# Patient Record
Sex: MALE | Race: WHITE | Employment: OTHER | URBAN - METROPOLITAN AREA
[De-identification: names, ages, dates, MRNs, and addresses within clinical notes are randomized per-mention and may not be internally consistent; named-entity substitution may affect disease eponyms.]

---

## 2018-05-12 ENCOUNTER — HOSPITAL ENCOUNTER (OUTPATIENT)
Age: 68
Discharge: HOME | End: 2018-05-14
Payer: MEDICARE

## 2018-05-12 ENCOUNTER — HOSPITAL ENCOUNTER (OUTPATIENT)
Dept: GENERAL RADIOLOGY | Age: 68
Discharge: HOME | End: 2018-05-14
Payer: MEDICARE

## 2018-05-12 DIAGNOSIS — M19.90 OSTEOARTHRITIS, UNSPECIFIED OSTEOARTHRITIS TYPE, UNSPECIFIED SITE: ICD-10-CM

## 2018-05-12 PROCEDURE — 73590 X-RAY EXAM OF LOWER LEG: CPT

## 2018-08-30 ENCOUNTER — OFFICE VISIT (OUTPATIENT)
Dept: FAMILY MEDICINE CLINIC | Age: 68
End: 2018-08-30
Payer: MEDICARE

## 2018-08-30 VITALS
BODY MASS INDEX: 42.66 KG/M2 | RESPIRATION RATE: 12 BRPM | HEIGHT: 72 IN | OXYGEN SATURATION: 97 % | DIASTOLIC BLOOD PRESSURE: 76 MMHG | HEART RATE: 66 BPM | WEIGHT: 315 LBS | SYSTOLIC BLOOD PRESSURE: 130 MMHG | TEMPERATURE: 96.4 F

## 2018-08-30 DIAGNOSIS — E11.59 TYPE 2 DIABETES MELLITUS WITH OTHER CIRCULATORY COMPLICATION, WITH LONG-TERM CURRENT USE OF INSULIN (HCC): Primary | ICD-10-CM

## 2018-08-30 DIAGNOSIS — R79.89 LOW TESTOSTERONE IN MALE: ICD-10-CM

## 2018-08-30 DIAGNOSIS — Z87.891 HISTORY OF TOBACCO USE: ICD-10-CM

## 2018-08-30 DIAGNOSIS — Z79.4 TYPE 2 DIABETES MELLITUS WITH OTHER CIRCULATORY COMPLICATION, WITH LONG-TERM CURRENT USE OF INSULIN (HCC): Primary | ICD-10-CM

## 2018-08-30 PROBLEM — J96.21 ACUTE AND CHRONIC RESPIRATORY FAILURE WITH HYPOXIA (HCC): Status: ACTIVE | Noted: 2017-01-16

## 2018-08-30 PROBLEM — G47.33 OSA ON CPAP: Status: ACTIVE | Noted: 2017-01-16

## 2018-08-30 PROBLEM — R06.2 WHEEZING: Status: ACTIVE | Noted: 2017-01-16

## 2018-08-30 PROBLEM — Z99.89 OSA ON CPAP: Status: ACTIVE | Noted: 2017-01-16

## 2018-08-30 PROCEDURE — 4040F PNEUMOC VAC/ADMIN/RCVD: CPT | Performed by: FAMILY MEDICINE

## 2018-08-30 PROCEDURE — G8417 CALC BMI ABV UP PARAM F/U: HCPCS | Performed by: FAMILY MEDICINE

## 2018-08-30 PROCEDURE — 1123F ACP DISCUSS/DSCN MKR DOCD: CPT | Performed by: FAMILY MEDICINE

## 2018-08-30 PROCEDURE — 4004F PT TOBACCO SCREEN RCVD TLK: CPT | Performed by: FAMILY MEDICINE

## 2018-08-30 PROCEDURE — 1101F PT FALLS ASSESS-DOCD LE1/YR: CPT | Performed by: FAMILY MEDICINE

## 2018-08-30 PROCEDURE — 3017F COLORECTAL CA SCREEN DOC REV: CPT | Performed by: FAMILY MEDICINE

## 2018-08-30 PROCEDURE — 99203 OFFICE O/P NEW LOW 30 MIN: CPT | Performed by: FAMILY MEDICINE

## 2018-08-30 PROCEDURE — G8427 DOCREV CUR MEDS BY ELIG CLIN: HCPCS | Performed by: FAMILY MEDICINE

## 2018-08-30 PROCEDURE — 2022F DILAT RTA XM EVC RTNOPTHY: CPT | Performed by: FAMILY MEDICINE

## 2018-08-30 PROCEDURE — 3046F HEMOGLOBIN A1C LEVEL >9.0%: CPT | Performed by: FAMILY MEDICINE

## 2018-08-30 RX ORDER — ALBUTEROL SULFATE 90 UG/1
2 AEROSOL, METERED RESPIRATORY (INHALATION) DAILY
COMMUNITY
Start: 2018-06-26 | End: 2020-02-13

## 2018-08-30 RX ORDER — IPRATROPIUM BROMIDE AND ALBUTEROL SULFATE 2.5; .5 MG/3ML; MG/3ML
3 SOLUTION RESPIRATORY (INHALATION) EVERY 4 HOURS PRN
Status: ON HOLD | COMMUNITY
Start: 2017-01-16 | End: 2019-10-25 | Stop reason: HOSPADM

## 2018-08-30 RX ORDER — GABAPENTIN 800 MG/1
800 TABLET ORAL 3 TIMES DAILY
COMMUNITY
End: 2018-08-30

## 2018-08-30 RX ORDER — GABAPENTIN 800 MG/1
800 TABLET ORAL 3 TIMES DAILY
Qty: 90 TABLET | Refills: 0 | Status: SHIPPED | OUTPATIENT
Start: 2018-08-30 | End: 2018-10-18 | Stop reason: SDUPTHER

## 2018-08-30 ASSESSMENT — PATIENT HEALTH QUESTIONNAIRE - PHQ9
SUM OF ALL RESPONSES TO PHQ9 QUESTIONS 1 & 2: 0
SUM OF ALL RESPONSES TO PHQ QUESTIONS 1-9: 0
SUM OF ALL RESPONSES TO PHQ QUESTIONS 1-9: 0
2. FEELING DOWN, DEPRESSED OR HOPELESS: 0
1. LITTLE INTEREST OR PLEASURE IN DOING THINGS: 0

## 2018-08-30 NOTE — PROGRESS NOTES
Subjective:      Patient ID: Launie Sandifer is a 79 y.o. male who presents today for:  Chief Complaint   Patient presents with   [address]     previous PCP Raudel Pierce. license was suspended    Diabetes     patient is here for a follow up on DM type 2. Patient is currently taking Insulin with complications. Diabetes   He presents for his follow-up diabetic visit. His disease course has been stable. There are no hypoglycemic associated symptoms. Pertinent negatives for hypoglycemia include no headaches or seizures. Pertinent negatives for diabetes include no blurred vision, no chest pain, no fatigue, no foot paresthesias, no foot ulcerations, no polydipsia, no polyphagia, no polyuria, no visual change, no weakness and no weight loss. There are no hypoglycemic complications. Symptoms are stable. He is compliant with treatment all of the time. His weight is stable. He is following a generally healthy diet. Meal planning includes avoidance of concentrated sweets. He participates in exercise intermittently. His overall blood glucose range is 130-140 mg/dl. An ACE inhibitor/angiotensin II receptor blocker is being taken. Patient also has a history of low testosterone that he was receiving injections at his primary care office. He states that he recently pain lab work last month and will forward it to our office for review  Past Medical History:   Diagnosis Date    Asthma     [facility]) [facility]     Neuropathy     Obesity     Sleep apnea     Type 2 diabetes mellitus without complication ([facility]      Past Surgical History:   Procedure Laterality Date    RECTAL SURGERY  2015    patient had a boil break open inside rectum. needed surgery for infection removal.     History reviewed. No pertinent family history.   Social History     Social History    Marital status:      Spouse name: N/A    Number of children: N/A    Years of education: N/A     Occupational

## 2018-09-06 ASSESSMENT — ENCOUNTER SYMPTOMS
NAUSEA: 0
DIARRHEA: 0
VOMITING: 0
SHORTNESS OF BREATH: 0
CONSTIPATION: 0
ABDOMINAL PAIN: 0
BLOOD IN STOOL: 0
BLURRED VISION: 0
APNEA: 0
VISUAL CHANGE: 0
COUGH: 0
CHEST TIGHTNESS: 0

## 2018-10-18 ENCOUNTER — OFFICE VISIT (OUTPATIENT)
Dept: FAMILY MEDICINE CLINIC | Age: 68
End: 2018-10-18
Payer: MEDICARE

## 2018-10-18 VITALS
SYSTOLIC BLOOD PRESSURE: 124 MMHG | RESPIRATION RATE: 14 BRPM | OXYGEN SATURATION: 98 % | BODY MASS INDEX: 42.66 KG/M2 | HEART RATE: 73 BPM | TEMPERATURE: 96.2 F | HEIGHT: 72 IN | WEIGHT: 315 LBS | DIASTOLIC BLOOD PRESSURE: 72 MMHG

## 2018-10-18 DIAGNOSIS — E11.8 TYPE 2 DIABETES MELLITUS WITH COMPLICATION, WITH LONG-TERM CURRENT USE OF INSULIN (HCC): ICD-10-CM

## 2018-10-18 DIAGNOSIS — Z91.81 AT HIGH RISK FOR FALLS: ICD-10-CM

## 2018-10-18 DIAGNOSIS — G62.9 NEUROPATHY: Primary | ICD-10-CM

## 2018-10-18 DIAGNOSIS — Z79.4 TYPE 2 DIABETES MELLITUS WITH COMPLICATION, WITH LONG-TERM CURRENT USE OF INSULIN (HCC): ICD-10-CM

## 2018-10-18 DIAGNOSIS — R79.89 LOW TESTOSTERONE IN MALE: ICD-10-CM

## 2018-10-18 PROCEDURE — 3017F COLORECTAL CA SCREEN DOC REV: CPT | Performed by: FAMILY MEDICINE

## 2018-10-18 PROCEDURE — G8417 CALC BMI ABV UP PARAM F/U: HCPCS | Performed by: FAMILY MEDICINE

## 2018-10-18 PROCEDURE — 99214 OFFICE O/P EST MOD 30 MIN: CPT | Performed by: FAMILY MEDICINE

## 2018-10-18 PROCEDURE — 2022F DILAT RTA XM EVC RTNOPTHY: CPT | Performed by: FAMILY MEDICINE

## 2018-10-18 PROCEDURE — 3046F HEMOGLOBIN A1C LEVEL >9.0%: CPT | Performed by: FAMILY MEDICINE

## 2018-10-18 PROCEDURE — 1123F ACP DISCUSS/DSCN MKR DOCD: CPT | Performed by: FAMILY MEDICINE

## 2018-10-18 PROCEDURE — 4004F PT TOBACCO SCREEN RCVD TLK: CPT | Performed by: FAMILY MEDICINE

## 2018-10-18 PROCEDURE — 4040F PNEUMOC VAC/ADMIN/RCVD: CPT | Performed by: FAMILY MEDICINE

## 2018-10-18 PROCEDURE — 1101F PT FALLS ASSESS-DOCD LE1/YR: CPT | Performed by: FAMILY MEDICINE

## 2018-10-18 PROCEDURE — G8484 FLU IMMUNIZE NO ADMIN: HCPCS | Performed by: FAMILY MEDICINE

## 2018-10-18 PROCEDURE — G8427 DOCREV CUR MEDS BY ELIG CLIN: HCPCS | Performed by: FAMILY MEDICINE

## 2018-10-18 RX ORDER — GABAPENTIN 800 MG/1
800 TABLET ORAL 3 TIMES DAILY
Qty: 90 TABLET | Refills: 0 | Status: SHIPPED | OUTPATIENT
Start: 2018-10-18 | End: 2020-02-13

## 2018-10-18 NOTE — PROGRESS NOTES
Subjective:      Patient ID: Amanda Stevens is a 79 y.o. male who presents today for:  No chief complaint on file. HPI  Patient is 57-year-old male with diabetic neuropathy who presents today to follow-up after initial visit last month. Unsure at this time whether he will continue to establish care or will go back to his previous doctor who is currently under suspension. He has not had an hemoglobin A1c in over 3 months and does not check his sugars regularly. However he is compliant/adherent to his medication. He continues to have a wound VAC in place due to a poor healing wound on his right leg. He also has a history of low testosterone which levels have not been checked in over one year. The patient does have mild fatigue and decreased libido    Past Medical History:   Diagnosis Date    Asthma     [facility] obstructive [facility] disease) [facility]     Neuropathy     Obesity     Sleep apnea     Type 2 diabetes mellitus without complication ([facility]      Past Surgical History:   Procedure Laterality Date    RECTAL SURGERY  2015    patient had a boil break open inside rectum. needed surgery for infection removal.     History reviewed. No pertinent family history. Social History     Social History    Marital status:      Spouse name: N/A    Number of children: N/A    Years of education: N/A     Occupational History    Not on file.      Social History Main Topics    Smoking status: Current Every Day Smoker     Packs/day: 0.50     Years: 58.00     Types: Cigarettes    Smokeless tobacco: Never Used    Alcohol use Not on file    Drug use: No    Sexual activity: Not on file     Other Topics Concern    Not on file     Social History Narrative    No narrative on file     Current Outpatient Prescriptions on File Prior to Visit   Medication Sig Dispense Refill    albuterol sulfate  (90 Base) MCG/ACT inhaler Inhale 2 puffs into the lungs      ipratropium-albuterol (DUONEB) 0.5-2.5 (3) MG/3ML reviewed. Assessment & Plan:     1. Neuropathy  We will continue chronic dose of Neurontin  - gabapentin (NEURONTIN) 800 MG tablet; Take 1 tablet by mouth 3 times daily for 30 days. .  Dispense: 90 tablet; Refill: 0    2. Type 2 diabetes mellitus with complication, with long-term current use of insulin ([facility]  Wall obtain labs to assess current status of diabetes if patient is willing to follow-up  - CBC Auto Differential; Future  - Comprehensive Metabolic Panel; Future  - Hemoglobin A1C; Future  - Lipid Panel; Future    3. At high risk for falls  Advised home safety tips    4. Low testosterone in male  We will check current testosterone levels per patient request  - Testosterone Free And Total Male; Future      Return in about 1 month (around 11/18/2018). Jack Cortes MD              On the basis of positive falls risk screening, assessment and plan is as follows: home safety tips provided.

## 2018-10-24 ASSESSMENT — ENCOUNTER SYMPTOMS
BLOOD IN STOOL: 0
SHORTNESS OF BREATH: 0
ABDOMINAL PAIN: 0
VOMITING: 0
CONSTIPATION: 0
APNEA: 0
CHEST TIGHTNESS: 0
DIARRHEA: 0
NAUSEA: 0
COUGH: 0

## 2018-11-13 ENCOUNTER — APPOINTMENT (OUTPATIENT)
Dept: GENERAL RADIOLOGY | Age: 68
End: 2018-11-13
Payer: MEDICARE

## 2018-11-13 ENCOUNTER — HOSPITAL ENCOUNTER (EMERGENCY)
Age: 68
Discharge: HOME | End: 2018-11-13
Attending: EMERGENCY MEDICINE
Payer: MEDICARE

## 2018-11-13 VITALS
BODY MASS INDEX: 48.15 KG/M2 | RESPIRATION RATE: 18 BRPM | WEIGHT: 315 LBS | OXYGEN SATURATION: 94 % | HEART RATE: 64 BPM | SYSTOLIC BLOOD PRESSURE: 117 MMHG | DIASTOLIC BLOOD PRESSURE: 93 MMHG | TEMPERATURE: 99.3 F

## 2018-11-13 DIAGNOSIS — M25.561 ACUTE PAIN OF RIGHT KNEE: Primary | ICD-10-CM
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
## 2018-11-13 DIAGNOSIS — M25.562 ACUTE PAIN OF LEFT KNEE: ICD-10-CM

## 2018-11-13 LAB
ALBUMIN SERPL-MCNC: 2.6 G/DL (ref 3.9–4.9)
ALP BLD-CCNC: 164 U/L (ref 35–104)
ALT SERPL-CCNC: <5 U/L (ref 0–41)
ANION GAP SERPL CALCULATED.3IONS-SCNC: 12 MEQ/L (ref 7–13)
AST SERPL-CCNC: 34 U/L (ref 0–40)
BASOPHILS ABSOLUTE: 0.1 K/UL (ref 0–0.2)
BASOPHILS RELATIVE PERCENT: 0.8 %
BILIRUB SERPL-MCNC: 1.6 MG/DL (ref 0–1.2)
BUN BLDV-MCNC: 23 MG/DL (ref 8–23)
CALCIUM SERPL-MCNC: 8.8 MG/DL (ref 8.6–10.2)
CHLORIDE BLD-SCNC: 103 MEQ/L (ref 98–107)
CO2: 24 MEQ/L (ref 22–29)
CREAT SERPL-MCNC: 0.87 MG/DL (ref 0.7–1.2)
EOSINOPHILS ABSOLUTE: 0 K/UL (ref 0–0.7)
EOSINOPHILS RELATIVE PERCENT: 0.1 %
GFR AFRICAN AMERICAN: >60
GFR NON-AFRICAN AMERICAN: >60
GLOBULIN: 3.7 G/DL (ref 2.3–3.5)
GLUCOSE BLD-MCNC: 97 MG/DL (ref 74–109)
HCT VFR BLD CALC: 47.9 % (ref 42–52)
HEMOGLOBIN: 15.9 G/DL (ref 14–18)
LYMPHOCYTES ABSOLUTE: 0.3 K/UL (ref 1–4.8)
LYMPHOCYTES RELATIVE PERCENT: 3.1 %
MCH RBC QN AUTO: 32.8 PG (ref 27–31.3)
MCHC RBC AUTO-ENTMCNC: 33.1 % (ref 33–37)
MCV RBC AUTO: 99 FL (ref 80–100)
MONOCYTES ABSOLUTE: 0.8 K/UL (ref 0.2–0.8)
MONOCYTES RELATIVE PERCENT: 9.1 %
NEUTROPHILS ABSOLUTE: 7.9 K/UL (ref 1.4–6.5)
NEUTROPHILS RELATIVE PERCENT: 86.9 %
PDW BLD-RTO: 16.5 % (ref 11.5–14.5)
PLATELET # BLD: 137 K/UL (ref 130–400)
POTASSIUM SERPL-SCNC: 4.7 MEQ/L (ref 3.5–5.1)
RBC # BLD: 4.84 M/UL (ref 4.7–6.1)
SODIUM BLD-SCNC: 139 MEQ/L (ref 132–144)
TOTAL PROTEIN: 6.3 G/DL (ref 6.4–8.1)
WBC # BLD: 9.1 K/UL (ref 4.8–10.8)

## 2018-11-13 PROCEDURE — 99284 EMERGENCY DEPT VISIT MOD MDM: CPT

## 2018-11-13 PROCEDURE — 36415 COLL VENOUS BLD VENIPUNCTURE: CPT

## 2018-11-13 PROCEDURE — 73562 X-RAY EXAM OF KNEE 3: CPT

## 2018-11-13 PROCEDURE — 96374 THER/PROPH/DIAG INJ IV PUSH: CPT

## 2018-11-13 PROCEDURE — 2580000003 HC RX 258: Performed by: EMERGENCY MEDICINE

## 2018-11-13 PROCEDURE — 85025 COMPLETE CBC W/AUTO DIFF WBC: CPT

## 2018-11-13 PROCEDURE — 80053 COMPREHEN METABOLIC PANEL: CPT

## 2018-11-13 PROCEDURE — 96375 TX/PRO/DX INJ NEW DRUG ADDON: CPT

## 2018-11-13 PROCEDURE — 6360000002 HC RX W HCPCS: Performed by: EMERGENCY MEDICINE

## 2018-11-13 RX ORDER — 0.9 % SODIUM CHLORIDE 0.9 %
1000 INTRAVENOUS SOLUTION INTRAVENOUS ONCE
Status: COMPLETED | OUTPATIENT
Start: 2018-11-13 | End: 2018-11-13

## 2018-11-13 RX ORDER — ONDANSETRON 2 MG/ML
4 INJECTION INTRAMUSCULAR; INTRAVENOUS ONCE
Status: COMPLETED | OUTPATIENT
Start: 2018-11-13 | End: 2018-11-13

## 2018-11-13 RX ORDER — KETOROLAC TROMETHAMINE 15 MG/ML
15 INJECTION, SOLUTION INTRAMUSCULAR; INTRAVENOUS ONCE
Status: COMPLETED | OUTPATIENT
Start: 2018-11-13 | End: 2018-11-13

## 2018-11-13 RX ORDER — OXYCODONE HYDROCHLORIDE AND ACETAMINOPHEN 5; 325 MG/1; MG/1
1 TABLET ORAL PRN
Status: ON HOLD | COMMUNITY
End: 2019-05-14 | Stop reason: SDUPTHER

## 2018-11-13 RX ORDER — HYDROCODONE BITARTRATE AND ACETAMINOPHEN 5; 325 MG/1; MG/1
1 TABLET ORAL EVERY 4 HOURS PRN
Qty: 18 TABLET | Refills: 0 | Status: SHIPPED | OUTPATIENT
Start: 2018-11-13 | End: 2018-11-16

## 2018-11-13 RX ADMIN — SODIUM CHLORIDE 1000 ML: 9 INJECTION, SOLUTION INTRAVENOUS at 11:21

## 2018-11-13 RX ADMIN — HYDROMORPHONE HYDROCHLORIDE 1 MG: 1 INJECTION, SOLUTION INTRAMUSCULAR; INTRAVENOUS; SUBCUTANEOUS at 11:26

## 2018-11-13 RX ADMIN — ONDANSETRON 4 MG: 2 INJECTION INTRAMUSCULAR; INTRAVENOUS at 11:23

## 2018-11-13 RX ADMIN — KETOROLAC TROMETHAMINE 15 MG: 15 INJECTION, SOLUTION INTRAMUSCULAR; INTRAVENOUS at 11:23

## 2018-11-13 ASSESSMENT — PAIN SCALES - GENERAL
PAINLEVEL_OUTOF10: 7
PAINLEVEL_OUTOF10: 5
PAINLEVEL_OUTOF10: 7
PAINLEVEL_OUTOF10: 8

## 2018-11-13 ASSESSMENT — PAIN DESCRIPTION - ONSET
ONSET: ON-GOING
ONSET: ON-GOING

## 2018-11-13 ASSESSMENT — ENCOUNTER SYMPTOMS
DIARRHEA: 0
VOMITING: 0
ABDOMINAL PAIN: 0
BACK PAIN: 0
SORE THROAT: 0
NAUSEA: 0
COUGH: 0
SHORTNESS OF BREATH: 0

## 2018-11-13 ASSESSMENT — PAIN DESCRIPTION - FREQUENCY
FREQUENCY: CONTINUOUS
FREQUENCY: CONTINUOUS

## 2018-11-13 ASSESSMENT — PAIN DESCRIPTION - DESCRIPTORS
DESCRIPTORS: THROBBING
DESCRIPTORS: THROBBING

## 2018-11-13 ASSESSMENT — PAIN DESCRIPTION - PROGRESSION
CLINICAL_PROGRESSION: NOT CHANGED
CLINICAL_PROGRESSION: NOT CHANGED

## 2018-11-13 ASSESSMENT — PAIN DESCRIPTION - LOCATION
LOCATION: KNEE
LOCATION: KNEE

## 2018-11-13 ASSESSMENT — PAIN DESCRIPTION - PAIN TYPE
TYPE: ACUTE PAIN
TYPE: CHRONIC PAIN;ACUTE PAIN

## 2018-11-13 ASSESSMENT — PAIN DESCRIPTION - ORIENTATION
ORIENTATION: RIGHT;LEFT
ORIENTATION: RIGHT;LEFT

## 2018-11-19 ENCOUNTER — HOSPITAL ENCOUNTER (INPATIENT)
Age: 68
LOS: 1 days | Discharge: ANOTHER HOSPITAL | DRG: 299 | End: 2018-11-20
Attending: INTERNAL MEDICINE | Admitting: INTERNAL MEDICINE
Payer: MEDICARE

## 2018-11-19 ENCOUNTER — APPOINTMENT (OUTPATIENT)
Dept: GENERAL RADIOLOGY | Age: 68
DRG: 299 | End: 2018-11-19
Payer: MEDICARE

## 2018-11-19 DIAGNOSIS — R26.2 UNABLE TO AMBULATE: ICD-10-CM

## 2018-11-19 DIAGNOSIS — M79.604 RIGHT LEG PAIN: ICD-10-CM

## 2018-11-19 DIAGNOSIS — L03.115 CELLULITIS OF RIGHT LOWER EXTREMITY: Primary | ICD-10-CM

## 2018-11-19 LAB
ALBUMIN SERPL-MCNC: 2.3 G/DL (ref 3.9–4.9)
ALP BLD-CCNC: 316 U/L (ref 35–104)
ALT SERPL-CCNC: <5 U/L (ref 0–41)
ANION GAP SERPL CALCULATED.3IONS-SCNC: 14 MEQ/L (ref 7–13)
APTT: 25.4 SEC (ref 21.6–35.4)
AST SERPL-CCNC: 48 U/L (ref 0–40)
BASOPHILS ABSOLUTE: 0 K/UL (ref 0–0.2)
BASOPHILS RELATIVE PERCENT: 0.4 %
BILIRUB SERPL-MCNC: 5.1 MG/DL (ref 0–1.2)
BUN BLDV-MCNC: 51 MG/DL (ref 8–23)
C-REACTIVE PROTEIN: 406.4 MG/L (ref 0–5)
CALCIUM SERPL-MCNC: 9.6 MG/DL (ref 8.6–10.2)
CHLORIDE BLD-SCNC: 94 MEQ/L (ref 98–107)
CO2: 28 MEQ/L (ref 22–29)
CREAT SERPL-MCNC: 0.99 MG/DL (ref 0.7–1.2)
EOSINOPHILS ABSOLUTE: 0 K/UL (ref 0–0.7)
EOSINOPHILS RELATIVE PERCENT: 0.1 %
GFR AFRICAN AMERICAN: >60
GFR NON-AFRICAN AMERICAN: >60
GLOBULIN: 4.6 G/DL (ref 2.3–3.5)
GLUCOSE BLD-MCNC: 204 MG/DL (ref 74–109)
HCT VFR BLD CALC: 48.8 % (ref 42–52)
HEMOGLOBIN: 16.2 G/DL (ref 14–18)
INR BLD: 1.2
LACTIC ACID: 2 MMOL/L (ref 0.5–2.2)
LYMPHOCYTES ABSOLUTE: 0.8 K/UL (ref 1–4.8)
LYMPHOCYTES RELATIVE PERCENT: 6.5 %
MCH RBC QN AUTO: 32.8 PG (ref 27–31.3)
MCHC RBC AUTO-ENTMCNC: 33.1 % (ref 33–37)
MCV RBC AUTO: 98.9 FL (ref 80–100)
MONOCYTES ABSOLUTE: 0.7 K/UL (ref 0.2–0.8)
MONOCYTES RELATIVE PERCENT: 5.5 %
NEUTROPHILS ABSOLUTE: 10.9 K/UL (ref 1.4–6.5)
NEUTROPHILS RELATIVE PERCENT: 87.5 %
PDW BLD-RTO: 17.1 % (ref 11.5–14.5)
PLATELET # BLD: 217 K/UL (ref 130–400)
POTASSIUM SERPL-SCNC: 4.5 MEQ/L (ref 3.5–5.1)
PROTHROMBIN TIME: 12.9 SEC (ref 9.6–12.3)
RBC # BLD: 4.93 M/UL (ref 4.7–6.1)
SODIUM BLD-SCNC: 136 MEQ/L (ref 132–144)
TOTAL CK: 50 U/L (ref 0–190)
TOTAL PROTEIN: 6.9 G/DL (ref 6.4–8.1)
WBC # BLD: 12.4 K/UL (ref 4.8–10.8)

## 2018-11-19 PROCEDURE — 83605 ASSAY OF LACTIC ACID: CPT

## 2018-11-19 PROCEDURE — 85025 COMPLETE CBC W/AUTO DIFF WBC: CPT

## 2018-11-19 PROCEDURE — 96375 TX/PRO/DX INJ NEW DRUG ADDON: CPT

## 2018-11-19 PROCEDURE — 80053 COMPREHEN METABOLIC PANEL: CPT

## 2018-11-19 PROCEDURE — 87040 BLOOD CULTURE FOR BACTERIA: CPT

## 2018-11-19 PROCEDURE — 2580000003 HC RX 258: Performed by: NURSE PRACTITIONER

## 2018-11-19 PROCEDURE — 86140 C-REACTIVE PROTEIN: CPT

## 2018-11-19 PROCEDURE — 85730 THROMBOPLASTIN TIME PARTIAL: CPT

## 2018-11-19 PROCEDURE — 73630 X-RAY EXAM OF FOOT: CPT

## 2018-11-19 PROCEDURE — 82550 ASSAY OF CK (CPK): CPT

## 2018-11-19 PROCEDURE — 6360000002 HC RX W HCPCS: Performed by: NURSE PRACTITIONER

## 2018-11-19 PROCEDURE — 99285 EMERGENCY DEPT VISIT HI MDM: CPT

## 2018-11-19 PROCEDURE — 93005 ELECTROCARDIOGRAM TRACING: CPT

## 2018-11-19 PROCEDURE — 85610 PROTHROMBIN TIME: CPT

## 2018-11-19 RX ORDER — KETOROLAC TROMETHAMINE 15 MG/ML
15 INJECTION, SOLUTION INTRAMUSCULAR; INTRAVENOUS ONCE
Status: COMPLETED | OUTPATIENT
Start: 2018-11-19 | End: 2018-11-19

## 2018-11-19 RX ORDER — 0.9 % SODIUM CHLORIDE 0.9 %
1000 INTRAVENOUS SOLUTION INTRAVENOUS ONCE
Status: COMPLETED | OUTPATIENT
Start: 2018-11-19 | End: 2018-11-20

## 2018-11-19 RX ORDER — NICOTINE 21 MG/24HR
1 PATCH, TRANSDERMAL 24 HOURS TRANSDERMAL ONCE
Status: DISCONTINUED | OUTPATIENT
Start: 2018-11-19 | End: 2018-11-21 | Stop reason: HOSPADM

## 2018-11-19 RX ADMIN — SODIUM CHLORIDE 1000 ML: 9 INJECTION, SOLUTION INTRAVENOUS at 22:39

## 2018-11-19 RX ADMIN — HYDROMORPHONE HYDROCHLORIDE 0.5 MG: 1 INJECTION, SOLUTION INTRAMUSCULAR; INTRAVENOUS; SUBCUTANEOUS at 22:39

## 2018-11-19 RX ADMIN — KETOROLAC TROMETHAMINE 15 MG: 15 INJECTION, SOLUTION INTRAMUSCULAR; INTRAVENOUS at 22:39

## 2018-11-19 ASSESSMENT — PAIN DESCRIPTION - LOCATION: LOCATION: FOOT;LEG

## 2018-11-19 ASSESSMENT — PAIN DESCRIPTION - PAIN TYPE
TYPE: ACUTE PAIN
TYPE: CHRONIC PAIN;ACUTE PAIN

## 2018-11-19 ASSESSMENT — PAIN SCALES - GENERAL
PAINLEVEL_OUTOF10: 5
PAINLEVEL_OUTOF10: 7
PAINLEVEL_OUTOF10: 7

## 2018-11-19 ASSESSMENT — PAIN DESCRIPTION - ORIENTATION: ORIENTATION: RIGHT;LEFT

## 2018-11-20 ENCOUNTER — APPOINTMENT (OUTPATIENT)
Dept: GENERAL RADIOLOGY | Age: 68
DRG: 299 | End: 2018-11-20
Payer: MEDICARE

## 2018-11-20 ENCOUNTER — APPOINTMENT (OUTPATIENT)
Dept: ULTRASOUND IMAGING | Age: 68
DRG: 299 | End: 2018-11-20
Payer: MEDICARE

## 2018-11-20 VITALS
OXYGEN SATURATION: 94 % | BODY MASS INDEX: 42.66 KG/M2 | RESPIRATION RATE: 16 BRPM | WEIGHT: 315 LBS | DIASTOLIC BLOOD PRESSURE: 47 MMHG | HEART RATE: 71 BPM | TEMPERATURE: 97.5 F | HEIGHT: 72 IN | SYSTOLIC BLOOD PRESSURE: 93 MMHG

## 2018-11-20 PROBLEM — D72.829 LEUKOCYTOSIS: Status: ACTIVE | Noted: 2018-11-20

## 2018-11-20 PROBLEM — E11.65 HYPERGLYCEMIA DUE TO TYPE 2 DIABETES MELLITUS (HCC): Chronic | Status: ACTIVE | Noted: 2018-11-20

## 2018-11-20 PROBLEM — Z87.891 HISTORY OF TOBACCO USE: Chronic | Status: ACTIVE | Noted: 2017-01-16

## 2018-11-20 PROBLEM — A48.0 GAS GANGRENE OF FOOT (HCC): Chronic | Status: ACTIVE | Noted: 2018-11-20

## 2018-11-20 PROBLEM — L03.90 CELLULITIS: Status: ACTIVE | Noted: 2018-11-20

## 2018-11-20 PROBLEM — R26.2 UNABLE TO AMBULATE: Status: ACTIVE | Noted: 2018-11-20

## 2018-11-20 PROBLEM — I73.9 PVD (PERIPHERAL VASCULAR DISEASE) (HCC): Chronic | Status: ACTIVE | Noted: 2018-11-20

## 2018-11-20 PROBLEM — G47.33 OSA ON CPAP: Chronic | Status: ACTIVE | Noted: 2017-01-16

## 2018-11-20 PROBLEM — R17 ELEVATED BILIRUBIN: Status: ACTIVE | Noted: 2018-11-20

## 2018-11-20 PROBLEM — S81.802A LEG WOUND, LEFT: Status: ACTIVE | Noted: 2018-11-20

## 2018-11-20 PROBLEM — Z99.89 OSA ON CPAP: Chronic | Status: ACTIVE | Noted: 2017-01-16

## 2018-11-20 PROBLEM — A48.0 GAS GANGRENE OF FOOT (HCC): Status: ACTIVE | Noted: 2018-11-20

## 2018-11-20 PROBLEM — R26.2 UNABLE TO AMBULATE: Chronic | Status: ACTIVE | Noted: 2018-11-20

## 2018-11-20 LAB
ABO/RH: NORMAL
ALBUMIN SERPL-MCNC: 1.8 G/DL (ref 3.9–4.9)
ALP BLD-CCNC: 285 U/L (ref 35–104)
ALT SERPL-CCNC: <5 U/L (ref 0–41)
ANION GAP SERPL CALCULATED.3IONS-SCNC: 15 MEQ/L (ref 7–13)
ANISOCYTOSIS: ABNORMAL
ANTIBODY SCREEN: NORMAL
APTT: 24.9 SEC (ref 21.6–35.4)
AST SERPL-CCNC: 46 U/L (ref 0–40)
BACTERIA: ABNORMAL /HPF
BANDED NEUTROPHILS RELATIVE PERCENT: 4 %
BASOPHILS ABSOLUTE: 0 K/UL (ref 0–0.2)
BASOPHILS RELATIVE PERCENT: 0.2 %
BILIRUB SERPL-MCNC: 4.8 MG/DL (ref 0–1.2)
BILIRUBIN DIRECT: 4 MG/DL (ref 0–0.3)
BILIRUBIN URINE: ABNORMAL
BILIRUBIN, INDIRECT: 0.8 MG/DL (ref 0–0.6)
BLOOD, URINE: NEGATIVE
BUN BLDV-MCNC: 52 MG/DL (ref 8–23)
CALCIUM SERPL-MCNC: 9.1 MG/DL (ref 8.6–10.2)
CHLORIDE BLD-SCNC: 101 MEQ/L (ref 98–107)
CLARITY: CLEAR
CO2: 22 MEQ/L (ref 22–29)
COLOR: ABNORMAL
CREAT SERPL-MCNC: 0.92 MG/DL (ref 0.7–1.2)
EKG ATRIAL RATE: 71 BPM
EKG ATRIAL RATE: 73 BPM
EKG P AXIS: 62 DEGREES
EKG P AXIS: 73 DEGREES
EKG P-R INTERVAL: 190 MS
EKG P-R INTERVAL: 194 MS
EKG Q-T INTERVAL: 416 MS
EKG Q-T INTERVAL: 416 MS
EKG QRS DURATION: 160 MS
EKG QRS DURATION: 162 MS
EKG QTC CALCULATION (BAZETT): 452 MS
EKG QTC CALCULATION (BAZETT): 458 MS
EKG R AXIS: 65 DEGREES
EKG R AXIS: 85 DEGREES
EKG T AXIS: -28 DEGREES
EKG T AXIS: -40 DEGREES
EKG VENTRICULAR RATE: 71 BPM
EKG VENTRICULAR RATE: 73 BPM
EOSINOPHILS ABSOLUTE: 0 K/UL (ref 0–0.7)
EOSINOPHILS RELATIVE PERCENT: 0.1 %
EPITHELIAL CELLS, UA: ABNORMAL /HPF
GFR AFRICAN AMERICAN: >60
GFR NON-AFRICAN AMERICAN: >60
GLOBULIN: 4.1 G/DL (ref 2.3–3.5)
GLUCOSE BLD-MCNC: 181 MG/DL (ref 60–115)
GLUCOSE BLD-MCNC: 195 MG/DL (ref 74–109)
GLUCOSE BLD-MCNC: 214 MG/DL (ref 60–115)
GLUCOSE BLD-MCNC: 219 MG/DL (ref 60–115)
GLUCOSE BLD-MCNC: 241 MG/DL (ref 60–115)
GLUCOSE URINE: NEGATIVE MG/DL
HBA1C MFR BLD: 7.2 % (ref 4.8–5.9)
HCT VFR BLD CALC: 44.8 % (ref 42–52)
HEMOGLOBIN: 14.7 G/DL (ref 14–18)
KETONES, URINE: ABNORMAL MG/DL
LACTIC ACID: 1.8 MMOL/L (ref 0.5–2.2)
LACTIC ACID: 2.1 MMOL/L (ref 0.5–2.2)
LEUKOCYTE ESTERASE, URINE: ABNORMAL
LV EF: 60 %
LVEF MODALITY: NORMAL
LYMPHOCYTES ABSOLUTE: 0.3 K/UL (ref 1–4.8)
LYMPHOCYTES RELATIVE PERCENT: 2 %
MACROCYTES: ABNORMAL
MCH RBC QN AUTO: 32.5 PG (ref 27–31.3)
MCHC RBC AUTO-ENTMCNC: 32.9 % (ref 33–37)
MCV RBC AUTO: 98.9 FL (ref 80–100)
MONOCYTES ABSOLUTE: 0.6 K/UL (ref 0.2–0.8)
MONOCYTES RELATIVE PERCENT: 3.8 %
NEUTROPHILS ABSOLUTE: 14.5 K/UL (ref 1.4–6.5)
NEUTROPHILS RELATIVE PERCENT: 91 %
NITRITE, URINE: POSITIVE
PDW BLD-RTO: 17 % (ref 11.5–14.5)
PERFORMED ON: ABNORMAL
PH UA: 6 (ref 5–9)
PLATELET # BLD: 211 K/UL (ref 130–400)
PLATELET SLIDE REVIEW: NORMAL
POTASSIUM REFLEX MAGNESIUM: 4.5 MEQ/L (ref 3.5–5.1)
PROTEIN UA: >=300 MG/DL
RBC # BLD: 4.52 M/UL (ref 4.7–6.1)
RBC UA: ABNORMAL /HPF (ref 0–2)
RENAL EPITHELIAL, UA: ABNORMAL /HPF
SEDIMENTATION RATE, ERYTHROCYTE: 59 MM (ref 0–20)
SODIUM BLD-SCNC: 138 MEQ/L (ref 132–144)
SPECIFIC GRAVITY UA: 1.03 (ref 1–1.03)
TOTAL PROTEIN: 5.9 G/DL (ref 6.4–8.1)
URINE REFLEX TO CULTURE: YES
UROBILINOGEN, URINE: 4 E.U./DL
WBC # BLD: 15.3 K/UL (ref 4.8–10.8)
WBC UA: ABNORMAL /HPF (ref 0–5)

## 2018-11-20 PROCEDURE — 80053 COMPREHEN METABOLIC PANEL: CPT

## 2018-11-20 PROCEDURE — 87086 URINE CULTURE/COLONY COUNT: CPT

## 2018-11-20 PROCEDURE — 6360000002 HC RX W HCPCS: Performed by: NURSE PRACTITIONER

## 2018-11-20 PROCEDURE — 87075 CULTR BACTERIA EXCEPT BLOOD: CPT

## 2018-11-20 PROCEDURE — 73630 X-RAY EXAM OF FOOT: CPT

## 2018-11-20 PROCEDURE — 2580000003 HC RX 258: Performed by: NURSE PRACTITIONER

## 2018-11-20 PROCEDURE — 93005 ELECTROCARDIOGRAM TRACING: CPT

## 2018-11-20 PROCEDURE — 71046 X-RAY EXAM CHEST 2 VIEWS: CPT

## 2018-11-20 PROCEDURE — 99222 1ST HOSP IP/OBS MODERATE 55: CPT | Performed by: INTERNAL MEDICINE

## 2018-11-20 PROCEDURE — 93010 ELECTROCARDIOGRAM REPORT: CPT | Performed by: INTERNAL MEDICINE

## 2018-11-20 PROCEDURE — 6360000002 HC RX W HCPCS: Performed by: HOSPITALIST

## 2018-11-20 PROCEDURE — 83036 HEMOGLOBIN GLYCOSYLATED A1C: CPT

## 2018-11-20 PROCEDURE — 2580000003 HC RX 258

## 2018-11-20 PROCEDURE — 86900 BLOOD TYPING SEROLOGIC ABO: CPT

## 2018-11-20 PROCEDURE — 87070 CULTURE OTHR SPECIMN AEROBIC: CPT

## 2018-11-20 PROCEDURE — 2500000003 HC RX 250 WO HCPCS: Performed by: INTERNAL MEDICINE

## 2018-11-20 PROCEDURE — 2060000000 HC ICU INTERMEDIATE R&B

## 2018-11-20 PROCEDURE — 93925 LOWER EXTREMITY STUDY: CPT

## 2018-11-20 PROCEDURE — 87186 SC STD MICRODIL/AGAR DIL: CPT

## 2018-11-20 PROCEDURE — 82248 BILIRUBIN DIRECT: CPT

## 2018-11-20 PROCEDURE — 85652 RBC SED RATE AUTOMATED: CPT

## 2018-11-20 PROCEDURE — 6370000000 HC RX 637 (ALT 250 FOR IP): Performed by: STUDENT IN AN ORGANIZED HEALTH CARE EDUCATION/TRAINING PROGRAM

## 2018-11-20 PROCEDURE — 87077 CULTURE AEROBIC IDENTIFY: CPT

## 2018-11-20 PROCEDURE — 87205 SMEAR GRAM STAIN: CPT

## 2018-11-20 PROCEDURE — 96368 THER/DIAG CONCURRENT INF: CPT

## 2018-11-20 PROCEDURE — 93306 TTE W/DOPPLER COMPLETE: CPT

## 2018-11-20 PROCEDURE — 85730 THROMBOPLASTIN TIME PARTIAL: CPT

## 2018-11-20 PROCEDURE — 86901 BLOOD TYPING SEROLOGIC RH(D): CPT

## 2018-11-20 PROCEDURE — 87040 BLOOD CULTURE FOR BACTERIA: CPT

## 2018-11-20 PROCEDURE — 2580000003 HC RX 258: Performed by: HOSPITALIST

## 2018-11-20 PROCEDURE — 6370000000 HC RX 637 (ALT 250 FOR IP): Performed by: HOSPITALIST

## 2018-11-20 PROCEDURE — 86850 RBC ANTIBODY SCREEN: CPT

## 2018-11-20 PROCEDURE — 36415 COLL VENOUS BLD VENIPUNCTURE: CPT

## 2018-11-20 PROCEDURE — 73590 X-RAY EXAM OF LOWER LEG: CPT

## 2018-11-20 PROCEDURE — 83605 ASSAY OF LACTIC ACID: CPT

## 2018-11-20 PROCEDURE — 82247 BILIRUBIN TOTAL: CPT

## 2018-11-20 PROCEDURE — 85025 COMPLETE CBC W/AUTO DIFF WBC: CPT

## 2018-11-20 PROCEDURE — 96365 THER/PROPH/DIAG IV INF INIT: CPT

## 2018-11-20 PROCEDURE — 81001 URINALYSIS AUTO W/SCOPE: CPT

## 2018-11-20 RX ORDER — ONDANSETRON 2 MG/ML
4 INJECTION INTRAMUSCULAR; INTRAVENOUS EVERY 6 HOURS PRN
Status: DISCONTINUED | OUTPATIENT
Start: 2018-11-20 | End: 2018-11-21 | Stop reason: HOSPADM

## 2018-11-20 RX ORDER — SODIUM CHLORIDE 0.9 % (FLUSH) 0.9 %
10 SYRINGE (ML) INJECTION EVERY 12 HOURS SCHEDULED
Status: DISCONTINUED | OUTPATIENT
Start: 2018-11-20 | End: 2018-11-21 | Stop reason: HOSPADM

## 2018-11-20 RX ORDER — DEXTROSE MONOHYDRATE 25 G/50ML
12.5 INJECTION, SOLUTION INTRAVENOUS PRN
Status: DISCONTINUED | OUTPATIENT
Start: 2018-11-20 | End: 2018-11-21 | Stop reason: HOSPADM

## 2018-11-20 RX ORDER — NICOTINE POLACRILEX 4 MG
15 LOZENGE BUCCAL PRN
Status: DISCONTINUED | OUTPATIENT
Start: 2018-11-20 | End: 2018-11-21 | Stop reason: HOSPADM

## 2018-11-20 RX ORDER — SODIUM CHLORIDE 9 MG/ML
INJECTION, SOLUTION INTRAVENOUS
Status: COMPLETED
Start: 2018-11-20 | End: 2018-11-20

## 2018-11-20 RX ORDER — DEXTROSE MONOHYDRATE 50 MG/ML
100 INJECTION, SOLUTION INTRAVENOUS PRN
Status: DISCONTINUED | OUTPATIENT
Start: 2018-11-20 | End: 2018-11-21 | Stop reason: HOSPADM

## 2018-11-20 RX ORDER — SODIUM CHLORIDE 0.9 % (FLUSH) 0.9 %
10 SYRINGE (ML) INJECTION PRN
Status: DISCONTINUED | OUTPATIENT
Start: 2018-11-20 | End: 2018-11-21 | Stop reason: HOSPADM

## 2018-11-20 RX ORDER — PANTOPRAZOLE SODIUM 40 MG/1
40 TABLET, DELAYED RELEASE ORAL
Status: DISCONTINUED | OUTPATIENT
Start: 2018-11-20 | End: 2018-11-21 | Stop reason: HOSPADM

## 2018-11-20 RX ORDER — KETOROLAC TROMETHAMINE 15 MG/ML
15 INJECTION, SOLUTION INTRAMUSCULAR; INTRAVENOUS EVERY 6 HOURS PRN
Status: DISCONTINUED | OUTPATIENT
Start: 2018-11-20 | End: 2018-11-21 | Stop reason: HOSPADM

## 2018-11-20 RX ORDER — ACETAMINOPHEN 325 MG/1
650 TABLET ORAL EVERY 4 HOURS PRN
Status: DISCONTINUED | OUTPATIENT
Start: 2018-11-20 | End: 2018-11-21 | Stop reason: HOSPADM

## 2018-11-20 RX ORDER — DEXTROSE MONOHYDRATE 50 MG/ML
INJECTION, SOLUTION INTRAVENOUS
Status: COMPLETED
Start: 2018-11-20 | End: 2018-11-20

## 2018-11-20 RX ADMIN — Medication 10 ML: at 22:10

## 2018-11-20 RX ADMIN — VANCOMYCIN HYDROCHLORIDE 1750 MG: 5 INJECTION, POWDER, LYOPHILIZED, FOR SOLUTION INTRAVENOUS at 14:25

## 2018-11-20 RX ADMIN — ENOXAPARIN SODIUM 40 MG: 40 INJECTION SUBCUTANEOUS at 08:40

## 2018-11-20 RX ADMIN — PIPERACILLIN SODIUM,TAZOBACTAM SODIUM 3.38 G: 3; .375 INJECTION, POWDER, FOR SOLUTION INTRAVENOUS at 17:05

## 2018-11-20 RX ADMIN — PIPERACILLIN SODIUM,TAZOBACTAM SODIUM 3.38 G: 3; .375 INJECTION, POWDER, FOR SOLUTION INTRAVENOUS at 08:39

## 2018-11-20 RX ADMIN — HYOSCYAMINE SULFATE: 16 SOLUTION at 04:09

## 2018-11-20 RX ADMIN — PIPERACILLIN SODIUM,TAZOBACTAM SODIUM 3.38 G: 3; .375 INJECTION, POWDER, FOR SOLUTION INTRAVENOUS at 00:31

## 2018-11-20 RX ADMIN — VANCOMYCIN HYDROCHLORIDE 1000 MG: 1 INJECTION, POWDER, LYOPHILIZED, FOR SOLUTION INTRAVENOUS at 01:00

## 2018-11-20 RX ADMIN — KETOROLAC TROMETHAMINE 15 MG: 15 INJECTION, SOLUTION INTRAMUSCULAR; INTRAVENOUS at 04:00

## 2018-11-20 RX ADMIN — KETOROLAC TROMETHAMINE 15 MG: 15 INJECTION, SOLUTION INTRAMUSCULAR; INTRAVENOUS at 12:26

## 2018-11-20 RX ADMIN — DEXTROSE MONOHYDRATE 250 ML: 50 INJECTION, SOLUTION INTRAVENOUS at 01:14

## 2018-11-20 RX ADMIN — Medication 10 ML: at 08:40

## 2018-11-20 RX ADMIN — DAKIN'S SOLUTION 0.125% (QUARTER STRENGTH): 0.12 SOLUTION at 17:03

## 2018-11-20 RX ADMIN — SODIUM CHLORIDE: 9 INJECTION, SOLUTION INTRAVENOUS at 08:39

## 2018-11-20 RX ADMIN — MICONAZOLE NITRATE: 20 POWDER TOPICAL at 14:39

## 2018-11-20 ASSESSMENT — ENCOUNTER SYMPTOMS
STRIDOR: 0
CHEST TIGHTNESS: 0
ABDOMINAL PAIN: 0
COLOR CHANGE: 0
EYE PAIN: 0
EYE DISCHARGE: 0
SORE THROAT: 0
NAUSEA: 0
APNEA: 0
COUGH: 0
TROUBLE SWALLOWING: 0
VOMITING: 0
RHINORRHEA: 0
CONSTIPATION: 0
WHEEZING: 0
ABDOMINAL DISTENTION: 0
DIARRHEA: 0
CHOKING: 0
BACK PAIN: 0
SHORTNESS OF BREATH: 0

## 2018-11-20 ASSESSMENT — PAIN SCALES - GENERAL
PAINLEVEL_OUTOF10: 10
PAINLEVEL_OUTOF10: 7

## 2018-11-20 NOTE — ED NOTES
Bed: 24  Expected date: 11/19/18  Expected time: 8:36 PM  Means of arrival: [facility]  Comments:  67yo M, weakness     Phillip Vance, RN  11/19/18 2045

## 2018-11-20 NOTE — DISCHARGE SUMMARY
of the lateral ankle extending along the anterior soft tissues. Irregularity in the anterior soft tissues of the distal lower extremity are consistent with history of wound. Three  views of the LEFT FOOT are submitted. There is a fracture of the proximal fifth metatarsal. There is air in the adjacent soft tissues, cannot exclude superimposed osteomyelitis. No dislocations. There is soft tissue swelling and air predominantly at the dorsum of the foot and extending into the lateral ankle. Right tibia and fibula-2 views: Plate and screw hardware at the proximal medial tibia. No acute fracture of the tibia or fibula. Soft tissue irregularity at the anterior soft tissues of the distal right lower extremity, consistent with wound. 1. Enlarged left hilum, when the patient is able recommend repeat chest radiograph in true PA positioning. 2. Fracture of the proximal fifth metatarsal. Correlate with site of ulcerations, cannot exclude superimposed osteomyelitis. 3. Air within the soft tissues at the level of the left ankle and foot, suggestive of extensive soft tissue infection. 4. Bilateral anterior distal lower extremity wounds. Xr Foot Right (min 3 Views)    Result Date: 11/20/2018  EXAMINATION: THREE VIEWS OF THE RIGHT FOOT: DATE AND TIME: 11/19/2018 at 11:00 PM. CLINICAL HISTORY: ACUTE RIGHT FOOT PAIN. INCREASING PAIN - SKIN BREAKDOWN HEEL INFECTION. COMPARISON: None. FINDINGS: There is no evidence of acute fracture dislocation or other significant bone abnormality. No abnormal gas shadows. NO ACUTE RADIOGRAPHIC FINDINGS IN THE RIGHT FOOT. NO RADIOGRAPHIC FINDINGS OF OSTEOMYELITIS.        [facility] Medications:       [facility]   Home Medication Instructions UFF:[id number]    Printed on:11/20/18 7735   Medication Information                      albuterol sulfate  (90 Base) MCG/ACT inhaler  Inhale 2 puffs into the lungs             Clopidogrel Bisulfate (PLAVIX PO)  Take 75 mg by

## 2018-11-20 NOTE — ED PROVIDER NOTES
extremity pain, inability to ambulate. CRITICAL CARE TIME       CONSULTS:  None    PROCEDURES:  Unless otherwise noted below, none     Procedures    FINAL IMPRESSION      1. Cellulitis of right lower extremity    2. Right leg pain    3. Unable to ambulate          DISPOSITION/PLAN   DISPOSITION Decision To Admit 11/20/2018 12:57:21 AM      PATIENT REFERRED TO:  No follow-up provider specified.     DISCHARGE MEDICATIONS:  New Prescriptions    No medications on file          (Please notethat portions of this note were completed with a voice recognition program.  Efforts were made to edit the dictations but occasionally words are mis-transcribed.)    ASIM Mathew - CRIS (electronically signed)  Attending Emergency Physician         Melissa De Paz MD  11/20/18 2078

## 2018-11-20 NOTE — DISCHARGE INSTR - COC
Continuity of Care Form    Patient Name: Riana Alvarez   :  1950  MRN:  [id number]    [address] [place] [address] date:  2018  Discharge date:  ***    Code Status Order: Full Code   Advance Directives:   Advance Care Flowsheet Documentation     Date/Time Healthcare Directive Type of Healthcare Directive Copy in [address] Agent's Name Healthcare Agent's Phone Number    [phone]  No, patient does not have an advance directive for healthcare treatment -- -- -- -- --          Admitting Physician: Clotilde Limon MD  PCP: No primary care provider on file. Discharging Nurse: [facility] Unit/Room#: Z903/E796-76  Discharging Unit Phone Number: ***    Emergency Contact:   Extended Emergency Contact Information  Primary Emergency Contact: [address] Phone: [phone]  Relation: Other    Past Surgical History:  Past Surgical History:   Procedure Laterality Date    RECTAL SURGERY      patient had a boil break open inside rectum. needed surgery for infection removal.       Immunization History: There is no immunization history on file for this patient. Active Problems:  Patient Active Problem List   Diagnosis Code    Acute and chronic respiratory failure with hypoxia ([facility]) J96.21    BMI 45.0-49.9, adult ([facility]) Z68.42    History of tobacco use Z87.891    HARSHA on CPAP G47.33, Z99.89    Wheezing R06.2    Cellulitis L03.90    Leg wound, left S81.802A    Leukocytosis D72.829    Unable to ambulate R26.2    Elevated bilirubin R17    Cellulitis of left foot L03. 116    Gangrene associated with type 2 diabetes mellitus ([facility]) E11.52    Venous stasis ulcer limited to breakdown of skin without varicose veins ([facility]) I87.2, L97.901    Gas gangrene of foot ([facility]) A48.0    Hyperglycemia due to type 2 diabetes mellitus ([facility]) E11.65    PVD (peripheral vascular disease) ([facility]) I73.9       Isolation/Infection:   Isolation          Contact Dressing Changed Changed/New 11/20/2018  4:29 AM   Dressing/Treatment Moist to dry 11/20/2018  4:29 AM   Wound Cleansed Rinsed/Irrigated with saline 11/20/2018  4:29 AM   Wound Length (cm) 4 cm 11/20/2018  4:29 AM   Wound Width (cm) 3 cm 11/20/2018  4:29 AM   Calculated Wound Size (cm^2) (l*w) 12 cm^2 11/20/2018  4:29 AM   Wound Assessment Yellow;Edema;Drainage;Light purple 11/20/2018  4:29 AM   Drainage Amount Small 11/20/2018  4:29 AM   Drainage Description Foul purulent; Tan 11/20/2018  4:29 AM   Odor Strong 11/20/2018  4:29 AM   Number of days: 0       Wound 11/20/18 Leg Left; Anterior; Lower 5cm wide 4 cm long (Active)   Wound Type Wound 11/20/2018  4:29 AM   Wound Unstageable 11/20/2018  4:29 AM   Dressing Status Clean;Dry; Intact; Changed 11/20/2018  4:29 AM   Dressing Changed Changed/New 11/20/2018  4:29 AM   Dressing/Treatment Moist to dry;ABD 11/20/2018  4:29 AM   Wound Cleansed Rinsed/Irrigated with saline; Wound cleanser 11/20/2018  4:29 AM   Wound Length (cm) 5 cm 11/20/2018  4:29 AM   Wound Width (cm) 4 cm 11/20/2018  4:29 AM   Calculated Wound Size (cm^2) (l*w) 20 cm^2 11/20/2018  4:29 AM   Drainage Amount Scant 11/20/2018  4:29 AM   Drainage Description Foul purulent 11/20/2018  4:29 AM   Number of days: 0       Wound 11/20/18 Leg Left; Lower; Inner;Medial butterfly shape 2x1 (Active)   Wound Type Wound 11/20/2018  4:29 AM   Wound Pressure Stage  2 11/20/2018  4:29 AM   Dressing Status Clean;Dry; Intact; Changed 11/20/2018  4:29 AM   Dressing Changed Changed/New 11/20/2018  4:29 AM   Dressing/Treatment Moist to dry 11/20/2018  4:29 AM   Wound Cleansed Rinsed/Irrigated with saline 11/20/2018  4:29 AM   Wound Length (cm) 2 cm 11/20/2018  4:29 AM   Wound Width (cm) 1 cm 11/20/2018  4:29 AM   Calculated Wound Size (cm^2) (l*w) 2 cm^2 11/20/2018  4:29 AM   Drainage Amount None 11/20/2018  4:29 AM   Number of days: 0       Wound 11/20/18 Leg Left; Lower;Distal 1 cm round back of leg, tunneling apparent (Active) Wound Type Wound 11/20/2018  4:29 AM   Wound Unstageable 11/20/2018  4:29 AM   Dressing Status Clean;Dry; Intact 11/20/2018  4:29 AM   Dressing Changed Changed/New 11/20/2018  4:29 AM   Dressing/Treatment Moist to dry;ABD 11/20/2018  4:29 AM   Wound Cleansed Wound cleanser 11/20/2018  4:29 AM   Wound Length (cm) 1 cm 11/20/2018  4:29 AM   Wound Width (cm) 1 cm 11/20/2018  4:29 AM   Calculated Wound Size (cm^2) (l*w) 1 cm^2 11/20/2018  4:29 AM   Number of days: 0       Wound 11/20/18 Leg Right; Lower; Anterior 7cm x 2.5cm black, tomts  (Active)   Wound Type Wound 11/20/2018  4:29 AM   Dressing Status Clean;Dry; Intact 11/20/2018  4:29 AM   Dressing Changed Changed/New 11/20/2018  4:29 AM   Dressing/Treatment Moist to dry;ABD 11/20/2018  4:29 AM   Wound Cleansed Wound cleanser 11/20/2018  4:29 AM   Wound Length (cm) 7 cm 11/20/2018  4:29 AM   Wound Width (cm) 2.5 cm 11/20/2018  4:29 AM   Wound Depth (cm)  1 11/20/2018  4:29 AM   Calculated Wound Size (cm^2) (l*w) 17.5 cm^2 11/20/2018  4:29 AM   Wound Assessment Black;Drainage;Painful 11/20/2018  4:29 AM   Drainage Amount Small 11/20/2018  4:29 AM   Drainage Description Foul purulent;Black 11/20/2018  4:29 AM   Odor Strong 11/20/2018  4:29 AM   Number of days: 0       Wound 11/20/18 Blister Leg Right; Lower 4x3 cm round red blister area (Active)   Wound Type Wound 11/20/2018  4:29 AM   Dressing Status Clean;Dry; Intact 11/20/2018  4:29 AM   Dressing Changed Changed/New 11/20/2018  4:29 AM   Dressing/Treatment Moist to dry;ABD 11/20/2018  4:29 AM   Wound Cleansed Rinsed/Irrigated with saline 11/20/2018  4:29 AM   Wound Length (cm) 4 cm 11/20/2018  4:29 AM   Wound Width (cm) 3 cm 11/20/2018  4:29 AM   Calculated Wound Size (cm^2) (l*w) 12 cm^2 11/20/2018  4:29 AM   Drainage Amount None 11/20/2018  4:29 AM   Number of days: 0       Wound 11/20/18 Blister Leg Right; Lower 2 small blisters above large one (Active)   Wound Type Wound 11/20/2018  4:29 AM   Dressing Status

## 2018-11-20 NOTE — ED TRIAGE NOTES
Patient to ED with c/o increased bilateral leg and foot pain and weakness. Patient has wound vac present on right leg. Patient admitted to hospital after at home last Tuesday. Prior to that patient was able to ambulate with cane or walker.

## 2018-11-20 NOTE — CONSULTS
Infectious Diseases Inpatient Consult Note      Reason for Consult:   BLE cellulitis and ulcers  Requesting Physician:   Sweetie Stein CNP  Primary Care Physician:  No primary care provider on file. History Obtained From:   Pt, EPIC    Admit Date: 11/19/2018  Hospital Day: 2      HISTORY OF PRESENT ILLNESS:  This [facility] 79 [facility],  through ER with severe weakness and frequent falls for 1 week. Has extensive BLE ulcers with maggots in some with black discoloration, foul smell and redness, swelling and blisters. Had chills. On CPAP at home. Chronic smoker. Lives alone. girlfriend visits him daily. Follows up at [facility] and wants transferred there. Had a wound VAC on R leg chronic deep ulcer. Has been off antibiotics for past 6 weeks. No fevers, + leukocytosis. High direct Bilirubin and AST, DHJ=698    CHIEF COMPLAINT:       Past Medical History:   Diagnosis Date    Asthma     [facility]     Morbid obesity ([facility]     Neuropathy     PVD (peripheral vascular disease) ([facility])     1 stent in left leg placed 09/2018 and 2 stents in right leg    Sleep apnea     Type 2 diabetes mellitus without complication ([facility]        Past Surgical History:   Procedure Laterality Date    RECTAL SURGERY  2015    patient had a boil break open inside rectum.  needed surgery for infection removal.       Current Medications:     sodium chloride flush  10 mL Intravenous 2 times per day    enoxaparin  40 mg Subcutaneous Daily    pantoprazole  40 mg Oral QAM AC    sodium hypochlorite   Irrigation Daily    insulin lispro  0-6 Units Subcutaneous TID WC    insulin lispro  0-3 Units Subcutaneous Nightly    piperacillin-tazobactam  3.375 g Intravenous Q8H    vancomycin  15 mg/kg (Adjusted) Intravenous Q12H    vancomycin (VANCOCIN) intermittent dosing (placeholder)   Other RX Placeholder    miconazole   Topical BID    sodium hypochlorite   Irrigation Daily   

## 2018-11-21 LAB — URINE CULTURE, ROUTINE: NORMAL

## 2018-11-21 NOTE — CONSULTS
Inpatient Consultation      Olena Suresh (1950)  11/20/2018    Reason for Consult:  [id number]    IMPRESSION/RECOMMENDATIONS:    Assessment: B leg cellulitis    Plan:  1) IV abx  2) high risk for wound complications / amp  3) transfer to [facility] per pt request and treating wound physician    Herbie Lloyd    CHIEF COMPLAINT:   B leg cellulitis    HISTORY OF PRESENT ILLNESS:                The patient is a 79 y.o. male who presents with above chief complaint. B leg cellulitis    Past Medical History:        Diagnosis Date    Asthma     COPD (chronic obstructive pulmonary disease) ([facility]     Morbid obesity (HCC)     Neuropathy     PVD (peripheral vascular disease) ([facility]     1 stent in left leg placed 09/2018 and 2 stents in right leg    Sleep apnea     Type 2 diabetes mellitus without complication ([facility]      Past Surgical History:        Procedure Laterality Date    RECTAL SURGERY  2015    patient had a boil break open inside rectum.  needed surgery for infection removal.     Current Medications:   Current Facility-Administered Medications: sodium chloride flush 0.9 % injection 10 mL, 10 mL, Intravenous, 2 times per day  sodium chloride flush 0.9 % injection 10 mL, 10 mL, Intravenous, PRN  magnesium hydroxide (MILK OF MAGNESIA) 400 MG/5ML suspension 30 mL, 30 mL, Oral, Daily PRN  ondansetron (ZOFRAN) injection 4 mg, 4 mg, Intravenous, Q6H PRN  enoxaparin (LOVENOX) injection 40 mg, 40 mg, Subcutaneous, Daily  pantoprazole (PROTONIX) tablet 40 mg, 40 mg, Oral, QAM AC  acetaminophen (TYLENOL) tablet 650 mg, 650 mg, Oral, Q4H PRN  ketorolac (TORADOL) injection 15 mg, 15 mg, Intravenous, Q6H PRN  sodium hypochlorite (DAKINS) 0.25 % external solution, , Irrigation, Daily  glucose (GLUTOSE) 40 % oral gel 15 g, 15 g, Oral, PRN  dextrose 50 % solution 12.5 g, 12.5 g, Intravenous, PRN  glucagon (rDNA) injection 1 mg, 1 mg, Intramuscular, PRN  dextrose 5 % solution, 100 mL/hr, Intravenous, PRN  insulin lispro

## 2018-11-21 NOTE — CONSULTS
[address]                      [address], [address] [facility]                                  CONSULTATION    PATIENT NAME: Destiny Alarcon                      :        1950  MED REC NO:   [id number]                            ROOM:       [id number]  ACCOUNT NO:   [de-identified]                           ADMIT DATE: 2018  PROVIDER:     Derek Kelly MD    CONSULT DATE:  2018    TIME:  2 p.m. HISTORY:  This is a 19-year-old male who was admitted from home with  severe weakness, falls, and extensive lower extremity blister and  concern for a cellulitis and deep infection. The patient is complaining  of little bit of aching pain. He says the legs have been stable in  terms of his overall wound management. He says he sees a wound  management physician at [facility], where he has been having  ongoing treatments for his leg ulcers in the past including wound VACs,  surgical debridements, and other treatments. He states that his legs  have been fairly stable in terms of their overall wound management. The  patient does not believe this to be any too much worsened and it is at  normal baseline. He does report that the left leg is more swollen and  red today. PHYSICAL EXAMINATION:  GENERAL:  He is morbidly obese male, in no acute distress, alert and  oriented. Responds appropriately to questioning. Good mood. Normal  affect. HEENT:  Head is atraumatic. Trachea midline. Extraocular eye muscles  intact. LUNGS:  Respirations regular, no audible wheeze. ABDOMEN:  Soft and nontender. CARDIOVASCULAR:  Pulse regular. EXTREMITIES:  Left upper extremity is neurovascularly intact, has  palpable pedal pulse, warm and well perfused. Skin is intact sensation  and light touch.   The patient does have couple of significant areas of  superficial skin necrosis and a little bit eschar and breakdown.  _____  anterior tibia and his medial tibia distally he has

## 2018-11-21 NOTE — FLOWSHEET NOTE
Consent to transfer to [facility] signed.  Electronically signed by Travis Last RN on 11/20/2018 at 9:01 PM

## 2018-11-23 LAB
ANAEROBIC CULTURE: ABNORMAL
GRAM STAIN RESULT: ABNORMAL
ORGANISM: ABNORMAL
WOUND/ABSCESS: ABNORMAL
WOUND/ABSCESS: ABNORMAL

## 2018-11-24 LAB
ANAEROBIC CULTURE: ABNORMAL
GRAM STAIN RESULT: ABNORMAL
ORGANISM: ABNORMAL
WOUND/ABSCESS: ABNORMAL

## 2018-11-25 LAB
BLOOD CULTURE, ROUTINE: NORMAL
CULTURE, BLOOD 2: NORMAL

## 2018-12-05 ENCOUNTER — TELEPHONE (OUTPATIENT)
Dept: FAMILY MEDICINE CLINIC | Age: 68
End: 2018-12-05

## 2018-12-05 NOTE — LETTER
[address], [address]  Phone: [phone]  Fax: [phone]        Ricardo Thorpe      December 13, 2018      Janet Yee [address] [address]      Dear [employer] Peter recently had lab work ordered by Dr. Inez Campos on 10/18/2018. Our system shows that we have yet to receive these results. If you have already had these tests done, please contact our office so we can resolve this issue. If you have not had this test done, please do so at this time. If you need any assistance please feel free to contact our office at the number listed above. Thank you.     Sincerely,        [facility]

## 2019-03-07 ENCOUNTER — TELEPHONE (OUTPATIENT)
Dept: FAMILY MEDICINE CLINIC | Age: 69
End: 2019-03-07

## 2019-03-26 ENCOUNTER — TELEPHONE (OUTPATIENT)
Dept: FAMILY MEDICINE CLINIC | Age: 69
End: 2019-03-26

## 2019-04-30 ENCOUNTER — TELEPHONE (OUTPATIENT)
Dept: FAMILY MEDICINE CLINIC | Age: 69
End: 2019-04-30

## 2019-05-07 ENCOUNTER — HOSPITAL ENCOUNTER (INPATIENT)
Age: 69
LOS: 7 days | Discharge: NURSING, REHAB OR LONG-TERM CARE | DRG: 622 | End: 2019-05-14
Attending: EMERGENCY MEDICINE | Admitting: INTERNAL MEDICINE
Payer: MEDICARE

## 2019-05-07 ENCOUNTER — APPOINTMENT (OUTPATIENT)
Dept: MRI IMAGING | Age: 69
DRG: 622 | End: 2019-05-07
Payer: MEDICARE

## 2019-05-07 ENCOUNTER — APPOINTMENT (OUTPATIENT)
Dept: GENERAL RADIOLOGY | Age: 69
DRG: 622 | End: 2019-05-07
Payer: MEDICARE

## 2019-05-07 ENCOUNTER — APPOINTMENT (OUTPATIENT)
Dept: ULTRASOUND IMAGING | Age: 69
DRG: 622 | End: 2019-05-07
Payer: MEDICARE

## 2019-05-07 DIAGNOSIS — R26.9 GAIT DISTURBANCE: ICD-10-CM

## 2019-05-07 DIAGNOSIS — Z86.39 HISTORY OF TYPE 2 DIABETES MELLITUS: ICD-10-CM

## 2019-05-07 DIAGNOSIS — L03.119 CELLULITIS OF LOWER LEG: Primary | ICD-10-CM

## 2019-05-07 PROBLEM — L97.519 DIABETIC ULCER OF RIGHT FOOT DUE TO TYPE 2 DIABETES MELLITUS (HCC): Status: ACTIVE | Noted: 2019-05-07

## 2019-05-07 PROBLEM — E11.621 DIABETIC ULCER OF RIGHT FOOT DUE TO TYPE 2 DIABETES MELLITUS (HCC): Status: ACTIVE | Noted: 2019-05-07

## 2019-05-07 LAB
ALBUMIN SERPL-MCNC: 2.5 G/DL (ref 3.5–4.6)
ALP BLD-CCNC: 105 U/L (ref 35–104)
ALT SERPL-CCNC: <5 U/L (ref 0–41)
ANION GAP SERPL CALCULATED.3IONS-SCNC: 18 MEQ/L (ref 9–15)
AST SERPL-CCNC: 51 U/L (ref 0–40)
BACTERIA: NEGATIVE /HPF
BASOPHILS ABSOLUTE: 0 K/UL (ref 0–0.2)
BASOPHILS RELATIVE PERCENT: 0.3 %
BILIRUB SERPL-MCNC: 0.8 MG/DL (ref 0.2–0.7)
BILIRUBIN URINE: NEGATIVE
BLOOD, URINE: ABNORMAL
BUN BLDV-MCNC: 51 MG/DL (ref 8–23)
CALCIUM SERPL-MCNC: 8.4 MG/DL (ref 8.5–9.9)
CHLORIDE BLD-SCNC: 94 MEQ/L (ref 95–107)
CHP ED QC CHECK: YES
CLARITY: CLEAR
CO2: 25 MEQ/L (ref 20–31)
COLOR: ABNORMAL
CREAT SERPL-MCNC: 1.34 MG/DL (ref 0.7–1.2)
EOSINOPHILS ABSOLUTE: 0 K/UL (ref 0–0.7)
EOSINOPHILS RELATIVE PERCENT: 0.3 %
EPITHELIAL CELLS, UA: ABNORMAL /HPF (ref 0–5)
GFR AFRICAN AMERICAN: >60
GFR NON-AFRICAN AMERICAN: 53.2
GLOBULIN: 3.8 G/DL (ref 2.3–3.5)
GLUCOSE BLD-MCNC: 318 MG/DL (ref 60–115)
GLUCOSE BLD-MCNC: 327 MG/DL (ref 70–99)
GLUCOSE BLD-MCNC: 331 MG/DL
GLUCOSE BLD-MCNC: 331 MG/DL (ref 60–115)
GLUCOSE URINE: 500 MG/DL
HBA1C MFR BLD: 7.8 % (ref 4.8–5.9)
HCT VFR BLD CALC: 48.2 % (ref 42–52)
HEMOGLOBIN: 15.8 G/DL (ref 14–18)
HYALINE CASTS: ABNORMAL /HPF (ref 0–5)
KETONES, URINE: ABNORMAL MG/DL
LACTIC ACID: 2.4 MMOL/L (ref 0.5–2.2)
LEUKOCYTE ESTERASE, URINE: NEGATIVE
LYMPHOCYTES ABSOLUTE: 0.8 K/UL (ref 1–4.8)
LYMPHOCYTES RELATIVE PERCENT: 7.9 %
MCH RBC QN AUTO: 31.8 PG (ref 27–31.3)
MCHC RBC AUTO-ENTMCNC: 32.8 % (ref 33–37)
MCV RBC AUTO: 97.2 FL (ref 80–100)
MONOCYTES ABSOLUTE: 1.2 K/UL (ref 0.2–0.8)
MONOCYTES RELATIVE PERCENT: 12.1 %
NEUTROPHILS ABSOLUTE: 8.1 K/UL (ref 1.4–6.5)
NEUTROPHILS RELATIVE PERCENT: 79.4 %
NITRITE, URINE: NEGATIVE
PDW BLD-RTO: 16.9 % (ref 11.5–14.5)
PERFORMED ON: ABNORMAL
PERFORMED ON: ABNORMAL
PH UA: 5 (ref 5–9)
PLATELET # BLD: 216 K/UL (ref 130–400)
POTASSIUM SERPL-SCNC: 4.8 MEQ/L (ref 3.4–4.9)
PROTEIN UA: >=300 MG/DL
RBC # BLD: 4.96 M/UL (ref 4.7–6.1)
RBC UA: ABNORMAL /HPF (ref 0–2)
SODIUM BLD-SCNC: 137 MEQ/L (ref 135–144)
SPECIFIC GRAVITY UA: 1.02 (ref 1–1.03)
TOTAL PROTEIN: 6.3 G/DL (ref 6.3–8)
URINE REFLEX TO CULTURE: YES
UROBILINOGEN, URINE: 1 E.U./DL
WBC # BLD: 10.1 K/UL (ref 4.8–10.8)
WBC UA: ABNORMAL /HPF (ref 0–5)
YEAST: PRESENT

## 2019-05-07 PROCEDURE — 6360000002 HC RX W HCPCS: Performed by: NURSE PRACTITIONER

## 2019-05-07 PROCEDURE — 81001 URINALYSIS AUTO W/SCOPE: CPT

## 2019-05-07 PROCEDURE — 80053 COMPREHEN METABOLIC PANEL: CPT

## 2019-05-07 PROCEDURE — 96365 THER/PROPH/DIAG IV INF INIT: CPT

## 2019-05-07 PROCEDURE — 1210000000 HC MED SURG R&B

## 2019-05-07 PROCEDURE — 93926 LOWER EXTREMITY STUDY: CPT

## 2019-05-07 PROCEDURE — 2700000000 HC OXYGEN THERAPY PER DAY

## 2019-05-07 PROCEDURE — 73630 X-RAY EXAM OF FOOT: CPT

## 2019-05-07 PROCEDURE — 2580000003 HC RX 258: Performed by: EMERGENCY MEDICINE

## 2019-05-07 PROCEDURE — 36415 COLL VENOUS BLD VENIPUNCTURE: CPT

## 2019-05-07 PROCEDURE — 73502 X-RAY EXAM HIP UNI 2-3 VIEWS: CPT

## 2019-05-07 PROCEDURE — 83036 HEMOGLOBIN GLYCOSYLATED A1C: CPT

## 2019-05-07 PROCEDURE — 85025 COMPLETE CBC W/AUTO DIFF WBC: CPT

## 2019-05-07 PROCEDURE — 87086 URINE CULTURE/COLONY COUNT: CPT

## 2019-05-07 PROCEDURE — 6360000002 HC RX W HCPCS: Performed by: EMERGENCY MEDICINE

## 2019-05-07 PROCEDURE — 2580000003 HC RX 258: Performed by: NURSE PRACTITIONER

## 2019-05-07 PROCEDURE — 99285 EMERGENCY DEPT VISIT HI MDM: CPT

## 2019-05-07 PROCEDURE — 6360000002 HC RX W HCPCS: Performed by: INTERNAL MEDICINE

## 2019-05-07 PROCEDURE — 73718 MRI LOWER EXTREMITY W/O DYE: CPT

## 2019-05-07 PROCEDURE — 87040 BLOOD CULTURE FOR BACTERIA: CPT

## 2019-05-07 PROCEDURE — 83605 ASSAY OF LACTIC ACID: CPT

## 2019-05-07 PROCEDURE — 73564 X-RAY EXAM KNEE 4 OR MORE: CPT

## 2019-05-07 RX ORDER — DEXTROSE MONOHYDRATE 25 G/50ML
12.5 INJECTION, SOLUTION INTRAVENOUS PRN
Status: DISCONTINUED | OUTPATIENT
Start: 2019-05-07 | End: 2019-05-14 | Stop reason: HOSPADM

## 2019-05-07 RX ORDER — NICOTINE POLACRILEX 4 MG
15 LOZENGE BUCCAL PRN
Status: DISCONTINUED | OUTPATIENT
Start: 2019-05-07 | End: 2019-05-14 | Stop reason: HOSPADM

## 2019-05-07 RX ORDER — SODIUM CHLORIDE 0.9 % (FLUSH) 0.9 %
10 SYRINGE (ML) INJECTION EVERY 12 HOURS SCHEDULED
Status: DISCONTINUED | OUTPATIENT
Start: 2019-05-07 | End: 2019-05-14 | Stop reason: HOSPADM

## 2019-05-07 RX ORDER — ONDANSETRON 2 MG/ML
4 INJECTION INTRAMUSCULAR; INTRAVENOUS EVERY 6 HOURS PRN
Status: DISCONTINUED | OUTPATIENT
Start: 2019-05-07 | End: 2019-05-14 | Stop reason: HOSPADM

## 2019-05-07 RX ORDER — SODIUM CHLORIDE 0.9 % (FLUSH) 0.9 %
10 SYRINGE (ML) INJECTION PRN
Status: DISCONTINUED | OUTPATIENT
Start: 2019-05-07 | End: 2019-05-14 | Stop reason: HOSPADM

## 2019-05-07 RX ORDER — LORAZEPAM 2 MG/ML
2 INJECTION INTRAMUSCULAR ONCE
Status: COMPLETED | OUTPATIENT
Start: 2019-05-07 | End: 2019-05-07

## 2019-05-07 RX ORDER — 0.9 % SODIUM CHLORIDE 0.9 %
1000 INTRAVENOUS SOLUTION INTRAVENOUS ONCE
Status: COMPLETED | OUTPATIENT
Start: 2019-05-07 | End: 2019-05-07

## 2019-05-07 RX ORDER — DEXTROSE MONOHYDRATE 50 MG/ML
100 INJECTION, SOLUTION INTRAVENOUS PRN
Status: DISCONTINUED | OUTPATIENT
Start: 2019-05-07 | End: 2019-05-14 | Stop reason: HOSPADM

## 2019-05-07 RX ORDER — MAGNESIUM SULFATE 1 G/100ML
1 INJECTION INTRAVENOUS PRN
Status: DISCONTINUED | OUTPATIENT
Start: 2019-05-07 | End: 2019-05-14 | Stop reason: HOSPADM

## 2019-05-07 RX ORDER — CLOPIDOGREL BISULFATE 75 MG/1
75 TABLET ORAL DAILY
Status: DISCONTINUED | OUTPATIENT
Start: 2019-05-07 | End: 2019-05-14 | Stop reason: HOSPADM

## 2019-05-07 RX ORDER — ALBUTEROL SULFATE 90 UG/1
2 AEROSOL, METERED RESPIRATORY (INHALATION) EVERY 4 HOURS PRN
Status: DISCONTINUED | OUTPATIENT
Start: 2019-05-07 | End: 2019-05-14 | Stop reason: HOSPADM

## 2019-05-07 RX ORDER — 0.9 % SODIUM CHLORIDE 0.9 %
30 INTRAVENOUS SOLUTION INTRAVENOUS ONCE
Status: COMPLETED | OUTPATIENT
Start: 2019-05-07 | End: 2019-05-08

## 2019-05-07 RX ORDER — POTASSIUM CHLORIDE 1.5 G/1.77G
40 POWDER, FOR SOLUTION ORAL PRN
Status: DISCONTINUED | OUTPATIENT
Start: 2019-05-07 | End: 2019-05-14 | Stop reason: HOSPADM

## 2019-05-07 RX ORDER — IPRATROPIUM BROMIDE AND ALBUTEROL SULFATE 2.5; .5 MG/3ML; MG/3ML
3 SOLUTION RESPIRATORY (INHALATION) EVERY 4 HOURS PRN
Status: DISCONTINUED | OUTPATIENT
Start: 2019-05-07 | End: 2019-05-08

## 2019-05-07 RX ORDER — SODIUM CHLORIDE 9 MG/ML
INJECTION, SOLUTION INTRAVENOUS CONTINUOUS
Status: DISCONTINUED | OUTPATIENT
Start: 2019-05-07 | End: 2019-05-14 | Stop reason: HOSPADM

## 2019-05-07 RX ORDER — POTASSIUM CHLORIDE 7.45 MG/ML
10 INJECTION INTRAVENOUS PRN
Status: DISCONTINUED | OUTPATIENT
Start: 2019-05-07 | End: 2019-05-14 | Stop reason: HOSPADM

## 2019-05-07 RX ORDER — POTASSIUM CHLORIDE 20 MEQ/1
40 TABLET, EXTENDED RELEASE ORAL PRN
Status: DISCONTINUED | OUTPATIENT
Start: 2019-05-07 | End: 2019-05-14 | Stop reason: HOSPADM

## 2019-05-07 RX ADMIN — PIPERACILLIN SODIUM,TAZOBACTAM SODIUM 3.38 G: 3; .375 INJECTION, POWDER, FOR SOLUTION INTRAVENOUS at 23:03

## 2019-05-07 RX ADMIN — LORAZEPAM 2 MG: 2 INJECTION INTRAMUSCULAR; INTRAVENOUS at 16:56

## 2019-05-07 RX ADMIN — PIPERACILLIN SODIUM,TAZOBACTAM SODIUM 3.38 G: 3; .375 INJECTION, POWDER, FOR SOLUTION INTRAVENOUS at 14:01

## 2019-05-07 RX ADMIN — SODIUM CHLORIDE 4437 ML: 9 INJECTION, SOLUTION INTRAVENOUS at 20:33

## 2019-05-07 RX ADMIN — SODIUM CHLORIDE 1000 ML: 9 INJECTION, SOLUTION INTRAVENOUS at 14:01

## 2019-05-07 RX ADMIN — VANCOMYCIN HYDROCHLORIDE 2000 MG: 1 INJECTION, POWDER, LYOPHILIZED, FOR SOLUTION INTRAVENOUS at 15:16

## 2019-05-07 RX ADMIN — ENOXAPARIN SODIUM 40 MG: 40 INJECTION SUBCUTANEOUS at 21:36

## 2019-05-07 ASSESSMENT — ENCOUNTER SYMPTOMS
SORE THROAT: 0
VOMITING: 0
EYE PAIN: 0
NAUSEA: 0
SHORTNESS OF BREATH: 0
COLOR CHANGE: 1
ABDOMINAL PAIN: 0
CHEST TIGHTNESS: 0

## 2019-05-07 ASSESSMENT — PAIN DESCRIPTION - LOCATION: LOCATION: LEG

## 2019-05-07 ASSESSMENT — PAIN DESCRIPTION - ORIENTATION: ORIENTATION: LEFT

## 2019-05-07 ASSESSMENT — PAIN DESCRIPTION - FREQUENCY: FREQUENCY: CONTINUOUS

## 2019-05-07 ASSESSMENT — PAIN SCALES - GENERAL: PAINLEVEL_OUTOF10: 7

## 2019-05-07 ASSESSMENT — PAIN DESCRIPTION - PAIN TYPE: TYPE: ACUTE PAIN

## 2019-05-07 ASSESSMENT — PAIN DESCRIPTION - DESCRIPTORS: DESCRIPTORS: ACHING

## 2019-05-07 NOTE — ED NOTES
This ED Tech @ bedside to do OT and transport patient to X-ray via cart.       Robert Bosch  05/07/19 6143

## 2019-05-07 NOTE — PROGRESS NOTES
Pharmacy Note  Vancomycin Consult    Edith Chi is a 79 y.o. male started on Vancomycin for cellulitis, r/o osteomyelitis;   consult received from Jenny Alejandro to manage therapy. Also receiving the following antibiotics: Zosyn. Patient Active Problem List   Diagnosis    Acute and chronic respiratory failure with hypoxia (HCC)    BMI 45.0-49.9, adult ([facility])    History of tobacco use    HARSHA on CPAP    Wheezing    Cellulitis    Leg wound, left    Leukocytosis    Unable to ambulate    Elevated bilirubin    Cellulitis of left foot    Gangrene associated with type 2 diabetes mellitus ([facility])    Venous stasis ulcer limited to breakdown of skin without varicose veins ([facility])    Gas gangrene of foot ([facility])    Hyperglycemia due to type 2 diabetes mellitus ([facility]    PVD (peripheral vascular disease) ([facility]    Diabetic ulcer of right foot due to type 2 diabetes mellitus ([facility])    Gait abnormality       Allergies:  Coconut oil     Temp max: 98.7 F    Recent Labs     05/07/19  1248   BUN 51*       Recent Labs     05/07/19  1248   CREATININE 1.34*       Recent Labs     05/07/19  1248   WBC 10.1         Intake/Output Summary (Last 24 hours) at 5/7/2019 1602  Last data filed at 5/7/2019 1516  Gross per 24 hour   Intake 50 ml   Output --   Net 50 ml           Ht Readings from Last 1 Encounters:   05/07/19 6' (1.829 m)        Wt Readings from Last 1 Encounters:   05/07/19 (!) 326 lb (147.9 kg)         Body mass index is 44.21 kg/m². Estimated Creatinine Clearance: 81 mL/min (A) (based on SCr of 1.34 mg/dL (H)). Goal Trough Level: 10-20 mcg/mL for cellulitis. Assessment/Plan:  Will initiate vancomycin 1500mg (= 15mg/kg using adjusted BW) IV every 12 hours. The patient rec'd one dose of 2000mg in ER. Timing of trough level will be determined based on culture results, renal function, and clinical response. Trough prior to 4th total dose. \" R/O  osteomyelitis \" is noted in chart, may need higher goal trough (15-20).      Thank you for the consult. Will continue to follow.      Tony Parry [facility]  05/07/19  4:11 PM

## 2019-05-07 NOTE — H&P
Hospital Medicine History & Physical      PCP: No primary care provider on file. Date of Admission: 5/7/2019    Date of Service: Pt seen/examined on 5/7/19 and Admitted to Inpatient with expected LOS greater than two midnights due to medical therapy. Chief Complaint:  Right lower extremity weakness      History Of Present Illness:      79 y.o. male who presented to [facility] ED with complaint of right lower extremity weakness and pain. He has a history of asthma,COPD, obesity, neuropathy, PVD with stents in lower extremities, sleep apnea, uncontrolled DM2 with Left AKA. He states yesterday evening he was feeling weak in RLE and felt self sliding off couch. He caught self with left leg stump and tangled right lower extremity underneath him and developed RLE and knee pain. He has a known right heel ulcer that is currently being treated by [facility] wound center. Per patient he does have a prothesis but was never taught how to use or ambulate with it and has not done so since being fitted in January. He was unable to get self off the floor and required the help of significant other. He has 7/10 pain to RLE with palpation. He denies cp/sob/n/v/d/fever/chills/rash. Past Medical History:          Diagnosis Date    Asthma     COPD (chronic obstructive pulmonary disease) ([address]     Morbid obesity (HCC)     Neuropathy     [facility]     1 stent in left leg placed 09/2018 and 2 stents in right leg    Sleep apnea     Type 2 diabetes mellitus without complication ([facility]        Past Surgical History:          Procedure Laterality Date    RECTAL SURGERY  2015    patient had a boil break open inside rectum. needed surgery for infection removal.       Medications Prior to Admission:      Prior to Admission medications    Medication Sig Start Date End Date Taking?  Authorizing Provider   Clopidogrel Bisulfate (PLAVIX PO) Take 75 mg by mouth daily    Yes Historical Provider, MD Musculoskeletal:  No clubbing, cyanosis or edema bilaterally. Left AKA  Skin: RLE erythema and swelling with non-healing wound to RLE shin and non-healing ulcer to right heel with thick purulent drainage. Neurologic:  Neurovascularly intact without any focal sensory/motor deficits. Cranial nerves: II-XII intact, grossly non-focal.  Psychiatric:  Alert and oriented, thought content appropriate, normal insight  Capillary Refill: Brisk,< 3 seconds   Peripheral Pulses: +2 palpable, equal bilaterally       Labs:     Recent Labs     05/07/19  1248   WBC 10.1   HGB 15.8   HCT 48.2        Recent Labs     05/07/19  1248      K 4.8   CL 94*   CO2 25   BUN 51*   CREATININE 1.34*   CALCIUM 8.4*     Recent Labs     05/07/19  1248   AST 51*   ALT <5   BILITOT 0.8*   ALKPHOS 105*     No results for input(s): INR in the last 72 hours. No results for input(s): Cookie Dark in the last 72 hours. Urinalysis:      Lab Results   Component Value Date    NITRU Negative 05/07/2019    WBCUA 3-5 05/07/2019    BACTERIA Negative 05/07/2019    RBCUA 0-2 05/07/2019    BLOODU LARGE 05/07/2019    SPECGRAV 1.022 05/07/2019    GLUCOSEU 500 05/07/2019       Radiology:     CXR: I have reviewed the CXR with the following interpretation: pending  EKG:  I have reviewed the EKG with the following interpretation: pending     XR FOOT RIGHT (MIN 3 VIEWS)   Final Result      XR HIP 2-3 VW W PELVIS RIGHT   Final Result      XR KNEE RIGHT (MIN 4 VIEWS)   Final Result          ASSESSMENT:    Active Hospital Problems    Diagnosis Date Noted    Diabetic ulcer of right foot due to type 2 diabetes mellitus ([facility] [U09.919, L97.519] 05/07/2019       PLAN:    1. Right diabetic foot ulcer with cellulitis of RLE- podiatry consulted, continue on IV zosyn and vancomycin, cultures pending, IVF, MRI right foot to r/o osteomyelitis. 2. Uncontrolled DM2- ac/hs and as needed poct glucose with ssi coverage as needed, a1c pending  3.  Lactic acidosis- will hydrate and repeat lab  4. Renal insufficency- hydrate and repeat labs, monitor output       DVT Prophylaxis: lovenox  Diet: carb control  Code Status: full    Dispo - inpatient   I saw and evaluated the pt.  I personally obtained the key and critical portions of the history and physical exam. I reviewed the mild level documentation and agree with assessment and plan that we come up together  Electronically signed by Piyush Oliveira MD on 5/7/19 at 3:10 PM         ASIM Kay - CNP

## 2019-05-07 NOTE — ED NOTES
Bed: 08  Expected date: 5/7/19  Expected time: 11:50 AM  Means of arrival: [facility]  Comments:  79year old male weakness in right leg. Patient fell 2 days ago.  ot [id number] [facility], [place],[facility], RN  05/07/19 5000

## 2019-05-07 NOTE — ED PROVIDER NOTES
Genitourinary: Negative for flank pain and urgency. Musculoskeletal: Positive for arthralgias, gait problem and myalgias. Negative for neck stiffness. Skin: Positive for color change and wound. Negative for rash. Neurological: Negative for weakness, light-headedness and headaches. Psychiatric/Behavioral: Negative for confusion and sleep disturbance. Except as noted above the remainder of the review of systems was reviewed and negative. PAST MEDICAL HISTORY     Past Medical History:   Diagnosis Date    Asthma     [facility]ch[facility] obstructive [facility] disease) [facility]     Morbid obesity ([facility]     Neuropathy     PVD (peripheral vascular disease) ([facility]     1 stent in left leg placed 09/2018 and 2 stents in right leg    Sleep apnea     Type 2 diabetes mellitus [facility] complication ([facility]          SURGICALHISTORY       Past Surgical History:   Procedure Laterality Date    RECTAL SURGERY  2015    patient had a boil break open inside rectum. needed surgery for infection removal.         CURRENT MEDICATIONS       Previous Medications    ALBUTEROL SULFATE  (90 BASE) MCG/ACT INHALER    Inhale 2 puffs into the lungs    CLOPIDOGREL BISULFATE (PLAVIX PO)    Take 75 mg by mouth daily     GABAPENTIN (NEURONTIN) 800 MG TABLET    Take 1 tablet by mouth 3 times daily for 30 days. .    INSULIN LISPRO PROTAMINE & LISPRO (HUMALOG MIX 75/25) (75-25) 100 UNIT PER ML SUSP INJECTION VIAL    Inject into the skin 2 times daily (with meals)    IPRATROPIUM-ALBUTEROL (DUONEB) 0.5-2.5 (3) MG/3ML SOLN NEBULIZER SOLUTION    Inhale 3 mLs into the lungs    OXYCODONE-ACETAMINOPHEN (PERCOCET) 5-325 MG PER TABLET    Take 1 tablet by mouth as needed for Pain. Suezanne Thiago ALLERGIES     Coconut oil    FAMILY HISTORY     History reviewed. No pertinent family history.        SOCIAL HISTORY       Social History     Socioeconomic History    Marital status:      Spouse name: None    Number of children: None    Years of education: None    Highest education level: None   Occupational History    None   Social Needs    Financial resource strain: None    Food insecurity:     Worry: None     Inability: None    Transportation needs:     Medical: None     Non-medical: None   Tobacco Use    Smoking status: Current Every Day Smoker     Packs/day: 1.00     Years: 58.00     Pack years: 58.00     Types: Cigarettes    Smokeless tobacco: Never Used   Substance and Sexual Activity    Alcohol use: No    Drug use: Yes     Types: Marijuana    Sexual activity: None   Lifestyle    Physical activity:     Days per week: None     Minutes per session: None    Stress: None   Relationships    Social connections:     Talks on phone: None     Gets together: None     Attends Oriental orthodox service: None     Active member of club or organization: None     Attends meetings of clubs or organizations: None     Relationship status: None    Intimate partner violence:     Fear of current or ex partner: None     Emotionally abused: None     Physically abused: None     Forced sexual activity: None   Other Topics Concern    None   Social History Narrative    None       SCREENINGS              PHYSICAL EXAM    (up to 7 for level 4, 8 or more for level 5)     ED Triage Vitals [05/07/19 1212]   BP Temp Temp Source Pulse Resp SpO2 Height Weight   (!) 157/71 98.7 °F (37.1 °C) Oral 86 24 94 % 6' (1.829 m) (!) 326 lb (147.9 kg)       Physical Exam   Constitutional: He is oriented to person, place, and time. He appears well-developed and well-nourished. No distress. HENT:   Head: Normocephalic and atraumatic. Right Ear: External ear normal.   Left Ear: External ear normal.   Mouth/Throat: Oropharynx is clear and moist. No oropharyngeal exudate. Eyes: Pupils are equal, round, and reactive to light. Conjunctivae are normal.   Neck: Normal range of motion. Neck supple. No JVD present. No tracheal deviation present. No thyromegaly present.    Cardiovascular: Normal rate and normal heart sounds. No murmur heard. Pulmonary/Chest: Effort normal and breath sounds normal. No respiratory distress. He has no wheezes. Abdominal: Soft. Bowel sounds are normal. There is no tenderness. There is no guarding. Musculoskeletal: He exhibits edema. Right lower extremity shows severe edema to the right thigh hip all the way down to his toes. He has pain with movement of the hip. Knee is mildly tender. Lower extremity is extremely hot and red with a very large necrotic ulcer on his heel. He has some black discoloration to a couple of his toenails. Pulses are palpable dorsalis pedis and posterior tibial.   Neurological: He is alert and oriented to person, place, and time. No cranial nerve deficit. Skin: Skin is warm and dry. No rash noted. He is not diaphoretic. Psychiatric: He has a normal mood and affect. His behavior is normal.   Nursing note and vitals reviewed.       DIAGNOSTIC RESULTS     EKG: All EKG's are interpreted by the Emergency Department Physician who either signs or Co-signsthis chart in the absence of a cardiologist.        RADIOLOGY:   Subhash Lacy such as CT, Ultrasound and MRI are read by the radiologist. [facility] radiographic images are visualized and preliminarily interpreted by the emergency physician with the below findings:    X-ray right hip and pelvis no fracture  X-ray knee no fracture  X-ray foot no osteomyelitis    Interpretation per the Radiologist below, if available at the time ofthis note:    XR FOOT RIGHT (MIN 3 VIEWS)    (Results Pending)   XR HIP 2-3 VW W PELVIS RIGHT    (Results Pending)   XR KNEE RIGHT (MIN 4 VIEWS)    (Results Pending)         ED BEDSIDE ULTRASOUND:   Performed by ED Physician - none    LABS:  Labs Reviewed   CBC WITH AUTO DIFFERENTIAL - Abnormal; Notable for the following components:       Result Value    MCH 31.8 (*)     MCHC 32.8 (*)     RDW 16.9 (*)     Neutrophils # 8.1 (*)     Lymphocytes # 0.8 (*)     Monocytes Care time was 0 minutes, excluding separatelyreportable procedures. There was a high probability ofclinically significant/life threatening deterioration in the patient's condition which required my urgent intervention. CONSULTS:  None    PROCEDURES:  Unless otherwise noted below, none     Procedures    FINAL IMPRESSION      1. Cellulitis of lower leg    2. Gait disturbance    3. History of type 2 diabetes mellitus          DISPOSITION/PLAN   DISPOSITION        PATIENT REFERREDTO:  No follow-up provider specified. DISCHARGEMEDICATIONS:  New Prescriptions    No medications on file     Controlled Substances Monitoring:     RX Monitoring 8/30/2018   Attestation The Prescription Monitoring Report for this patient was reviewed today. Chronic Pain Routine Monitoring Possible medication side effects, risk of tolerance/dependence & alternative treatments discussed. ;No signs of potential drug abuse or diversion identified.        (Please note that portions of this note were completed with a voice recognition program.  Efforts were made to edit the dictations but occasionally words are mis-transcribed.)    Enma Wilde DO (electronically signed)  Attending Emergency Physician         Enma Wilde DO  05/07/19 1600

## 2019-05-08 ENCOUNTER — APPOINTMENT (OUTPATIENT)
Dept: GENERAL RADIOLOGY | Age: 69
DRG: 622 | End: 2019-05-08
Payer: MEDICARE

## 2019-05-08 LAB
ANION GAP SERPL CALCULATED.3IONS-SCNC: 13 MEQ/L (ref 9–15)
BUN BLDV-MCNC: 39 MG/DL (ref 8–23)
C-REACTIVE PROTEIN, HIGH SENSITIVITY: 233.1 MG/L (ref 0–5)
CALCIUM SERPL-MCNC: 7.5 MG/DL (ref 8.5–9.9)
CHLORIDE BLD-SCNC: 101 MEQ/L (ref 95–107)
CO2: 24 MEQ/L (ref 20–31)
CREAT SERPL-MCNC: 1.06 MG/DL (ref 0.7–1.2)
GFR AFRICAN AMERICAN: >60
GFR NON-AFRICAN AMERICAN: >60
GLUCOSE BLD-MCNC: 246 MG/DL (ref 60–115)
GLUCOSE BLD-MCNC: 257 MG/DL (ref 60–115)
GLUCOSE BLD-MCNC: 284 MG/DL (ref 60–115)
GLUCOSE BLD-MCNC: 330 MG/DL (ref 70–99)
HCT VFR BLD CALC: 43.8 % (ref 42–52)
HEMOGLOBIN: 14.1 G/DL (ref 14–18)
MCH RBC QN AUTO: 31.6 PG (ref 27–31.3)
MCHC RBC AUTO-ENTMCNC: 32.2 % (ref 33–37)
MCV RBC AUTO: 98 FL (ref 80–100)
PDW BLD-RTO: 17.1 % (ref 11.5–14.5)
PERFORMED ON: ABNORMAL
PLATELET # BLD: 188 K/UL (ref 130–400)
POTASSIUM REFLEX MAGNESIUM: 4.6 MEQ/L (ref 3.4–4.9)
RBC # BLD: 4.47 M/UL (ref 4.7–6.1)
SODIUM BLD-SCNC: 138 MEQ/L (ref 135–144)
URINE CULTURE, ROUTINE: NORMAL
WBC # BLD: 7.8 K/UL (ref 4.8–10.8)

## 2019-05-08 PROCEDURE — 2580000003 HC RX 258: Performed by: NURSE PRACTITIONER

## 2019-05-08 PROCEDURE — 1210000000 HC MED SURG R&B

## 2019-05-08 PROCEDURE — 97163 PT EVAL HIGH COMPLEX 45 MIN: CPT

## 2019-05-08 PROCEDURE — 94640 AIRWAY INHALATION TREATMENT: CPT

## 2019-05-08 PROCEDURE — 36415 COLL VENOUS BLD VENIPUNCTURE: CPT

## 2019-05-08 PROCEDURE — 99222 1ST HOSP IP/OBS MODERATE 55: CPT | Performed by: INTERNAL MEDICINE

## 2019-05-08 PROCEDURE — 6370000000 HC RX 637 (ALT 250 FOR IP): Performed by: INTERNAL MEDICINE

## 2019-05-08 PROCEDURE — 6370000000 HC RX 637 (ALT 250 FOR IP): Performed by: STUDENT IN AN ORGANIZED HEALTH CARE EDUCATION/TRAINING PROGRAM

## 2019-05-08 PROCEDURE — 80048 BASIC METABOLIC PNL TOTAL CA: CPT

## 2019-05-08 PROCEDURE — 86141 C-REACTIVE PROTEIN HS: CPT

## 2019-05-08 PROCEDURE — 99213 OFFICE O/P EST LOW 20 MIN: CPT

## 2019-05-08 PROCEDURE — 71045 X-RAY EXAM CHEST 1 VIEW: CPT

## 2019-05-08 PROCEDURE — 99223 1ST HOSP IP/OBS HIGH 75: CPT | Performed by: INTERNAL MEDICINE

## 2019-05-08 PROCEDURE — 6370000000 HC RX 637 (ALT 250 FOR IP): Performed by: NURSE PRACTITIONER

## 2019-05-08 PROCEDURE — 6360000002 HC RX W HCPCS: Performed by: NURSE PRACTITIONER

## 2019-05-08 PROCEDURE — 2700000000 HC OXYGEN THERAPY PER DAY

## 2019-05-08 PROCEDURE — 97535 SELF CARE MNGMENT TRAINING: CPT

## 2019-05-08 PROCEDURE — 94664 DEMO&/EVAL PT USE INHALER: CPT

## 2019-05-08 PROCEDURE — 97166 OT EVAL MOD COMPLEX 45 MIN: CPT

## 2019-05-08 PROCEDURE — 85027 COMPLETE CBC AUTOMATED: CPT

## 2019-05-08 RX ORDER — IPRATROPIUM BROMIDE AND ALBUTEROL SULFATE 2.5; .5 MG/3ML; MG/3ML
3 SOLUTION RESPIRATORY (INHALATION) 3 TIMES DAILY
Status: DISCONTINUED | OUTPATIENT
Start: 2019-05-08 | End: 2019-05-14 | Stop reason: HOSPADM

## 2019-05-08 RX ORDER — ALBUTEROL SULFATE 2.5 MG/3ML
2.5 SOLUTION RESPIRATORY (INHALATION)
Status: DISCONTINUED | OUTPATIENT
Start: 2019-05-08 | End: 2019-05-14 | Stop reason: HOSPADM

## 2019-05-08 RX ORDER — TRAMADOL HYDROCHLORIDE 50 MG/1
50 TABLET ORAL ONCE
Status: COMPLETED | OUTPATIENT
Start: 2019-05-08 | End: 2019-05-08

## 2019-05-08 RX ADMIN — Medication 10 ML: at 21:02

## 2019-05-08 RX ADMIN — INSULIN LISPRO 10 UNITS: 100 INJECTION, SUSPENSION SUBCUTANEOUS at 18:27

## 2019-05-08 RX ADMIN — CLOPIDOGREL BISULFATE 75 MG: 75 TABLET, FILM COATED ORAL at 08:46

## 2019-05-08 RX ADMIN — INSULIN LISPRO 6 UNITS: 100 INJECTION, SOLUTION INTRAVENOUS; SUBCUTANEOUS at 08:46

## 2019-05-08 RX ADMIN — IPRATROPIUM BROMIDE AND ALBUTEROL SULFATE 3 ML: 2.5; .5 SOLUTION RESPIRATORY (INHALATION) at 07:13

## 2019-05-08 RX ADMIN — PIPERACILLIN SODIUM,TAZOBACTAM SODIUM 3.38 G: 3; .375 INJECTION, POWDER, FOR SOLUTION INTRAVENOUS at 13:50

## 2019-05-08 RX ADMIN — SODIUM CHLORIDE: 9 INJECTION, SOLUTION INTRAVENOUS at 11:01

## 2019-05-08 RX ADMIN — SODIUM CHLORIDE: 9 INJECTION, SOLUTION INTRAVENOUS at 00:53

## 2019-05-08 RX ADMIN — ENOXAPARIN SODIUM 40 MG: 40 INJECTION SUBCUTANEOUS at 08:46

## 2019-05-08 RX ADMIN — PIPERACILLIN SODIUM,TAZOBACTAM SODIUM 3.38 G: 3; .375 INJECTION, POWDER, FOR SOLUTION INTRAVENOUS at 05:45

## 2019-05-08 RX ADMIN — INSULIN LISPRO 6 UNITS: 100 INJECTION, SOLUTION INTRAVENOUS; SUBCUTANEOUS at 13:50

## 2019-05-08 RX ADMIN — IPRATROPIUM BROMIDE AND ALBUTEROL SULFATE 3 ML: 2.5; .5 SOLUTION RESPIRATORY (INHALATION) at 13:10

## 2019-05-08 RX ADMIN — VANCOMYCIN HYDROCHLORIDE 1500 MG: 5 INJECTION, POWDER, LYOPHILIZED, FOR SOLUTION INTRAVENOUS at 02:36

## 2019-05-08 RX ADMIN — IPRATROPIUM BROMIDE AND ALBUTEROL SULFATE 3 ML: 2.5; .5 SOLUTION RESPIRATORY (INHALATION) at 19:55

## 2019-05-08 RX ADMIN — SODIUM CHLORIDE: 9 INJECTION, SOLUTION INTRAVENOUS at 20:52

## 2019-05-08 RX ADMIN — COLLAGENASE SANTYL: 250 OINTMENT TOPICAL at 05:26

## 2019-05-08 RX ADMIN — INSULIN LISPRO 10 UNITS: 100 INJECTION, SUSPENSION SUBCUTANEOUS at 08:46

## 2019-05-08 RX ADMIN — IPRATROPIUM BROMIDE AND ALBUTEROL SULFATE 3 ML: .5; 3 SOLUTION RESPIRATORY (INHALATION) at 01:11

## 2019-05-08 RX ADMIN — PIPERACILLIN SODIUM,TAZOBACTAM SODIUM 3.38 G: 3; .375 INJECTION, POWDER, FOR SOLUTION INTRAVENOUS at 21:02

## 2019-05-08 RX ADMIN — COLLAGENASE SANTYL: 250 OINTMENT TOPICAL at 11:02

## 2019-05-08 RX ADMIN — TRAMADOL HYDROCHLORIDE 50 MG: 50 TABLET, FILM COATED ORAL at 21:02

## 2019-05-08 ASSESSMENT — ENCOUNTER SYMPTOMS
SHORTNESS OF BREATH: 0
COUGH: 1
GASTROINTESTINAL NEGATIVE: 1
EYES NEGATIVE: 1
ABDOMINAL PAIN: 0
NAUSEA: 0
WHEEZING: 0
ALLERGIC/IMMUNOLOGIC NEGATIVE: 1
VOMITING: 0

## 2019-05-08 ASSESSMENT — PAIN SCALES - GENERAL
PAINLEVEL_OUTOF10: 0
PAINLEVEL_OUTOF10: 7
PAINLEVEL_OUTOF10: 7
PAINLEVEL_OUTOF10: 0

## 2019-05-08 ASSESSMENT — PAIN DESCRIPTION - DESCRIPTORS: DESCRIPTORS: ACHING

## 2019-05-08 ASSESSMENT — PAIN DESCRIPTION - FREQUENCY: FREQUENCY: CONTINUOUS

## 2019-05-08 ASSESSMENT — PAIN DESCRIPTION - ORIENTATION: ORIENTATION: LEFT

## 2019-05-08 ASSESSMENT — PAIN DESCRIPTION - PAIN TYPE: TYPE: ACUTE PAIN

## 2019-05-08 ASSESSMENT — PAIN DESCRIPTION - LOCATION: LOCATION: LEG

## 2019-05-08 NOTE — PROGRESS NOTES
Nutrition Assessment    Type and Reason for Visit: Initial, Consult, Positive Nutrition Screen(Wounds, Ole )    Nutrition Recommendations: Modify current diet, Start ONS(Carb Control 4 diet with wound healing supplement bid and protein supplement x1/day)    Nutrition Assessment: Pt at nutritional risk due to increased nutrient needs for wound healing. To provide wound healing supplement and Protein supplement. Malnutrition Assessment:  · Malnutrition Status: No malnutrition  · Context: Chronic illness  · Findings of the 6 clinical characteristics of malnutrition (Minimum of 2 out of 6 clinical characteristics is required to make the diagnosis of moderate or severe Protein Calorie Malnutrition based on AND/ASPEN Guidelines):  1. Energy Intake-Greater than 75% of estimated energy requirement, Greater than or equal to 3 months    2. Weight Loss-7.5% loss or greater, in 6 months  3. Fat Loss-No significant subcutaneous fat loss,    4. Muscle Loss-No significant muscle mass loss,    5. Fluid Accumulation-Moderate to severe fluid accumulation, Extremities  6.  Strength-Not measured    Nutrition Risk Level: Moderate    Nutrient Needs:  · Estimated Daily Total Kcal: 8290-8948 (kg x 12-14)  · Estimated Daily Protein (g): 112-128(kg x 1.4-1.6)  · Estimated Daily Total Fluid (ml/day): ~2000 (1ml/kcal)    Nutrition Diagnosis:   · Problem: Increased nutrient needs  · Etiology: related to Increased demand for energy/nutrients     Signs and symptoms:  as evidenced by Presence of wounds    Objective Information:  · Nutrition-Focused Physical Findings: +3 pitting RLE edema noted. Pt states appetite fairly good with no nutritional complaints/stated no recent unintended weight loss. Pt agreeable to wound supplement.   · Wound Type: Pressure Ulcer(ischium & coccyx, right foot, and left stump (see wound nurse note) )  · Current Nutrition Therapies:  · Oral Diet Orders: ('Carb Control')   · Oral Diet intake: 51-75%  · Oral Nutrition Supplement (ONS) Orders: None  · Anthropometric Measures:  · Ht: 6' (182.9 cm)   · Current Body Wt:  please obtain actual weight when able to zero bed  · Admission Body Wt: 326 lb (147.9 kg)(stated)  · Usual Body Wt: 357 lb (161.9 kg)(11/2018; 347-357lb in 2018)  · % Weight Change:  ,  8.8% in 6 months per EMR weights  · Ideal Body Wt: 168lb, % Ideal Body >100%  · body weight adjusted for  BKA  · BMI Classification: BMI > or equal to 40.0 Obese Class III    Nutrition Interventions:   Modify current diet, Start ONS(Carb Control 4 diet with wound healing supplement bid and protein supplement x1/day)  Continued Inpatient Monitoring    Nutrition Evaluation:   · Evaluation: Goals set   · Goals: Intake > 75% of meals/suppleemnt. Improved wound healing.      · Monitoring: Meal Intake, Supplement Intake, Weight, Pertinent Labs, Wound Healing      Electronically signed by Jeni Reed RD, LD on 5/8/19 at 4:32 PM

## 2019-05-08 NOTE — PLAN OF CARE
See OT evaluation for all goals and OT POC.  Electronically signed by STEPHEN Benoit/L on 5/8/2019 at 12:47 PM

## 2019-05-08 NOTE — PROGRESS NOTES
Physical Therapy   Facility/Department: [facility] MED SURG [id number]    NAME: Ediht Chi    : 1950 (79 y.o.)  MRN: [id number]    Account: [de-identified]  Gender: male    PT evaluation and treatment orders received. Chart reviewed. PT eval attempted. Patient eating breakfast and requesting he finishes before eval. Pt was assisted with repositioning in bed. Will attempt PT evaluation again at earliest convenience.       Electronically signed by Lauren Razo PT on 19 at 9:28 AM

## 2019-05-08 NOTE — CONSULTS
PODIATRIC MEDICINE AND SURGERY  CONSULT HISTORY AND PHYSICAL      Consulting Service:  Internal Medicine  Opinion/advice regarding: Management of Right LE DFU  Staff Doctor:  Dr. Jeremy Schreiber DPM    ASSESSMENT:  This 79 y.o. male with significant PMH of DM2, PVD, Left AKA 11/2018 at [facility]. Stable right heel and leg ulcerations. Dependent Rubor.     PLAN AND RECOMMENDATIONS[de-identified]  Prior imaging reviewed. Radiographs and XR negative for acute OM / abscess/Gas  Weight bearing as tolerated to RLE for transfers  Offloading boots in bed  Dependent Rubor likely secondary to PVD. Consult to Cardiology for work up. Art Duplex US ordered for right. Santyl to the right posterior heel and anterior leg with DSD. Betadine paint to any areas of necrosis to toes. Leave open to air. Change daily. Post op shoe to right foot  Patient to be discussed with staff, Dr. Chevy Walsh, who will provide final recommendations going forward. Podiatry to follow while in house. HPI: 79 y.o. male who presented to [facility] ED with complaint of right lower extremity weakness and pain. Patient is a known diabetic vasculopath with hx of AKA at [facility] in November of 2018. States he developed weakness yesterday evening and sustained a fall. He states that he has a chronic history right heel and leg ulcerations for which he follows at [facility]. He has been applying santyl daily to RLE ulcerations. Ptient denies nausea, vomiting, diarrhea, fevers, chills, chest pain, shortness of breath, head ache, or calf pain. No other pedal complaints.      Past Medical History:   Diagnosis Date    Asthma     COPD (chronic obstructive pulmonary disease) ([address]     Morbid obesity (HCC)     Neuropathy     PVD [facility]peripheral vascular [facility]) [facility]     1 stent in left leg placed 09/2018 and 2 stents in right leg    Sleep apnea     Type 2 diabetes mellitus without complication ([address]        Past Surgical History: file    Stress: Not on file   Relationships    Social connections:     Talks on phone: Not on file     Gets together: Not on file     Attends Nondenominational service: Not on file     Active member of club or organization: Not on file     Attends meetings of clubs or organizations: Not on file     Relationship status: Not on file    Intimate partner violence:     Fear of current or ex partner: Not on file     Emotionally abused: Not on file     Physically abused: Not on file     Forced sexual activity: Not on file   Other Topics Concern    Not on file   Social History Narrative    Social/Functional History            Review of Systems  CONSTITUTIONAL: No fevers, chills, diaphoresis  HEENT: Denies epistaxis or tinnitus  EYES: No diplopia or blurry vision. CARDIOVASCULAR: No chest pain, dyspnea, palpitations, orthopnea, PND, ankle edema. PULM: No dyspnea, tachypnea, wheezing  GI: No dysphagia/odynophagia, constipation, diarrhea, changes in stool habits, hematochezia, melena. : No new urinary complaints, including dysuria, gross hematuria or pyuria. NEURO: No new balance problems, peripheral weakness/paresthesias or numbness of concern. MUSC-SKEL: admits to pain in right. See below  PSY: No concerns regarding depression, anxiety or panic. INTEGUMENTARY: admits to open skin lesion. See below    OBJECTIVE:  BP (!) 148/51   Pulse 92   Temp 97.9 °F (36.6 °C)   Resp 22   Ht 6' (1.829 m)   Wt (!) 326 lb (147.9 kg)   SpO2 94%   BMI 44.21 kg/m²   Patient is alert and oriented x 3 in NAD.        Vascular:   Non-Palpable Dorsalis Pedis and non-Palpable Posterior Tibial Pulses on right.   Capillary Fill time < 5 seconds right  Skin temperature warm to cool tibial tuberosity to the digits B/L  Hair growth absent to digits   ++ Pitting edema RLE  Dependent rubor distal right forefoot     Neurological:   Epicritic sensation intact B/L  Protective sensation via monofilament testing impaired B/L  Sharp/dull sensation

## 2019-05-08 NOTE — PROGRESS NOTES
[facility] Respiratory Therapy Evaluation   Current Order duoneb q4prn albuterol MDI 2 puffs q4prn    Home Regimen: prn     Ordering Physician: Staci Soria  Re-evaluation Date: 5/11     Diagnosis: cellulitis of lower leg     Patient Status: Stable / Unstable + Physician notified    The following MDI Criteria must be met in order to convert aerosol to MDI with spacer.  If unable to meet, MDI will be converted to aerosol:  []  Patient able to demonstrate the ability to use MDI effectively  []  Patient alert and cooperative  []  Patient able to take deep breath with 5-10 second hold  []  Medication(s) available in this delivery method   []  Peak flow greater than or equal to 200 ml/min            Current Order Substituted To  (same drug, same frequency)   Aerosol to MDI [] Albuterol Sulfate 0.083% unit dose by aerosol Albuterol Sulfate MDI 2 puffs by inhalation with spacer    [] Levalbuterol 1.25 mg unit dose by aerosol Levalbuterol MDI 2 puffs by inhalation with spacer    [] Levalbuterol 0.63 mg unit dose by aerosol Levalbuterol MDI 2 puffs by inhalation with spacer    [] Ipratropium Bromide 0.02% unit dose by aerosol Ipratropium Bromide MDI 2 puffs by inhalation with spacer    [] Duoneb (Ipratropium + Albuterol) unit dose by aerosol Ipratropium MDI + Albuterol MDI 2 puffs by inhalation w/spacer   MDI to Aerosol [] Albuterol Sulfate MDI Albuterol Sulfate 0.083% unit dose by aerosol    [] Levalbuterol MDI 2 puffs by inhalation Levalbuterol 1.25 mg unit dose by aerosol    [] Ipratropium Bromide MDI by inhalation Ipratropium Bromide 0.02% unit dose by aerosol    [] Combivent (Ipratropium + Albuterol) MDI by inhalation Duoneb (Ipratropium + Albuterol) unit dose by aerosol   Treatment Assessment [Frequency/Schedule]:  Change frequency to:duoneb tid albuterol q2prn __________________________________________________per Protocol, P&T, [facility]      Points 0 1 2 3 4   Pulmonary Status  Non-Smoker  []   Smoking history   < 20 pack years  []   Smoking history  ?  20 pack years  []   Pulmonary Disorder  (acute or chronic)  [x]   Severe or Chronic w/ Exacerbation  []     Surgical Status No [x]   Surgeries     General []   Surgery Lower []   Abdominal Thoracic or []   Upper Abdominal Thoracic with  PulmonaryDisorder  []     Chest X-ray Clear/Not  Ordered     [x]  Chronic Changes  Results Pending  []  Infiltrates, atelectasis, pleural effusion, or edema  []  Infiltrates in more than one lobe []  Infiltrate + Atelectasis, &/or pleural effusion  []    Respiratory Pattern Regular,  RR = 12-20 [x]  Increased,  RR = 21-25 []  CANCINO, irregular,  or RR = 26-30 []  Decreased FEV1  or RR = 31-35 []  Severe SOB, use  of accessory muscles, or RR ? 35  []    Mental Status Alert, oriented,  Cooperative [x]  Confused but Follows commands []  Lethargic or unable to follow commands []  Obtunded  []  Comatose  []    Breath Sounds Clear to  auscultation  []  Decreased unilaterally or  in bases only []  Decreased  bilaterally  []  Crackles or intermittent wheezes [x]  Wheezes []    Cough Strong, Spontan., & nonproductive [x]  Strong,  spontaneous, &  productive []  Weak,  Nonproductive []  Weak, productive or  with wheezes []  No spontaneous  cough or may require suctioning []    Level of Activity Ambulatory []  Ambulatory w/ Assist  [x]  Non-ambulatory []  Paraplegic []  Quadriplegic []    Total    Score:__7_____     Triage Score:___4_____      Tri       Triage:     1. (>20) Freq: Q3    2. (16-20) Freq: Q4   3. (11-15) Freq: QID & Albuterol Q2 PRN    4. (6-10) Freq: TID & Albuterol Q2 PRN    5. (0-5) Freq Q4prn

## 2019-05-08 NOTE — CONSULTS
Infectious Diseases Inpatient Consult Note      Reason for Consult:   Diabetic foot ulcer  Requesting Physician:   Dr Madie Castro  Primary Care Physician:  No primary care provider on file. History Obtained From:   Pt, EPIC    Admit Date: 5/7/2019  Hospital Day: 2      HISTORY OF PRESENT ILLNESS:  This is a 79 y.o. male was admitted to [facility]  from home  through ER after he slid out of the couch and landed on his R leg on Sunday and since then he has not been able to lift himself to transfer. Has been very weak. . Had L BKA last year and since then he has not been ambulatory. Has HARSHA on CPAP at night. Has R heel chronic non healing ulcer for 1 year. Smokes on and off. Had DM 2. CHIEF COMPLAINT:       Past Medical History:   Diagnosis Date    Asthma     COPD (chronic obstructive pulmonary disease) ([address]     Morbid obesity (HCC)     Neuropathy     PVD [facility]peripheral vascular disease) [facility]     1 stent in left leg placed 09/2018 and 2 stents in right leg    Sleep apnea     Type 2 diabetes mellitus without complication ([facility]        Past Surgical History:   Procedure Laterality Date    RECTAL SURGERY  2015    patient had a boil break open inside rectum.  needed surgery for infection removal.       Current Medications:     ipratropium-albuterol  3 mL Inhalation TID    sodium chloride flush  10 mL Intravenous 2 times per day    enoxaparin  40 mg Subcutaneous Daily    vancomycin  15 mg/kg (Adjusted) Intravenous Q12H    piperacillin-tazobactam  3.375 g Intravenous Q8H    insulin lispro  0-12 Units Subcutaneous TID WC    insulin lispro  0-6 Units Subcutaneous Nightly    vancomycin (VANCOCIN) intermittent dosing (placeholder)   Other RX Placeholder    clopidogrel  75 mg Oral Daily    insulin lispro protamine & lispro  10 Units Subcutaneous BID WC    collagenase   Topical Daily       Allergies:  Coconut oil    Social History     Socioeconomic History    Marital status:      Spouse name: Not on file    Number of children: Not on file    Years of education: Not on file    Highest education level: Not on file   Occupational History    Not on file   Social Needs    Financial resource strain: Not on file    Food insecurity:     Worry: Not on file     Inability: Not on file    Transportation needs:     Medical: Not on file     Non-medical: Not on file   Tobacco Use    Smoking status: Current Every Day Smoker     Packs/day: 1.00     Years: 58.00     Pack years: 58.00     Types: Cigarettes    Smokeless tobacco: Never Used   Substance and Sexual Activity    Alcohol use: No    Drug use: Yes     Types: Marijuana    Sexual activity: Not on file   Lifestyle    Physical activity:     Days per week: Not on file     Minutes per session: Not on file    Stress: Not on file   Relationships    Social connections:     Talks on phone: Not on file     Gets together: Not on file     Attends [employer] service: Not on file     Active member of club or organization: Not on file     Attends meetings of clubs or organizations: Not on file     Relationship status: Not on file    Intimate partner violence:     Fear of current or ex partner: Not on file     Emotionally abused: Not on file     Physically abused: Not on file     Forced sexual activity: Not on file   Other Topics Concern    Not on file   Social History Narrative         Lives With: Alone     Pt is from home but home was not going well prior to this admission. Pt lives alone. Pt was recently at a SNF in [facility] but he does not want to return there. Pt is agreeable to [facility] if they will take him. Referral called to Gonzalo Reid at [place].       Type of Home: Apartment One level    Home Access: Stairs to enter without rails - Number of Steps: 1 (working with landlord to get a ramp)    Bathroom Shower/Tub: Tub/Shower unit(w/c does not fit into the bathroom)    Bathroom Equipment: 3-in-1 commode    Home Equipment: [address]    ADL Assistance: Independent(\"With baby wipes, not very good\")    Homemaking Assistance: Needs assistance    Ambulation Assistance: (w/c)    Transfer Assistance: Independent(was using slideboard at home)    Active : No    Additional Comments: Has been sleeping on the sofa, cannot get into the bedroom or bathroom at home as [facility] does not fit in the doorways. Girlfriend or guys from the apartment do his shopping    Social/Functional History              Family History:   History reviewed. No pertinent family history. Review of Systems   Respiratory: Positive for cough (for 3 days). Skin: Positive for wound. Neurological: Positive for weakness. All other systems reviewed and are negative. 14 system review is negative other than HPI    Physical Exam  Vitals:    05/07/19 1621 05/07/19 1924 05/08/19 0111 05/08/19 0734   BP: (!) 148/51 (!) 145/69     Pulse: 92 88     Resp: 22 22     Temp: 97.9 °F (36.6 °C) 97.7 °F (36.5 °C)     TempSrc:  Oral     SpO2:  93% (!) 89% 90%   Weight:       Height:         General Appearance: alert  and oriented to person, place and time, well-developed and well-nourished, in no acute distress  Skin: warm and dry, no rash. Head: normocephalic and atraumatic  Eyes: extraocular eye movements intact, conjunctivae normal, anicteric sclerae  ENT: oropharynx clear and moist with normal mucous membranes. No thrush  Lungs: normal respiratory effort, diminished with mild wheezes and rales  Heart:RRR, nl S1/S2, no murmur  Abdomen: soft, obese, no tenderness, no H-S-megaly, + BS  NEUROLOGICAL: alert and oriented x 3, no focal deficits  RLE 3+ edema  Mild RLE erythema, warmth, no tenderness.  decreased touch sensation  R heel ulcer necrotic with slough, minimal granulation, measures > 5cm, no exposed bone, minimal drainage, no malodor  R 1st 2 toes with small areas of blisters and black discolored nails  Palpable peripheral pulses  L BKA healed        DATA:    Lab Results   Component Value Date BMI 45.0-49.9, adult (HCC)    History of tobacco use    HARSHA on CPAP    Wheezing    Cellulitis    Leg wound, left    Leukocytosis    Unable to ambulate    Elevated bilirubin    Cellulitis of left foot    Gangrene associated with type 2 diabetes mellitus (HCC)    Venous stasis ulcer limited to breakdown of skin without varicose veins (HCC)    Gas gangrene of foot (HCC)    Hyperglycemia due to type 2 diabetes mellitus [facility]    PVD (peripheral vascular disease) ([facility]    Diabetic ulcer of right foot due to type 2 diabetes mellitus ([facility]    Gait abnormality       PLAN:  · D/C IV Vanco  · Continue IV Zosyn  · R heel Wound Cx  · Local wound care and offloading  · Podiatry and cardiology following  · CRP CXR    Discussed with patient    Karol Fowler MD

## 2019-05-08 NOTE — PROGRESS NOTES
Pt,complaining of shortness of breath. However pt refusing to do tx because\" I need to drink my coffee first.\"  Tx eventually given. Pt speaking in complete sentences. No distress noted @ this time.

## 2019-05-08 NOTE — DISCHARGE INSTR - COC
2 diabetes mellitus ([facility]) E11.621, L97.519    Gait abnormality R26.9       Isolation/Infection:   Isolation          No Isolation            Nurse Assessment:  Last Vital Signs: BP (!) 145/69   Pulse 88   Temp 97.7 °F (36.5 °C) (Oral)   Resp 22   Ht 6' (1.829 m)   Wt (!) 326 lb (147.9 kg)   SpO2 90%   BMI 44.21 kg/m²     Last documented pain score (0-10 scale): Pain Level: 0  Last Weight:   Wt Readings from Last 1 Encounters:   05/07/19 (!) 326 lb (147.9 kg)     Mental Status:  oriented, alert, coherent, logical, thought processes intact and able to concentrate and follow conversation    IV Access:  - None    Nursing Mobility/ADLs:  Walking   Assisted  Transfer  Assisted  Bathing  Assisted  Dressing  Assisted  Toileting  Assisted  Feeding  Independent  Med [address]  Med Delivery   whole    Wound Care Documentation and Therapy:  Wound 11/19/18 Skin tear Leg Anterior; Lateral;Right pt with wound vac on lower leg and open ulcer above wound vac area right heel reddened (Active)   Number of days: 169       Wound 11/19/18  Leg Left (Active)   Number of days: 170       Wound 11/20/18 Foot Left;Lateral tunneling apparent, 1.2 cm (Active)   Number of days: 169       Wound 11/20/18 Skin tear Foot Left; Anterior yellow and bruising, seeping (Active)   Number of days: 169       Wound 11/20/18 Leg Left; Anterior; Lower 5cm wide 4 cm long (Active)   Number of days: 169       Wound 11/20/18 Leg Left; Lower; Inner;Medial butterfly shape 2x1 (Active)   Number of days: 169       Wound 11/20/18 Leg Left; Lower;Distal 1 cm round back of leg, tunneling apparent (Active)   Number of days: 169       Wound 11/20/18 Leg Right; Lower; Anterior 7cm x 2.5cm black, maggots  (Active)   Number of days: 169       Wound 11/20/18 Blister Leg Right; Lower 4x3 cm round red blister area (Active)   Number of days: 169       Wound 11/20/18 Blister Leg Right; Lower 2 small blisters above large one (Active)   Number of days: 169       Wound 05/08/19 Ischium Right (Active)   Wound Pressure Unstageable 5/8/2019 10:17 AM   Dressing/Treatment Moist to dry 5/8/2019 10:17 AM   Wound Length (cm) 5.5 cm 5/8/2019 10:17 AM   Wound Width (cm) 3.5 cm 5/8/2019 10:17 AM   Wound Surface Area (cm^2) 19.25 cm^2 5/8/2019 10:17 AM   Wound Assessment Brown;Slough;Drainage 5/8/2019 10:17 AM   Drainage Amount Moderate 5/8/2019 10:17 AM   Drainage Description Yellow 5/8/2019 10:17 AM   Odor Mild 5/8/2019 10:17 AM   Silvia-wound Assessment Red;Maceration;Denuded 5/8/2019 10:17 AM   Other%Wound Bed 100 5/8/2019 10:17 AM   Number of days: 0       Wound 05/07/19 Ischium Left (Active)   Wound Deep tissue/Injury 5/8/2019 10:17 AM   Dressing/Treatment Protective barrier 5/8/2019 10:17 AM   Dressing Change Due 05/08/19 5/8/2019 10:17 AM   Wound Length (cm) 2 cm 5/8/2019 10:17 AM   Wound Width (cm) 2.5 cm 5/8/2019 10:17 AM   Wound Surface Area (cm^2) 5 cm^2 5/8/2019 10:17 AM   Wound Assessment Intact; Purple;Maroon 5/8/2019 10:17 AM   Drainage Amount None 5/8/2019 10:17 AM   Odor None 5/8/2019 10:17 AM   Purple%Wound Bed 100 5/8/2019 10:17 AM   Number of days: 1       Wound 05/07/19 Coccyx (Active)   Wound Deep tissue/Injury 5/8/2019 10:17 AM   Dressing/Treatment Protective barrier 5/8/2019 10:17 AM   Dressing Change Due 05/08/19 5/8/2019 10:17 AM   Wound Length (cm) 2 cm 5/8/2019 10:17 AM   Wound Width (cm) 1.5 cm 5/8/2019 10:17 AM   Wound Surface Area (cm^2) 3 cm^2 5/8/2019 10:17 AM   Wound Assessment Intact; Purple 5/8/2019 10:17 AM   Drainage Amount None 5/8/2019 10:17 AM   Odor None 5/8/2019 10:17 AM   Purple%Wound Bed 100 5/8/2019 10:17 AM   Number of days: 1       Wound 05/07/19 Leg Left; Anterior (Active)   Wound Pressure Stage  2 5/8/2019 10:17 AM   Dressing/Treatment Barrier Film 5/8/2019 10:17 AM   Dressing Change Due 05/08/19 5/8/2019 10:17 AM   Wound Length (cm) 2 cm 5/8/2019 10:17 AM   Wound Width (cm) 5 cm 5/8/2019 10:17 AM   Wound Depth (cm) 0 cm 5/8/2019 10:17 AM   Wound Surface Area (cm^2) 10 cm^2 5/8/2019 10:17 AM   Wound Volume (cm^3) 0 cm^3 5/8/2019 10:17 AM   Wound Assessment Blood filled blister 5/8/2019 10:17 AM   Drainage Amount None 5/8/2019 10:17 AM   Odor None 5/8/2019 10:17 AM   Silvia-wound Assessment Non-blanchable erythema 5/8/2019 10:17 AM   Number of days: 1        Elimination:  Continence:   · Bowel: Yes  · Bladder: [place]  Urinary Catheter: Insertion Date: 5/9/19   Colostomy/Ileostomy/Ileal Conduit: No       Date of Last BM: 5/15/19    Intake/Output Summary (Last 24 hours) at 5/8/2019 1136  Last data filed at 5/7/2019 1516  Gross per 24 hour   Intake 50 ml   Output --   Net 50 ml     I/O last 3 completed shifts: In: 48 [IV Piggyback:50]  Out: -     Safety Concerns:     None    Impairments/Disabilities:      Amputation - Left AKA    Nutrition Therapy:  Current Nutrition Therapy:   - Oral Diet:  General    Routes of Feeding: Oral  Liquids: No Restrictions  Daily Fluid Restriction: no  Last Modified Barium Swallow with Video (Video Swallowing Test): not done    Treatments at the Time of Hospital Discharge:   Respiratory Treatments: None  Oxygen Therapy:  is on oxygen at 3 L/min per nasal cannula. Ventilator:    No ventilator support. Rehab Therapies: Physical Therapy and Occupational Therapy. Right heel daily wound care. Weight Bearing Status/Restrictions: Left BKA  Other Medical Equipment (for information only, NOT a DME order):  wheelchair, bedside commode and hospital bed  Other Treatments: Wound treatments. Dressing changes. Daily dressing changes to right heel wound. Daily wound care to the right heel wound as follows: 1. Cleanse wound(s) with normal saline. 2. Apply a nickel thickness layer of SANTYL OINTMENT to the wound bed for enzymatic debridement purposes. 3. Apply a moistened saline 4x4 (gauze pad) over the Santyl Ointment. 4. Cover with additional dry 4x4's and wrap with gauze (theresa or kerlix).   5. Change Daily    Dressing changes to Rt ischial area (per Dr. Johnathon Thakkar): change dressing with silvercel three times a week. F/U in wound center 2 weeks after discharge. Patient's personal belongings (please select all that are sent with patient):  {[facility] DME Belongings:[id number]}    RN SIGNATURE:  Electronically signed by Santo Patino RN on 5/14/19 at 7:08 PM    CASE MANAGEMENT/SOCIAL WORK SECTION    Inpatient Status Date: 05/07/2019    Readmission Risk Assessment Score:  Readmission Risk              Risk of Unplanned Readmission:        17           Discharging to Facility/ Agency   · Name:   · Address:  · Phone:  · Fax:    Dialysis Facility (if applicable)   · Name:  · Address:  · Dialysis Schedule:  · Phone:  · Fax:    / signature: Electronically signed by JING Soto on 5/8/19 at 11:36 AM    PHYSICIAN SECTION    Prognosis: Fair    Condition at Discharge: Stable    Rehab Potential (if transferring to Rehab): Good    Recommended Labs or Other Treatments After Discharge: none     Physician Certification: I certify the above information and transfer of Zuleyma Nolasco  is necessary for the continuing treatment of the diagnosis listed and that he requires [facility] for less 30 days.      Update Admission H&P: No change in H&P    PHYSICIAN SIGNATURE:  Electronically signed by Sol Willett DO on 5/14/19 at 12:35 PM

## 2019-05-08 NOTE — CONSULTS
([facility]    Gas gangrene of foot ([facility]    Hyperglycemia due to type 2 diabetes mellitus ([facility]    [facility]    Diabetic ulcer of right foot due to type 2 diabetes mellitus ([facility]    Gait abnormality       Past Surgical History:   Procedure Laterality Date    RECTAL SURGERY  2015    patient had a boil break open inside rectum. needed surgery for infection removal.       Social History     Socioeconomic History    Marital status:      Spouse name: None    Number of children: None    Years of education: None    Highest education level: None   Occupational History    None   Social Needs    Financial resource strain: None    Food insecurity:     Worry: None     Inability: None    Transportation needs:     Medical: None     Non-medical: None   Tobacco Use    Smoking status: Current Every Day Smoker     Packs/day: 1.00     Years: 58.00     Pack years: 58.00     Types: Cigarettes    Smokeless tobacco: Never Used   Substance and Sexual Activity    Alcohol use: No    Drug use: Yes     Types: Marijuana    Sexual activity: None   Lifestyle    Physical activity:     Days per week: None     Minutes per session: None    Stress: None   Relationships    Social connections:     Talks on phone: None     Gets together: None     Attends [employer] service: None     Active member of club or organization: None     Attends meetings of clubs or organizations: None     Relationship status: None    Intimate partner violence:     Fear of current or ex partner: None     Emotionally abused: None     Physically abused: None     Forced sexual activity: None   Other Topics Concern    None   Social History Narrative    Social/Functional History            History reviewed. No pertinent family history.     Current Facility-Administered Medications   Medication Dose Route Frequency Provider Last Rate Last Dose    ipratropium-albuterol (DUONEB) nebulizer solution 3 mL  3 mL Inhalation TID Steve Montero Josephine Sears ASIM - CNP        insulin lispro (HUMALOG) injection vial 0-12 Units  0-12 Units Subcutaneous TID WC Josephine Sears APRNABILA Claire CNP   6 Units at 05/08/19 0846    insulin lispro (HUMALOG) injection vial 0-6 Units  0-6 Units Subcutaneous Nightly Josephine Sears APRN - CNP   4 Units at 05/07/19 2254    vancomycin (VANCOCIN) intermittent dosing (placeholder)   Other RX Placeholder Josephine Sears APRNABILA - CNP        albuterol sulfate  (90 Base) MCG/ACT inhaler 2 puff  2 puff Inhalation Q4H PRN Thomas Stringer MD        clopidogrel (PLAVIX) tablet 75 mg  75 mg Oral Daily Thomas Stringer MD   75 mg at 05/08/19 0846    insulin lispro protamine & lispro (HUMALOG MIX) (75-25) 100 UNIT per ML injection vial SUSP 10 Units  10 Units Subcutaneous BID  Thomas Stringer MD   10 Units at 05/08/19 0846    collagenase ointment   Topical Daily Sherry Gibbons DPM           ALLERGIES: Coconut oil    Review of Systems   Constitutional: Negative. Negative for chills and fever. HENT: Negative. Eyes: Negative. Respiratory: Negative for shortness of breath and wheezing. Cardiovascular: Negative for chest pain, palpitations and leg swelling. Gastrointestinal: Negative. Negative for abdominal pain, nausea and vomiting. Endocrine: Negative. Genitourinary: Negative. Musculoskeletal:        Left AKA   Skin: Positive for wound (right LE bandaged but has gangrenous toes and superficail ulcers on anterior tibial area. ). Negative for rash. Allergic/Immunologic: Negative. Neurological: Negative for dizziness, weakness and headaches. Hematological: Negative. Psychiatric/Behavioral: Negative. VITALS:  Blood pressure (!) 145/69, pulse 88, temperature 97.7 °F (36.5 °C), temperature source Oral, resp. rate 22, height 6' (1.829 m), weight (!) 326 lb (147.9 kg), SpO2 90 %. Body mass index is 44.21 kg/m². Physical Exam   Constitutional: He is oriented to person, place, and time.  He appears well-developed and well-nourished. HENT:   Head: Normocephalic and atraumatic. Eyes: Pupils are equal, round, and reactive to light. Neck: Normal range of motion. Neck supple. No JVD present. No tracheal deviation present. No thyromegaly present. Cardiovascular: Normal rate, regular rhythm, normal heart sounds and intact distal pulses. PMI is not displaced. Exam reveals no gallop, no S3, no distant heart sounds and no friction rub. No murmur heard. Pulmonary/Chest: No respiratory distress. He has no wheezes. He has no rales. He exhibits no tenderness. Abdominal: Soft. Bowel sounds are normal. He exhibits no distension and no mass. There is no tenderness. There is no rebound and no guarding. Musculoskeletal: He exhibits edema (right LE with multiple wounds, bandaged. + left AKA. Judithe Owego ). Neurological: He is alert and oriented to person, place, and time. No cranial nerve deficit. Skin: Skin is warm and dry. No rash noted. He is not diaphoretic. No erythema. No pallor. Psychiatric: He has a normal mood and affect.  His behavior is normal. Judgment and thought content normal.       LABS:  Recent Results (from the past 24 hour(s))   Urine Reflex to Culture    Collection Time: 05/07/19 12:30 PM   Result Value Ref Range    Color, UA DARK YELLOW (A) Straw/Yellow    Clarity, UA Clear Clear    Glucose, Ur 500 (A) Negative mg/dL    Bilirubin Urine Negative Negative    Ketones, Urine TRACE (A) Negative mg/dL    Specific Gravity, UA 1.022 1.005 - 1.030    Blood, Urine LARGE (A) Negative    pH, UA 5.0 5.0 - 9.0    Protein, UA >=300 (A) Negative mg/dL    Urobilinogen, Urine 1.0 <2.0 E.U./dL    Nitrite, Urine Negative Negative    Leukocyte Esterase, Urine Negative Negative    Urine Reflex to Culture YES    Microscopic Urinalysis    Collection Time: 05/07/19 12:30 PM   Result Value Ref Range    RBC, UA 0-2 0 - 2 /HPF    Bacteria, UA Negative /HPF    Yeast, UA Present (A)     Hyaline Casts, UA 5-10 0 - 5 /HPF 05/07/19  8:42 PM   Result Value Ref Range    POC Glucose 318 (H) 60 - 115 mg/dl    Performed on ACCU-CHEK    Basic Metabolic Panel w/ Reflex to MG    Collection Time: 05/08/19  5:17 AM   Result Value Ref Range    Sodium 138 135 - 144 mEq/L    Potassium reflex Magnesium 4.6 3.4 - 4.9 mEq/L    Chloride 101 95 - 107 mEq/L    CO2 24 20 - 31 mEq/L    Anion Gap 13 9 - 15 mEq/L    Glucose 330 (H) 70 - 99 mg/dL    BUN 39 (H) 8 - 23 mg/dL    CREATININE 1.06 0.70 - 1.20 mg/dL    GFR Non-African American >60.0 >60    GFR  >60.0 >60    Calcium 7.5 (L) 8.5 - 9.9 mg/dL   CBC    Collection Time: 05/08/19  5:17 AM   Result Value Ref Range    WBC 7.8 4.8 - 10.8 K/uL    RBC 4.47 (L) 4.70 - 6.10 M/uL    Hemoglobin 14.1 14.0 - 18.0 g/dL    Hematocrit 43.8 42.0 - 52.0 %    MCV 98.0 80.0 - 100.0 fL    MCH 31.6 (H) 27.0 - 31.3 pg    MCHC 32.2 (L) 33.0 - 37.0 %    RDW 17.1 (H) 11.5 - 14.5 %    Platelets 634 379 - 135 K/uL   POCT Glucose    Collection Time: 05/08/19  8:09 AM   Result Value Ref Range    POC Glucose 284 (H) 60 - 115 mg/dl    Performed on ACCU-CHEK      Troponin: No results found for: TROPONINI    EKG: normal sinus rhythm, RBBB      ASSESSMENT:      Right LE non healing diabetic foot ulcers/wounds  Hx of severe PAD s/p remote PVI's  Hx of left AKA  Hx of DM  Morbid obesity  HARSHA        PLAN:   1. As always, aggressive risk factor modification is strongly recommended. We should adhere to the JNC VIII guidelines for HTN management and the NCEPATP III guidelines for LDL-C management. 2. Will need LE angio to assess right BTK vasculature as LE art duplex will be limited due to body habitus, ulcerations and edema. Will plan for tomorrow pm  3. Max cardiac meds  4. Coronary evaluation when feasible. Will likely need [facility] given poor stress test candidate and multiple risk factors for cad  5. Gi/dvt proph  6. Further recs to follow. Will discuss with podiatry.      Thank you for allowing me to participate in the care of your patient, please don't hesitate to contact me if you have any further questions.     Electronically signed by Yobani Logan DO on 5/8/2019 at 9:21 AM

## 2019-05-08 NOTE — PROGRESS NOTES
Hospitalist Daily Progress Note  Name: Rc Eastman  Age: 79 y.o. Gender: male  CodeStatus: Full Code  Allergies: Coconut Oil    Chief Complaint:Extremity Weakness (pt c/o RLE extremity weakness for the past 2 days after falling off the couch)      Primary Care Provider: No primary care provider on file. InpatientTreatment Team: Treatment Team: Attending Provider: Jeanine Cullen MD; Consulting Physician: Deion Hernadez DO; Consulting Physician: Haley Orellana DPM; Consulting Physician: Osmar Dumont MD; Consulting Physician: Nikita Burns DO; Care Coordinator: Marylu Razo, RN; Patient Care Tech: Davis Cedeno; Registered Nurse: Jessica Lim, RN; Registered Nurse: Petr Warner, RN; Consulting Physician: Lacie Osler, MD    Admission Date: 5/7/2019      Subjective: Patient resting in bed, states rle wound has been worsening over past several weeks. No cp, sob. Physical Exam   Constitutional: He is oriented to person, place, and time. He appears well-developed and well-nourished. Cardiovascular: Normal rate and regular rhythm. Pulmonary/Chest: Effort normal and breath sounds normal.   Abdominal: Soft. Bowel sounds are normal.   Musculoskeletal: Normal range of motion. LLE AKA   Neurological: He is alert and oriented to person, place, and time. Skin:   Right foot dressed, noted significant drainage from right heel wound stage 3   Nursing note and vitals reviewed.       Medications:  Reviewed    Infusion Medications:    sodium chloride 100 mL/hr at 05/08/19 1101    dextrose       Scheduled Medications:    ipratropium-albuterol  3 mL Inhalation TID    sodium chloride flush  10 mL Intravenous 2 times per day    enoxaparin  40 mg Subcutaneous Daily    vancomycin  15 mg/kg (Adjusted) Intravenous Q12H    piperacillin-tazobactam  3.375 g Intravenous Q8H    insulin lispro  0-12 Units Subcutaneous TID WC    insulin lispro  0-6 Units Subcutaneous Nightly    left hilum appears bulky and enlarged measuring up to 3.0 cm. The pulmonary vascularity is normal size. The osseous   structures are unremarkable. Left tibia and fibula-2 views:  No fracture or dislocation. There is spurring of the medial lateral tibial plateau and narrowing of the medial compartment of the left knee. There is air in the subcutaneous tissues of the level of the lateral ankle extending along the anterior soft   tissues. Irregularity in the anterior soft tissues of the distal lower extremity are consistent with history of wound. Three  views of the LEFT FOOT are submitted. There is a fracture of the proximal fifth metatarsal. There is air in the adjacent soft tissues, cannot exclude superimposed osteomyelitis. No dislocations. There is soft tissue swelling and air predominantly at the dorsum of the foot and extending into the lateral ankle. Right tibia and fibula-2 views:  Plate and screw hardware at the proximal medial tibia. No acute fracture of the tibia or fibula. Soft tissue irregularity at the anterior soft tissues of the distal right lower extremity, consistent with wound. Impression 1. Enlarged left hilum, when the patient is able recommend repeat chest radiograph in true PA positioning. 2. Fracture of the proximal fifth metatarsal. Correlate with site of ulcerations, cannot exclude superimposed osteomyelitis. 3. Air within the soft tissues at the level of the left ankle and foot, suggestive of extensive soft tissue infection. 4. Bilateral anterior distal lower extremity wounds. Portable: No results found for this or any previous visit. Echo No results found for this or any previous visit.           Assessment/Plan:    Active Hospital Problems    Diagnosis Date Noted    Diabetic ulcer of right foot due to type 2 diabetes mellitus ([facility] [S27.748, L97.519] 05/07/2019    Gait abnormality [R26.9] 05/07/2019    PVD (peripheral vascular disease) ([facility] [I73.9] 11/20/2018    Leg wound, left [S81.802A] 11/20/2018    Venous stasis ulcer limited to breakdown of skin without varicose veins (HCC) [I87.2, L97.901]     HARSHA on CPAP [G47.33, Z99.89] 01/16/2017    Acute and chronic respiratory failure with hypoxia ([facility] [J96.21] 01/16/2017     1. Right diabetic foot ulcer with cellulitis of RLE- podiatry consulted, continue on IV zosyn and vancomycin, cultures pending, IVF, MRI right foot to r/o osteomyelitis. 5/8 - neg MRI for osteomyelitis, cont iv abx, podiatry and cardiology evaluating for healing plan    2. Uncontrolled DM2- ac/hs and as needed poct glucose with ssi coverage as needed, a1c pending   5/8 - hgba1c 7.8, cont current coverage    3. Lactic acidosis- will hydrate and repeat lab    4. Renal insufficency- hydrate and repeat labs, monitor output    5/8 - improved    5.  DVT proph - lovenox        DVT Prophylaxis: lovenox  Diet: carb control  Code Status: full           Electronically signed by Ian Peterson MD on 5/8/2019 at 12:25 PM

## 2019-05-08 NOTE — FLOWSHEET NOTE
9253  Physical and occupational therapy to bedside. [employer]ility rep also here with bariatric air mattress. Therapy was willing to help transfer patient when bed was ready. 1000  Patient was transferred to the bed via [facility] Lift that is in the room. Patient now resting in bed. Susy Lynchijamrit 95 wound care to bedside to see the patient. Patient has multiple sites of concern. 2 spots on the bottom look necrotic and need to be debrided in the OR. Dr. Sana Maria was consulted at this time. There is a blister filled with fluid on the left thigh. Patient has an AKA on the left side. Patient's right leg is warm and very swollen. +3 pitting edema noted on the right side. Podiatry has the foot on that side wrapped with santyl at this time. Patient needs help with turning. Patient denies any chest pain, dizziness, nausea, vomiting. He has shortness of breath upon exertion at this time. Patient is awake, and oriented at this time. [address] [facility] to bedside to speak with patient about plan of care. ABEL PENA&MARIA DOLORES Patel updated her on patient's condition before she entered the room.

## 2019-05-08 NOTE — PROGRESS NOTES
[facility] OCCUPATIONAL THERAPY EVALUATION - ACUTE     Date: 2019  Patient Name: Ginny Griffiths        MRN: [id number]  Account: [de-identified]   : 1950  (79 y.o.)  [facility]    Chart Review:  Diagnosis:  The primary encounter diagnosis was Cellulitis of lower leg. Diagnoses of Gait disturbance and History of type 2 diabetes mellitus were also pertinent to this visit. Past Medical History:   Diagnosis Date    Asthma     COPD (chronic obstructive pulmonary disease) ([facility]     Morbid obesity (HCC)     Neuropathy     PVD (peripheral vascular disease) ([facility]     1 stent in left leg placed 2018 and 2 stents in right leg    Sleep apnea     Type 2 diabetes mellitus without complication ([facility]      Past Surgical History:   Procedure Laterality Date    RECTAL SURGERY      patient had a boil break open inside rectum. needed surgery for infection removal.       Restrictions  Restrictions/Precautions: Fall Risk  Position Activity Restriction  Other position/activity restrictions: \"Weight bearing as tolerated to RLE for transfers\" and \"surgical shoe to RLE\" per podiatry note     Safety Devices: Safety Devices  Safety Devices in place: Yes  Type of devices:  All fall risk precautions in place    Subjective  Pre Treatment Pain Screening  Pain at present: 0  Scale Used: Numeric Score  Intervention List: Patient able to continue with treatment, Patient declined any intervention    Pain Reassessment:   Pain Assessment  Patient Currently in Pain: Denies  Pain Assessment: 0-10  Pain Level: 0       Orientation  Orientation  Overall Orientation Status: Within Functional Limits    Prior Level of Function:  Social/Functional History  Lives With: Alone  Type of Home: Apartment  Home Layout: One level  Home Access: Stairs to enter without rails  Entrance Stairs - Number of Steps: 1 (working with landlord to get a ramp)  Bathroom Shower/Tub: Tub/Shower unit(w/c does not fit into the bathroom)  Bathroom Equipment: 3-in-1 commode  Home Equipment: Wheelchair-manual  ADL Assistance: Independent(\"With baby wipes, not very good\")  Homemaking Assistance: Needs assistance  Ambulation Assistance: (w/c)  Transfer Assistance: Independent(was using slideboard at home)  Active : No  Additional Comments: Has been sleeping on the sofa, cannot get into the bedroom or bathroom at home as [place] [facility] does not fit in the doorways. Girlfriend or guys from the apartment do his shopping    OBJECTIVE:     Orientation Status:  Orientation  Overall Orientation Status: Within Functional Limits    Observation:  Observation/Palpation  Posture: Fair(L lean, difficulty coming to full upright position)  Observation: blister on L stump, R foot bandaged and redness throughout lower leg    Cognition Status:  Cognition  Overall Cognitive Status: WFL  Cognition Comment: Increased processing time    Perception Status:  Perception  Overall Perceptual Status: WFL    Sensation Status:  Sensation  Overall Sensation Status: WFL    Vision and Hearing Status:  Vision  Vision: Within Functional Limits  Hearing  Hearing: Within functional limits     ROM:   LUE AROM (degrees)  LUE AROM : WFL  Left Hand AROM (degrees)  Left Hand AROM: WFL  RUE AROM (degrees)  RUE AROM : WFL  Right Hand AROM (degrees)  Right Hand AROM: WFL    Strength:  LUE Strength  Gross LUE Strength: Exceptions to [facility]  L Hand Grasp: 3+/5  L Hand Release: 3+/5  LUE Strength Comment: 3+/5 all planes  RUE Strength  Gross RUE Strength: Exceptions to [facility]  R Hand Grasp: 3+/5  R Hand Release: 3+/5  RUE Strength Comment: 3+/5 all planes    Coordination, Tone, Quality of Movement: Tone RUE  RUE Tone: Normotonic  Tone LUE  LUE Tone: Normotonic  Coordination  Movements Are Fluid And Coordinated: Yes    Hand Dominance:  Hand Dominance  Hand Dominance: Right    ADL Status:  ADL  Feeding: Independent  Grooming: Setup  UE Bathing: Moderate assistance  LE Bathing: Maximum assistance  UE Dressing:  Moderate assistance  LE functional mobility , Decreased ADL status, Decreased strength, Decreased endurance, Decreased balance, Decreased high-level IADLs  Prognosis: Good  Discharge Recommendations: Continue to assess pending progress  History: Pt's medical history is moderately complex  Exam: Pt has 6 performance deficits  Assistance / Modification: Pt. requires max A    Six Click Score   How much help for putting on and taking off regular lower body clothing?: Total  How much help for Bathing?: A Lot  How much help for Toileting?: Total  How much help for putting on and taking off regular upper body clothing?: A Lot  How much help for taking care of personal grooming?: A Little  How much help for eating meals?: None  AM-PAC Inpatient Daily Activity Raw Score: 13  AM-PAC Inpatient ADL T-Scale Score : 32.03  ADL Inpatient CMS 0-100% Score: 63.03    Plan:  Plan  Times per week: 1-3x  Plan weeks: Length of acute stay  Current Treatment Recommendations: Strengthening, Balance Training, Functional Mobility Training, Endurance Training, Equipment Evaluation, Education, & procurement, Self-Care / ADL, Patient/Caregiver Education & Training, Home Management Training, Safety Education & Training    Goals:   Patient will:    - Improve functional endurance to tolerate/complete 60 mins of ADL's  - Be Setup in UB ADLs   - Be Mod A in LB ADLs  - Be Mod A slide board in ADL transfers without LOB  - Be Max A in toileting tasks  - Improve B UE strength and endurance to 4/5 in order to participate in self-care activities as projected. - Access appropriate D/C site with as few architectural barriers as possible. - Sequence self-care tasks with no verbal cues for safety    Patient Goal: Patient goals : \"I want to get stronger so I can learn to walk again\"     Discussed and agreed upon: Yes Comments:     Therapy Time:   OT Individual Minutes  Time In: 0941  Time Out: 1010  Minutes: 29  10 minutes ADL education    Electronically signed by:     Zakia Mora CARIE Beard  5/8/2019, 12:42 PM

## 2019-05-08 NOTE — PROGRESS NOTES
Physical Therapy Med Surg Initial Assessment  [facility]       NAME: Marquez Lee  : 1950 (79 y.o.)  MRN: [id number]  CODE STATUS: Full Code    Date of Service: 2019    Patient Diagnosis(es): Cellulitis of lower leg [D27.619]   Chief Complaint   Patient presents with    Extremity Weakness     pt c/o RLE extremity weakness for the past 2 days after falling off the couch     Patient Active Problem List    Diagnosis Date Noted    Diabetic ulcer of right foot due to type 2 diabetes mellitus ([address] 2019    Gait abnormality 2019    Cellulitis 2018    Leg wound, left 2018    Leukocytosis 2018    Unable to ambulate 2018    Elevated bilirubin 2018    Gas gangrene of foot ([address] 2018    Hyperglycemia due to type 2 diabetes mellitus ([address] 2018    PVD (peripheral vascular disease) ([address] 2018    Cellulitis of left foot     Gangrene associated with type 2 diabetes mellitus ([address]     Venous stasis ulcer limited to breakdown of skin without varicose veins (HCC)     Acute and chronic respiratory failure with hypoxia ([address] 2017    BMI 45.0-49.9, adult ([address] 2017    History of tobacco use 2017    HARSHA on CPAP 2017    Wheezing 2017        Past Medical History:   Diagnosis Date    Asthma     COPD (chronic obstructive pulmonary disease) ([address]     Morbid obesity ([address]     Neuropathy     PVD (peripheral vascular disease) ([address]     1 stent in left leg placed 2018 and 2 stents in right leg    Sleep apnea     Type 2 diabetes mellitus without complication ([address]      Past Surgical History:   Procedure Laterality Date    RECTAL SURGERY      patient had a boil break open inside rectum.  needed surgery for infection removal.       Chart Reviewed: Yes  Patient assessed for rehabilitation services?: Yes  Family / Caregiver Present: No  General Comment  Comments: Pt awake in bed, agreeable to PT eval.     Restrictions:  Restrictions/Precautions: Fall Risk  Position Activity Restriction  Other position/activity restrictions: \"Weight bearing as tolerated to RLE for transfers\" and \"surgical shoe to RLE\" per podiatry note     SUBJECTIVE: Subjective: Pt reports he will be unable to attempt a pivot transfer this date. Pre Treatment Pain Screening  Pain at present: 0  Comments / Details: no pain at rest, though tenderness throughout RLE when assisting it with mobility    Post Treatment Pain Screening:   Pain Screening  Patient Currently in Pain: Denies  Pain Assessment  Pain Level: 0    Prior Level of Function:  Social/Functional History  Lives With: Alone  Type of Home: Apartment  Home Layout: One level  Home Access: Stairs to enter without rails  Entrance Stairs - Number of Steps: 1 (working with landlord to get a ramp)  Bathroom Shower/Tub: Tub/Shower unit(w/c does not fit into the bathroom)  Bathroom Equipment: 3-in-1 commode  Home Equipment: [email or web address]  ADL Assistance: Independent(\"With baby wipes, not very good\")  Homemaking Assistance: Needs assistance  Ambulation Assistance: (w/c)  Transfer Assistance: Independent(was using slideboard at home)  Active : No  Additional Comments: Has been sleeping on the sofa, cannot get into the bedroom or bathroom at home as [facility] does not fit in the doorways.  Girlfriend or guys from the apartment do his shopping    OBJECTIVE:   Vision/Hearing:  Vision: Within Functional Limits  Hearing: Within functional limits    Cognition:  Overall Orientation Status: Within Functional Limits  Follows Commands: Within Functional Limits    Observation/Palpation  Observation: blister on L stump, R foot bandaged and redness throughout lower leg    ROM:  RLE PROM: WFL  LLE General PROM: tightness into 0* hip extension and abduction    Strength:  Strength RLE  Comment: no noted mm contractions in ankle DF or PF, pt reports \"it feels like I'm wiggling my toes but I know they're not\"; limited in hip and knee strength to 2-/5 grossly  Strength LLE  Comment: grossly 2-/5 hip mm    Neuro:  Balance  Sitting - Static: (unable; decreased core strength)             Bed mobility  Rolling to Left: Moderate assistance(increased pain rolling to L)  Rolling to Right: Moderate assistance  Supine to Sit: Dependent/Total;2 Person assistance  Comment: trained with rolling and positioning in bed; scooting and use of bed rails/bed controls for compensatory techniques    Transfers  Sit to Stand: Dependent/Total(unsafe to attempt pivot or slideboard transfer due to severe LE/core weakness)  Comment: recommend mechanical lift         Activity Tolerance  Activity Tolerance: Patient limited by endurance          ASSESSMENT:   Body structures, Functions, Activity limitations: Decreased functional mobility ; Decreased strength;Decreased endurance;Decreased coordination;Decreased balance; Increased Pain;Decreased ROM  Decision Making: High Complexity  History: high  Exam: high  Clinical Presentation: high    Prognosis: Good  Patient Education: PT POC, safety techniques    DISCHARGE RECOMMENDATIONS:  Discharge Recommendations: Continue to assess pending progress, Patient would benefit from continued therapy after discharge    Assessment: Pt with severe deficits in strength, mobility, endurance and also with painful mobility. Pt was DC'd from SNF [facility] level with slideboard transfers but was having difficulty at home and now unable to safely attempt a transfer this date. Pt will benefit from further PT to address impairments in order to return to highest practical level of mobility.          PLAN OF CARE:  Plan  Times per week: 3-6  Current Treatment Recommendations: Strengthening, Functional Mobility Training, Wheelchair Mobility Training, Neuromuscular Re-education, Home Exercise Program, Equipment Evaluation, Education, & procurement, ROM, Transfer Training, Safety Education & Training, Modalities, Manual Therapy - Soft Tissue Mobilization, Balance Training, Endurance Training, Pain Management, Patient/Caregiver Education & Training, Positioning  Safety Devices  Type of devices: Left in bed(handoff of care to RN)    Goals:  Patient goals : \"to get more independent\"  Short term goals  Short term goal 1: pt to roll side to side with SBA  Short term goal 2: pt to bride/scoot in bed with min A  Short term goal 3: pt to be SBA in RLE HEP in order to increase strength to 3/5 all planes  Short term goal 4: pt to be SBA with LLE HEP to improve ROM to [facility] required for prosthesis and mobility  Long term goals  Long term goal 1: pt to be mod A +2 for supine <> sit  Long term goal 2: pt to be assessed for transfers when able    [facility] (6 CLICK) [phone] Mobility Raw Score : 7     Therapy Time:   Individual   Time In 0941   Time Out 1010   Minutes Jaime Alvares PT, 05/08/19 at 11:04 AM

## 2019-05-08 NOTE — CARE COORDINATION
LSW spoke with pt regarding his DC plan. Pt is from home but home was not going well prior to this admission. Pt lives alone. Pt was recently at a SNF in [facility] but he does not want to return there. Pt is agreeable to [facility] if they will take him. Referral called to Ana Stauffer at [facility]. [facility] is out of network with pt's insurance so pt has decided on [address]. Referral faxed to [phone]  and precert was started today.

## 2019-05-08 NOTE — PROGRESS NOTES
0030 incont of urine and stool, pericare provided linens changed, patient able to help minimally yulisa self, 5cm in diameter dark purple wound on right buttock noted with surrounding skin denuded, mepilex applied, patient states he is unaware of any wounds on his skin, also noted fluid filled blisters to left lower ext stump, patient notably SOB and wheezing after turning in bed and exerting self, called for resp treatment patient denies need.  Will monitor

## 2019-05-08 NOTE — PROGRESS NOTES
Wound Ostomy Continence Nurse  Consult Note       NAME:  Trinh Muñoz  MEDICAL RECORD NUMBER:  [id number]  AGE: 79 y.o. GENDER: male  : 1950  TODAY'S DATE:  2019    Subjective   Reason for [id number] Nurse Evaluation and Assessment: Multiple pressure injuries present on admission      Trinh Muñoz is a 79 y.o. male referred by:   [] Physician  [x] Nursing  [] Other:     Wound Identification:  Wound Type: pressure  Contributing Factors: chronic pressure, decreased mobility, shear force, obesity, malnutrition, incontinence of stool and incontinence of urine    Wound History: Patient admitted to [facility] with Multiple pressure injuries - present on admission. Right ischium with unstageable pressure injury; left ischium and coccyx with Deep tissue pressure injury; Stage 2 intact serum filled blistering from prosthetic; fungal rash in skin folds and IAD with denudation of skin to scrotum, buttocks, and lj areas   Current Wound Care Treatment:  Right foot wound care per Podiatry.  Recommendin) Pressure injury prevention interventions 2) Consult Dr. Cheryl Randle for evaluation of Right ischium - Enzymatic vs. Surgical debridement; will order saline moist to dry for now 3) Antifungal powder to skin folds 4) Protective barrier cream to areas of DTPI 5) incontinence care  - zinc paste 6) Skin protectant wipe to left stump stage 2    Patient Goal of Care:  [x] Wound Healing  [] Odor Control  [] Palliative Care  [] Pain Control   [] Other:         PAST MEDICAL HISTORY        Diagnosis Date    Asthma     COPD (chronic obstructive pulmonary disease) ([address]     Morbid obesity ([address]     Neuropathy     PVD (peripheral vascular disease) [facility]     1 stent in left leg placed 2018 and 2 stents in right leg    Sleep apnea     Type 2 diabetes mellitus without complication ([address]        PAST SURGICAL HISTORY    Past Surgical History:   Procedure Laterality Date    RECTAL SURGERY      patient had a boil break open inside rectum. needed surgery for infection removal.       FAMILY HISTORY    History reviewed. No pertinent family history. SOCIAL HISTORY    Social History     Tobacco Use    Smoking status: Current Every Day Smoker     Packs/day: 1.00     Years: 58.00     Pack years: 58.00     Types: Cigarettes    Smokeless tobacco: Never Used   Substance Use Topics    Alcohol use: No    Drug use: Yes     Types: Marijuana       ALLERGIES    Allergies   Allergen Reactions    Coconut Oil Nausea And Vomiting     Coconut meat only. MEDICATIONS    No current facility-administered medications on file prior to encounter. Current Outpatient Medications on File Prior to Encounter   Medication Sig Dispense Refill    Clopidogrel Bisulfate (PLAVIX PO) Take 75 mg by mouth daily       insulin lispro protamine & lispro (HUMALOG MIX 75/25) (75-25) 100 UNIT per ML SUSP injection vial Inject into the skin 2 times daily (with meals)      albuterol sulfate  (90 Base) MCG/ACT inhaler Inhale 2 puffs into the lungs      ipratropium-albuterol (DUONEB) 0.5-2.5 (3) MG/3ML SOLN nebulizer solution Inhale 3 mLs into the lungs      oxyCODONE-acetaminophen (PERCOCET) 5-325 MG per tablet Take 1 tablet by mouth as needed for Pain. Nile Dark gabapentin (NEURONTIN) 800 MG tablet Take 1 tablet by mouth 3 times daily for 30 days. . 90 tablet 0       Objective    BP (!) 145/69   Pulse 88   Temp 97.7 °F (36.5 °C) (Oral)   Resp 22   Ht 6' (1.829 m)   Wt (!) 326 lb (147.9 kg)   SpO2 90%   BMI 44.21 kg/m²     LABS:  WBC:    Lab Results   Component Value Date    WBC 7.8 05/08/2019     H/H:    Lab Results   Component Value Date    HGB 14.1 05/08/2019    HCT 43.8 05/08/2019     PTT:    Lab Results   Component Value Date    APTT 24.9 11/20/2018   [APTT}  PT/INR:    Lab Results   Component Value Date    PROTIME 12.9 11/19/2018    INR 1.2 11/19/2018     HgBA1c:    Lab Results   Component Value Date    LABA1C 7.8 05/07/2019       Assessment Ole Risk Score: Ole Scale Score: 10    Patient Active Problem List   Diagnosis    Acute and chronic respiratory failure with hypoxia (HCC)    BMI 45.0-49.9, adult ([facility])    History of tobacco use    HARSHA on CPAP    Wheezing    Cellulitis    Leg wound, left    Leukocytosis    Unable to ambulate    Elevated bilirubin    Cellulitis of left foot    Gangrene associated with type 2 diabetes mellitus (HCC)    Venous stasis ulcer limited to breakdown of skin without varicose veins (HCC)    Gas gangrene of foot ([facility])    Hyperglycemia due to type 2 diabetes mellitus ([facility]    PVD (peripheral vascular disease) [facility]    Diabetic ulcer of right foot due to type 2 diabetes mellitus [facility]    Gait abnormality       Measurements:  Wound 11/19/18 Skin tear Leg Anterior; Lateral;Right pt with wound vac on lower leg and open ulcer above wound vac area right heel reddened (Active)   Number of days: 169       Wound 11/19/18  Leg Left (Active)   Number of days: 170       Wound 11/20/18 Foot Left;Lateral tunneling apparent, 1.2 cm (Active)   Number of days: 169       Wound 11/20/18 Skin tear Foot Left; Anterior yellow and bruising, seeping (Active)   Number of days: 169       Wound 11/20/18 Leg Left; Anterior; Lower 5cm wide 4 cm long (Active)   Number of days: 169       Wound 11/20/18 Leg Left; Lower; Inner;Medial butterfly shape 2x1 (Active)   Number of days: 169       Wound 11/20/18 Leg Left; Lower;Distal 1 cm round back of leg, tunneling apparent (Active)   Number of days: 169       Wound 11/20/18 Leg Right; Lower; Anterior 7cm x 2.5cm black, maggots  (Active)   Number of days: 169       Wound 11/20/18 Blister Leg Right; Lower 4x3 cm round red blister area (Active)   Number of days: 169       Wound 11/20/18 Blister Leg Right; Lower 2 small blisters above large one (Active)   Number of days: 169       Wound 05/08/19 Ischium Right (Active)   Wound Pressure Unstageable 5/8/2019 10:17 AM   Dressing/Treatment Moist to dry Blood filled blister 5/8/2019 10:17 AM   Drainage Amount None 5/8/2019 10:17 AM   Odor None 5/8/2019 10:17 AM   Silvia-wound Assessment Non-blanchable erythema 5/8/2019 10:17 AM   Number of days: 1       Plan   Plan of Care: Wound 05/08/19 Ischium Right-Dressing/Treatment: Moist to dry  Wound 05/07/19 Ischium Left-Dressing/Treatment: Protective barrier  Wound 05/07/19 Coccyx-Dressing/Treatment: Protective barrier  Wound 05/07/19 Leg Left; Anterior-Dressing/Treatment: Sweta Coad Film    Specialty Bed Required : Yes   [x] Low Air Loss   [] Pressure Redistribution  [] Fluid Immersion  [x] Bariatric  [] Other:     Current Diet: DIET CARB CONTROL;   Dietician consult:  Yes    Discharge Plan:  Placement for patient upon discharge: skilled nursing    Patient appropriate for Outpatient [address]: Yes    Referrals:  []   [] [facility]  [] Supplies  [] Other    Patient/Caregiver Teaching:  Level of patient/caregiver understanding able to:   [] Indicates understanding       [] Needs reinforcement  [] Unsuccessful      [] Verbal Understanding  [] Demonstrated understanding       [] No evidence of learning  [] Refused teaching         [x] N/A       Electronically signed by CARLTON PageN, RN, CWOCN on 5/8/2019 at 10:39 AM

## 2019-05-09 ENCOUNTER — APPOINTMENT (OUTPATIENT)
Dept: CARDIAC CATH/INVASIVE PROCEDURES | Age: 69
DRG: 622 | End: 2019-05-09
Payer: MEDICARE

## 2019-05-09 ENCOUNTER — APPOINTMENT (OUTPATIENT)
Dept: ULTRASOUND IMAGING | Age: 69
DRG: 622 | End: 2019-05-09
Payer: MEDICARE

## 2019-05-09 LAB
ANION GAP SERPL CALCULATED.3IONS-SCNC: 12 MEQ/L (ref 9–15)
BUN BLDV-MCNC: 31 MG/DL (ref 8–23)
CALCIUM SERPL-MCNC: 7.7 MG/DL (ref 8.5–9.9)
CHLORIDE BLD-SCNC: 100 MEQ/L (ref 95–107)
CO2: 25 MEQ/L (ref 20–31)
CREAT SERPL-MCNC: 0.94 MG/DL (ref 0.7–1.2)
GFR AFRICAN AMERICAN: >60
GFR NON-AFRICAN AMERICAN: >60
GLUCOSE BLD-MCNC: 237 MG/DL (ref 70–99)
GLUCOSE BLD-MCNC: 269 MG/DL (ref 60–115)
GLUCOSE BLD-MCNC: 296 MG/DL (ref 60–115)
HCT VFR BLD CALC: 42.7 % (ref 42–52)
HEMOGLOBIN: 13.5 G/DL (ref 14–18)
MCH RBC QN AUTO: 31.4 PG (ref 27–31.3)
MCHC RBC AUTO-ENTMCNC: 31.7 % (ref 33–37)
MCV RBC AUTO: 99.3 FL (ref 80–100)
PDW BLD-RTO: 17.5 % (ref 11.5–14.5)
PERFORMED ON: ABNORMAL
PERFORMED ON: ABNORMAL
PLATELET # BLD: 178 K/UL (ref 130–400)
POTASSIUM REFLEX MAGNESIUM: 4.3 MEQ/L (ref 3.4–4.9)
RBC # BLD: 4.3 M/UL (ref 4.7–6.1)
SODIUM BLD-SCNC: 137 MEQ/L (ref 135–144)
WBC # BLD: 7.3 K/UL (ref 4.8–10.8)

## 2019-05-09 PROCEDURE — 6370000000 HC RX 637 (ALT 250 FOR IP): Performed by: INTERNAL MEDICINE

## 2019-05-09 PROCEDURE — 75710 ARTERY X-RAYS ARM/LEG: CPT | Performed by: INTERNAL MEDICINE

## 2019-05-09 PROCEDURE — 2500000003 HC RX 250 WO HCPCS: Performed by: INTERNAL MEDICINE

## 2019-05-09 PROCEDURE — 2700000000 HC OXYGEN THERAPY PER DAY

## 2019-05-09 PROCEDURE — 94640 AIRWAY INHALATION TREATMENT: CPT

## 2019-05-09 PROCEDURE — 94760 N-INVAS EAR/PLS OXIMETRY 1: CPT

## 2019-05-09 PROCEDURE — 2580000003 HC RX 258: Performed by: NURSE PRACTITIONER

## 2019-05-09 PROCEDURE — 2580000003 HC RX 258

## 2019-05-09 PROCEDURE — C1769 GUIDE WIRE: HCPCS

## 2019-05-09 PROCEDURE — 2709999900 HC NON-CHARGEABLE SUPPLY

## 2019-05-09 PROCEDURE — C1894 INTRO/SHEATH, NON-LASER: HCPCS

## 2019-05-09 PROCEDURE — 80048 BASIC METABOLIC PNL TOTAL CA: CPT

## 2019-05-09 PROCEDURE — 2580000003 HC RX 258: Performed by: INTERNAL MEDICINE

## 2019-05-09 PROCEDURE — 85027 COMPLETE CBC AUTOMATED: CPT

## 2019-05-09 PROCEDURE — 36247 INS CATH ABD/L-EXT ART 3RD: CPT | Performed by: INTERNAL MEDICINE

## 2019-05-09 PROCEDURE — 2500000003 HC RX 250 WO HCPCS

## 2019-05-09 PROCEDURE — 1210000000 HC MED SURG R&B

## 2019-05-09 PROCEDURE — 6360000002 HC RX W HCPCS

## 2019-05-09 PROCEDURE — 36415 COLL VENOUS BLD VENIPUNCTURE: CPT

## 2019-05-09 PROCEDURE — 6360000002 HC RX W HCPCS: Performed by: NURSE PRACTITIONER

## 2019-05-09 RX ORDER — SODIUM CHLORIDE 0.9 % (FLUSH) 0.9 %
10 SYRINGE (ML) INJECTION PRN
Status: DISCONTINUED | OUTPATIENT
Start: 2019-05-09 | End: 2019-05-14 | Stop reason: HOSPADM

## 2019-05-09 RX ORDER — SODIUM CHLORIDE 0.9 % (FLUSH) 0.9 %
10 SYRINGE (ML) INJECTION EVERY 12 HOURS SCHEDULED
Status: DISCONTINUED | OUTPATIENT
Start: 2019-05-09 | End: 2019-05-14 | Stop reason: HOSPADM

## 2019-05-09 RX ORDER — ACETAMINOPHEN 325 MG/1
650 TABLET ORAL EVERY 4 HOURS PRN
Status: DISCONTINUED | OUTPATIENT
Start: 2019-05-09 | End: 2019-05-14 | Stop reason: HOSPADM

## 2019-05-09 RX ORDER — SODIUM CHLORIDE 9 MG/ML
INJECTION, SOLUTION INTRAVENOUS CONTINUOUS
Status: ACTIVE | OUTPATIENT
Start: 2019-05-09 | End: 2019-05-10

## 2019-05-09 RX ADMIN — Medication 10 ML: at 22:00

## 2019-05-09 RX ADMIN — PIPERACILLIN SODIUM,TAZOBACTAM SODIUM 3.38 G: 3; .375 INJECTION, POWDER, FOR SOLUTION INTRAVENOUS at 21:58

## 2019-05-09 RX ADMIN — IPRATROPIUM BROMIDE AND ALBUTEROL SULFATE 3 ML: 2.5; .5 SOLUTION RESPIRATORY (INHALATION) at 07:20

## 2019-05-09 RX ADMIN — INSULIN LISPRO 10 UNITS: 100 INJECTION, SUSPENSION SUBCUTANEOUS at 09:16

## 2019-05-09 RX ADMIN — GUAIFENESIN 200 MG: 200 SOLUTION ORAL at 20:30

## 2019-05-09 RX ADMIN — SODIUM CHLORIDE: 9 INJECTION, SOLUTION INTRAVENOUS at 20:27

## 2019-05-09 RX ADMIN — PIPERACILLIN SODIUM,TAZOBACTAM SODIUM 3.38 G: 3; .375 INJECTION, POWDER, FOR SOLUTION INTRAVENOUS at 05:30

## 2019-05-09 RX ADMIN — GUAIFENESIN 200 MG: 200 SOLUTION ORAL at 02:27

## 2019-05-09 RX ADMIN — INSULIN LISPRO 6 UNITS: 100 INJECTION, SOLUTION INTRAVENOUS; SUBCUTANEOUS at 09:16

## 2019-05-09 RX ADMIN — ACETAMINOPHEN 650 MG: 325 TABLET ORAL at 05:30

## 2019-05-09 RX ADMIN — IPRATROPIUM BROMIDE AND ALBUTEROL SULFATE 3 ML: 2.5; .5 SOLUTION RESPIRATORY (INHALATION) at 19:31

## 2019-05-09 RX ADMIN — COLLAGENASE SANTYL: 250 OINTMENT TOPICAL at 11:01

## 2019-05-09 RX ADMIN — Medication 10 ML: at 20:37

## 2019-05-09 ASSESSMENT — PAIN DESCRIPTION - LOCATION
LOCATION: LEG
LOCATION: LEG

## 2019-05-09 ASSESSMENT — PAIN SCALES - GENERAL
PAINLEVEL_OUTOF10: 5
PAINLEVEL_OUTOF10: 2
PAINLEVEL_OUTOF10: 2

## 2019-05-09 ASSESSMENT — PAIN DESCRIPTION - DESCRIPTORS: DESCRIPTORS: ACHING

## 2019-05-09 ASSESSMENT — PAIN DESCRIPTION - PAIN TYPE
TYPE: ACUTE PAIN
TYPE: ACUTE PAIN

## 2019-05-09 ASSESSMENT — PAIN DESCRIPTION - FREQUENCY: FREQUENCY: CONTINUOUS

## 2019-05-09 ASSESSMENT — PAIN DESCRIPTION - ORIENTATION: ORIENTATION: LEFT

## 2019-05-09 NOTE — PROGRESS NOTES
0815: Introduced self to pt. Vitals obtained temp 97.8f oral, pulse 70, RR 22,  54, Pulse ox 94% on 4L. Blood sugar obtained for morning insulin. Head to toe assessment completed. Lungs ANDRY RU clear RL LL clear diminished, +3 pitting edema on LR extremity. Multiple stage 1 to 2 wounds on coccyx. inter dry put in place to prevent skin breakdown and irritation. 0915: insulin given in right thigh. Pt tolerated, bed down, call light within reach    1100: Size 14 [place] inserted to prevent further skin irritation and to have better view of output for patient. Urine clear yellow. Bed bath completed.

## 2019-05-09 NOTE — CONSULTS
Consults                             Consultation dictated. Impression: Necrotic, unstageble pressure wound on Rt Ischial area. Recommend: Need surgical debridement. Discussed with the pt. Who agree and requested general anesthesia.   Will schedule it in the next 24-48h

## 2019-05-09 NOTE — BRIEF OP NOTE
Section of Cardiology  Adult Brief LE angio Procedure Note        Procedure(s):  Right LE angio    Pre-operative Diagnosis:  Critical limb ischemia, non healing right foot wounds    H&P Status: Completed and reviewed. Post-operative Diagnosis:  99% right distal Popliteal artery stenosis. Patent right SFA stent  3 vessel run off, patent Iliacs and right profunda    Findings:  See full report    Complications:  none    Primary Proceduralist:   Dr. Danley Nageotte    Right Popliteal artery PVI on Monday post sacral wound debridement by dr. Daljit iverson to resume plavix.        Full procedure note to follow

## 2019-05-09 NOTE — CARE COORDINATION
Chema Amezquita is still working on the precert for pt to transfer to them when medically stable. 01370 completed.

## 2019-05-09 NOTE — CONSULTS
presented with a small superficial  ulceration around the perianal area as well as in the anterior perianal  area and also presented with a large unstageable pressure wound with  necrotic skin involving the right distal gluteal area just in the  ischial region. Other examination noncontributory. LABORATORY TESTS:  Electrolytes normal, BUN 31, glucose 237. Liver  function tests with albumin 2.5, alkaline phosphatase 105, bilirubin  0.8, and AST 51. The CBC with WBC 7, hemoglobin 13.5, hematocrit 42.7,  and platelet count 499,752. IMPRESSION:  The patient has a necrotic pressure wound in the right  ischial area. RECOMMENDATION:  The patient needs a surgical debridement which was  discussed with the patient who agreed. The patient requests to be done  under general anesthesia. The surgery will be scheduled in the next 24  to 48 hours.       Candi Irwin MD    D: 05/09/2019 10:21:58       T: 05/09/2019 14:46:48     [email or web address]  Job#: [id number]     Doc#: [id number]    CC:  Nena Tatum MD

## 2019-05-09 NOTE — PROGRESS NOTES
2030- Assessment complete. Pt is aert and oriented x 4. Skin has multiple areas of concern. Left AKA has fluid filled blister, RLE is warm to touch with diabetic foot wound. Dressing done by podiatry and is CDI. Pt c/o pain to foot and coocyx 7-10. Pt needs assistance moving from side to side in bed. 2100- Perfect serve to hospitalist requesting pain meds as none are ordered and pt rates pain 7-10 to right foot and coccyx. 2110- Received order for  Tramadol every 6 hrs PRN. 2200- Pt states pain is now 2-10 with relief from Tramadol. 0100- Attempts made throughout shift to roll pt side to side,. Pt refusing and wants to remain on back. Pt has specialty mattress. Made aware of importance to turn. Pt continues to refuse. 3192- Pt c/o persistent cough. Message sent to hospitalist requesting cough suppressant. Received order for Robitussin and administered to pt.      0515- Pt c/o HA 5/10. Received order for Tylenol 650 mg every 4 hrs prn and administered to pt.

## 2019-05-09 NOTE — PROGRESS NOTES
Hospitalist Daily Progress Note  Name: Mario Orellana  Age: 79 y.o. Gender: male  CodeStatus: Full Code  Allergies: Coconut Oil    Chief Complaint:Extremity Weakness (pt c/o RLE extremity weakness for the past 2 days after falling off the couch)      Primary Care Provider: No primary care provider on file. InpatientTreatment Team: Treatment Team: Attending Provider: Luís Melgar MD; Consulting Physician: Adarsh Hidalgo DO; Consulting Physician: Brittanie Ca DPM; Consulting Physician: Adriana Yip MD; Registered Nurse: Loni Ludwig, ABEL; Consulting Physician: Lenka Somers MD; : Melina Villagran, RN; Patient Care Tech: Fracisco Aguillonjose; Surgeon: Lenka Somers MD; Registered Nurse: Domonique Harrison RN    Admission Date: 5/7/2019      Subjective: Patient resting in bed, states rle wound has been worsening over past several weeks. No cp, sob. Physical Exam   Constitutional: He is oriented to person, place, and time. He appears well-developed and well-nourished. Cardiovascular: Normal rate and regular rhythm. Pulmonary/Chest: Effort normal and breath sounds normal.   Abdominal: Soft. Bowel sounds are normal.   Musculoskeletal: Normal range of motion. LLE AKA   Neurological: He is alert and oriented to person, place, and time. Skin:   Right foot dressed, noted significant drainage from right heel wound unstagable   Nursing note and vitals reviewed.       Medications:  Reviewed    Infusion Medications:    sodium chloride 100 mL/hr at 05/08/19 2052    dextrose       Scheduled Medications:    sodium chloride flush  10 mL Intravenous 2 times per day    ipratropium-albuterol  3 mL Inhalation TID    sodium chloride flush  10 mL Intravenous 2 times per day    enoxaparin  40 mg Subcutaneous Daily    piperacillin-tazobactam  3.375 g Intravenous Q8H    insulin lispro  0-12 Units Subcutaneous TID WC    insulin lispro  0-6 Units Subcutaneous Nightly    clopidogrel  75 mg Oral Daily    insulin lispro protamine & lispro  10 Units Subcutaneous BID WC    collagenase   Topical Daily     PRN Meds: sodium chloride flush, guaiFENesin, acetaminophen, albuterol, sodium chloride flush, magnesium hydroxide, ondansetron, potassium chloride **OR** potassium alternative oral replacement **OR** potassium chloride, magnesium sulfate, glucose, dextrose, glucagon (rDNA), dextrose, albuterol sulfate HFA    Labs:   Recent Labs     05/07/19  1248 05/08/19  0517 05/09/19  0508   WBC 10.1 7.8 7.3   HGB 15.8 14.1 13.5*   HCT 48.2 43.8 42.7    188 178     Recent Labs     05/07/19  1248 05/08/19  0517 05/09/19  0507    138 137   K 4.8 4.6 4.3   CL 94* 101 100   CO2 25 24 25   BUN 51* 39* 31*   CREATININE 1.34* 1.06 0.94   CALCIUM 8.4* 7.5* 7.7*     Recent Labs     05/07/19  1248   AST 51*   ALT <5   BILITOT 0.8*   ALKPHOS 105*     No results for input(s): INR in the last 72 hours. No results for input(s): Clarance Motley in the last 72 hours. Urinalysis:   Lab Results   Component Value Date    NITRU Negative 05/07/2019    WBCUA 3-5 05/07/2019    BACTERIA Negative 05/07/2019    RBCUA 0-2 05/07/2019    BLOODU LARGE 05/07/2019    SPECGRAV 1.022 05/07/2019    GLUCOSEU 500 05/07/2019       Radiology:   Most recent    Chest CT      WITH CONTRAST:No results found for this or any previous visit. WITHOUT CONTRAST: No results found for this or any previous visit. CXR      2-view:   Results for orders placed during the hospital encounter of 11/19/18   XR CHEST STANDARD (2 VW)    Narrative EXAMINATION: XR CHEST (2 VW), XR FOOT LEFT (MIN 3 VIEWS), XR TIBIA FIBULA LEFT (2 VIEWS), XR TIBIA FIBULA RIGHT (2 VIEWS), 11/20/2018 10:30 AM     History: 53-year-old male, infection, also    COMPARISON:  NONE. FINDINGS:  Chest-2 views: The patient is rotated to the left limiting the evaluation. There are chronic interstitial changes. The costophrenic angles are clear. Is cardiomegaly.  There are aortic arch calcifications. The left hilum appears bulky and enlarged measuring up to 3.0 cm. The pulmonary vascularity is normal size. The osseous   structures are unremarkable. Left tibia and fibula-2 views:  No fracture or dislocation. There is spurring of the medial lateral tibial plateau and narrowing of the medial compartment of the left knee. There is air in the subcutaneous tissues of the level of the lateral ankle extending along the anterior soft   tissues. Irregularity in the anterior soft tissues of the distal lower extremity are consistent with history of wound. Three  views of the LEFT FOOT are submitted. There is a fracture of the proximal fifth metatarsal. There is air in the adjacent soft tissues, cannot exclude superimposed osteomyelitis. No dislocations. There is soft tissue swelling and air predominantly at the dorsum of the foot and extending into the lateral ankle. Right tibia and fibula-2 views:  Plate and screw hardware at the proximal medial tibia. No acute fracture of the tibia or fibula. Soft tissue irregularity at the anterior soft tissues of the distal right lower extremity, consistent with wound. Impression 1. Enlarged left hilum, when the patient is able recommend repeat chest radiograph in true PA positioning. 2. Fracture of the proximal fifth metatarsal. Correlate with site of ulcerations, cannot exclude superimposed osteomyelitis. 3. Air within the soft tissues at the level of the left ankle and foot, suggestive of extensive soft tissue infection. 4. Bilateral anterior distal lower extremity wounds. Portable: No results found for this or any previous visit. Echo No results found for this or any previous visit.           Assessment/Plan:    Active Hospital Problems    Diagnosis Date Noted    Cellulitis of lower leg [L25.141]     Cellulitis of right leg [Z38.821]     Decubitus ulcer of right heel, stage 3 (HCC) [L89.613]     Cough [R05]     Diabetes mellitus due to underlying condition, controlled, with stage 2 chronic kidney disease [facility] [E08.22, N18.2]     Diabetic peripheral neuropathy associated with type 2 diabetes mellitus ([facility] [E11.42]     Diabetic ulcer of right foot due to type 2 diabetes mellitus ([facility] [L31.786, L97.519] 05/07/2019    Gait abnormality [R26.9] 05/07/2019    PAD (peripheral artery disease) ([facility] [I73.9] 11/20/2018    Leg wound, left [S81.802A] 11/20/2018    Venous stasis ulcer limited to breakdown of skin without varicose veins (HCC) [I87.2, L97.901]     HARSHA on CPAP [G47.33, Z99.89] 01/16/2017    Acute and chronic respiratory failure with hypoxia ([facility] [J96.21] 01/16/2017     1. Right diabetic foot ulcer with cellulitis of RLE- podiatry consulted, continue on IV zosyn and vancomycin, cultures pending, IVF, MRI right foot to r/o osteomyelitis. 5/8 - neg MRI for osteomyelitis, cont iv abx, podiatry and cardiology evaluating for healing plan   5/9 - for debridement in OR tomorrow, angiography today. 2. Uncontrolled DM2- ac/hs and as needed poct glucose with ssi coverage as needed, a1c pending   5/8 - hgba1c 7.8, cont current coverage    3. Lactic acidosis- will hydrate and repeat lab    4. Renal insufficency- hydrate and repeat labs, monitor output    5/8 - improved    5.  DVT proph - lovenox        DVT Prophylaxis: lovenox  Diet: carb control  Code Status: full           Electronically signed by Albert Simental MD on 5/9/2019 at 1:42 PM

## 2019-05-10 ENCOUNTER — ANESTHESIA EVENT (OUTPATIENT)
Dept: OPERATING ROOM | Age: 69
DRG: 622 | End: 2019-05-10
Payer: MEDICARE

## 2019-05-10 ENCOUNTER — ANESTHESIA (OUTPATIENT)
Dept: OPERATING ROOM | Age: 69
DRG: 622 | End: 2019-05-10
Payer: MEDICARE

## 2019-05-10 VITALS — SYSTOLIC BLOOD PRESSURE: 168 MMHG | DIASTOLIC BLOOD PRESSURE: 104 MMHG | TEMPERATURE: 97.7 F | OXYGEN SATURATION: 90 %

## 2019-05-10 LAB
ANION GAP SERPL CALCULATED.3IONS-SCNC: 12 MEQ/L (ref 9–15)
BUN BLDV-MCNC: 28 MG/DL (ref 8–23)
CALCIUM SERPL-MCNC: 7.9 MG/DL (ref 8.5–9.9)
CHLORIDE BLD-SCNC: 103 MEQ/L (ref 95–107)
CO2: 24 MEQ/L (ref 20–31)
CREAT SERPL-MCNC: 0.75 MG/DL (ref 0.7–1.2)
EKG ATRIAL RATE: 78 BPM
EKG P AXIS: 46 DEGREES
EKG P-R INTERVAL: 176 MS
EKG Q-T INTERVAL: 418 MS
EKG QRS DURATION: 156 MS
EKG QTC CALCULATION (BAZETT): 476 MS
EKG R AXIS: 83 DEGREES
EKG T AXIS: -4 DEGREES
EKG VENTRICULAR RATE: 78 BPM
GFR AFRICAN AMERICAN: >60
GFR NON-AFRICAN AMERICAN: >60
GLUCOSE BLD-MCNC: 242 MG/DL (ref 60–115)
GLUCOSE BLD-MCNC: 295 MG/DL (ref 60–115)
GLUCOSE BLD-MCNC: 295 MG/DL (ref 60–115)
GLUCOSE BLD-MCNC: 304 MG/DL (ref 60–115)
GLUCOSE BLD-MCNC: 323 MG/DL (ref 60–115)
GLUCOSE BLD-MCNC: 341 MG/DL (ref 70–99)
HCT VFR BLD CALC: 44.5 % (ref 42–52)
HEMOGLOBIN: 14.2 G/DL (ref 14–18)
MCH RBC QN AUTO: 31.6 PG (ref 27–31.3)
MCHC RBC AUTO-ENTMCNC: 32 % (ref 33–37)
MCV RBC AUTO: 98.7 FL (ref 80–100)
PDW BLD-RTO: 16.7 % (ref 11.5–14.5)
PERFORMED ON: ABNORMAL
PLATELET # BLD: 157 K/UL (ref 130–400)
POTASSIUM REFLEX MAGNESIUM: 4.6 MEQ/L (ref 3.4–4.9)
RBC # BLD: 4.51 M/UL (ref 4.7–6.1)
REASON FOR REJECTION: NORMAL
REJECTED TEST: NORMAL
SODIUM BLD-SCNC: 139 MEQ/L (ref 135–144)
WBC # BLD: 6.6 K/UL (ref 4.8–10.8)

## 2019-05-10 PROCEDURE — 3700000000 HC ANESTHESIA ATTENDED CARE: Performed by: SURGERY

## 2019-05-10 PROCEDURE — 7100000000 HC PACU RECOVERY - FIRST 15 MIN: Performed by: SURGERY

## 2019-05-10 PROCEDURE — 1210000000 HC MED SURG R&B

## 2019-05-10 PROCEDURE — 87070 CULTURE OTHR SPECIMN AEROBIC: CPT

## 2019-05-10 PROCEDURE — 2580000003 HC RX 258: Performed by: ANESTHESIOLOGY

## 2019-05-10 PROCEDURE — 6370000000 HC RX 637 (ALT 250 FOR IP): Performed by: INTERNAL MEDICINE

## 2019-05-10 PROCEDURE — 87205 SMEAR GRAM STAIN: CPT

## 2019-05-10 PROCEDURE — 2580000003 HC RX 258: Performed by: SURGERY

## 2019-05-10 PROCEDURE — 6360000002 HC RX W HCPCS: Performed by: NURSE ANESTHETIST, CERTIFIED REGISTERED

## 2019-05-10 PROCEDURE — 7100000001 HC PACU RECOVERY - ADDTL 15 MIN: Performed by: SURGERY

## 2019-05-10 PROCEDURE — 3600000002 HC SURGERY LEVEL 2 BASE: Performed by: SURGERY

## 2019-05-10 PROCEDURE — 2580000003 HC RX 258

## 2019-05-10 PROCEDURE — 94640 AIRWAY INHALATION TREATMENT: CPT

## 2019-05-10 PROCEDURE — 3700000001 HC ADD 15 MINUTES (ANESTHESIA): Performed by: SURGERY

## 2019-05-10 PROCEDURE — 87077 CULTURE AEROBIC IDENTIFY: CPT

## 2019-05-10 PROCEDURE — 2709999900 HC NON-CHARGEABLE SUPPLY: Performed by: SURGERY

## 2019-05-10 PROCEDURE — 2500000003 HC RX 250 WO HCPCS: Performed by: NURSE ANESTHETIST, CERTIFIED REGISTERED

## 2019-05-10 PROCEDURE — 80048 BASIC METABOLIC PNL TOTAL CA: CPT

## 2019-05-10 PROCEDURE — 2580000003 HC RX 258: Performed by: NURSE PRACTITIONER

## 2019-05-10 PROCEDURE — 6360000002 HC RX W HCPCS: Performed by: NURSE PRACTITIONER

## 2019-05-10 PROCEDURE — 3600000012 HC SURGERY LEVEL 2 ADDTL 15MIN: Performed by: SURGERY

## 2019-05-10 PROCEDURE — 99232 SBSQ HOSP IP/OBS MODERATE 35: CPT | Performed by: INTERNAL MEDICINE

## 2019-05-10 PROCEDURE — 87186 SC STD MICRODIL/AGAR DIL: CPT

## 2019-05-10 PROCEDURE — 94150 VITAL CAPACITY TEST: CPT

## 2019-05-10 PROCEDURE — 36415 COLL VENOUS BLD VENIPUNCTURE: CPT

## 2019-05-10 PROCEDURE — 93005 ELECTROCARDIOGRAM TRACING: CPT

## 2019-05-10 PROCEDURE — 85027 COMPLETE CBC AUTOMATED: CPT

## 2019-05-10 PROCEDURE — 87075 CULTR BACTERIA EXCEPT BLOOD: CPT

## 2019-05-10 PROCEDURE — 0JB70ZZ EXCISION OF BACK SUBCUTANEOUS TISSUE AND FASCIA, OPEN APPROACH: ICD-10-PCS | Performed by: SURGERY

## 2019-05-10 PROCEDURE — 2700000000 HC OXYGEN THERAPY PER DAY

## 2019-05-10 RX ORDER — ONDANSETRON 2 MG/ML
4 INJECTION INTRAMUSCULAR; INTRAVENOUS
Status: DISCONTINUED | OUTPATIENT
Start: 2019-05-10 | End: 2019-05-10 | Stop reason: HOSPADM

## 2019-05-10 RX ORDER — METOCLOPRAMIDE HYDROCHLORIDE 5 MG/ML
10 INJECTION INTRAMUSCULAR; INTRAVENOUS
Status: DISCONTINUED | OUTPATIENT
Start: 2019-05-10 | End: 2019-05-10 | Stop reason: HOSPADM

## 2019-05-10 RX ORDER — HYDROCODONE BITARTRATE AND ACETAMINOPHEN 5; 325 MG/1; MG/1
2 TABLET ORAL PRN
Status: DISCONTINUED | OUTPATIENT
Start: 2019-05-10 | End: 2019-05-10 | Stop reason: HOSPADM

## 2019-05-10 RX ORDER — HYDROCODONE BITARTRATE AND ACETAMINOPHEN 5; 325 MG/1; MG/1
1 TABLET ORAL PRN
Status: DISCONTINUED | OUTPATIENT
Start: 2019-05-10 | End: 2019-05-10 | Stop reason: HOSPADM

## 2019-05-10 RX ORDER — MAGNESIUM HYDROXIDE 1200 MG/15ML
LIQUID ORAL CONTINUOUS PRN
Status: COMPLETED | OUTPATIENT
Start: 2019-05-10 | End: 2019-05-10

## 2019-05-10 RX ORDER — SODIUM CHLORIDE, SODIUM LACTATE, POTASSIUM CHLORIDE, CALCIUM CHLORIDE 600; 310; 30; 20 MG/100ML; MG/100ML; MG/100ML; MG/100ML
INJECTION, SOLUTION INTRAVENOUS CONTINUOUS
Status: DISCONTINUED | OUTPATIENT
Start: 2019-05-10 | End: 2019-05-12

## 2019-05-10 RX ORDER — PROPOFOL 10 MG/ML
INJECTION, EMULSION INTRAVENOUS PRN
Status: DISCONTINUED | OUTPATIENT
Start: 2019-05-10 | End: 2019-05-10 | Stop reason: SDUPTHER

## 2019-05-10 RX ORDER — OXYCODONE HYDROCHLORIDE AND ACETAMINOPHEN 5; 325 MG/1; MG/1
1 TABLET ORAL EVERY 4 HOURS PRN
Status: DISCONTINUED | OUTPATIENT
Start: 2019-05-10 | End: 2019-05-14 | Stop reason: HOSPADM

## 2019-05-10 RX ORDER — LIDOCAINE HYDROCHLORIDE 20 MG/ML
INJECTION, SOLUTION EPIDURAL; INFILTRATION; INTRACAUDAL; PERINEURAL PRN
Status: DISCONTINUED | OUTPATIENT
Start: 2019-05-10 | End: 2019-05-10 | Stop reason: SDUPTHER

## 2019-05-10 RX ORDER — MIDAZOLAM HYDROCHLORIDE 1 MG/ML
INJECTION INTRAMUSCULAR; INTRAVENOUS PRN
Status: DISCONTINUED | OUTPATIENT
Start: 2019-05-10 | End: 2019-05-10 | Stop reason: SDUPTHER

## 2019-05-10 RX ORDER — MEPERIDINE HYDROCHLORIDE 25 MG/ML
12.5 INJECTION INTRAMUSCULAR; INTRAVENOUS; SUBCUTANEOUS EVERY 5 MIN PRN
Status: DISCONTINUED | OUTPATIENT
Start: 2019-05-10 | End: 2019-05-10 | Stop reason: HOSPADM

## 2019-05-10 RX ORDER — FENTANYL CITRATE 50 UG/ML
50 INJECTION, SOLUTION INTRAMUSCULAR; INTRAVENOUS EVERY 10 MIN PRN
Status: DISCONTINUED | OUTPATIENT
Start: 2019-05-10 | End: 2019-05-10 | Stop reason: HOSPADM

## 2019-05-10 RX ORDER — SODIUM CHLORIDE, SODIUM LACTATE, POTASSIUM CHLORIDE, CALCIUM CHLORIDE 600; 310; 30; 20 MG/100ML; MG/100ML; MG/100ML; MG/100ML
INJECTION, SOLUTION INTRAVENOUS
Status: COMPLETED
Start: 2019-05-10 | End: 2019-05-10

## 2019-05-10 RX ORDER — DIPHENHYDRAMINE HYDROCHLORIDE 50 MG/ML
12.5 INJECTION INTRAMUSCULAR; INTRAVENOUS
Status: DISCONTINUED | OUTPATIENT
Start: 2019-05-10 | End: 2019-05-10 | Stop reason: HOSPADM

## 2019-05-10 RX ADMIN — SODIUM CHLORIDE: 9 INJECTION, SOLUTION INTRAVENOUS at 16:29

## 2019-05-10 RX ADMIN — PHENYLEPHRINE HYDROCHLORIDE 200 MCG: 10 INJECTION INTRAVENOUS at 11:47

## 2019-05-10 RX ADMIN — LIDOCAINE HYDROCHLORIDE 60 MG: 20 INJECTION, SOLUTION EPIDURAL; INFILTRATION; INTRACAUDAL; PERINEURAL at 11:25

## 2019-05-10 RX ADMIN — PHENYLEPHRINE HYDROCHLORIDE 200 MCG: 10 INJECTION INTRAVENOUS at 11:50

## 2019-05-10 RX ADMIN — PHENYLEPHRINE HYDROCHLORIDE 200 MCG: 10 INJECTION INTRAVENOUS at 11:49

## 2019-05-10 RX ADMIN — INSULIN LISPRO 8 UNITS: 100 INJECTION, SOLUTION INTRAVENOUS; SUBCUTANEOUS at 18:06

## 2019-05-10 RX ADMIN — PIPERACILLIN SODIUM,TAZOBACTAM SODIUM 3.38 G: 3; .375 INJECTION, POWDER, FOR SOLUTION INTRAVENOUS at 16:30

## 2019-05-10 RX ADMIN — SODIUM CHLORIDE, POTASSIUM CHLORIDE, SODIUM LACTATE AND CALCIUM CHLORIDE: 600; 310; 30; 20 INJECTION, SOLUTION INTRAVENOUS at 13:47

## 2019-05-10 RX ADMIN — PROPOFOL 150 MG: 10 INJECTION, EMULSION INTRAVENOUS at 11:25

## 2019-05-10 RX ADMIN — IPRATROPIUM BROMIDE AND ALBUTEROL SULFATE 3 ML: 2.5; .5 SOLUTION RESPIRATORY (INHALATION) at 19:01

## 2019-05-10 RX ADMIN — ACETAMINOPHEN 650 MG: 325 TABLET ORAL at 16:30

## 2019-05-10 RX ADMIN — SODIUM CHLORIDE, POTASSIUM CHLORIDE, SODIUM LACTATE AND CALCIUM CHLORIDE: 600; 310; 30; 20 INJECTION, SOLUTION INTRAVENOUS at 10:49

## 2019-05-10 RX ADMIN — OXYCODONE HYDROCHLORIDE AND ACETAMINOPHEN 1 TABLET: 5; 325 TABLET ORAL at 19:04

## 2019-05-10 RX ADMIN — INSULIN LISPRO 8 UNITS: 100 INJECTION, SOLUTION INTRAVENOUS; SUBCUTANEOUS at 14:28

## 2019-05-10 RX ADMIN — PIPERACILLIN SODIUM,TAZOBACTAM SODIUM 3.38 G: 3; .375 INJECTION, POWDER, FOR SOLUTION INTRAVENOUS at 05:38

## 2019-05-10 RX ADMIN — PHENYLEPHRINE HYDROCHLORIDE 100 MCG: 10 INJECTION INTRAVENOUS at 11:44

## 2019-05-10 RX ADMIN — MIDAZOLAM HYDROCHLORIDE 2 MG: 1 INJECTION, SOLUTION INTRAMUSCULAR; INTRAVENOUS at 11:21

## 2019-05-10 RX ADMIN — IPRATROPIUM BROMIDE AND ALBUTEROL SULFATE 3 ML: 2.5; .5 SOLUTION RESPIRATORY (INHALATION) at 06:57

## 2019-05-10 ASSESSMENT — PULMONARY FUNCTION TESTS
PIF_VALUE: 1
PIF_VALUE: 13
PIF_VALUE: 12
PIF_VALUE: 14
PIF_VALUE: 13
PIF_VALUE: 14
PIF_VALUE: 13
PIF_VALUE: 13
PIF_VALUE: 6
PIF_VALUE: 6
PIF_VALUE: 12
PIF_VALUE: 16
PIF_VALUE: 2
PIF_VALUE: 1
PIF_VALUE: 13
PIF_VALUE: 12
PIF_VALUE: 13
PIF_VALUE: 13
PIF_VALUE: 14
PIF_VALUE: 12
PIF_VALUE: 1
PIF_VALUE: 2
PIF_VALUE: 12
PIF_VALUE: 16
PIF_VALUE: 17
PIF_VALUE: 13
PIF_VALUE: 0
PIF_VALUE: 12
PIF_VALUE: 12
PIF_VALUE: 13
PIF_VALUE: 12
PIF_VALUE: 6
PIF_VALUE: 16
PIF_VALUE: 14
PIF_VALUE: 13
PIF_VALUE: 13
PIF_VALUE: 12
PIF_VALUE: 1
PIF_VALUE: 16
PIF_VALUE: 1
PIF_VALUE: 13
PIF_VALUE: 7
PIF_VALUE: 2
PIF_VALUE: 13
PIF_VALUE: 3
PIF_VALUE: 7
PIF_VALUE: 1
PIF_VALUE: 0
PIF_VALUE: 0
PIF_VALUE: 2

## 2019-05-10 ASSESSMENT — PAIN SCALES - GENERAL
PAINLEVEL_OUTOF10: 9
PAINLEVEL_OUTOF10: 9

## 2019-05-10 NOTE — PROGRESS NOTES
Chief Complaint   Patient presents with    Extremity Weakness     pt c/o RLE extremity weakness for the past 2 days after falling off the couch     Patient is a 79 y.o. male who presents with a chief complaint of right LE pain and weakness. Patient is followed on a regular basis by Dr. Ioana Thompson primary care provider on file. . Patient has multiple right LE non healing wounds/gangrene. Hx PAD s/p remote b/l LE PVI at [facility] per patient. + hx of lef AKA. + hx of DM. Denies tob abuse, HTN or HLP. Denies any hx of MI, CHF or arrhythmia. No hx of LHC. Hx of negative remote stress tests. He is limited in function due to left AKA. Denies any CP or SOB at rest.   He is HD stable. Renal function and Hgb are WNL. 5-10-19: + cough. Going for surgery today with Dr. Bere Melton. No CP or SOB. Requesting nicotine patch. S/p right LE angio with 99% right popliteal artery stenosis.          Past Medical History        Past Medical History:   Diagnosis Date    Asthma     COPD (chronic obstructive pulmonary disease) ([facility] [address]     Morbid obesity (HCC)     Neuropathy     PVD (peripheral vascular disease) ([facility]     1 stent in left leg placed 09/2018 and 2 stents in right leg    Sleep apnea     Type 2 diabetes mellitus without complication ([facility])          Patient Active Problem List   Diagnosis    Acute and chronic respiratory failure with hypoxia ([facility])    BMI 45.0-49.9, adult ([address]    History of tobacco use    HARSHA on CPAP    Wheezing    Cellulitis    Leg wound, left    Leukocytosis    Unable to ambulate    Elevated bilirubin    Cellulitis of left foot    Gangrene associated with type 2 diabetes mellitus (HCC)    Venous stasis ulcer limited to breakdown of skin without varicose veins (HCC)    Gas gangrene of foot (HCC)    Hyperglycemia due to type 2 diabetes mellitus ([address]    PVD (peripheral vascular disease) ([address]    Diabetic ulcer of right foot due to type 2 diabetes mellitus (HCC)    Gait abnormality     Past Surgical History         Past Surgical History:   Procedure Laterality Date    RECTAL SURGERY  2015    patient had a boil break open inside rectum. needed surgery for infection removal.     Social History   Social History         Socioeconomic History    Marital status:      Spouse name: None    Number of children: None    Years of education: None    Highest education level: None   Occupational History    None   Social Needs    Financial resource strain: None    Food insecurity:     Worry: None     Inability: None    Transportation needs:     Medical: None     Non-medical: None   Tobacco Use    Smoking status: Current Every Day Smoker     Packs/day: 1.00     Years: 58.00     Pack years: 58.00     Types: Cigarettes    Smokeless tobacco: Never Used   Substance and Sexual Activity    Alcohol use: No    Drug use: Yes     Types: Marijuana    Sexual activity: None   Lifestyle    Physical activity:     Days per week: None     Minutes per session: None    Stress: None   Relationships    Social connections:     Talks on phone: None     Gets together: None     Attends Sabianist service: None     Active member of club or organization: None     Attends meetings of clubs or organizations: None     Relationship status: None    Intimate partner violence:     Fear of current or ex partner: None     Emotionally abused: None     Physically abused: None     Forced sexual activity: None   Other Topics Concern    None   Social History Narrative    Social/Functional History         Family History   History reviewed. No pertinent family history.      Current Facility-Administered Medications             Current Facility-Administered Medications   Medication Dose Route Frequency Provider Last Rate Last Dose    ipratropium-albuterol (DUONEB) nebulizer solution 3 mL 3 mL Inhalation TID Adrianna Glasgow MD  3 mL at 05/08/19 0713    albuterol (PROVENTIL) nebulizer solution 2.5 mg 2.5 mg Nebulization Q2H PRN Adrianna Glasgow MD  sodium chloride flush 0.9 % injection 10 mL 10 mL Intravenous 2 times per day Lutricia Stuart, APRN - CNP      sodium chloride flush 0.9 % injection 10 mL 10 mL Intravenous PRN Lutricia Stuart, APRN - CNP      magnesium hydroxide (MILK OF MAGNESIA) 400 MG/5ML suspension 30 mL 30 mL Oral Daily PRN Lutricia Stuart, APRN - CNP      ondansetron (ZOFRAN) injection 4 mg 4 mg Intravenous Q6H PRN Lutricia Stuart, APRN - CNP      enoxaparin (LOVENOX) injection 40 mg 40 mg Subcutaneous Daily Lutricia Stuart, APRN - CNP  40 mg at 05/08/19 0846    0.9 % sodium chloride infusion  Intravenous Continuous Lutricia Stuart, APRN -  mL/hr at 05/08/19 0053     potassium chloride (KLOR-CON M) extended release tablet 40 mEq 40 mEq Oral PRN Lutricia Stuart, APRN - CNP      Or    potassium chloride (KLOR-CON) packet 40 mEq 40 mEq Oral PRN Lutricia Stuart, APRN - CNP      Or    potassium chloride 10 mEq/100 mL IVPB (Peripheral Line) 10 mEq Intravenous PRN Lutricia Stuart, APRN - CNP      magnesium sulfate 1 g in dextrose 5% 100 mL IVPB 1 g Intravenous PRN Lutricia Stuart, APRN - CNP      vancomycin (VANCOCIN) 1,500 mg in dextrose 5 % 500 mL IVPB 15 mg/kg (Adjusted) Intravenous Q12H Lutricia Stuart, APRN - CNP  Stopped at 05/08/19 0504    piperacillin-tazobactam (ZOSYN) 3.375 g in dextrose 5 % 50 mL IVPB extended infusion (mini-bag) 3.375 g Intravenous Q8H Lutricia Stuart, APRN - CNP 12.5 mL/hr at 05/08/19 0545 3.375 g at 05/08/19 0545    glucose (GLUTOSE) 40 % oral gel 15 g 15 g Oral PRN Lutricia Stuart, APRN - CNP      dextrose 50 % solution 12.5 g 12.5 g Intravenous PRN Lutricia Stuart, APRN - CNP      glucagon (rDNA) injection 1 mg 1 mg Intramuscular PRN Lutricia Stuart, APRN - CNP      dextrose 5 % solution 100 mL/hr Intravenous PRN Lutricia Stuart, APRN - CNP      insulin lispro (HUMALOG) injection vial 0-12 Units 0-12 Units Subcutaneous TID  ASIM Brian CNP  6 Units at 05/08/19 0846    insulin

## 2019-05-10 NOTE — PROGRESS NOTES
2038- Assessment complete. Pt alert and oriented x 4. RLE dressing intact. Breathing easy and unlabored. L puncture site intact to Left groin. No swelling, redness, or drainage. Consent obtained by dayshift nurse for Debridement of right buttocks tomorrow, to be performed by Dr. Rianna Buck. 2100- Robitussin given for cough per pt request.      [address]- Pt made aware at 1900 that he would be NPO at midnight. Snacks x 3 given to pt this evening per his request. At 0000 this nurse went in room to remove food/ beverages from room. Pt became agitated and annoyed that he could no longer continue eating. Pt educated on the need to ne NPO at  [facility] for surgery.

## 2019-05-10 NOTE — OP NOTE
[address]                      [address], [address] [facility]                                OPERATIVE REPORT    PATIENT NAME: Yolette Bueno                      :        1950  MED REC NO:   [id number]                            ROOM:       W270  ACCOUNT NO:   [de-identified]                           ADMIT DATE: 2019  PROVIDER:     Trav Mcelroy MD    DATE OF PROCEDURE:  05/10/2019    PREOPERATIVE DIAGNOSIS:  Necrotic pressure wound in the right  gluteal/ischial area. POSTOPERATIVE DIAGNOSIS:  Necrotic pressure wound in the right  gluteal/ischial area. OPERATION PERFORMED:  Excisional debridement of 10 x 5 x 2.5 cm necrotic  pressure wound in the right buttock/ischial area. SURGEON:  Trav Mcelroy MD.    INDICATIONS:  This 61-year-old male patient who was recently admitted to  the hospital because of the weakness in the lower extremity for the past  few days after falling off the couch. The patient has a history of  peripheral vascular disease, history of diabetes mellitus, and also  present with a pressure necrotic wound unstageable involving the right  gluteal/ischial area. Otherwise, the patient is alert and stable. OPERATIVE PROCEDURE:  With the patient under general anesthesia and  lying on the left side, the right border of the ischial area was exposed  and was prepped and draped in the usual manner. Using a knife, all of  the necrotic tissue including the rim of the normal skin were excised  down to the subcutaneous tissue and including part of subcutaneous  tissue. Encountered some bleeders which immediately were controlled  with electrocautery. Also, the patient presented with some abrasions  distal to the wound which was cleared using curette removing epithelial  layer of the area. The wounds were irrigated with copious amount of  saline and tissue was obtained for culture and sensitivity.   After the  procedure complete, the wound was dressed using wet-to-dry dressing with  reevaluation in 24 to 48 hours. Otherwise, the patient tolerated the  procedure very well.       Anne Alfaro MD    D: 05/10/2019 15:07:11       T: 05/10/2019 16:38:30     [id number]  Job#: [id number]     Doc#: [id number]    CC:  Claudette Slay MD

## 2019-05-10 NOTE — ANESTHESIA PRE PROCEDURE
Department of Anesthesiology  Preprocedure Note       Name:  Peña Lantigua   Age:  79 y.o.  :  1950                                          MRN:  [id number]         Date:  5/10/2019      Surgeon: Lara Minaya):  Alexandria Pederson MD    Procedure: INCISION AND DEBRIDEMENT WOUND RIGHT BUTTOCK, ROOM 270 (Right )    Medications prior to admission:   Prior to Admission medications    Medication Sig Start Date End Date Taking? Authorizing Provider   Clopidogrel Bisulfate (PLAVIX PO) Take 75 mg by mouth daily    Yes Historical Provider, MD   insulin lispro protamine & lispro (HUMALOG MIX 75/25) (75-25) 100 UNIT per ML SUSP injection vial Inject into the skin 2 times daily (with meals)   Yes Historical Provider, MD   albuterol sulfate  (90 Base) MCG/ACT inhaler Inhale 2 puffs into the lungs 18 Yes Historical Provider, MD   ipratropium-albuterol (DUONEB) 0.5-2.5 (3) MG/3ML SOLN nebulizer solution Inhale 3 mLs into the lungs 17  Yes Historical Provider, MD   oxyCODONE-acetaminophen (PERCOCET) 5-325 MG per tablet Take 1 tablet by mouth as needed for Pain. Nimco Murray Historical Provider, MD   gabapentin (NEURONTIN) 800 MG tablet Take 1 tablet by mouth 3 times daily for 30 days. . 10/18/18 11/17/18  Alexandra May MD       Current medications:    Current Facility-Administered Medications   Medication Dose Route Frequency Provider Last Rate Last Dose    insulin NPH (HUMULIN N;NOVOLIN N) injection vial 15 Units  15 Units Subcutaneous BID AC Jack Hopper MD        lactated ringers infusion   Intravenous Continuous Miladis Humphrey  mL/hr at 05/10/19 1049      sodium chloride flush 0.9 % injection 10 mL  10 mL Intravenous 2 times per day Brita Bence, APRN - CNP   10 mL at 19    sodium chloride flush 0.9 % injection 10 mL  10 mL Intravenous PRN Brita Bence, APRN - CNP        guaiFENesin (ROBITUSSIN) 100 MG/5ML liquid 200 mg  200 mg Oral Q4H PRN Yoselin Cain MD   200 mg at [facility]) E11.42       Past Medical History:        Diagnosis Date    Asthma     [facility] obstructive [facility] disease) ([facility]     Diabetes mellitus due to underlying condition, controlled, with stage 2 chronic kidney disease [facility]     Morbid obesity ([facility]     Neuropathy     PVD (peripheral vascular disease) ([facility]     1 stent in left leg placed 09/2018 and 2 stents in right leg    Sleep apnea     Type 2 diabetes mellitus without complication ([facility]        Past Surgical History:        Procedure Laterality Date    RECTAL SURGERY  2015    patient had a boil break open inside rectum. needed surgery for infection removal.       Social History:    Social History     Tobacco Use    Smoking status: Current Every Day Smoker     Packs/day: 1.00     Years: 58.00     Pack years: 58.00     Types: Cigarettes    Smokeless tobacco: Never Used   Substance Use Topics    Alcohol use: No                                Ready to quit: Not Answered  Counseling given: Not Answered      Vital Signs (Current):   Vitals:    05/09/19 1708 05/09/19 1919 05/09/19 1934 05/10/19 1044   BP: (!) 148/60 (!) 176/79  (!) 144/70   Pulse: 79 79  81   Resp: (!) 35  18 16   Temp:  97.9 °F (36.6 °C)  97.3 °F (36.3 °C)   TempSrc:  Oral  Temporal   SpO2: 92% 98% 99% 92%   Weight:       Height:                                                  BP Readings from Last 3 Encounters:   05/10/19 (!) 144/70   11/20/18 (!) 93/47   11/13/18 (!) 117/93       NPO Status: Time of last liquid consumption: 1930                        Time of last solid consumption: 2200                        Date of last liquid consumption: 05/09/19                        Date of last solid food consumption: 05/09/19    BMI:   Wt Readings from Last 3 Encounters:   05/07/19 (!) 326 lb (147.9 kg)   11/20/18 (!) 357 lb 12.9 oz (162.3 kg)   11/13/18 (!) 355 lb (161 kg)     Body mass index is 44.21 kg/m².     CBC:   Lab Results   Component Value Date    WBC 6.6 05/10/2019    RBC 4.51 05/10/2019    HGB 14.2 05/10/2019    HCT 44.5 05/10/2019    MCV 98.7 05/10/2019    RDW 16.7 05/10/2019     05/10/2019       CMP:   Lab Results   Component Value Date     05/10/2019    K 4.6 05/10/2019     05/10/2019    CO2 24 05/10/2019    BUN 28 05/10/2019    CREATININE 0.75 05/10/2019    GFRAA >60.0 05/10/2019    LABGLOM >60.0 05/10/2019    GLUCOSE 341 05/10/2019    PROT 6.3 05/07/2019    CALCIUM 7.9 05/10/2019    BILITOT 0.8 05/07/2019    ALKPHOS 105 05/07/2019    AST 51 05/07/2019    ALT <5 05/07/2019       POC Tests:   Recent Labs     05/10/19  1059   POCGLU 295*       Coags:   Lab Results   Component Value Date    PROTIME 12.9 11/19/2018    INR 1.2 11/19/2018    APTT 24.9 11/20/2018       HCG (If Applicable): No results found for: PREGTESTUR, PREGSERUM, HCG, HCGQUANT     ABGs: No results found for: PHART, PO2ART, QUM5RRJ, YMU0VOD, BEART, A6ETGRLL     Type & Screen (If Applicable):  No results found for: LABABO, [address]    Anesthesia Evaluation  Patient summary reviewed and Nursing notes reviewed no history of anesthetic complications:   Airway: Mallampati: II  TM distance: >3 FB   Neck ROM: full  Mouth opening: > = 3 FB Dental:    (+) upper dentures  Comment: No mandibular teeth    Pulmonary:   (+) COPD:  sleep apnea:  asthma:                            Cardiovascular:Negative CV ROS                      Neuro/Psych:   (+) neuromuscular disease:,             GI/Hepatic/Renal:   (+) morbid obesity          Endo/Other:    (+) DiabetesType II DM, using insulin, . Abdominal:           Vascular:   + PVD, aortic or cerebral, . Anesthesia Plan      general     ASA 4       Induction: intravenous. MIPS: Postoperative opioids intended and Prophylactic antiemetics administered. Anesthetic plan and risks discussed with patient. Plan discussed with CRNA.     Attending anesthesiologist reviewed and agrees with Pre Eval content              Rashmi ARMENTA

## 2019-05-10 NOTE — CARE COORDINATION
WENT TO SURGERY TODAY AND DR Prasanna Aparicio DOING A SACRAL DEBRIDEMENT. DR WALDROP PLANS ON Monday  A RIGHT POPLITEAL ARTERY PVI. DISCHARGE THEN POSSIBLE [facility]. PLAN IS [employer] WHEN PRECERT IS RETURNED WHICH WAS STARTED ON 5/9/119 PT DID NOT GO TO 1WT YESTERDAY AS PREVIOUSLY PLANNED. AFTER ANGIOGRAM, KEPT ON 2WT.

## 2019-05-10 NOTE — BRIEF OP NOTE
Brief Postoperative Note  ______________________________________________________________    Patient: Ben Urbina  YOB: 1950  MRN: [id number]  Date of Procedure: 5/10/2019    Pre-Op Diagnosis: INPATIENT    Post-Op Diagnosis: Same       Procedure(s):  INCISION AND DEBRIDEMENT WOUND RIGHT BUTTOCK, ROOM 270    Anesthesia: General    Surgeon(s):  Dara Quinn MD    Assistant: Garth Reyes    Estimated Blood Loss (mL): less than 50     Complications: None    Specimens:   ID Type Source Tests Collected by Time Destination   1 : RIGHT BUTTOCK WOUND DEBRIDEMENT TISSUE Tissue Buttock SURGICAL CULTURE Dara Quinn MD 5/10/2019 1148        Implants:  * No implants in log *      Drains: * No LDAs found *    Findings: necrotic skin and subcut    Dara Quinn MD  Date: 5/10/2019  Time: 12:02 PM

## 2019-05-10 NOTE — PROGRESS NOTES
NOTED TO HAVE FLUID FILLED BLISTERED AREA ON ANTERIOR LEFT STUMP. XEROFORM AND GAUZE DRESSING APPLIED.

## 2019-05-10 NOTE — PROGRESS NOTES
Hospitalist Daily Progress Note  Name: Nestor De La Paz  Age: 79 y.o. Gender: male  CodeStatus: Full Code  Allergies: Coconut Oil    Chief Complaint:Extremity Weakness (pt c/o RLE extremity weakness for the past 2 days after falling off the couch)      Primary Care Provider: No primary care provider on file. InpatientTreatment Team: Treatment Team: Attending Provider: Antonietta Wilkins MD; Consulting Physician: Azalea Iglesias DO; Consulting Physician: Mauricio Hernandez DPM; Consulting Physician: Madan Moon MD; Registered Nurse: Yanet Robert RN; Consulting Physician: Trav Mcelroy MD; Surgeon: Trav Mcelroy MD; Consulting Physician: Yuval Hopkins MD; : Xochitl Adan, RN; Patient Care Tech: Yumi Velasco; Registered Nurse: Stacy Owen RN    Admission Date: 5/7/2019      Subjective: Patient returned from OR, no cp, sob. Resting in bed. Physical Exam   Constitutional: He is oriented to person, place, and time. He appears well-developed and well-nourished. Cardiovascular: Normal rate and regular rhythm. Pulmonary/Chest: Effort normal and breath sounds normal.   Abdominal: Soft. Bowel sounds are normal.   Musculoskeletal: Normal range of motion. LLE AKA   Neurological: He is alert and oriented to person, place, and time. Skin:   Right foot dressed, noted significant drainage from right heel wound unstagable   Nursing note and vitals reviewed.       Medications:  Reviewed    Infusion Medications:    lactated ringers 100 mL/hr at 05/10/19 1049    sodium chloride 100 mL/hr at 05/08/19 2052    dextrose       Scheduled Medications:    insulin NPH  15 Units Subcutaneous BID AC    sodium chloride flush  10 mL Intravenous 2 times per day    ipratropium-albuterol  3 mL Inhalation TID    sodium chloride flush  10 mL Intravenous 2 times per day    enoxaparin  40 mg Subcutaneous Daily    piperacillin-tazobactam  3.375 g Intravenous Q8H    insulin lispro  0-12 Units Subcutaneous TID     insulin lispro  0-6 Units Subcutaneous Nightly    clopidogrel  75 mg Oral Daily    collagenase   Topical Daily     PRN Meds: fentanNYL, HYDROmorphone, HYDROcodone 5 mg - acetaminophen **OR** HYDROcodone 5 mg - acetaminophen, diphenhydrAMINE, ondansetron, metoclopramide, meperidine, sodium chloride flush, guaiFENesin, acetaminophen, albuterol, sodium chloride flush, magnesium hydroxide, ondansetron, potassium chloride **OR** potassium alternative oral replacement **OR** potassium chloride, magnesium sulfate, glucose, dextrose, glucagon (rDNA), dextrose, albuterol sulfate HFA    Labs:   Recent Labs     05/08/19  0517 05/09/19  0508 05/10/19  0514   WBC 7.8 7.3 6.6   HGB 14.1 13.5* 14.2   HCT 43.8 42.7 44.5    178 157     Recent Labs     05/08/19  0517 05/09/19  0507 05/10/19  0514    137 139   K 4.6 4.3 4.6    100 103   CO2 24 25 24   BUN 39* 31* 28*   CREATININE 1.06 0.94 0.75   CALCIUM 7.5* 7.7* 7.9*     No results for input(s): AST, ALT, BILIDIR, BILITOT, ALKPHOS in the last 72 hours. No results for input(s): INR in the last 72 hours. No results for input(s): Georga Rout in the last 72 hours. Urinalysis:   Lab Results   Component Value Date    NITRU Negative 05/07/2019    WBCUA 3-5 05/07/2019    BACTERIA Negative 05/07/2019    RBCUA 0-2 05/07/2019    BLOODU LARGE 05/07/2019    SPECGRAV 1.022 05/07/2019    GLUCOSEU 500 05/07/2019       Radiology:   Most recent    Chest CT      WITH CONTRAST:No results found for this or any previous visit. WITHOUT CONTRAST: No results found for this or any previous visit.     CXR      2-view:   Results for orders placed during the hospital encounter of 11/19/18   XR CHEST STANDARD (2 VW)    Narrative EXAMINATION: XR CHEST (2 VW), XR FOOT LEFT (MIN 3 VIEWS), XR TIBIA FIBULA LEFT (2 VIEWS), XR TIBIA FIBULA RIGHT (2 VIEWS), 11/20/2018 10:30 AM     History: 19-year-old male, infection, also    COMPARISON: NONE.    FINDINGS:  Chest-2 views: The patient is rotated to the left limiting the evaluation. There are chronic interstitial changes. The costophrenic angles are clear. Is cardiomegaly. There are aortic arch calcifications. The left hilum appears bulky and enlarged measuring up to 3.0 cm. The pulmonary vascularity is normal size. The osseous   structures are unremarkable. Left tibia and fibula-2 views:  No fracture or dislocation. There is spurring of the medial lateral tibial plateau and narrowing of the medial compartment of the left knee. There is air in the subcutaneous tissues of the level of the lateral ankle extending along the anterior soft   tissues. Irregularity in the anterior soft tissues of the distal lower extremity are consistent with history of wound. Three  views of the LEFT FOOT are submitted. There is a fracture of the proximal fifth metatarsal. There is air in the adjacent soft tissues, cannot exclude superimposed osteomyelitis. No dislocations. There is soft tissue swelling and air predominantly at the dorsum of the foot and extending into the lateral ankle. Right tibia and fibula-2 views:  Plate and screw hardware at the proximal medial tibia. No acute fracture of the tibia or fibula. Soft tissue irregularity at the anterior soft tissues of the distal right lower extremity, consistent with wound. Impression 1. Enlarged left hilum, when the patient is able recommend repeat chest radiograph in true PA positioning. 2. Fracture of the proximal fifth metatarsal. Correlate with site of ulcerations, cannot exclude superimposed osteomyelitis. 3. Air within the soft tissues at the level of the left ankle and foot, suggestive of extensive soft tissue infection. 4. Bilateral anterior distal lower extremity wounds. Portable: No results found for this or any previous visit.     Echo No results found for this or any previous

## 2019-05-11 LAB
ANION GAP SERPL CALCULATED.3IONS-SCNC: 9 MEQ/L (ref 9–15)
BUN BLDV-MCNC: 24 MG/DL (ref 8–23)
CALCIUM SERPL-MCNC: 7.8 MG/DL (ref 8.5–9.9)
CHLORIDE BLD-SCNC: 106 MEQ/L (ref 95–107)
CO2: 24 MEQ/L (ref 20–31)
CREAT SERPL-MCNC: 0.78 MG/DL (ref 0.7–1.2)
GFR AFRICAN AMERICAN: >60
GFR NON-AFRICAN AMERICAN: >60
GLUCOSE BLD-MCNC: 250 MG/DL (ref 60–115)
GLUCOSE BLD-MCNC: 297 MG/DL (ref 60–115)
GLUCOSE BLD-MCNC: 310 MG/DL (ref 60–115)
GLUCOSE BLD-MCNC: 313 MG/DL (ref 60–115)
GLUCOSE BLD-MCNC: 339 MG/DL (ref 70–99)
HCT VFR BLD CALC: 43.2 % (ref 42–52)
HEMOGLOBIN: 13.9 G/DL (ref 14–18)
MCH RBC QN AUTO: 32.2 PG (ref 27–31.3)
MCHC RBC AUTO-ENTMCNC: 32.2 % (ref 33–37)
MCV RBC AUTO: 99.9 FL (ref 80–100)
PDW BLD-RTO: 16.8 % (ref 11.5–14.5)
PERFORMED ON: ABNORMAL
PLATELET # BLD: 143 K/UL (ref 130–400)
POTASSIUM REFLEX MAGNESIUM: 4.6 MEQ/L (ref 3.4–4.9)
RBC # BLD: 4.32 M/UL (ref 4.7–6.1)
SODIUM BLD-SCNC: 139 MEQ/L (ref 135–144)
WBC # BLD: 6.5 K/UL (ref 4.8–10.8)

## 2019-05-11 PROCEDURE — 80048 BASIC METABOLIC PNL TOTAL CA: CPT

## 2019-05-11 PROCEDURE — 99231 SBSQ HOSP IP/OBS SF/LOW 25: CPT | Performed by: NURSE PRACTITIONER

## 2019-05-11 PROCEDURE — 93010 ELECTROCARDIOGRAM REPORT: CPT | Performed by: INTERNAL MEDICINE

## 2019-05-11 PROCEDURE — 94660 CPAP INITIATION&MGMT: CPT

## 2019-05-11 PROCEDURE — 6370000000 HC RX 637 (ALT 250 FOR IP): Performed by: INTERNAL MEDICINE

## 2019-05-11 PROCEDURE — 85027 COMPLETE CBC AUTOMATED: CPT

## 2019-05-11 PROCEDURE — 2580000003 HC RX 258: Performed by: NURSE PRACTITIONER

## 2019-05-11 PROCEDURE — 2700000000 HC OXYGEN THERAPY PER DAY

## 2019-05-11 PROCEDURE — 99232 SBSQ HOSP IP/OBS MODERATE 35: CPT | Performed by: INTERNAL MEDICINE

## 2019-05-11 PROCEDURE — 1210000000 HC MED SURG R&B

## 2019-05-11 PROCEDURE — 6360000002 HC RX W HCPCS: Performed by: NURSE PRACTITIONER

## 2019-05-11 PROCEDURE — 36415 COLL VENOUS BLD VENIPUNCTURE: CPT

## 2019-05-11 PROCEDURE — 6370000000 HC RX 637 (ALT 250 FOR IP): Performed by: FAMILY MEDICINE

## 2019-05-11 PROCEDURE — 94640 AIRWAY INHALATION TREATMENT: CPT

## 2019-05-11 PROCEDURE — 94664 DEMO&/EVAL PT USE INHALER: CPT

## 2019-05-11 RX ORDER — NICOTINE 21 MG/24HR
1 PATCH, TRANSDERMAL 24 HOURS TRANSDERMAL DAILY
Status: DISCONTINUED | OUTPATIENT
Start: 2019-05-11 | End: 2019-05-14 | Stop reason: HOSPADM

## 2019-05-11 RX ADMIN — IPRATROPIUM BROMIDE AND ALBUTEROL SULFATE 3 ML: 2.5; .5 SOLUTION RESPIRATORY (INHALATION) at 20:20

## 2019-05-11 RX ADMIN — PIPERACILLIN SODIUM,TAZOBACTAM SODIUM 3.38 G: 3; .375 INJECTION, POWDER, FOR SOLUTION INTRAVENOUS at 13:17

## 2019-05-11 RX ADMIN — COLLAGENASE SANTYL: 250 OINTMENT TOPICAL at 13:18

## 2019-05-11 RX ADMIN — Medication 10 ML: at 08:56

## 2019-05-11 RX ADMIN — IPRATROPIUM BROMIDE AND ALBUTEROL SULFATE 3 ML: 2.5; .5 SOLUTION RESPIRATORY (INHALATION) at 08:12

## 2019-05-11 RX ADMIN — OXYCODONE HYDROCHLORIDE AND ACETAMINOPHEN 1 TABLET: 5; 325 TABLET ORAL at 01:31

## 2019-05-11 RX ADMIN — Medication 10 ML: at 08:57

## 2019-05-11 RX ADMIN — OXYCODONE HYDROCHLORIDE AND ACETAMINOPHEN 1 TABLET: 5; 325 TABLET ORAL at 19:37

## 2019-05-11 RX ADMIN — INSULIN LISPRO 6 UNITS: 100 INJECTION, SOLUTION INTRAVENOUS; SUBCUTANEOUS at 17:45

## 2019-05-11 RX ADMIN — CLOPIDOGREL BISULFATE 75 MG: 75 TABLET, FILM COATED ORAL at 08:55

## 2019-05-11 RX ADMIN — INSULIN LISPRO 8 UNITS: 100 INJECTION, SOLUTION INTRAVENOUS; SUBCUTANEOUS at 09:03

## 2019-05-11 RX ADMIN — PIPERACILLIN SODIUM,TAZOBACTAM SODIUM 3.38 G: 3; .375 INJECTION, POWDER, FOR SOLUTION INTRAVENOUS at 21:54

## 2019-05-11 RX ADMIN — INSULIN LISPRO 6 UNITS: 100 INJECTION, SOLUTION INTRAVENOUS; SUBCUTANEOUS at 12:36

## 2019-05-11 RX ADMIN — SODIUM CHLORIDE: 9 INJECTION, SOLUTION INTRAVENOUS at 01:42

## 2019-05-11 RX ADMIN — SODIUM CHLORIDE: 9 INJECTION, SOLUTION INTRAVENOUS at 21:54

## 2019-05-11 RX ADMIN — INSULIN HUMAN 15 UNITS: 100 INJECTION, SUSPENSION SUBCUTANEOUS at 09:02

## 2019-05-11 RX ADMIN — PIPERACILLIN SODIUM,TAZOBACTAM SODIUM 3.38 G: 3; .375 INJECTION, POWDER, FOR SOLUTION INTRAVENOUS at 04:01

## 2019-05-11 RX ADMIN — OXYCODONE HYDROCHLORIDE AND ACETAMINOPHEN 1 TABLET: 5; 325 TABLET ORAL at 12:39

## 2019-05-11 RX ADMIN — ENOXAPARIN SODIUM 40 MG: 40 INJECTION SUBCUTANEOUS at 08:55

## 2019-05-11 ASSESSMENT — PAIN SCALES - GENERAL
PAINLEVEL_OUTOF10: 7
PAINLEVEL_OUTOF10: 0
PAINLEVEL_OUTOF10: 8
PAINLEVEL_OUTOF10: 0
PAINLEVEL_OUTOF10: 2
PAINLEVEL_OUTOF10: 10

## 2019-05-11 ASSESSMENT — PAIN DESCRIPTION - FREQUENCY: FREQUENCY: CONTINUOUS

## 2019-05-11 ASSESSMENT — PAIN DESCRIPTION - LOCATION: LOCATION: LEG

## 2019-05-11 ASSESSMENT — ENCOUNTER SYMPTOMS
GASTROINTESTINAL NEGATIVE: 1
RESPIRATORY NEGATIVE: 1

## 2019-05-11 ASSESSMENT — PAIN DESCRIPTION - DESCRIPTORS: DESCRIPTORS: ACHING

## 2019-05-11 ASSESSMENT — PAIN DESCRIPTION - PAIN TYPE: TYPE: ACUTE PAIN

## 2019-05-11 NOTE — PROGRESS NOTES
(peripheral vascular disease) ([facility]    Diabetic ulcer of right foot due to type 2 diabetes mellitus ([facility]    Gait abnormality     Past Surgical History         Past Surgical History:   Procedure Laterality Date    RECTAL SURGERY  2015    patient had a boil break open inside rectum. needed surgery for infection removal.     Social History   Social History         Socioeconomic History    Marital status:      Spouse name: None    Number of children: None    Years of education: None    Highest education level: None   Occupational History    None   Social Needs    Financial resource strain: None    Food insecurity:     Worry: None     Inability: None    Transportation needs:     Medical: None     Non-medical: None   Tobacco Use    Smoking status: Current Every Day Smoker     Packs/day: 1.00     Years: 58.00     Pack years: 58.00     Types: Cigarettes    Smokeless tobacco: Never Used   Substance and Sexual Activity    Alcohol use: No    Drug use: Yes     Types: Marijuana    Sexual activity: None   Lifestyle    Physical activity:     Days per week: None     Minutes per session: None    Stress: None   Relationships    Social connections:     Talks on phone: None     Gets together: None     Attends [employer] service: None     Active member of club or organization: None     Attends meetings of clubs or organizations: None     Relationship status: None    Intimate partner violence:     Fear of current or ex partner: None     Emotionally abused: None     Physically abused: None     Forced sexual activity: None   Other Topics Concern    None   Social History Narrative    Social/Functional History         Family History   History reviewed. No pertinent family history.      Current Facility-Administered Medications             Current Facility-Administered Medications   Medication Dose Route Frequency Provider Last Rate Last Dose    ipratropium-albuterol (DUONEB) nebulizer solution 3 mL 3 mL Inhalation TID Ronald Zapata MD  3 mL at 05/08/19 0713    albuterol (PROVENTIL) nebulizer solution 2.5 mg 2.5 mg Nebulization Q2H PRN Ronald Zapata MD      sodium chloride flush 0.9 % injection 10 mL 10 mL Intravenous 2 times per day ASIM Best - CNP      sodium chloride flush 0.9 % injection 10 mL 10 mL Intravenous PRN ASIM Best CNP      magnesium hydroxide (MILK OF MAGNESIA) 400 MG/5ML suspension 30 mL 30 mL Oral Daily PRN ASIM Best - CNP      ondansetron (ZOFRAN) injection 4 mg 4 mg Intravenous Q6H PRN ASIM Best CNP      enoxaparin (LOVENOX) injection 40 mg 40 mg Subcutaneous Daily ASIM Best CNP  40 mg at 05/08/19 0846    0.9 % sodium chloride infusion  Intravenous Continuous ASIM Best  mL/hr at 05/08/19 0053     potassium chloride (KLOR-CON M) extended release tablet 40 mEq 40 mEq Oral PRN ASIM Best - CNP      Or    potassium chloride (KLOR-CON) packet 40 mEq 40 mEq Oral PRN ASIM Best - CNP      Or    potassium chloride 10 mEq/100 mL IVPB (Peripheral Line) 10 mEq Intravenous PRN ASIM Best CNP      magnesium sulfate 1 g in dextrose 5% 100 mL IVPB 1 g Intravenous PRN ASIM Best - CNP      vancomycin (VANCOCIN) 1,500 mg in dextrose 5 % 500 mL IVPB 15 mg/kg (Adjusted) Intravenous Q12H ASIM Best CNP  Stopped at 05/08/19 0504    piperacillin-tazobactam (ZOSYN) 3.375 g in dextrose 5 % 50 mL IVPB extended infusion (mini-bag) 3.375 g Intravenous Q8H ASIM Best CNP 12.5 mL/hr at 05/08/19 0545 3.375 g at 05/08/19 0545    glucose (GLUTOSE) 40 % oral gel 15 g 15 g Oral PRN ASIM Best CNP      dextrose 50 % solution 12.5 g 12.5 g Intravenous PRN ASIM Best - CNP      glucagon (rDNA) injection 1 mg 1 mg Intramuscular PRN Tonya Erlin, APRN - CNP      dextrose 5 % solution 100 mL/hr Intravenous PRN Tonya Kern, APRN - CNP  insulin lispro (HUMALOG) injection vial 0-12 Units 0-12 Units Subcutaneous TID  ASIM Francois CNP  6 Units at 05/08/19 0846    insulin lispro (HUMALOG) injection vial 0-6 Units 0-6 Units Subcutaneous Nightly Tyrone ASIM Rashid CNP  4 Units at 05/07/19 2254    vancomycin (VANCOCIN) intermittent dosing (placeholder)  Other RX Placeholder Tyrone Rashid, APRN - CNP      albuterol sulfate  (90 Base) MCG/ACT inhaler 2 puff 2 puff Inhalation Q4H PRN Josue Isaacs MD      clopidogrel (PLAVIX) tablet 75 mg 75 mg Oral Daily Josue Isaacs MD  75 mg at 05/08/19 0846    insulin lispro protamine & lispro (HUMALOG MIX) (75-25) 100 UNIT per ML injection vial SUSP 10 Units 10 Units Subcutaneous BID  Josue Isaacs MD  10 Units at 05/08/19 0846    collagenase ointment  Topical Daily Floydene Riddles, DPM       ALLERGIES: Coconut oil   Review of Systems   Constitutional: Negative. Negative for chills and fever. HENT: Negative. Eyes: Negative. Respiratory: Negative for shortness of breath and wheezing. Cardiovascular: Negative for chest pain, palpitations and leg swelling. Gastrointestinal: Negative. Negative for abdominal pain, nausea and vomiting. Endocrine: Negative. Genitourinary: Negative. Musculoskeletal:   Left AKA   Skin: Positive for wound (right LE bandaged but has gangrenous toes and superficail ulcers on anterior tibial area. ). Negative for rash. Allergic/Immunologic: Negative. Neurological: Negative for dizziness, weakness and headaches. Hematological: Negative. Psychiatric/Behavioral: Negative. VITALS:   Blood pressure (!) 145/69, pulse 88, temperature 97.7 °F (36.5 °C), temperature source Oral, resp. rate 22, height 6' (1.829 m), weight (!) 326 lb (147.9 kg), SpO2 90 %. Body mass index is 44.21 kg/m². Physical Exam   Constitutional: He is oriented to person, place, and time. He appears well-developed and well-nourished.    HENT:   Head: Normocephalic

## 2019-05-11 NOTE — PROGRESS NOTES
Hospitalist Daily Progress Note  Name: Marquez Lee  Age: 79 y.o. Gender: male  CodeStatus: Full Code  Allergies: Coconut Oil    Chief Complaint:Extremity Weakness (pt c/o RLE extremity weakness for the past 2 days after falling off the couch)      Primary Care Provider: No primary care provider on file. InpatientTreatment Team: Treatment Team: Attending Provider: Teresita Castellanos MD; Consulting Physician: Lee Oglesby DO; Consulting Physician: Tabitha Soriano DPM; Consulting Physician: Renee Castellanos MD; Registered Nurse: Bart Luna RN; Consulting Physician: Rajni Hoyt MD; Surgeon: Rajni Hoyt MD; Consulting Physician: Codey Carroll MD; Care Coordinator: Maco Alejandro RN; Registered Nurse: Mariano Paredes RN; LPN: Drusilla Leyden, LPN; Patient Care Tech: Stacey Sheffield    Admission Date: 5/7/2019      Subjective: Patient resting in bed, complains of nicotine urge, denies cp, sob. Physical Exam   Constitutional: He is oriented to person, place, and time. He appears well-developed and well-nourished. Cardiovascular: Normal rate and regular rhythm. Pulmonary/Chest: Effort normal and breath sounds normal.   Abdominal: Soft. Bowel sounds are normal.   Musculoskeletal: Normal range of motion. LLE AKA   Neurological: He is alert and oriented to person, place, and time. Skin:   Right foot wound wrapped, clean dressing   Nursing note and vitals reviewed.       Medications:  Reviewed    Infusion Medications:    lactated ringers 100 mL/hr at 05/10/19 1347    sodium chloride 100 mL/hr at 05/11/19 0142    dextrose       Scheduled Medications:    nicotine  1 patch Transdermal Daily    insulin NPH  15 Units Subcutaneous BID AC    sodium chloride flush  10 mL Intravenous 2 times per day    ipratropium-albuterol  3 mL Inhalation TID    sodium chloride flush  10 mL Intravenous 2 times per day    enoxaparin  40 mg Subcutaneous Daily    piperacillin-tazobactam 3.375 g Intravenous Q8H    insulin lispro  0-12 Units Subcutaneous TID WC    insulin lispro  0-6 Units Subcutaneous Nightly    clopidogrel  75 mg Oral Daily    collagenase   Topical Daily     PRN Meds: oxyCODONE-acetaminophen, sodium chloride flush, guaiFENesin, acetaminophen, albuterol, sodium chloride flush, magnesium hydroxide, ondansetron, potassium chloride **OR** potassium alternative oral replacement **OR** potassium chloride, magnesium sulfate, glucose, dextrose, glucagon (rDNA), dextrose, albuterol sulfate HFA    Labs:   Recent Labs     05/09/19  0508 05/10/19  0514 05/11/19  0516   WBC 7.3 6.6 6.5   HGB 13.5* 14.2 13.9*   HCT 42.7 44.5 43.2    157 143     Recent Labs     05/09/19  0507 05/10/19  0514 05/11/19  0516    139 139   K 4.3 4.6 4.6    103 106   CO2 25 24 24   BUN 31* 28* 24*   CREATININE 0.94 0.75 0.78   CALCIUM 7.7* 7.9* 7.8*     No results for input(s): AST, ALT, BILIDIR, BILITOT, ALKPHOS in the last 72 hours. No results for input(s): INR in the last 72 hours. No results for input(s): Presley Mylar in the last 72 hours. Urinalysis:   Lab Results   Component Value Date    NITRU Negative 05/07/2019    WBCUA 3-5 05/07/2019    BACTERIA Negative 05/07/2019    RBCUA 0-2 05/07/2019    BLOODU LARGE 05/07/2019    SPECGRAV 1.022 05/07/2019    GLUCOSEU 500 05/07/2019       Radiology:   Most recent    Chest CT      WITH CONTRAST:No results found for this or any previous visit. WITHOUT CONTRAST: No results found for this or any previous visit. CXR      2-view:   Results for orders placed during the hospital encounter of 11/19/18   XR CHEST STANDARD (2 VW)    Narrative EXAMINATION: XR CHEST (2 VW), XR FOOT LEFT (MIN 3 VIEWS), XR TIBIA FIBULA LEFT (2 VIEWS), XR TIBIA FIBULA RIGHT (2 VIEWS), 11/20/2018 10:30 AM     History: 61-year-old male, infection, also    COMPARISON:  NONE. FINDINGS:  Chest-2 views: The patient is rotated to the left limiting the evaluation. There are chronic interstitial changes. The costophrenic angles are clear. Is cardiomegaly. There are aortic arch calcifications. The left hilum appears bulky and enlarged measuring up to 3.0 cm. The pulmonary vascularity is normal size. The osseous   structures are unremarkable. Left tibia and fibula-2 views:  No fracture or dislocation. There is spurring of the medial lateral tibial plateau and narrowing of the medial compartment of the left knee. There is air in the subcutaneous tissues of the level of the lateral ankle extending along the anterior soft   tissues. Irregularity in the anterior soft tissues of the distal lower extremity are consistent with history of wound. Three  views of the LEFT FOOT are submitted. There is a fracture of the proximal fifth metatarsal. There is air in the adjacent soft tissues, cannot exclude superimposed osteomyelitis. No dislocations. There is soft tissue swelling and air predominantly at the dorsum of the foot and extending into the lateral ankle. Right tibia and fibula-2 views:  Plate and screw hardware at the proximal medial tibia. No acute fracture of the tibia or fibula. Soft tissue irregularity at the anterior soft tissues of the distal right lower extremity, consistent with wound. Impression 1. Enlarged left hilum, when the patient is able recommend repeat chest radiograph in true PA positioning. 2. Fracture of the proximal fifth metatarsal. Correlate with site of ulcerations, cannot exclude superimposed osteomyelitis. 3. Air within the soft tissues at the level of the left ankle and foot, suggestive of extensive soft tissue infection. 4. Bilateral anterior distal lower extremity wounds. Portable: No results found for this or any previous visit. Echo No results found for this or any previous visit.           Assessment/Plan:    Active Hospital Problems    Diagnosis Date Noted    Cellulitis of lower leg [K35.101]     Cellulitis of right leg [W21.896]     Decubitus ulcer of right heel, stage 3 (HCC) [L89.613]     Cough [R05]     Diabetes mellitus due to underlying condition, controlled, with stage 2 chronic kidney disease ([facility] [E08.22, N18.2]     Diabetic peripheral neuropathy associated with type 2 diabetes mellitus ([facility] [E11.42]     Diabetic ulcer of right foot due to type 2 diabetes mellitus ([facility] [M10.311, L97.519] 05/07/2019    Gait abnormality [R26.9] 05/07/2019    PVD (peripheral vascular disease) ([facility] [I73.9] 11/20/2018    Leg wound, left [S81.802A] 11/20/2018    Venous stasis ulcer limited to breakdown of skin without varicose veins ([facility] [I87.2, U22.618]     HARSHA on CPAP [G47.33, Z99.89] 01/16/2017    Acute and chronic respiratory failure with hypoxia ([facility] [J96.21] 01/16/2017     1. Right diabetic foot ulcer with cellulitis of RLE- podiatry consulted, continue on IV zosyn and vancomycin, cultures pending, IVF, MRI right foot to r/o osteomyelitis. 5/8 - neg MRI for osteomyelitis, cont iv abx, podiatry and cardiology evaluating for healing plan   5/9 - for debridement in OR tomorrow, angiography today. 5/10 - wound care per Dr. Sammy Michelle, cont current ID treatment. 5/11 - s/p sacral debridement, wound care; 5/13 for angiography LE    2. Uncontrolled DM2- ac/hs and as needed poct glucose with ssi coverage as needed, a1c pending   5/8 - hgba1c 7.8, cont current coverage   5/11 - increase nph    3. Lactic acidosis- will hydrate and repeat lab    4. Renal insufficency- hydrate and repeat labs, monitor output    5/8 - improved    5.  DVT proph - lovenox        DVT Prophylaxis: lovenox  Diet: carb control  Code Status: full           Electronically signed by Pretty Haddad MD on 5/11/2019 at 11:47 AM

## 2019-05-12 LAB
ANION GAP SERPL CALCULATED.3IONS-SCNC: 7 MEQ/L (ref 9–15)
BLOOD CULTURE, ROUTINE: NORMAL
BUN BLDV-MCNC: 20 MG/DL (ref 8–23)
CALCIUM SERPL-MCNC: 7.9 MG/DL (ref 8.5–9.9)
CHLORIDE BLD-SCNC: 105 MEQ/L (ref 95–107)
CO2: 27 MEQ/L (ref 20–31)
CREAT SERPL-MCNC: 0.8 MG/DL (ref 0.7–1.2)
CULTURE, BLOOD 2: NORMAL
GFR AFRICAN AMERICAN: >60
GFR NON-AFRICAN AMERICAN: >60
GLUCOSE BLD-MCNC: 188 MG/DL (ref 60–115)
GLUCOSE BLD-MCNC: 216 MG/DL (ref 60–115)
GLUCOSE BLD-MCNC: 230 MG/DL (ref 60–115)
GLUCOSE BLD-MCNC: 241 MG/DL (ref 60–115)
GLUCOSE BLD-MCNC: 263 MG/DL (ref 70–99)
HCT VFR BLD CALC: 41.9 % (ref 42–52)
HEMOGLOBIN: 13.7 G/DL (ref 14–18)
MCH RBC QN AUTO: 32.4 PG (ref 27–31.3)
MCHC RBC AUTO-ENTMCNC: 32.7 % (ref 33–37)
MCV RBC AUTO: 99.1 FL (ref 80–100)
PDW BLD-RTO: 17.2 % (ref 11.5–14.5)
PERFORMED ON: ABNORMAL
PLATELET # BLD: 142 K/UL (ref 130–400)
POTASSIUM REFLEX MAGNESIUM: 4.8 MEQ/L (ref 3.4–4.9)
RBC # BLD: 4.23 M/UL (ref 4.7–6.1)
SODIUM BLD-SCNC: 139 MEQ/L (ref 135–144)
WBC # BLD: 6.7 K/UL (ref 4.8–10.8)

## 2019-05-12 PROCEDURE — 6370000000 HC RX 637 (ALT 250 FOR IP): Performed by: INTERNAL MEDICINE

## 2019-05-12 PROCEDURE — 80048 BASIC METABOLIC PNL TOTAL CA: CPT

## 2019-05-12 PROCEDURE — 2700000000 HC OXYGEN THERAPY PER DAY

## 2019-05-12 PROCEDURE — 1210000000 HC MED SURG R&B

## 2019-05-12 PROCEDURE — 2580000003 HC RX 258: Performed by: SURGERY

## 2019-05-12 PROCEDURE — 6370000000 HC RX 637 (ALT 250 FOR IP): Performed by: SURGERY

## 2019-05-12 PROCEDURE — 6360000002 HC RX W HCPCS: Performed by: SURGERY

## 2019-05-12 PROCEDURE — 2580000003 HC RX 258: Performed by: NURSE PRACTITIONER

## 2019-05-12 PROCEDURE — 6370000000 HC RX 637 (ALT 250 FOR IP): Performed by: FAMILY MEDICINE

## 2019-05-12 PROCEDURE — 99231 SBSQ HOSP IP/OBS SF/LOW 25: CPT | Performed by: INTERNAL MEDICINE

## 2019-05-12 PROCEDURE — 36415 COLL VENOUS BLD VENIPUNCTURE: CPT

## 2019-05-12 PROCEDURE — 6360000002 HC RX W HCPCS: Performed by: NURSE PRACTITIONER

## 2019-05-12 PROCEDURE — 94640 AIRWAY INHALATION TREATMENT: CPT

## 2019-05-12 PROCEDURE — 99232 SBSQ HOSP IP/OBS MODERATE 35: CPT | Performed by: INTERNAL MEDICINE

## 2019-05-12 PROCEDURE — 85027 COMPLETE CBC AUTOMATED: CPT

## 2019-05-12 RX ADMIN — ACETAMINOPHEN 650 MG: 325 TABLET ORAL at 04:08

## 2019-05-12 RX ADMIN — SODIUM CHLORIDE: 9 INJECTION, SOLUTION INTRAVENOUS at 17:38

## 2019-05-12 RX ADMIN — PIPERACILLIN SODIUM,TAZOBACTAM SODIUM 3.38 G: 3; .375 INJECTION, POWDER, FOR SOLUTION INTRAVENOUS at 14:17

## 2019-05-12 RX ADMIN — OXYCODONE HYDROCHLORIDE AND ACETAMINOPHEN 1 TABLET: 5; 325 TABLET ORAL at 09:15

## 2019-05-12 RX ADMIN — Medication 10 ML: at 20:56

## 2019-05-12 RX ADMIN — OXYCODONE HYDROCHLORIDE AND ACETAMINOPHEN 1 TABLET: 5; 325 TABLET ORAL at 18:52

## 2019-05-12 RX ADMIN — IPRATROPIUM BROMIDE AND ALBUTEROL SULFATE 3 ML: 2.5; .5 SOLUTION RESPIRATORY (INHALATION) at 07:03

## 2019-05-12 RX ADMIN — PIPERACILLIN SODIUM,TAZOBACTAM SODIUM 3.38 G: 3; .375 INJECTION, POWDER, FOR SOLUTION INTRAVENOUS at 05:35

## 2019-05-12 RX ADMIN — IPRATROPIUM BROMIDE AND ALBUTEROL SULFATE 3 ML: 2.5; .5 SOLUTION RESPIRATORY (INHALATION) at 14:18

## 2019-05-12 RX ADMIN — INSULIN LISPRO 4 UNITS: 100 INJECTION, SOLUTION INTRAVENOUS; SUBCUTANEOUS at 17:32

## 2019-05-12 RX ADMIN — CLOPIDOGREL BISULFATE 75 MG: 75 TABLET, FILM COATED ORAL at 09:15

## 2019-05-12 RX ADMIN — ENOXAPARIN SODIUM 40 MG: 40 INJECTION SUBCUTANEOUS at 09:15

## 2019-05-12 RX ADMIN — OXYCODONE HYDROCHLORIDE AND ACETAMINOPHEN 1 TABLET: 5; 325 TABLET ORAL at 14:23

## 2019-05-12 RX ADMIN — OXYCODONE HYDROCHLORIDE AND ACETAMINOPHEN 1 TABLET: 5; 325 TABLET ORAL at 02:13

## 2019-05-12 RX ADMIN — COLLAGENASE SANTYL: 250 OINTMENT TOPICAL at 09:15

## 2019-05-12 RX ADMIN — INSULIN LISPRO 4 UNITS: 100 INJECTION, SOLUTION INTRAVENOUS; SUBCUTANEOUS at 09:17

## 2019-05-12 RX ADMIN — PIPERACILLIN SODIUM,TAZOBACTAM SODIUM 3.38 G: 3; .375 INJECTION, POWDER, FOR SOLUTION INTRAVENOUS at 22:04

## 2019-05-12 RX ADMIN — IPRATROPIUM BROMIDE AND ALBUTEROL SULFATE 3 ML: 2.5; .5 SOLUTION RESPIRATORY (INHALATION) at 19:48

## 2019-05-12 ASSESSMENT — PAIN SCALES - GENERAL
PAINLEVEL_OUTOF10: 8
PAINLEVEL_OUTOF10: 0
PAINLEVEL_OUTOF10: 9
PAINLEVEL_OUTOF10: 7
PAINLEVEL_OUTOF10: 8
PAINLEVEL_OUTOF10: 10
PAINLEVEL_OUTOF10: 0
PAINLEVEL_OUTOF10: 8

## 2019-05-12 ASSESSMENT — PAIN DESCRIPTION - LOCATION: LOCATION: LEG

## 2019-05-12 ASSESSMENT — ENCOUNTER SYMPTOMS
GASTROINTESTINAL NEGATIVE: 1
RESPIRATORY NEGATIVE: 1

## 2019-05-12 ASSESSMENT — PAIN DESCRIPTION - PAIN TYPE: TYPE: ACUTE PAIN

## 2019-05-12 NOTE — PROGRESS NOTES
(DUONEB) nebulizer solution 3 mL 3 mL Inhalation TID Ana Mcdonough MD  3 mL at 05/08/19 0713    albuterol (PROVENTIL) nebulizer solution 2.5 mg 2.5 mg Nebulization Q2H PRN Ana Mcdonough MD      sodium chloride flush 0.9 % injection 10 mL 10 mL Intravenous 2 times per day Laqueta Dies, APRN - CNP      sodium chloride flush 0.9 % injection 10 mL 10 mL Intravenous PRN Laqueta Dies, APRN - CNP      magnesium hydroxide (MILK OF MAGNESIA) 400 MG/5ML suspension 30 mL 30 mL Oral Daily PRN Laqueta Dies, APRN - CNP      ondansetron (ZOFRAN) injection 4 mg 4 mg Intravenous Q6H PRN Laqueta Dies, APRN - CNP      enoxaparin (LOVENOX) injection 40 mg 40 mg Subcutaneous Daily Laqueta Dies, APRN - CNP  40 mg at 05/08/19 0846    0.9 % sodium chloride infusion  Intravenous Continuous Laqueta Dies, APRN -  mL/hr at 05/08/19 0053     potassium chloride (KLOR-CON M) extended release tablet 40 mEq 40 mEq Oral PRN Laqueta Dies, APRN - CNP      Or    potassium chloride (KLOR-CON) packet 40 mEq 40 mEq Oral PRN Laqueta Dies, APRN - CNP      Or    potassium chloride 10 mEq/100 mL IVPB (Peripheral Line) 10 mEq Intravenous PRN Laqueta Dies, APRN - CNP      magnesium sulfate 1 g in dextrose 5% 100 mL IVPB 1 g Intravenous PRN Laqueta Dies, APRN - CNP      vancomycin (VANCOCIN) 1,500 mg in dextrose 5 % 500 mL IVPB 15 mg/kg (Adjusted) Intravenous Q12H Laqueta Dies, APRN - CNP  Stopped at 05/08/19 0504    piperacillin-tazobactam (ZOSYN) 3.375 g in dextrose 5 % 50 mL IVPB extended infusion (mini-bag) 3.375 g Intravenous Q8H Laqueta Dies, APRN - CNP 12.5 mL/hr at 05/08/19 0545 3.375 g at 05/08/19 0545    glucose (GLUTOSE) 40 % oral gel 15 g 15 g Oral PRN Laqueta Dies, APRN - CNP      dextrose 50 % solution 12.5 g 12.5 g Intravenous PRN Laqueta Dies, APRN - CNP      glucagon (rDNA) injection 1 mg 1 mg Intramuscular PRN Laqueta Dies, APRN - CNP      dextrose 5 % solution 100 mL/hr well-nourished. HENT:   Head: Normocephalic and atraumatic. Eyes: Pupils are equal, round, and reactive to light. Neck: Normal range of motion. Neck supple. No JVD present. No tracheal deviation present. No thyromegaly present. Cardiovascular: Normal rate, regular rhythm, normal heart sounds and intact distal pulses. PMI is not displaced. Exam reveals no gallop, no S3, no distant heart sounds and no friction rub. No murmur heard. Pulmonary/Chest: No respiratory distress. He has no wheezes. He has no rales. He exhibits no tenderness. Abdominal: Soft. Bowel sounds are normal. He exhibits no distension and no mass. There is no tenderness. There is no rebound and no guarding. Musculoskeletal: He exhibits edema (right LE with multiple wounds, bandaged. + left AKA. [place] ). Neurological: He is alert and oriented to person, place, and time. No cranial nerve deficit. Skin: Skin is warm and dry. No rash noted. He is not diaphoretic. No erythema. No pallor. Psychiatric: He has a normal mood and affect. His behavior is normal. Judgment and thought content normal.       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 Hx of severe PAD s/p remote PVI's   Hx of left AKA   Hx of DM   Morbid obesity   HARSHA  Tob abuse          PLAN:   1. As always, aggressive risk factor modification is strongly recommended. We should adhere to the JNC VIII guidelines for HTN management and the NCEPATP III guidelines for LDL-C management. 2. Right Popliteal artery PVI on Monday post sacral wound debridement by dr. Reagan iverson to resume plavix.  monday at 730 AM      Electronically signed by Sophia Chicas MD on 5/12/2019 at 1:47 PM

## 2019-05-12 NOTE — PROGRESS NOTES
Natalie García is a 79 y.o.male patient.       R heel chronic non healing ulcer  · RLE cellulitis        Current Facility-Administered Medications   Medication Dose Route Frequency Provider Last Rate Last Dose    insulin NPH (HUMULIN N;NOVOLIN N) injection vial 28 Units  28 Units Subcutaneous BID AC Dee Hall MD        nicotine (NICODERM CQ) 21 MG/24HR 1 patch  1 patch Transdermal Daily Dee Hall MD   1 patch at 05/12/19 0916    oxyCODONE-acetaminophen (PERCOCET) 5-325 MG per tablet 1 tablet  1 tablet Oral Q4H PRN Abigail Moya MD   1 tablet at 05/12/19 1423    sodium chloride flush 0.9 % injection 10 mL  10 mL Intravenous 2 times per day Jes Ruvalcaba MD   10 mL at 05/11/19 0856    sodium chloride flush 0.9 % injection 10 mL  10 mL Intravenous PRN Jes Ruvalcaba MD        guaiFENesin (ROBITUSSIN) 100 MG/5ML liquid 200 mg  200 mg Oral Q4H PRN Jes Ruvalcaba MD   200 mg at 05/09/19 2030    acetaminophen (TYLENOL) tablet 650 mg  650 mg Oral Q4H PRN Jes Ruvalcaba MD   650 mg at 05/12/19 0408    ipratropium-albuterol (DUONEB) nebulizer solution 3 mL  3 mL Inhalation TID Jes Ruvalcaba MD   3 mL at 05/12/19 1418    albuterol (PROVENTIL) nebulizer solution 2.5 mg  2.5 mg Nebulization Q2H PRN Jes Ruvalcaba MD        sodium chloride flush 0.9 % injection 10 mL  10 mL Intravenous 2 times per day Jes Ruvalcaba MD   10 mL at 05/11/19 0857    sodium chloride flush 0.9 % injection 10 mL  10 mL Intravenous PRN Jes Ruvalcaba MD        magnesium hydroxide (MILK OF MAGNESIA) 400 MG/5ML suspension 30 mL  30 mL Oral Daily PRN Jes Ruvalcaba MD        ondansetron [facility]) injection 4 mg  4 mg Intravenous Q6H PRN Jes Ruvalcaba MD        [Held by provider] enoxaparin (LOVENOX) injection 40 mg  40 mg Subcutaneous Daily Jes Ruvalcaba MD   40 mg at 05/12/19 0915    0.9 % sodium chloride infusion   Intravenous Continuous Jes Ruvalcaba  mL/hr at 05/11/19 5974      potassium chloride (KLOR-CON M) extended release tablet 40 mEq  40 mEq Oral PRN Bonita Patton MD        Or    potassium chloride (KLOR-CON) packet 40 mEq  40 mEq Oral PRN Bonita Patton MD        Or    potassium chloride 10 mEq/100 mL IVPB (Peripheral Line)  10 mEq Intravenous PRN Bonita Patton MD        magnesium sulfate 1 g in dextrose 5% 100 mL IVPB  1 g Intravenous PRN Bonita Patton MD        piperacillin-tazobactam (ZOSYN) 3.375 g in dextrose 5 % 50 mL IVPB extended infusion (mini-bag)  3.375 g Intravenous Q8H Bonita Patton MD 12.5 mL/hr at 05/12/19 1417 3.375 g at 05/12/19 1417    glucose (GLUTOSE) 40 % oral gel 15 g  15 g Oral PRN Bonita Patton MD        dextrose 50 % solution 12.5 g  12.5 g Intravenous PRN Bonita Patton MD        glucagon (rDNA) injection 1 mg  1 mg Intramuscular PRN Bonita Patton MD        dextrose 5 % solution  100 mL/hr Intravenous PRN Bonita Patton MD        insulin lispro (HUMALOG) injection vial 0-12 Units  0-12 Units Subcutaneous TID WC Bonita Patton MD   4 Units at 05/12/19 0917    insulin lispro (HUMALOG) injection vial 0-6 Units  0-6 Units Subcutaneous Nightly Bonita Patton MD   4 Units at 05/11/19 2155    albuterol sulfate  (90 Base) MCG/ACT inhaler 2 puff  2 puff Inhalation Q4H PRN Bonita Patton MD        clopidogrel (PLAVIX) tablet 75 mg  75 mg Oral Daily Bonita Patton MD   75 mg at 05/12/19 0915    collagenase ointment   Topical Daily Bonita Patton MD           Allergies   Allergen Reactions    Coconut Oil Nausea And Vomiting     Coconut meat only.       Patient Active Problem List    Diagnosis Date Noted    Cellulitis of lower leg     Cellulitis of right leg     Decubitus ulcer of right heel, stage 3 (HCC)     Cough     Diabetes mellitus due to underlying condition, controlled, with stage 2 chronic kidney disease ([facility]     Diabetic peripheral neuropathy associated with type 2 diabetes mellitus ([facility]     Diabetic ulcer of right foot due to type 2 diabetes mellitus ([facility] 05/07/2019    Gait abnormality 05/07/2019    Cellulitis 11/20/2018    Leg wound, left 11/20/2018    Leukocytosis 11/20/2018    Unable to ambulate 11/20/2018    Elevated bilirubin 11/20/2018    Gas gangrene of foot ([facility] 11/20/2018    Hyperglycemia due to type 2 diabetes mellitus ([facility] 11/20/2018    PVD (peripheral vascular disease) ([facility] 11/20/2018    Cellulitis of left foot     Gangrene associated with type 2 diabetes mellitus (HCC)     Venous stasis ulcer limited to breakdown of skin without varicose veins (HCC)     Acute and chronic respiratory failure with hypoxia ([facility] 01/16/2017    BMI 45.0-49.9, adult ([facility] 01/16/2017    History of tobacco use 01/16/2017    HARSHA on CPAP 01/16/2017    Wheezing 01/16/2017       BP (!) 157/53   Pulse 64   Temp 97.9 °F (36.6 °C) (Oral)   Resp 18   Ht 6' (1.829 m)   Wt (!) 326 lb (147.9 kg)   SpO2 99%   BMI 44.21 kg/m²     Review of Systems   HENT: Negative. Respiratory: Negative. Cardiovascular: Negative. Gastrointestinal: Negative. Genitourinary: Negative. Skin: Positive for wound. Physical Exam   Cardiovascular: Normal heart sounds. No murmur heard. Pulmonary/Chest: No respiratory distress. He has no wheezes. He has no rales. He exhibits no tenderness. Abdominal: He exhibits no distension and no mass. There is no tenderness. There is no rebound and no guarding. Musculoskeletal: He exhibits edema. Deep right heel ulcer surrounding redness   Skin: Skin is warm. There is erythema.      Surgical Culture [[id number]] Collected: 05/10/19 1148   Order Status: Completed Specimen: Tissue from Buttock Updated: 05/11/19 1031    Gram Stain Result No WBC's, No organisms seen    Anaerobic Culture Culture in progress    Culture Surgical No growth 24 hours       Assessment/Plan:    R heel chronic non healing ulcer  · RLE cellulitis    zosyn

## 2019-05-12 NOTE — PROGRESS NOTES
Hospitalist Daily Progress Note  Name: Maliha Blanchard  Age: 79 y.o. Gender: male  CodeStatus: Full Code  Allergies: Coconut Oil    Chief Complaint:Extremity Weakness (pt c/o RLE extremity weakness for the past 2 days after falling off the couch)      Primary Care Provider: No primary care provider on file. InpatientTreatment Team: Treatment Team: Attending Provider: Miguel Butler MD; Consulting Physician: Myke Mazariegos DO; Consulting Physician: Renetta Burt DPM; Consulting Physician: Jason Simpson MD; Registered Nurse: Robel Velez, ABEL; Consulting Physician: Yolanda Williamson MD; Surgeon: Yolanda Williamson MD; Consulting Physician: Butch Joya MD; Care Coordinator: Alison Hernandez RN; Registered Nurse: Anisha Kelly RN    Admission Date: 5/7/2019      Subjective: Patient resting in bed, no cp, sob. Tolerating wound care. Physical Exam   Constitutional: He is oriented to person, place, and time. He appears well-developed and well-nourished. Cardiovascular: Normal rate and regular rhythm. Pulmonary/Chest: Effort normal and breath sounds normal.   Abdominal: Soft. Bowel sounds are normal.   Musculoskeletal: Normal range of motion. LLE AKA   Neurological: He is alert and oriented to person, place, and time. Skin:   Right foot wound wrapped, clean dressing; sacrum dressed   Nursing note and vitals reviewed.       Medications:  Reviewed    Infusion Medications:    lactated ringers 100 mL/hr at 05/10/19 1347    sodium chloride 100 mL/hr at 05/11/19 2154    dextrose       Scheduled Medications:    nicotine  1 patch Transdermal Daily    insulin NPH  22 Units Subcutaneous BID AC    sodium chloride flush  10 mL Intravenous 2 times per day    ipratropium-albuterol  3 mL Inhalation TID    sodium chloride flush  10 mL Intravenous 2 times per day    [Held by provider] enoxaparin  40 mg Subcutaneous Daily    piperacillin-tazobactam  3.375 g Intravenous Q8H    insulin lispro  0-12 Units Subcutaneous TID     insulin lispro  0-6 Units Subcutaneous Nightly    clopidogrel  75 mg Oral Daily    collagenase   Topical Daily     PRN Meds: oxyCODONE-acetaminophen, sodium chloride flush, guaiFENesin, acetaminophen, albuterol, sodium chloride flush, magnesium hydroxide, ondansetron, potassium chloride **OR** potassium alternative oral replacement **OR** potassium chloride, magnesium sulfate, glucose, dextrose, glucagon (rDNA), dextrose, albuterol sulfate HFA    Labs:   Recent Labs     05/10/19  0514 05/11/19  0516 05/12/19  0521   WBC 6.6 6.5 6.7   HGB 14.2 13.9* 13.7*   HCT 44.5 43.2 41.9*    143 142     Recent Labs     05/10/19  0514 05/11/19  0516 05/12/19  0521    139 139   K 4.6 4.6 4.8    106 105   CO2 24 24 27   BUN 28* 24* 20   CREATININE 0.75 0.78 0.80   CALCIUM 7.9* 7.8* 7.9*     No results for input(s): AST, ALT, BILIDIR, BILITOT, ALKPHOS in the last 72 hours. No results for input(s): INR in the last 72 hours. No results for input(s): Eward Pong in the last 72 hours. Urinalysis:   Lab Results   Component Value Date    NITRU Negative 05/07/2019    WBCUA 3-5 05/07/2019    BACTERIA Negative 05/07/2019    RBCUA 0-2 05/07/2019    BLOODU LARGE 05/07/2019    SPECGRAV 1.022 05/07/2019    GLUCOSEU 500 05/07/2019       Radiology:   Most recent    Chest CT      WITH CONTRAST:No results found for this or any previous visit. WITHOUT CONTRAST: No results found for this or any previous visit. CXR      2-view:   Results for orders placed during the hospital encounter of 11/19/18   XR CHEST STANDARD (2 VW)    Narrative EXAMINATION: XR CHEST (2 VW), XR FOOT LEFT (MIN 3 VIEWS), XR TIBIA FIBULA LEFT (2 VIEWS), XR TIBIA FIBULA RIGHT (2 VIEWS), 11/20/2018 10:30 AM     History: 82-year-old male, infection, also    COMPARISON:  NONE. FINDINGS:  Chest-2 views: The patient is rotated to the left limiting the evaluation.    There are chronic interstitial changes. The costophrenic angles are clear. Is cardiomegaly. There are aortic arch calcifications. The left hilum appears bulky and enlarged measuring up to 3.0 cm. The pulmonary vascularity is normal size. The osseous   structures are unremarkable. Left tibia and fibula-2 views:  No fracture or dislocation. There is spurring of the medial lateral tibial plateau and narrowing of the medial compartment of the left knee. There is air in the subcutaneous tissues of the level of the lateral ankle extending along the anterior soft   tissues. Irregularity in the anterior soft tissues of the distal lower extremity are consistent with history of wound. Three  views of the LEFT FOOT are submitted. There is a fracture of the proximal fifth metatarsal. There is air in the adjacent soft tissues, cannot exclude superimposed osteomyelitis. No dislocations. There is soft tissue swelling and air predominantly at the dorsum of the foot and extending into the lateral ankle. Right tibia and fibula-2 views:  Plate and screw hardware at the proximal medial tibia. No acute fracture of the tibia or fibula. Soft tissue irregularity at the anterior soft tissues of the distal right lower extremity, consistent with wound. Impression 1. Enlarged left hilum, when the patient is able recommend repeat chest radiograph in true PA positioning. 2. Fracture of the proximal fifth metatarsal. Correlate with site of ulcerations, cannot exclude superimposed osteomyelitis. 3. Air within the soft tissues at the level of the left ankle and foot, suggestive of extensive soft tissue infection. 4. Bilateral anterior distal lower extremity wounds. Portable: No results found for this or any previous visit. Echo No results found for this or any previous visit.           Assessment/Plan:    Active Hospital Problems    Diagnosis Date Noted    Cellulitis of lower leg [Q58.653]     Cellulitis of right leg [K94.312]

## 2019-05-12 NOTE — PROGRESS NOTES
Pt am assessment complete. Pt alert and oriented. Complains of 8/10 pain, medicated with prn percocet as ordered. Podiatry in to change RLE dressing on foot. Santyl applied. Pt tolerated well. Pt refusing to allow this nurse to assess wounds on bottom at this time. States that the nurse just looked at them this am. Will reattempt to assess with next repositioning. Heel elevated off bed with pillow. Clarified with Ton Bear to give plavix and lovenox. VS stable. Will continue to monitor and hourly round.

## 2019-05-12 NOTE — PROGRESS NOTES
into the lungs      ipratropium-albuterol (DUONEB) 0.5-2.5 (3) MG/3ML SOLN nebulizer solution Inhale 3 mLs into the lungs      oxyCODONE-acetaminophen (PERCOCET) 5-325 MG per tablet Take 1 tablet by mouth as needed for Pain. Ani Yang gabapentin (NEURONTIN) 800 MG tablet Take 1 tablet by mouth 3 times daily for 30 days. . 90 tablet 0       Allergies   Allergen Reactions    Coconut Oil Nausea And Vomiting     Coconut meat only. History reviewed. No pertinent family history.     Social History     Socioeconomic History    Marital status:      Spouse name: Not on file    Number of children: Not on file    Years of education: Not on file    Highest education level: Not on file   Occupational History    Not on file   Social Needs    Financial resource strain: Not on file    Food insecurity:     Worry: Not on file     Inability: Not on file    Transportation needs:     Medical: Not on file     Non-medical: Not on file   Tobacco Use    Smoking status: Current Every Day Smoker     Packs/day: 1.00     Years: 58.00     Pack years: 58.00     Types: Cigarettes    Smokeless tobacco: Never Used   Substance and Sexual Activity    Alcohol use: No    Drug use: Yes     Types: Marijuana    Sexual activity: Not on file   Lifestyle    Physical activity:     Days per week: Not on file     Minutes per session: Not on file    Stress: Not on file   Relationships    Social connections:     Talks on phone: Not on file     Gets together: Not on file     Attends [employer] service: Not on file     Active member of club or organization: Not on file     Attends meetings of clubs or organizations: Not on file     Relationship status: Not on file    Intimate partner violence:     Fear of current or ex partner: Not on file     Emotionally abused: Not on file     Physically abused: Not on file     Forced sexual activity: Not on file   Other Topics Concern    Not on file   Social History Narrative         Lives With: Alone     Pt is from home but home was not going well prior to this admission. Pt lives alone. Pt was recently at a SNF [facility] but he does not want to return there. Pt is agreeable to [facility] if they will take him. Referral called to [place] at [place]. Type of Home: Apartment One level    Home Access: Stairs to enter without rails - Number of Steps: 1 (working with landlord to get a ramp)    Bathroom Shower/Tub: Tub/Shower unit(w/c does not fit into the bathroom)    Bathroom Equipment: 3-in-1 commode    Home Equipment: [employer]    ADL Assistance: Independent(\"With baby wipes, not very good\")    Homemaking Assistance: Needs assistance    Ambulation Assistance: (w/c)    Transfer Assistance: Independent(was using slideboard at home)    Active : No    Additional Comments: Has been sleeping on the sofa, cannot get into the bedroom or bathroom at home as [facility] does not fit in the doorways. Girlfriend or guys from the apartment do his shopping    Social/Functional History            Review of Systems  CONSTITUTIONAL: No fevers, chills, diaphoresis  HEENT: Denies epistaxis or tinnitus  EYES: No diplopia or blurry vision. CARDIOVASCULAR: No chest pain, dyspnea, palpitations, orthopnea, PND, ankle edema. PULM: No dyspnea, tachypnea, wheezing  GI: No dysphagia/odynophagia, constipation, diarrhea, changes in stool habits, hematochezia, melena. : No new urinary complaints, including dysuria, gross hematuria or pyuria. NEURO: No new balance problems, peripheral weakness/paresthesias or numbness of concern. MUSC-SKEL: admits to pain in right. See below  PSY: No concerns regarding depression, anxiety or panic. INTEGUMENTARY: admits to open skin lesion. See below    OBJECTIVE:  BP (!) 157/53   Pulse 64   Temp 97.9 °F (36.6 °C) (Oral)   Resp 18   Ht 6' (1.829 m)   Wt (!) 326 lb (147.9 kg)   SpO2 98%   BMI 44.21 kg/m²   Patient is alert and oriented x 3 in NAD. OSTEOMYELITIS. THERE IS SOFT TISSUE SWELLING OF THE DORSUM OF FOOT.           5/7/19:Right foot MRI:  Impression   1.   PRONOUNCED GENERALIZED SOFT TISSUE SWELLING AND SKIN THICKENING, COMPATIBLE CELLULITIS.       2.  THERE IS NO DRAINABLE ABSCESS.       3.  THERE IS NO SEPTIC ARTHRITIS.       4.  THERE IS NO OSTEOMYELITIS     Right LE Duplex Art US 5/7/19       FINDINGS:       The study is significantly limited by the patient's body habitus.       Elevated velocity within the distal right superficial femoral artery is present to just over 340 cm/s, consistent with an approximately 60% stenosis.       No other significantly elevated velocities are identified elsewhere. There is no occlusion.       There is significantly limited visualization of the infrapopliteal runoff secondary to leg bandages, with mildly pulsatile monophasic waveforms identified within the proximal anterior tibial, posterior tibial and peroneal arteries.       Other maximum velocities are noted on the included worksheet.                   Impression       EXTENSIVE ATHEROSCLEROTIC DISEASE, WITH AN APPROXIMATELY 60% STENOSIS OF THE DISTAL RIGHT SUPERFICIAL FEMORAL ARTERY.       NO EVIDENCE OF OCCLUSION, OR OTHER FLOW LIMITING STENOSIS IDENTIFIED.             Patient's case will be discussed with staff, who will provide final recommendations. Thank you for the consult.     Sherry Gibbons, PGY2  Please first page [facility] On [facility], [phone]  May 12, 2019  12:02 PM

## 2019-05-12 NOTE — PROGRESS NOTES
Tello Cavazos is a 79 y.o.male patient.       R heel chronic non healing ulcer  · RLE cellulitis        Current Facility-Administered Medications   Medication Dose Route Frequency Provider Last Rate Last Dose    nicotine (NICODERM CQ) 21 MG/24HR 1 patch  1 patch Transdermal Daily Fernie Ha MD   1 patch at 05/11/19 1115    insulin NPH (HUMULIN N;NOVOLIN N) injection vial 22 Units  22 Units Subcutaneous BID AC Fernie Ha MD   22 Units at 05/11/19 1737    lactated ringers infusion   Intravenous Continuous Omari Dallas  mL/hr at 05/10/19 1347      oxyCODONE-acetaminophen (PERCOCET) 5-325 MG per tablet 1 tablet  1 tablet Oral Q4H PRN Franklyn Grigsby MD   1 tablet at 05/11/19 1937    sodium chloride flush 0.9 % injection 10 mL  10 mL Intravenous 2 times per day ASIM Maza CNP   10 mL at 05/11/19 0856    sodium chloride flush 0.9 % injection 10 mL  10 mL Intravenous PRN ASIM Maza CNP        guaiFENesin (ROBITUSSIN) 100 MG/5ML liquid 200 mg  200 mg Oral Q4H PRN Lakia Mike MD   200 mg at 05/09/19 2030    acetaminophen (TYLENOL) tablet 650 mg  650 mg Oral Q4H PRN Lakia Mike MD   650 mg at 05/10/19 1630    ipratropium-albuterol (DUONEB) nebulizer solution 3 mL  3 mL Inhalation TID Cheyenne Jimenez MD   3 mL at 05/11/19 2020    albuterol (PROVENTIL) nebulizer solution 2.5 mg  2.5 mg Nebulization Q2H PRN Cheyenne Jimenez MD        sodium chloride flush 0.9 % injection 10 mL  10 mL Intravenous 2 times per day Yvonnie PostinASIM - CNP   10 mL at 05/11/19 0857    sodium chloride flush 0.9 % injection 10 mL  10 mL Intravenous PRN HAWKvonnie Postin, APRN - CNP        magnesium hydroxide (MILK OF MAGNESIA) 400 MG/5ML suspension 30 mL  30 mL Oral Daily PRN HAWKvorenatoie Postin, APRN - CRIS        ondansetron (ZOFRAN) injection 4 mg  4 mg Intravenous Q6H PRN Yvonnie Postin, APRN - CRIS        enoxaparin (LOVENOX) injection 40 mg  40 mg Subcutaneous Daily Sonal MARIEE Angel Maldonado, APRN - CNP   40 mg at 05/11/19 0855    0.9 % sodium chloride infusion   Intravenous Continuous Raynelle Draft, APRN -  mL/hr at 05/11/19 0142      potassium chloride (KLOR-CON M) extended release tablet 40 mEq  40 mEq Oral PRN Raynelle Draft, APRN - CNP        Or    potassium chloride (KLOR-CON) packet 40 mEq  40 mEq Oral PRN Raynelle Draft, APRN - CNP        Or    potassium chloride 10 mEq/100 mL IVPB (Peripheral Line)  10 mEq Intravenous PRN Raynelle Draft, APRN - CNP        magnesium sulfate 1 g in dextrose 5% 100 mL IVPB  1 g Intravenous PRN Raynelle Draft, APRN - CNP        piperacillin-tazobactam (ZOSYN) 3.375 g in dextrose 5 % 50 mL IVPB extended infusion (mini-bag)  3.375 g Intravenous Q8H Raynelle Draft, APRN - CNP   Stopped at 05/1950    glucose (GLUTOSE) 40 % oral gel 15 g  15 g Oral PRN Raynelle Draft, APRN - CNP        dextrose 50 % solution 12.5 g  12.5 g Intravenous PRN Raynelle Draft, APRN - CNP        glucagon (rDNA) injection 1 mg  1 mg Intramuscular PRN Raynelle Draft, APRN - CNP        dextrose 5 % solution  100 mL/hr Intravenous PRN Raynelle Draft, APRN - CNP        insulin lispro (HUMALOG) injection vial 0-12 Units  0-12 Units Subcutaneous TID WC Raynelle Draft, APRN - CNP   6 Units at 05/11/19 1745    insulin lispro (HUMALOG) injection vial 0-6 Units  0-6 Units Subcutaneous Nightly Raynelle Draft, APRN - CNP   3 Units at 05/09/19 2028    albuterol sulfate  (90 Base) MCG/ACT inhaler 2 puff  2 puff Inhalation Q4H PRN Chris Amado MD        clopidogrel (PLAVIX) tablet 75 mg  75 mg Oral Daily Chris Amado MD   75 mg at 05/11/19 0855    collagenase ointment   Topical Daily Melvin Smith DPM           Allergies   Allergen Reactions    Coconut Oil Nausea And Vomiting     Coconut meat only.       Patient Active Problem List    Diagnosis Date Noted    Cellulitis of lower leg     Cellulitis of right leg     Decubitus ulcer of right heel, stage 3 (HCC)     Cough     Diabetes mellitus due to underlying condition, controlled, with stage 2 chronic kidney disease ([facility]     Diabetic peripheral neuropathy associated with type 2 diabetes mellitus ([facility]     Diabetic ulcer of right foot due to type 2 diabetes mellitus ([facility] 05/07/2019    Gait abnormality 05/07/2019    Cellulitis 11/20/2018    Leg wound, left 11/20/2018    Leukocytosis 11/20/2018    Unable to ambulate 11/20/2018    Elevated bilirubin 11/20/2018    Gas gangrene of foot ([facility] 11/20/2018    Hyperglycemia due to type 2 diabetes mellitus ([facility] 11/20/2018    PVD (peripheral vascular disease) ([facility] 11/20/2018    Cellulitis of left foot     Gangrene associated with type 2 diabetes mellitus (HCC)     Venous stasis ulcer limited to breakdown of skin without varicose veins (HCC)     Acute and chronic respiratory failure with hypoxia ([facility] 01/16/2017    BMI 45.0-49.9, adult ([facility] 01/16/2017    History of tobacco use 01/16/2017    HARSHA on CPAP 01/16/2017    Wheezing 01/16/2017       BP (!) 150/58   Pulse 74   Temp 98.2 °F (36.8 °C) (Oral)   Resp 18   Ht 6' (1.829 m)   Wt (!) 326 lb (147.9 kg)   SpO2 99%   BMI 44.21 kg/m²     Review of Systems   HENT: Negative. Respiratory: Negative. Cardiovascular: Negative. Gastrointestinal: Negative. Genitourinary: Negative. Skin: Positive for wound. Physical Exam   Cardiovascular: Normal heart sounds. No murmur heard. Pulmonary/Chest: No respiratory distress. He has no wheezes. He has no rales. He exhibits no tenderness. Abdominal: He exhibits no distension and no mass. There is no tenderness. There is no rebound and no guarding. Musculoskeletal: He exhibits edema. Deep right heel ulcer surrounding redness   Skin: Skin is warm. There is erythema.      Surgical Culture [[id number]] Collected: 05/10/19 5575   Order Status: Completed Specimen: Tissue from Buttock Updated: 05/11/19 1032    Gram Stain Result No WBC's, No organisms seen    Anaerobic Culture Culture in progress    Culture Surgical No growth 24 hours       Assessment/Plan:  R heel chronic non healing ulcer  · RLE cellulitis    zosyn

## 2019-05-12 NOTE — PROGRESS NOTES
Pt encouraged to turn every 2 hours. pca lila and supervisor cara assisted with dressing change to bottom. Wet to dry removed and silver cell placed with wet 4X4 over top with ABD and tape. Dressing removed was saturated, large amount of bloody/serousangious drainage. Multiple stage 2 wounds through the mid section of rectum/ lj area. Placed new interdry cloths under scrotum. Scrotum large and excoriated/weeping. New dressing placed on blister on anterior thigh stump. Pt tolerated well.

## 2019-05-12 NOTE — PROGRESS NOTES
Pt repositioned and dressings checked. Some drainage noted to dressing, however the buttocks dressing is intact. Pt placed to his side. Perineal edema noted with redness and excoriation. Very painful to touch. Attempted to assess [place] insertion site, however pt could not tolerated exam and was unable to visualize at this time. Will try to reattempt post pain medication. [place] is draining without difficulty at this time. Pt on a bariatric pressure reducing bed.

## 2019-05-13 ENCOUNTER — APPOINTMENT (OUTPATIENT)
Dept: CARDIAC CATH/INVASIVE PROCEDURES | Age: 69
DRG: 622 | End: 2019-05-13
Payer: MEDICARE

## 2019-05-13 LAB
ANION GAP SERPL CALCULATED.3IONS-SCNC: 11 MEQ/L (ref 9–15)
BUN BLDV-MCNC: 19 MG/DL (ref 8–23)
CALCIUM SERPL-MCNC: 7.6 MG/DL (ref 8.5–9.9)
CHLORIDE BLD-SCNC: 109 MEQ/L (ref 95–107)
CO2: 23 MEQ/L (ref 20–31)
CREAT SERPL-MCNC: 0.66 MG/DL (ref 0.7–1.2)
GFR AFRICAN AMERICAN: >60
GFR NON-AFRICAN AMERICAN: >60
GLUCOSE BLD-MCNC: 175 MG/DL (ref 60–115)
GLUCOSE BLD-MCNC: 185 MG/DL (ref 60–115)
GLUCOSE BLD-MCNC: 198 MG/DL (ref 60–115)
GLUCOSE BLD-MCNC: 219 MG/DL (ref 70–99)
GLUCOSE BLD-MCNC: 230 MG/DL (ref 60–115)
HCT VFR BLD CALC: 36.5 % (ref 42–52)
HEMOGLOBIN: 11.8 G/DL (ref 14–18)
MCH RBC QN AUTO: 32.2 PG (ref 27–31.3)
MCHC RBC AUTO-ENTMCNC: 32.3 % (ref 33–37)
MCV RBC AUTO: 99.5 FL (ref 80–100)
PDW BLD-RTO: 17.1 % (ref 11.5–14.5)
PERFORMED ON: ABNORMAL
PLATELET # BLD: 137 K/UL (ref 130–400)
POC ACTIVATED CLOTTING TIME KAOLIN: 252 SEC (ref 82–152)
POC SAMPLE TYPE: ABNORMAL
POTASSIUM REFLEX MAGNESIUM: 4.4 MEQ/L (ref 3.4–4.9)
RBC # BLD: 3.67 M/UL (ref 4.7–6.1)
SODIUM BLD-SCNC: 143 MEQ/L (ref 135–144)
WBC # BLD: 6.7 K/UL (ref 4.8–10.8)

## 2019-05-13 PROCEDURE — 99232 SBSQ HOSP IP/OBS MODERATE 35: CPT | Performed by: INTERNAL MEDICINE

## 2019-05-13 PROCEDURE — 2700000000 HC OXYGEN THERAPY PER DAY

## 2019-05-13 PROCEDURE — 37225 PR REVSC OPN/PRQ FEM/POP W/ATHRC/ANGIOP SM VSL: CPT | Performed by: INTERNAL MEDICINE

## 2019-05-13 PROCEDURE — 2500000003 HC RX 250 WO HCPCS

## 2019-05-13 PROCEDURE — 2580000003 HC RX 258

## 2019-05-13 PROCEDURE — 6370000000 HC RX 637 (ALT 250 FOR IP): Performed by: INTERNAL MEDICINE

## 2019-05-13 PROCEDURE — 6370000000 HC RX 637 (ALT 250 FOR IP): Performed by: SURGERY

## 2019-05-13 PROCEDURE — 36415 COLL VENOUS BLD VENIPUNCTURE: CPT

## 2019-05-13 PROCEDURE — C1724 CATH, TRANS ATHEREC,ROTATION: HCPCS

## 2019-05-13 PROCEDURE — C1725 CATH, TRANSLUMIN NON-LASER: HCPCS

## 2019-05-13 PROCEDURE — 6370000000 HC RX 637 (ALT 250 FOR IP): Performed by: FAMILY MEDICINE

## 2019-05-13 PROCEDURE — C1760 CLOSURE DEV, VASC: HCPCS

## 2019-05-13 PROCEDURE — 6370000000 HC RX 637 (ALT 250 FOR IP)

## 2019-05-13 PROCEDURE — 85027 COMPLETE CBC AUTOMATED: CPT

## 2019-05-13 PROCEDURE — 37229 HC TIB PER TERRITORY ATHER: CPT | Performed by: INTERNAL MEDICINE

## 2019-05-13 PROCEDURE — 6360000002 HC RX W HCPCS

## 2019-05-13 PROCEDURE — 2580000003 HC RX 258: Performed by: NURSE PRACTITIONER

## 2019-05-13 PROCEDURE — 85347 COAGULATION TIME ACTIVATED: CPT

## 2019-05-13 PROCEDURE — 2580000003 HC RX 258: Performed by: INTERNAL MEDICINE

## 2019-05-13 PROCEDURE — C1769 GUIDE WIRE: HCPCS

## 2019-05-13 PROCEDURE — 2580000003 HC RX 258: Performed by: SURGERY

## 2019-05-13 PROCEDURE — 94760 N-INVAS EAR/PLS OXIMETRY 1: CPT

## 2019-05-13 PROCEDURE — C1887 CATHETER, GUIDING: HCPCS

## 2019-05-13 PROCEDURE — C1894 INTRO/SHEATH, NON-LASER: HCPCS

## 2019-05-13 PROCEDURE — 1210000000 HC MED SURG R&B

## 2019-05-13 PROCEDURE — 6360000002 HC RX W HCPCS: Performed by: SURGERY

## 2019-05-13 PROCEDURE — 82948 REAGENT STRIP/BLOOD GLUCOSE: CPT

## 2019-05-13 PROCEDURE — 80048 BASIC METABOLIC PNL TOTAL CA: CPT

## 2019-05-13 PROCEDURE — 2709999900 HC NON-CHARGEABLE SUPPLY

## 2019-05-13 RX ORDER — ASPIRIN 81 MG/1
81 TABLET, CHEWABLE ORAL DAILY
Qty: 30 TABLET | Refills: 3 | Status: SHIPPED | OUTPATIENT
Start: 2019-05-13

## 2019-05-13 RX ORDER — ATORVASTATIN CALCIUM 20 MG/1
20 TABLET, FILM COATED ORAL NIGHTLY
Status: DISCONTINUED | OUTPATIENT
Start: 2019-05-13 | End: 2019-05-14 | Stop reason: HOSPADM

## 2019-05-13 RX ORDER — SODIUM CHLORIDE 9 MG/ML
INJECTION, SOLUTION INTRAVENOUS CONTINUOUS
Status: DISPENSED | OUTPATIENT
Start: 2019-05-13 | End: 2019-05-13

## 2019-05-13 RX ORDER — ASPIRIN 81 MG/1
81 TABLET, CHEWABLE ORAL DAILY
Status: DISCONTINUED | OUTPATIENT
Start: 2019-05-13 | End: 2019-05-14 | Stop reason: HOSPADM

## 2019-05-13 RX ORDER — ASPIRIN 81 MG/1
81 TABLET, CHEWABLE ORAL ONCE
Status: COMPLETED | OUTPATIENT
Start: 2019-05-13 | End: 2019-05-13

## 2019-05-13 RX ORDER — ATORVASTATIN CALCIUM 20 MG/1
20 TABLET, FILM COATED ORAL NIGHTLY
Qty: 30 TABLET | Refills: 3 | Status: SHIPPED | OUTPATIENT
Start: 2019-05-13

## 2019-05-13 RX ORDER — SODIUM CHLORIDE 0.9 % (FLUSH) 0.9 %
10 SYRINGE (ML) INJECTION PRN
Status: DISCONTINUED | OUTPATIENT
Start: 2019-05-13 | End: 2019-05-14 | Stop reason: HOSPADM

## 2019-05-13 RX ORDER — SODIUM CHLORIDE 0.9 % (FLUSH) 0.9 %
10 SYRINGE (ML) INJECTION EVERY 12 HOURS SCHEDULED
Status: DISCONTINUED | OUTPATIENT
Start: 2019-05-13 | End: 2019-05-14 | Stop reason: HOSPADM

## 2019-05-13 RX ORDER — SODIUM CHLORIDE 9 MG/ML
INJECTION, SOLUTION INTRAVENOUS CONTINUOUS
Status: DISCONTINUED | OUTPATIENT
Start: 2019-05-13 | End: 2019-05-14 | Stop reason: HOSPADM

## 2019-05-13 RX ADMIN — SODIUM CHLORIDE: 9 INJECTION, SOLUTION INTRAVENOUS at 05:46

## 2019-05-13 RX ADMIN — PIPERACILLIN SODIUM,TAZOBACTAM SODIUM 3.38 G: 3; .375 INJECTION, POWDER, FOR SOLUTION INTRAVENOUS at 05:47

## 2019-05-13 RX ADMIN — PIPERACILLIN SODIUM,TAZOBACTAM SODIUM 3.38 G: 3; .375 INJECTION, POWDER, FOR SOLUTION INTRAVENOUS at 16:09

## 2019-05-13 RX ADMIN — Medication 10 ML: at 22:43

## 2019-05-13 RX ADMIN — ATORVASTATIN CALCIUM 20 MG: 20 TABLET, FILM COATED ORAL at 22:43

## 2019-05-13 RX ADMIN — INSULIN LISPRO 2 UNITS: 100 INJECTION, SOLUTION INTRAVENOUS; SUBCUTANEOUS at 18:17

## 2019-05-13 RX ADMIN — ASPIRIN 81 MG 81 MG: 81 TABLET ORAL at 10:42

## 2019-05-13 RX ADMIN — COLLAGENASE SANTYL: 250 OINTMENT TOPICAL at 10:44

## 2019-05-13 RX ADMIN — ASPIRIN 81 MG 81 MG: 81 TABLET ORAL at 07:36

## 2019-05-13 RX ADMIN — SODIUM CHLORIDE: 9 INJECTION, SOLUTION INTRAVENOUS at 10:43

## 2019-05-13 RX ADMIN — CLOPIDOGREL BISULFATE 75 MG: 75 TABLET, FILM COATED ORAL at 07:34

## 2019-05-13 RX ADMIN — OXYCODONE HYDROCHLORIDE AND ACETAMINOPHEN 1 TABLET: 5; 325 TABLET ORAL at 03:53

## 2019-05-13 RX ADMIN — SODIUM CHLORIDE: 9 INJECTION, SOLUTION INTRAVENOUS at 03:54

## 2019-05-13 ASSESSMENT — ENCOUNTER SYMPTOMS: COUGH: 1

## 2019-05-13 ASSESSMENT — PAIN SCALES - GENERAL
PAINLEVEL_OUTOF10: 10
PAINLEVEL_OUTOF10: 0

## 2019-05-13 NOTE — CARE COORDINATION
JING spoke with Annabella Carreno from [place] and she said that we lost the precert for Autumn Aegis. Pt having a procedure today. New precert will be started after treatment plan is clear. PT updated note needed to restart precert.

## 2019-05-13 NOTE — PROGRESS NOTES
Physical Therapy Missed Treatment   Facility/Department: [facility] MED SURG [id number]/[id number]    NAME: Ben Urbina    : 1950 (79 y.o.)  MRN: [id number]    Account: [de-identified]  Gender: male       [x] Patient Unavailable: patient on bedrest until 3:15 PM, S/P procedure     Will attempt PT treatment again at earliest convenience.         Electronically signed by An Trinidad PTA on 19 at 1:42 PM

## 2019-05-13 NOTE — FLOWSHEET NOTE
Pt encouraged to turn every 2 hours. Dressing change was done to bottom. Silver cell placed with wet 4x4 and ABD and tape. Dressing removed, large amount of bloody/serousangious drainage. Multiple stage 2 wounds through rectum/lj area. Dressing changed on blister on stump. Pt tolerated well. Will continue tomonitor.

## 2019-05-13 NOTE — BRIEF OP NOTE
Section of Cardiology  Adult Brief LE angio Procedure Note        Procedure(s):  Right LE angio, right Pop ath/PTA    Pre-operative Diagnosis:  Right foot non healing wounds    H&P Status: Completed and reviewed.      Post-operative Diagnosis:  95% right pop stenosis, calcified    Findings:  See full report    Complications:  none    Primary Proceduralist:   Dr. Kevin TSE        Full procedure note to follow

## 2019-05-13 NOTE — PROGRESS NOTES
dressing  L BKA      DATA:    Lab Results   Component Value Date    WBC 6.7 05/13/2019    HGB 11.8 (L) 05/13/2019    HCT 36.5 (L) 05/13/2019    MCV 99.5 05/13/2019     05/13/2019     Lab Results   Component Value Date    CREATININE 0.66 (L) 05/13/2019    BUN 19 05/13/2019     05/13/2019    K 4.4 05/13/2019     (H) 05/13/2019    CO2 23 05/13/2019       Hepatic Function Panel:  Lab Results   Component Value Date    ALKPHOS 105 05/07/2019    ALT <5 05/07/2019    AST 51 05/07/2019    PROT 6.3 05/07/2019    BILITOT 0.8 05/07/2019    BILIDIR 4.0 11/20/2018    IBILI 0.8 11/20/2018    LABALBU 2.5 05/07/2019         IMPRESSION:    · RLE cellulitis  · RLE and heel diabetic foot ulcers with Enterococcus and GNR infection  · PAD S/P PTA   · Morbid obesity    Patient Active Problem List   Diagnosis    Acute and chronic respiratory failure with hypoxia (HCC)    BMI 45.0-49.9, adult (HCC)    History of tobacco use    HARSHA on CPAP    Wheezing    Cellulitis    Leg wound, left    Leukocytosis    Unable to ambulate    Elevated bilirubin    Cellulitis of left foot    Gangrene associated with type 2 diabetes mellitus (HCC)    Venous stasis ulcer limited to breakdown of skin without varicose veins (HCC)    Gas gangrene of foot (HCC)    Hyperglycemia due to type 2 diabetes mellitus ([address]    PVD (peripheral vascular disease) ([address]    Diabetic ulcer of right foot due to type 2 diabetes mellitus ([address]    Gait abnormality    Cellulitis of lower leg    Cellulitis of right leg    Decubitus ulcer of right heel, stage 3 (HCC)    Cough    Diabetes mellitus due to underlying condition, controlled, with stage 2 chronic kidney disease ([address]    Diabetic peripheral neuropathy associated with type 2 diabetes mellitus ([address]       PLAN:  · IV Zosyn  · Check wound C&S and then decide for need of IV or PO antibiotics  · Continue local wound care    Discussed with patient    Cathy Urbano MD

## 2019-05-13 NOTE — PROGRESS NOTES
Returned to [employer] per bed. Left femoral site soft and non-bleeding. Site check by PARVIN RN. Pt sipped water.  ca

## 2019-05-13 NOTE — PROGRESS NOTES
Physician Progress Note    5/13/2019   10:28 AM    Name:  Kenn Luna  MRN:    [id number]      Day: 6     Admit Date: 5/7/2019 12:11 PM  PCP: No primary care provider on file. Code Status:  Full Code    Subjective:      no new events. Physical Examination:      Vitals:  BP (!) 129/58 Comment: Simultaneous filing. User may not have seen previous data. Pulse 84   Temp 97.9 °F (36.6 °C) (Oral)   Resp 23   Ht 6' (1.829 m)   Wt (!) 326 lb (147.9 kg)   SpO2 95%   BMI 44.21 kg/m²   No data recorded. General appearance: alert, cooperative and no distress  Mental Status: oriented to person, place and time and normal affect  Lungs: clear to auscultation bilaterally, normal effort  Heart: regular rate and rhythm, no murmur  Abdomen: soft, nontender, nondistended, bowel sounds present, no masses  Skin: no gross lesions     Data:     Labs:  Recent Labs     05/12/19  0521 05/13/19  0508   WBC 6.7 6.7   HGB 13.7* 11.8*    137     Recent Labs     05/12/19  0521 05/13/19  0508    143   K 4.8 4.4    109*   CO2 27 23   BUN 20 19   CREATININE 0.80 0.66*   GLUCOSE 263* 219*     No results for input(s): AST, ALT, ALB, BILITOT, ALKPHOS in the last 72 hours.     Current Facility-Administered Medications   Medication Dose Route Frequency Provider Last Rate Last Dose    0.9 % sodium chloride infusion   Intravenous Continuous Jose M Adas, APRN - CNP 75 mL/hr at 05/13/19 0546      sodium chloride flush 0.9 % injection 10 mL  10 mL Intravenous 2 times per day Jose M Adas, APRN - CNP        sodium chloride flush 0.9 % injection 10 mL  10 mL Intravenous PRN Jose M Turners, APRN - CNP        0.9 % sodium chloride infusion   Intravenous Continuous Alexandro J Holiday, DO        aspirin chewable tablet 81 mg  81 mg Oral Daily Alexandro J Holiday, DO        atorvastatin (LIPITOR) tablet 20 mg  20 mg Oral Nightly Alexandro J Holiday, DO        insulin NPH (HUMULIN N;NOVOLIN N) injection vial 28 Units  28 Units Subcutaneous BID [facility] Rik Kaba MD   28 Units at 05/12/19 1731    nicotine (NICODERM CQ) 21 MG/24HR 1 patch  1 patch Transdermal Daily Rik Kaba MD   1 patch at 05/12/19 0916    oxyCODONE-acetaminophen (PERCOCET) 5-325 MG per tablet 1 tablet  1 tablet Oral Q4H PRN Prem Mirza MD   1 tablet at 05/13/19 0353    sodium chloride flush 0.9 % injection 10 mL  10 mL Intravenous 2 times per day Francisco Thomas MD   10 mL at 05/12/19 2056    sodium chloride flush 0.9 % injection 10 mL  10 mL Intravenous PRN Francisco Thomas MD        guaiFENesin (ROBITUSSIN) 100 MG/5ML liquid 200 mg  200 mg Oral Q4H PRN Francisco Thomas MD   200 mg at 05/09/19 2030    acetaminophen (TYLENOL) tablet 650 mg  650 mg Oral Q4H PRN Francisco Thomas MD   650 mg at 05/12/19 0408    ipratropium-albuterol (DUONEB) nebulizer solution 3 mL  3 mL Inhalation TID Francisco Thomas MD   3 mL at 05/12/19 1948    albuterol (PROVENTIL) nebulizer solution 2.5 mg  2.5 mg Nebulization Q2H PRN Francisco Thomas MD        sodium chloride flush 0.9 % injection 10 mL  10 mL Intravenous 2 times per day Francisco Thomas MD   10 mL at 05/11/19 0857    sodium chloride flush 0.9 % injection 10 mL  10 mL Intravenous PRN Francisco Thomas MD        magnesium hydroxide (MILK OF MAGNESIA) 400 MG/5ML suspension 30 mL  30 mL Oral Daily PRN Francisco Thomas MD        [facility]dansetron [facility]) injection 4 mg  4 mg Intravenous Q6H PRN Francisco Thomas MD        [Held by provider] enoxaparin (LOVENOX) injection 40 mg  40 mg Subcutaneous Daily Francisco Thomas MD   40 mg at 05/12/19 0915    0.9 % sodium chloride infusion   Intravenous Continuous Francisco Thomas  mL/hr at 05/13/19 0354      potassium chloride (KLOR-CON M) extended release tablet 40 mEq  40 mEq Oral PRN Francisco Thomas MD        Or    potassium chloride (KLOR-CON) packet 40 mEq  40 mEq Oral PRN Francisco Thomas MD        Or    potassium chloride 10 mEq/100 mL IVPB (Peripheral Line)  10 mEq Intravenous PRN Alba Campo MD        magnesium sulfate 1 g in dextrose 5% 100 mL IVPB  1 g Intravenous PRN Alba Campo MD        piperacillin-tazobactam (ZOSYN) 3.375 g in dextrose 5 % 50 mL IVPB extended infusion (mini-bag)  3.375 g Intravenous Q8H Alba Campo MD   Stopped at 05/13/19 1024    glucose (GLUTOSE) 40 % oral gel 15 g  15 g Oral PRN Alba Campo MD        dextrose 50 % solution 12.5 g  12.5 g Intravenous PRN Alba Campo MD        glucagon (rDNA) injection 1 mg  1 mg Intramuscular PRN Alba Campo MD        dextrose 5 % solution  100 mL/hr Intravenous PRN Alba Campo MD        insulin lispro (HUMALOG) injection vial 0-12 Units  0-12 Units Subcutaneous TID WC Alba Campo MD   4 Units at 05/12/19 1732    insulin lispro (HUMALOG) injection vial 0-6 Units  0-6 Units Subcutaneous Nightly Alba Campo MD   1 Units at 05/12/19 2058    albuterol sulfate  (90 Base) MCG/ACT inhaler 2 puff  2 puff Inhalation Q4H PRN Alba Campo MD        clopidogrel (PLAVIX) tablet 75 mg  75 mg Oral Daily Alba Campo MD   75 mg at 05/13/19 0734    collagenase ointment   Topical Daily Alba Campo MD         Assessment and Plan:          Acute Hospital issues:    # RLE cellulitis, diabetic foot infection   - abx per ID     # PAD   - s/p PCI     # debility   - PT/OT consulted     # uncontrolled DM   - resume insulin, monitor     DVT/PPx- ordered if appropriate        DISCHARGE PLANNING  Pending consultants        Electronically signed by Aster Buck DO on 5/13/2019 at 10:28 AM

## 2019-05-13 NOTE — PROGRESS NOTES
Patient refused to go on CPAP for the night. States it makes them dry and puts too much pressure on their head.

## 2019-05-14 VITALS
HEIGHT: 72 IN | TEMPERATURE: 98.2 F | HEART RATE: 72 BPM | DIASTOLIC BLOOD PRESSURE: 42 MMHG | OXYGEN SATURATION: 95 % | SYSTOLIC BLOOD PRESSURE: 151 MMHG | RESPIRATION RATE: 16 BRPM | BODY MASS INDEX: 42.66 KG/M2 | WEIGHT: 315 LBS

## 2019-05-14 LAB
ANION GAP SERPL CALCULATED.3IONS-SCNC: 10 MEQ/L (ref 9–15)
BUN BLDV-MCNC: 16 MG/DL (ref 8–23)
CALCIUM SERPL-MCNC: 8.1 MG/DL (ref 8.5–9.9)
CHLORIDE BLD-SCNC: 104 MEQ/L (ref 95–107)
CO2: 25 MEQ/L (ref 20–31)
CREAT SERPL-MCNC: 0.62 MG/DL (ref 0.7–1.2)
GFR AFRICAN AMERICAN: >60
GFR NON-AFRICAN AMERICAN: >60
GLUCOSE BLD-MCNC: 157 MG/DL (ref 60–115)
GLUCOSE BLD-MCNC: 171 MG/DL (ref 70–99)
GLUCOSE BLD-MCNC: 178 MG/DL (ref 60–115)
GLUCOSE BLD-MCNC: 179 MG/DL (ref 60–115)
HCT VFR BLD CALC: 40.8 % (ref 42–52)
HEMOGLOBIN: 13.1 G/DL (ref 14–18)
MCH RBC QN AUTO: 31.6 PG (ref 27–31.3)
MCHC RBC AUTO-ENTMCNC: 32.2 % (ref 33–37)
MCV RBC AUTO: 98.3 FL (ref 80–100)
PDW BLD-RTO: 16.8 % (ref 11.5–14.5)
PERFORMED ON: ABNORMAL
PLATELET # BLD: 170 K/UL (ref 130–400)
POTASSIUM REFLEX MAGNESIUM: 4.5 MEQ/L (ref 3.4–4.9)
RBC # BLD: 4.16 M/UL (ref 4.7–6.1)
SODIUM BLD-SCNC: 139 MEQ/L (ref 135–144)
WBC # BLD: 6.9 K/UL (ref 4.8–10.8)

## 2019-05-14 PROCEDURE — 2700000000 HC OXYGEN THERAPY PER DAY

## 2019-05-14 PROCEDURE — 80048 BASIC METABOLIC PNL TOTAL CA: CPT

## 2019-05-14 PROCEDURE — 36415 COLL VENOUS BLD VENIPUNCTURE: CPT

## 2019-05-14 PROCEDURE — 97535 SELF CARE MNGMENT TRAINING: CPT

## 2019-05-14 PROCEDURE — 2580000003 HC RX 258: Performed by: SURGERY

## 2019-05-14 PROCEDURE — 99232 SBSQ HOSP IP/OBS MODERATE 35: CPT | Performed by: INTERNAL MEDICINE

## 2019-05-14 PROCEDURE — 6370000000 HC RX 637 (ALT 250 FOR IP): Performed by: FAMILY MEDICINE

## 2019-05-14 PROCEDURE — 6370000000 HC RX 637 (ALT 250 FOR IP): Performed by: INTERNAL MEDICINE

## 2019-05-14 PROCEDURE — 6360000002 HC RX W HCPCS: Performed by: SURGERY

## 2019-05-14 PROCEDURE — 6370000000 HC RX 637 (ALT 250 FOR IP): Performed by: SURGERY

## 2019-05-14 PROCEDURE — 85027 COMPLETE CBC AUTOMATED: CPT

## 2019-05-14 PROCEDURE — 94760 N-INVAS EAR/PLS OXIMETRY 1: CPT

## 2019-05-14 PROCEDURE — 94664 DEMO&/EVAL PT USE INHALER: CPT

## 2019-05-14 PROCEDURE — 94640 AIRWAY INHALATION TREATMENT: CPT

## 2019-05-14 PROCEDURE — 2580000003 HC RX 258: Performed by: NURSE PRACTITIONER

## 2019-05-14 RX ORDER — INSULIN GLARGINE 100 [IU]/ML
12 INJECTION, SOLUTION SUBCUTANEOUS NIGHTLY
Qty: 1 VIAL | Refills: 3 | DISCHARGE
Start: 2019-05-14

## 2019-05-14 RX ORDER — OXYCODONE HYDROCHLORIDE AND ACETAMINOPHEN 5; 325 MG/1; MG/1
1 TABLET ORAL PRN
Qty: 10 TABLET | Refills: 0 | Status: SHIPPED | OUTPATIENT
Start: 2019-05-14 | End: 2019-05-17

## 2019-05-14 RX ORDER — OXYCODONE HYDROCHLORIDE AND ACETAMINOPHEN 5; 325 MG/1; MG/1
1 TABLET ORAL PRN
Qty: 10 TABLET | Refills: 0 | Status: SHIPPED | OUTPATIENT
Start: 2019-05-14 | End: 2019-05-14 | Stop reason: SDUPTHER

## 2019-05-14 RX ORDER — CIPROFLOXACIN 500 MG/1
500 TABLET, FILM COATED ORAL 2 TIMES DAILY
Qty: 28 TABLET | Refills: 0
Start: 2019-05-14 | End: 2019-05-28

## 2019-05-14 RX ADMIN — INSULIN LISPRO 2 UNITS: 100 INJECTION, SOLUTION INTRAVENOUS; SUBCUTANEOUS at 11:13

## 2019-05-14 RX ADMIN — OXYCODONE HYDROCHLORIDE AND ACETAMINOPHEN 1 TABLET: 5; 325 TABLET ORAL at 20:10

## 2019-05-14 RX ADMIN — COLLAGENASE SANTYL: 250 OINTMENT TOPICAL at 10:40

## 2019-05-14 RX ADMIN — ASPIRIN 81 MG 81 MG: 81 TABLET ORAL at 10:48

## 2019-05-14 RX ADMIN — IPRATROPIUM BROMIDE AND ALBUTEROL SULFATE 3 ML: 2.5; .5 SOLUTION RESPIRATORY (INHALATION) at 07:34

## 2019-05-14 RX ADMIN — IPRATROPIUM BROMIDE AND ALBUTEROL SULFATE 3 ML: 2.5; .5 SOLUTION RESPIRATORY (INHALATION) at 19:04

## 2019-05-14 RX ADMIN — OXYCODONE HYDROCHLORIDE AND ACETAMINOPHEN 1 TABLET: 5; 325 TABLET ORAL at 11:17

## 2019-05-14 RX ADMIN — SODIUM CHLORIDE: 9 INJECTION, SOLUTION INTRAVENOUS at 15:09

## 2019-05-14 RX ADMIN — SODIUM CHLORIDE: 9 INJECTION, SOLUTION INTRAVENOUS at 05:17

## 2019-05-14 RX ADMIN — Medication 10 ML: at 10:49

## 2019-05-14 RX ADMIN — INSULIN LISPRO 2 UNITS: 100 INJECTION, SOLUTION INTRAVENOUS; SUBCUTANEOUS at 17:49

## 2019-05-14 RX ADMIN — CLOPIDOGREL BISULFATE 75 MG: 75 TABLET, FILM COATED ORAL at 10:48

## 2019-05-14 RX ADMIN — PIPERACILLIN SODIUM,TAZOBACTAM SODIUM 3.38 G: 3; .375 INJECTION, POWDER, FOR SOLUTION INTRAVENOUS at 00:06

## 2019-05-14 RX ADMIN — IPRATROPIUM BROMIDE AND ALBUTEROL SULFATE 3 ML: 2.5; .5 SOLUTION RESPIRATORY (INHALATION) at 11:33

## 2019-05-14 RX ADMIN — PIPERACILLIN SODIUM,TAZOBACTAM SODIUM 3.38 G: 3; .375 INJECTION, POWDER, FOR SOLUTION INTRAVENOUS at 18:47

## 2019-05-14 RX ADMIN — PIPERACILLIN SODIUM,TAZOBACTAM SODIUM 3.38 G: 3; .375 INJECTION, POWDER, FOR SOLUTION INTRAVENOUS at 10:39

## 2019-05-14 ASSESSMENT — PAIN SCALES - GENERAL
PAINLEVEL_OUTOF10: 10
PAINLEVEL_OUTOF10: 8
PAINLEVEL_OUTOF10: 10

## 2019-05-14 ASSESSMENT — PAIN DESCRIPTION - DESCRIPTORS: DESCRIPTORS: ACHING

## 2019-05-14 ASSESSMENT — PAIN DESCRIPTION - PAIN TYPE: TYPE: ACUTE PAIN

## 2019-05-14 ASSESSMENT — PAIN DESCRIPTION - FREQUENCY: FREQUENCY: CONTINUOUS

## 2019-05-14 ASSESSMENT — PAIN DESCRIPTION - LOCATION: LOCATION: BUTTOCKS;LEG

## 2019-05-14 ASSESSMENT — ENCOUNTER SYMPTOMS: COUGH: 1

## 2019-05-14 ASSESSMENT — PAIN DESCRIPTION - ORIENTATION: ORIENTATION: LEFT

## 2019-05-14 NOTE — DISCHARGE INSTR - DIET

## 2019-05-14 NOTE — PROGRESS NOTES
Stable and comfortable. Dressing   on Rt Ischial area changed. The wound looks clean and pink. Will continue dressing with silvercel 3 times a week. OK with discharge plan. F/U in the wound   Center in 2 weeks after discharge.

## 2019-05-14 NOTE — CARE COORDINATION
We have precert approval for pt to transfer to [facility] today. Ambulance  at 7:30.

## 2019-05-14 NOTE — PROGRESS NOTES
PODIATRIC MEDICINE AND SURGERY  CONSULT HISTORY AND PHYSICAL      Consulting Service:  Internal Medicine  Opinion/advice regarding: Management of Right LE DFU  Staff Doctor:  Dr. Angie Nance DPM    ASSESSMENT:  This 79 y.o. male with significant PMH of DM2, PVD, Left AKA 11/2018 at [facility]. Stable right heel ulceration.     PLAN AND RECOMMENDATIONS:  Ok to d/c patient from a podiatry stand point. Weight bearing as tolerated to RLE for transfers. Continue santyl to the right posterior heel and DSD. Betadine paint to any areas of necrosis to toes. Leave open to air. Change daily. Will follow up with Dr. Joya Leigh at [facility] in 1 weeks. Interval HPI: No interval changes. Wounds are stable. HDS. Afebrile. Denies f/c/n/v/cp/sob. Past Medical History:   Diagnosis Date    Asthma     COPD (chronic obstructive pulmonary disease) ([address]     Diabetes mellitus due to underlying condition, controlled, with stage 2 chronic kidney disease ([address]     Morbid obesity ([address]     Neuropathy     PVD (peripheral vascular disease) ([address]     1 stent in left leg placed 09/2018 and 2 stents in right leg    Sleep apnea     Type 2 diabetes mellitus without complication ([address]        Past Surgical History:   Procedure Laterality Date    NE RMVL DEVITAL TISS N-SLCTV DBRDMT W/O ANES 1 SESS Right 5/10/2019    INCISION AND DEBRIDEMENT WOUND RIGHT BUTTOCK, ROOM 270 performed by Rajni Hoyt MD at [address] [place] [address]  2015    patient had a boil break open inside rectum. needed surgery for infection removal.       No current facility-administered medications on file prior to encounter.       Current Outpatient Medications on File Prior to Encounter   Medication Sig Dispense Refill    Clopidogrel Bisulfate (PLAVIX PO) Take 75 mg by mouth daily       albuterol sulfate  (90 Base) MCG/ACT inhaler Inhale 2 puffs into the lungs      ipratropium-albuterol (DUONEB) 0.5-2.5 (3) MG/3ML SOLN nebulizer solution Inhale 3 mLs into the lungs      gabapentin (NEURONTIN) 800 MG tablet Take 1 tablet by mouth 3 times daily for 30 days. . 90 tablet 0       Allergies   Allergen Reactions    Coconut Oil Nausea And Vomiting     Coconut meat only. History reviewed. No pertinent family history. Social History     Socioeconomic History    Marital status:      Spouse name: Not on file    Number of children: Not on file    Years of education: Not on file    Highest education level: Not on file   Occupational History    Not on file   Social Needs    Financial resource strain: Not on file    Food insecurity:     Worry: Not on file     Inability: Not on file    Transportation needs:     Medical: Not on file     Non-medical: Not on file   Tobacco Use    Smoking status: Current Every Day Smoker     Packs/day: 1.00     Years: 58.00     Pack years: 58.00     Types: Cigarettes    Smokeless tobacco: Never Used   Substance and Sexual Activity    Alcohol use: No    Drug use: Yes     Types: Marijuana    Sexual activity: Not on file   Lifestyle    Physical activity:     Days per week: Not on file     Minutes per session: Not on file    Stress: Not on file   Relationships    Social connections:     Talks on phone: Not on file     Gets together: Not on file     Attends Voodoo service: Not on file     Active member of club or organization: Not on file     Attends meetings of clubs or organizations: Not on file     Relationship status: Not on file    Intimate partner violence:     Fear of current or ex partner: Not on file     Emotionally abused: Not on file     Physically abused: Not on file     Forced sexual activity: Not on file   Other Topics Concern    Not on file   Social History Narrative         Lives With: Alone     Pt is from home but home was not going well prior to this admission. Pt lives alone. Pt was recently at a SNF [facility] but he does not want to return there.   Pt is agreeable to [facility] if they will take him. Referral called to Diane Whittington at [facility]. Type of Home: Apartment One level    Home Access: Stairs to enter without rails - Number of Steps: 1 (working with landlord to get a ramp)    Bathroom Shower/Tub: Tub/Shower unit(w/c does not fit into the bathroom)    Bathroom Equipment: 3-in-1 commode    Home Equipment: [employer]    ADL Assistance: Independent(\"With baby wipes, not very good\")    Homemaking Assistance: Needs assistance    Ambulation Assistance: (w/c)    Transfer Assistance: Independent(was using slideboard at home)    Active : No    Additional Comments: Has been sleeping on the sofa, cannot get into the bedroom or bathroom at home as [facility] does not fit in the doorways. Girlfriend or guys from the apartment do his shopping    Social/Functional History              OBJECTIVE:  BP (!) 163/63   Pulse 75   Temp 98.8 °F (37.1 °C) (Oral)   Resp 18   Ht 6' (1.829 m)   Wt (!) 360 lb 9.6 oz (163.6 kg)   SpO2 98%   BMI 48.91 kg/m²   Patient is alert and oriented x 3 in NAD. RLE focused exam.  Vascular:   Non-Palpable Dorsalis Pedis and non-Palpable Posterior Tibial Pulses   Capillary Fill time < 5 seconds   Skin temperature warm to cool tibial tuberosity to the digits   Hair growth absent to digits   ++ Pitting edema  Dependent rubor distal right forefoot     Neurological:   Epicritic sensation intact   Protective sensation via monofilament testing impaired  Sharp/dull sensation impaired to plantar foot      Musculoskeletal/Orthopaedic:   Structural Deformities: decreased medial longitudinal arch   4/5 muscle strength Dorsiflexion, Plantarflexion, Inversion, Eversion   ROM decreased pedal and ankle joints    TTP RLE  Left AKA     Dermatological:   3.5 x 3.5 x 0.2 cm Right posterior heel ulceration with fibrogranular base and serosanginous drainage. No surrounding signs of infection. Multiple scattered necrotic pinpoint islands.  No overt signs of 60% STENOSIS OF THE DISTAL RIGHT SUPERFICIAL FEMORAL ARTERY.       NO EVIDENCE OF OCCLUSION, OR OTHER FLOW LIMITING STENOSIS IDENTIFIED.               Thank you for the consult.     Effie Spear, PGY2  Please first page Podiatry On Call, [phone]  May 14, 2019  6:55 PM

## 2019-05-14 NOTE — PLAN OF CARE
Nutrition Problem: Increased nutrient needs  Intervention: Food and/or Nutrient Delivery: Continue current diet, Continue current ONS  Nutritional Goals: Intake > 75% of meals/suppleemnt. Improved wound healing.

## 2019-05-14 NOTE — FLOWSHEET NOTE
Pt awake in bed. Accepted AM meds without problem. Dr. Ladan Gauthier was in to see pt and right buttocks debridment dressing change was done. Dr. Sapphire Preciado was in to see pt this morning. Dr. Robert Julien was in. Pt was repositioned in bed, new linen changed. Dr. Joseph Whitlock in to see pt. Call light in reach. 1500 Pt is refusing to wear telemetry monitor. Multiple attempts encouraging him to wear it unsuccessful. 2000 Report called to [facility] at [facility].

## 2019-05-14 NOTE — PROGRESS NOTES
Physical Therapy Med Surg Daily Treatment Note  [facility]  [facility]       NAME: Maryann Colby  : 1950 (79 y.o.)  MRN: [id number]  CODE STATUS: Full Code    Date of Service: 2019    Patient Diagnosis(es): Cellulitis of lower leg [R85.086]   Chief Complaint   Patient presents with    Extremity Weakness     pt c/o RLE extremity weakness for the past 2 days after falling off the couch     Patient Active Problem List    Diagnosis Date Noted    Cellulitis of lower leg     Cellulitis of right leg     Decubitus ulcer of right heel, stage 3 ([address]     Cough     Diabetes mellitus due to underlying condition, controlled, with stage 2 chronic kidney disease ([address]     Diabetic peripheral neuropathy associated with type 2 diabetes mellitus ([address]     Diabetic ulcer of right foot due to type 2 diabetes mellitus ([address] 2019    Gait abnormality 2019    Cellulitis 2018    Leg wound, left 2018    Leukocytosis 2018    Unable to ambulate 2018    Elevated bilirubin 2018    Gas gangrene of foot ([address] 2018    Hyperglycemia due to type 2 diabetes mellitus ([address] 2018    PAD (peripheral artery disease) ([address] 2018    Cellulitis of left foot     Gangrene associated with type 2 diabetes mellitus ([address]     Venous stasis ulcer limited to breakdown of skin without varicose veins (HCC)     Acute and chronic respiratory failure with hypoxia ([address] 2017    BMI 45.0-49.9, adult ([address] 2017    History of tobacco use 2017    HARSHA on CPAP 2017    Wheezing 2017        Past Medical History:   Diagnosis Date    Asthma     COPD (chronic obstructive pulmonary disease) ([address]     Diabetes mellitus due to underlying condition, controlled, with stage 2 chronic kidney disease ([address]     Morbid obesity ([address]     Neuropathy     PVD (peripheral vascular disease) ([address]     1 stent in left leg placed 2018 and 2 stents in right leg    Sleep apnea     Type 2 diabetes mellitus without complication ([address]      Past Surgical History:   Procedure Laterality Date    [place] RMVL DEVITAL TISS N-SLCTV DBRDMT W/O ANES 1 SESS Right 5/10/2019    INCISION AND DEBRIDEMENT WOUND RIGHT BUTTOCK, ROOM 270 performed by Yolanda Williamson MD at [facility]  2015    patient had a boil break open inside rectum. needed surgery for infection removal.   Restrictions  Restrictions/Precautions: Fall Risk  Position Activity Restriction  Other position/activity restrictions: \"Weight bearing as tolerated to RLE for transfers\" and \"surgical shoe to RLE\" per podiatry note    Subjective   General  Chart Reviewed: Yes  Family / Caregiver Present: No  Subjective  Subjective: \"i don't know why you're here to see me\"  General Comment  Comments: \"i just want a cup of coffee\"  Pre Treatment Pain Screening  Pain at present: 10  Scale Used: Numeric Score    Pain Screening  Patient Currently in Pain: Yes     Pain Reassessment:   Pain Assessment  Pain Assessment: 0-10  Pain Level: 10  Pain Type: Acute pain  Pain Location: Buttocks;Leg  Pain Orientation: Left  Pain Descriptors: Aching  Pain Frequency: Continuous   Orientation  Orientation  Overall Orientation Status: Within Functional Limits    Objective   Bed mobility  Rolling to Left: Maximum assistance;2 Person assistance  Rolling to Right: Maximum assistance;2 Person assistance  Comment: pt declined to sit eob. Transfers  Comment: unsafe at this time. Assessment pt very upset that he didn't have coffee this am. Alerted RN that pt was asking for some. Pt still has npo orders in chart. Pt very frustrated that he is so weak and unable to help himself. He says he feels like he didn't get a change at his rehab when he was on antibiotics 4 tx day. Pt agreeable to rolling side to side. Preferred his left side and asked if he could stay that way.  Pt hopeful that he will be able to utilize his prosthetic leg and ambulate again. Discharge Recommendations:  Continue to assess pending progress, Patient would benefit from continued therapy after discharge    Goals  Short term goals  Short term goal 1: pt to roll side to side with SBA  Short term goal 2: pt to bride/scoot in bed with min A  Short term goal 3: pt to be SBA in RLE HEP in order to increase strength to 3/5 all planes  Short term goal 4: pt to be SBA with LLE HEP to improve ROM to [facility] required for prosthesis and mobility  Long term goals  Long term goal 1: pt to be mod A +2 for supine <> sit  Long term goal 2: pt to be assessed for transfers when able  Patient Goals   Patient goals : \"to get more independent\"    Plan    Plan  Times per week: 3-6  Current Treatment Recommendations: Strengthening, Functional Mobility Training, Wheelchair Mobility Training, Neuromuscular Re-education, Home Exercise Program, Equipment Evaluation, Education, & procurement, ROM, Transfer Training, Safety Education & Training, Modalities, Manual Therapy - Soft Tissue Mobilization, Balance Training, Endurance Training, Pain Management, Patient/Caregiver Education & Training, Positioning  Safety Devices  Type of devices: Left in bed(handoff of care to RN)     [facility] (6 CLICK) [address] Inpatient Mobility without Stair Climbing Raw Score : 6     Therapy Time   Individual   Time In 0907   Time Out 0930   Minutes 23      23 minutes bed mobility activity.         Cleopatra Guo, PTA, 05/14/19 at 9:34 AM

## 2019-05-14 NOTE — PROGRESS NOTES
Physician Progress Note    2019   12:36 PM    Name:  Mario Orellana  MRN:    [id number]      Day: 7     Admit Date: 2019 12:11 PM  PCP: No primary care provider on file. Code Status:  Full Code    Subjective:      no new events. Physical Examination:      Vitals:  BP (!) 163/63   Pulse 75   Temp 98.8 °F (37.1 °C) (Oral)   Resp 18   Ht 6' (1.829 m)   Wt (!) 360 lb 9.6 oz (163.6 kg)   SpO2 98%   BMI 48.91 kg/m²   Temp (24hrs), Av.8 °F (37.1 °C), Min:98.8 °F (37.1 °C), Max:98.8 °F (37.1 °C)      General appearance: alert, cooperative and no distress  Mental Status: oriented to person, place and time and normal affect  Lungs: clear to auscultation bilaterally, normal effort  Heart: regular rate and rhythm, no murmur  Abdomen: soft, nontender, nondistended, bowel sounds present, no masses  Skin: no gross lesions     Data:     Labs:  Recent Labs     19  0508 19  0514   WBC 6.7 6.9   HGB 11.8* 13.1*    170     Recent Labs     19  0508 19  0514    139   K 4.4 4.5   * 104   CO2 23 25   BUN 19 16   CREATININE 0.66* 0.62*   GLUCOSE 219* 171*     No results for input(s): AST, ALT, ALB, BILITOT, ALKPHOS in the last 72 hours.     Current Facility-Administered Medications   Medication Dose Route Frequency Provider Last Rate Last Dose    0.9 % sodium chloride infusion   Intravenous Continuous Loly Fraction, APRN - CNP 75 mL/hr at 19 0546      sodium chloride flush 0.9 % injection 10 mL  10 mL Intravenous 2 times per day Loly Fraction, APRN - CNP   10 mL at 19 1049    sodium chloride flush 0.9 % injection 10 mL  10 mL Intravenous PRN Loly Fraction, APRN - CNP        aspirin chewable tablet 81 mg  81 mg Oral Daily Alexandro J Holiday, DO   81 mg at 19 1048    atorvastatin (LIPITOR) tablet 20 mg  20 mg Oral Nightly Alexandro J Holiday, DO   20 mg at 19 1507    insulin NPH (HUMULIN N;NOVOLIN N) injection vial 28 Units  28 Units Subcutaneous BID Trav Cobb MD   28 Units at 05/13/19 1815    nicotine (NICODERM CQ) 21 MG/24HR 1 patch  1 patch Transdermal Daily Leroy Shi MD   1 patch at 05/14/19 1041    oxyCODONE-acetaminophen (PERCOCET) 5-325 MG per tablet 1 tablet  1 tablet Oral Q4H PRN Jeny Rodarte MD   1 tablet at 05/14/19 1117    sodium chloride flush 0.9 % injection 10 mL  10 mL Intravenous 2 times per day Joya Figueredo MD   10 mL at 05/12/19 2056    sodium chloride flush 0.9 % injection 10 mL  10 mL Intravenous PRN Joya Figueredo MD        guaiFENesin (ROBITUSSIN) 100 MG/5ML liquid 200 mg  200 mg Oral Q4H PRN Joya Figueredo MD   200 mg at 05/09/19 2030    acetaminophen (TYLENOL) tablet 650 mg  650 mg Oral Q4H PRN Joya Figueredo MD   650 mg at 05/12/19 0408    ipratropium-albuterol (DUONEB) nebulizer solution 3 mL  3 mL Inhalation TID Joya Figueredo MD   3 mL at 05/14/19 1133    albuterol (PROVENTIL) nebulizer solution 2.5 mg  2.5 mg Nebulization Q2H PRN Joya Figueredo MD        sodium chloride flush 0.9 % injection 10 mL  10 mL Intravenous 2 times per day Joya Figueredo MD   10 mL at 05/13/19 2243    sodium chloride flush 0.9 % injection 10 mL  10 mL Intravenous PRN Joya Figueredo MD        magnesium hydroxide (MILK OF MAGNESIA) 400 MG/5ML suspension 30 mL  30 mL Oral Daily PRN Joya Figueredo MD        ondansetron [facility]) injection 4 mg  4 mg Intravenous Q6H PRN Joya Figueredo MD        [Held by provider] enoxaparin (LOVENOX) injection 40 mg  40 mg Subcutaneous Daily Joya Figueredo MD   40 mg at 05/12/19 0915    0.9 % sodium chloride infusion   Intravenous Continuous Joya Figueredo  mL/hr at 05/14/19 0517      potassium chloride (KLOR-CON M) extended release tablet 40 mEq  40 mEq Oral PRN Joya Figueredo MD        Or    potassium chloride (KLOR-CON) packet 40 mEq  40 mEq Oral PRN Joya Figueredo MD        Or    potassium chloride 10 mEq/100 mL IVPB (Peripheral Line)  10 mEq Intravenous PRN

## 2019-05-14 NOTE — DISCHARGE SUMMARY
Hospital Medicine Discharge Summary    Janette Gill  :  1950  MRN:  [id number]    Admit date:  2019  Discharge date:  2019    Admitting Physician:  Rik Kaba MD  Primary Care Physician:  No primary care provider on file. Discharge Diagnoses: Active Problems:    Acute and chronic respiratory failure with hypoxia (HCC)    HARSHA on CPAP    Leg wound, left    Venous stasis ulcer limited to breakdown of skin without varicose veins (HCC)    PAD (peripheral artery disease) (HCC)    Diabetic ulcer of right foot due to type 2 diabetes mellitus (HCC)    Gait abnormality    Cellulitis of lower leg    Cellulitis of right leg    Decubitus ulcer of right heel, stage 3 (HCC)    Cough    Diabetes mellitus due to underlying condition, controlled, with stage 2 chronic kidney disease ([address]    Diabetic peripheral neuropathy associated with type 2 diabetes mellitus ([address]  Resolved Problems:    * No resolved hospital problems. *    Chief Complaint   Patient presents with    Extremity Weakness     pt c/o RLE extremity weakness for the past 2 days after falling off the couch     Hospital Course:   Janette Gill is a 79 y.o. male that was admitted and treated at [facility] for the following medical issues: Active Problems:    Acute and chronic respiratory failure with hypoxia (HCC)    HRASHA on CPAP    Leg wound, left    Venous stasis ulcer limited to breakdown of skin without varicose veins (HCC)    PAD (peripheral artery disease) (HCC)    Diabetic ulcer of right foot due to type 2 diabetes mellitus (HCC)    Gait abnormality    Cellulitis of lower leg    Cellulitis of right leg    Decubitus ulcer of right heel, stage 3 (HCC)    Cough    Diabetes mellitus due to underlying condition, controlled, with stage 2 chronic kidney disease ([address]    Diabetic peripheral neuropathy associated with type 2 diabetes mellitus ([address]  Resolved Problems:    * No resolved hospital problems.  *      [age]-year-old male with pes planus obesity, diabetic insulin-dependent who presented with right lower extremities cellulitis and diabetic foot infection. He was started on IV antibiotic broadly. He also had a history of PAD and he was seen by cardiology and he underwent thrombectomy and stent placement by Dr. Piotr Moura. His medication were adjusted by cardiology. He was discharged to skilled nursing facility on by mouth Cipro as per infectious disease recommendation with plan to follow-up with cardiology as outpatient. Pt was discharge in a stable condition. Exam on discharge:   BP (!) 163/63   Pulse 75   Temp 98.8 °F (37.1 °C) (Oral)   Resp 18   Ht 6' (1.829 m)   Wt (!) 360 lb 9.6 oz (163.6 kg)   SpO2 98%   BMI 48.91 kg/m²   General appearance: alert, cooperative and no distress  Mental Status: oriented to person, place and time and normal affect  Lungs: clear to auscultation bilaterally, normal effort  Heart: regular rate and rhythm, no murmur  Abdomen: soft, nontender, nondistended, bowel sounds present, no masses  Skin: no gross lesions         Patient was seen by the following consultants while admitted to [facility]:   Consults:  IP CONSULT TO CASE MANAGEMENT  IP CONSULT TO PODIATRY  IP CONSULT TO PHYSICAL MEDICINE REHAB  IP CONSULT TO INFECTIOUS DISEASES  IP CONSULT TO CARDIOLOGY  IP CONSULT TO DIETITIAN  IP CONSULT TO GENERAL SURGERY    Significant Diagnostic Studies:    Xr Knee Right (min 4 Views)    Result Date: 5/7/2019  EXAMINATION: XR FOOT RIGHT (MIN 3 VIEWS), XR KNEE RIGHT (MIN 4 VIEWS), XR HIP 2-3 VW W PELVIS RIGHT CLINICAL HISTORY:  osteomyelitis . Right sided lower extremity weakness from a fall 2 days ago, best possible due to patient mobility, he was unable to bend his leg or straighten it out Pain swelling. COMPARISONS: None available. FINDINGS: AP view the pelvis and oblique view of the right hip were obtained. Bones are intact. Joints are maintained.  There is arterial calcification. 4 views of the right knee were obtained. There is a compression plate in the medial tibial condyle. There is advanced degenerative disease compartment. There is no joint is no sign of osteomyelitis. Incidentally noted is a superficial femoral/popliteal artery stent. 3 views of the right foot were obtained showing large amount dorsal soft tissue swelling. There is no soft tissue gas. Bones are low density but are intact. Incidentally noted are hammertoe deformities. There is no erosive or inflammatory arthropathy. There are incidental phleboliths in the leg. CONCLUSION: THERE IS NO ACUTE FRACTURE OR DISLOCATION. NO SIGNS OF OSTEOMYELITIS. THERE IS SOFT TISSUE SWELLING OF THE DORSUM OF FOOT. Xr Foot Right (min 3 Views)    Result Date: 5/7/2019  EXAMINATION: XR FOOT RIGHT (MIN 3 VIEWS), XR KNEE RIGHT (MIN 4 VIEWS), XR HIP 2-3 VW W PELVIS RIGHT CLINICAL HISTORY:  osteomyelitis . Right sided lower extremity weakness from a fall 2 days ago, best possible due to patient mobility, he was unable to bend his leg or straighten it out Pain swelling. COMPARISONS: None available. FINDINGS: AP view the pelvis and oblique view of the right hip were obtained. Bones are intact. Joints are maintained. There is arterial calcification. 4 views of the right knee were obtained. There is a compression plate in the medial tibial condyle. There is advanced degenerative disease compartment. There is no joint is no sign of osteomyelitis. Incidentally noted is a superficial femoral/popliteal artery stent. 3 views of the right foot were obtained showing large amount dorsal soft tissue swelling. There is no soft tissue gas. Bones are low density but are intact. Incidentally noted are hammertoe deformities. There is no erosive or inflammatory arthropathy. There are incidental phleboliths in the leg. CONCLUSION: THERE IS NO ACUTE FRACTURE OR DISLOCATION. NO SIGNS OF OSTEOMYELITIS.  THERE IS SOFT TISSUE SWELLING OF THE DORSUM OF FOOT.    Xr Chest Portable    Result Date: 5/8/2019  EXAMINATION: XR CHEST PORTABLE CLINICAL HISTORY: COUGH FOR 3 DAYS COMPARISONS: NOVEMBER 20, 2018 FINDINGS: Patient is leaning to left, and rotated to left. Osseous structures intact. Cardiopericardial silhouette normal for technique. Aorta calcified. No discrete focal airspace disease identified. Right diaphragm elevated and unchanged. NO ACUTE CARDIOPULMONARY DISEASE WITH ABOVE LIMITATIONS. .    Us Dup Lower Extremity Right Arteries    Result Date: 5/8/2019  US DUP LOWER EXTREMITY RIGHT ARTERIES: 5/7/2019 CLINICAL HISTORY:  ARTERIAL OCCLUSION, AORTA, ARCH, BRANCHES . Right lower extremity cellulitis. COMPARISON: 11/20/2018. Grayscale, color and waveform Doppler analysis of the right lower extremity arterial system was performed. FINDINGS: The study is significantly limited by the patient's body habitus. Elevated velocity within the distal right superficial femoral artery is present to just over 340 cm/s, consistent with an approximately 60% stenosis. No other significantly elevated velocities are identified elsewhere. There is no occlusion. There is significantly limited visualization of the infrapopliteal runoff secondary to leg bandages, with mildly pulsatile monophasic waveforms identified within the proximal anterior tibial, posterior tibial and peroneal arteries. Other maximum velocities are noted on the included worksheet. EXTENSIVE ATHEROSCLEROTIC DISEASE, WITH AN APPROXIMATELY 60% STENOSIS OF THE DISTAL RIGHT SUPERFICIAL FEMORAL ARTERY. NO EVIDENCE OF OCCLUSION, OR OTHER FLOW LIMITING STENOSIS IDENTIFIED. Mri Foot Right Wo Contrast    Result Date: 5/7/2019  MRI of the right foot without contrast enhancement INDICATION: Infection. Looking for osteomyelitis. TECHNIQUE: Multiplanar, multi-sequence MRI was performed on a 1.5 Preethi closed magnet. COMPARISON:   May 7, 2019 x-ray.  FINDINGS: Multisequence, multiplanar MRI of the left foot was obtained showing pronounced generalized soft tissue swelling, greatest over the dorsum of the foot. There is pronounced skin thickening, greatest over the dorsum of the foot. There is no sign of a drainable fluid collection. Bony structures are intact, without evidence of osteomyelitis. There are no signs of septic arthritis. Degenerative disease is mild. Tendons are intact. There is no joint effusion in the ankle. 1.   PRONOUNCED GENERALIZED SOFT TISSUE SWELLING AND SKIN THICKENING, COMPATIBLE CELLULITIS. 2.  THERE IS NO DRAINABLE ABSCESS. 3.  THERE IS NO SEPTIC ARTHRITIS. 4.  THERE IS NO OSTEOMYELITIS. Xr Hip 2-3 Vw W Pelvis Right    Result Date: 5/7/2019  EXAMINATION: XR FOOT RIGHT (MIN 3 VIEWS), XR KNEE RIGHT (MIN 4 VIEWS), XR HIP 2-3 VW W PELVIS RIGHT CLINICAL HISTORY:  osteomyelitis . Right sided lower extremity weakness from a fall 2 days ago, best possible due to patient mobility, he was unable to bend his leg or straighten it out Pain swelling. COMPARISONS: None available. FINDINGS: AP view the pelvis and oblique view of the right hip were obtained. Bones are intact. Joints are maintained. There is arterial calcification. 4 views of the right knee were obtained. There is a compression plate in the medial tibial condyle. There is advanced degenerative disease compartment. There is no joint is no sign of osteomyelitis. Incidentally noted is a superficial femoral/popliteal artery stent. 3 views of the right foot were obtained showing large amount dorsal soft tissue swelling. There is no soft tissue gas. Bones are low density but are intact. Incidentally noted are hammertoe deformities. There is no erosive or inflammatory arthropathy. There are incidental phleboliths in the leg. CONCLUSION: THERE IS NO ACUTE FRACTURE OR DISLOCATION. NO SIGNS OF OSTEOMYELITIS. THERE IS SOFT TISSUE SWELLING OF THE DORSUM OF FOOT.       Discharge Medications:       Jaime Walters Medication Instructions [id number]

## 2019-05-14 NOTE — PROGRESS NOTES
Nutrition Assessment    Type and Reason for Visit: Reassess    Nutrition Recommendations: Continue current diet, Continue current ONS    Nutrition Assessment: Pt at nutritional risk due to increased nutrient needs for wound healing. To continue wound healing supplement and protein supplement. Malnutrition Assessment:  · Malnutrition Status: No malnutrition  · Context: Chronic illness  · Findings of the 6 clinical characteristics of malnutrition (Minimum of 2 out of 6 clinical characteristics is required to make the diagnosis of moderate or severe Protein Calorie Malnutrition based on AND/ASPEN Guidelines):  1. Energy Intake-Greater than 75% of estimated energy requirement, Greater than or equal to 3 months    2. Weight Loss-7.5% loss or greater, in 6 months  3. Fat Loss-No significant subcutaneous fat loss,    4. Muscle Loss-No significant muscle mass loss,    5. Fluid Accumulation-Moderate to severe fluid accumulation, Extremities  6.  Strength-Not measured    Nutrition Risk Level: Moderate    Nutrient Needs:  · Estimated Daily Total Kcal: 6134-7512 (kg x 11-13)  · Estimated Daily Protein (g): 112-128(kg x 1.4-1.6)  · Estimated Daily Total Fluid (ml/day): ~2000 (1ml/kcal)    Nutrition Diagnosis:   · Problem: Increased nutrient needs  · Etiology: related to Increased demand for energy/nutrients     Signs and symptoms:  as evidenced by Presence of wounds    Objective Information:  · Nutrition-Focused Physical Findings: +2 BLE edema noted. Pt states appetite fairly good with no nutritional complaints/taking supplements.   · Wound Type: Pressure Ulcer(ischium & coccyx, right foot, and left stump (see wound nurse note) )  · Current Nutrition Therapies:  · Oral Diet Orders: Carb Control 4 Carbs/Meal   · Oral Diet intake: 51-75%  · Oral Nutrition Supplement (ONS) Orders: Wound Healing Oral Supplement(bid and protein supplement x1/day)  · ONS intake: %  · Anthropometric Measures:  · Ht: 6' (182.9 cm) · Current Body Wt: 360 lb 8 oz (163.5 kg)(5-14)  · Admission Body Wt: 326 lb (147.9 kg)(stated)  · Usual Body Wt: 357 lb (161.9 kg)(11/2018; 347-357lb in 2018)  · % Weight Change:  ,  none noted, yet current wt increased with edema present  · Ideal Body Wt: 178 lb (80.7 kg), % Ideal Body >100%  · BMI Classification: BMI > or equal to 40.0 Obese Class III    Nutrition Interventions:   Continue current diet, Continue current ONS  Continued Inpatient Monitoring    Nutrition Evaluation:   · Evaluation: Progressing toward goals   · Goals: Intake > 75% of meals/suppleemnt. Improved wound healing.      · Monitoring: Meal Intake, Supplement Intake, Weight, Pertinent Labs, Wound Healing      Electronically signed by Jeni Reed RD, LD on 5/14/19 at 4:43 PM

## 2019-05-14 NOTE — PROGRESS NOTES
Infectious Diseases Inpatient Progress Note          HISTORY OF PRESENT ILLNESS:  Follow up RLE cellulitis, RLE diabetic foot ulcers on IV Zosyn, well tolerated. Patient had resolved R leg redness and pain. Has R foot and leg ulcers, chronic, no drainage per podiatry. Has new L AKA ulcer, Had R popliteal angioplasty done . Current Medications:     sodium chloride flush  10 mL Intravenous 2 times per day    aspirin  81 mg Oral Daily    atorvastatin  20 mg Oral Nightly    insulin NPH  28 Units Subcutaneous BID AC    nicotine  1 patch Transdermal Daily    sodium chloride flush  10 mL Intravenous 2 times per day    ipratropium-albuterol  3 mL Inhalation TID    sodium chloride flush  10 mL Intravenous 2 times per day    [Held by provider] enoxaparin  40 mg Subcutaneous Daily    piperacillin-tazobactam  3.375 g Intravenous Q8H    insulin lispro  0-12 Units Subcutaneous TID WC    insulin lispro  0-6 Units Subcutaneous Nightly    clopidogrel  75 mg Oral Daily    collagenase   Topical Daily       Allergies:  Coconut oil      Review of Systems   Respiratory: Positive for cough. Cardiovascular: Positive for leg swelling. Skin: Positive for wound. Neurological: Positive for weakness. All other systems reviewed and are negative. 14 system review is negative other than HPI    Physical Exam  Vitals:    05/13/19 1917 05/13/19 2245 05/14/19 0515 05/14/19 0739   BP:  (!) 163/63     Pulse:  75     Resp: 16 18     Temp:  98.8 °F (37.1 °C)     TempSrc:  Oral     SpO2: 92% 99%  98%   Weight:   (!) 360 lb 9.6 oz (163.6 kg)    Height:         General Appearance: alert and oriented to person, place and time, well-developed and well-nourished, in no acute distress  Skin: warm and dry, no rash. Head: normocephalic and atraumatic  Eyes: anicteric sclerae  ENT: oropharynx clear and moist with normal mucous membranes.  No oral thrush  Lungs: normal respiratory effort, diminished BS diffuse  Heart: RRR  Abdomen: soft, no tenderness, obese  No leg edema  RLE resolved erythema, no tenderness, + swelling  Intact dressing  L AKA, 3 cm superficial ulcer      DATA:    Lab Results   Component Value Date    WBC 6.9 05/14/2019    HGB 13.1 (L) 05/14/2019    HCT 40.8 (L) 05/14/2019    MCV 98.3 05/14/2019     05/14/2019     Lab Results   Component Value Date    CREATININE 0.62 (L) 05/14/2019    BUN 16 05/14/2019     05/14/2019    K 4.5 05/14/2019     05/14/2019    CO2 25 05/14/2019       Hepatic Function Panel:  Lab Results   Component Value Date    ALKPHOS 105 05/07/2019    ALT <5 05/07/2019    AST 51 05/07/2019    PROT 6.3 05/07/2019    BILITOT 0.8 05/07/2019    BILIDIR 4.0 11/20/2018    IBILI 0.8 11/20/2018    LABALBU 2.5 05/07/2019     Susceptibility     Enterobacter cloacae complex (1)     Antibiotic Interpretation NASREEN Status   amoxicillin-clavulanate Resistant >=32 mcg/mL    ceFAZolin Resistant >=64 mcg/mL    cefepime Sensitive 2 mcg/mL    cefTRIAXone Resistant >=64 mcg/mL    ciprofloxacin Sensitive <=0.25 mcg/mL    gentamicin Sensitive <=1 mcg/mL    imipenem Sensitive <=0.25 mcg/mL    piperacillin-tazobactam Resistant >=128 mcg/mL    trimethoprim-sulfamethoxazole Sensitive <=20 mcg/mL    Lab and Collection     Surgical Culture - 5/10/2019   Result History         IMPRESSION:    · RLE cellulitis  · RLE and heel diabetic foot ulcers with Enterobacter infection  · L AKA ulcer  · PAD S/P PTA   · Morbid obesity    Patient Active Problem List   Diagnosis    Acute and chronic [facility] failure with hypoxia ([facility]    BMI 45.0-49.9, adult ([facility])    History of tobacco use    HARSHA on CPAP    Wheezing    Cellulitis    Leg wound, left    Leukocytosis    Unable to ambulate    Elevated bilirubin    Cellulitis of left foot    Gangrene associated with type 2 diabetes mellitus (HCC)    Venous stasis ulcer limited to breakdown of skin without varicose veins (HCC)    Gas gangrene of foot ([facility])    Hyperglycemia due to type 2 diabetes mellitus ([facility]    PAD (peripheral artery disease) ([facility]    Diabetic ulcer of right foot due to type 2 diabetes mellitus (HCC)    Gait abnormality    Cellulitis of lower leg    Cellulitis of right leg    Decubitus ulcer of right heel, stage 3 (HCC)    Cough    Diabetes mellitus due to underlying condition, controlled, with stage 2 chronic kidney disease ([facility]    Diabetic peripheral neuropathy associated with type 2 diabetes mellitus (HCC)       PLAN:  · D/C IV Zosyn on D/C  · D/C to NH on 2 weeks PO Cipro  · Avoid wearing L leg prosthesis until L AKA ulcer is healed  · Continue local wound care    Discussed with patient, RN    Sangeeta Evans MD

## 2019-05-14 NOTE — PROGRESS NOTES
[facility] EMERGENCY [facility] AT [place] Respiratory Therapy Evaluation   Current Order:  Duoneb tid and albuterol q2 prn      Home Regimen: prn      Ordering Physician: Carolina Bliss  Re-evaluation Date:  5/17     Diagnosis: Cellulitis       Patient Status: Stable / Unstable + Physician notified    The following MDI Criteria must be met in order to convert aerosol to MDI with spacer. If unable to meet, MDI will be converted to aerosol:  []  Patient able to demonstrate the ability to use MDI effectively  []  Patient alert and cooperative  []  Patient able to take deep breath with 5-10 second hold  []  Medication(s) available in this delivery method   []  Peak flow greater than or equal to 200 ml/min            Current Order Substituted To  (same drug, same frequency)   Aerosol to MDI [] Albuterol Sulfate 0.083% unit dose by aerosol Albuterol Sulfate MDI 2 puffs by inhalation with spacer    [] Levalbuterol 1.25 mg unit dose by aerosol Levalbuterol MDI 2 puffs by inhalation with spacer    [] Levalbuterol 0.63 mg unit dose by aerosol Levalbuterol MDI 2 puffs by inhalation with spacer    [] Ipratropium Bromide 0.02% unit dose by aerosol Ipratropium Bromide MDI 2 puffs by inhalation with spacer    [] Duoneb (Ipratropium + Albuterol) unit dose by aerosol Ipratropium MDI + Albuterol MDI 2 puffs by inhalation w/spacer   MDI to Aerosol [] Albuterol Sulfate MDI Albuterol Sulfate 0.083% unit dose by aerosol    [] Levalbuterol MDI 2 puffs by inhalation Levalbuterol 1.25 mg unit dose by aerosol    [] Ipratropium Bromide MDI by inhalation Ipratropium Bromide 0.02% unit dose by aerosol    [] Combivent (Ipratropium + Albuterol) MDI by inhalation Duoneb (Ipratropium + Albuterol) unit dose by aerosol   Treatment Assessment [Frequency/Schedule]:  Change frequency to: no changes __________________________________________________per Protocol, P&T, MEC      Points 0 1 2 3 4   Pulmonary Status  Non-Smoker  []   Smoking history   < 20 pack years  []   Smoking history  ? 20 pack years  []   Pulmonary Disorder  (acute or chronic)  [x]   Severe or Chronic w/ Exacerbation  []     Surgical Status No [x]   Surgeries     General []   Surgery Lower []   Abdominal Thoracic or []   Upper Abdominal Thoracic with  PulmonaryDisorder  []     Chest X-ray Clear/Not  Ordered     [x]  Chronic Changes  Results Pending  []  Infiltrates, atelectasis, pleural effusion, or edema  []  Infiltrates in more than one lobe []  Infiltrate + Atelectasis, &/or pleural effusion  []    Respiratory Pattern Regular,  RR = 12-20 [x]  Increased,  RR = 21-25 []  CANCINO, irregular,  or RR = 26-30 []  Decreased FEV1  or RR = 31-35 []  Severe SOB, use  of accessory muscles, or RR ? 35  []    Mental Status Alert, oriented,  Cooperative [x]  Confused but Follows commands []  Lethargic or unable to follow commands []  Obtunded  []  Comatose  []    Breath Sounds Clear to  auscultation  []  Decreased unilaterally or  in bases only []  Decreased  bilaterally  [x]  Crackles or intermittent wheezes []  Wheezes []    Cough Strong, Spontan., & nonproductive [x]  Strong,  spontaneous, &  productive []  Weak,  Nonproductive []  Weak, productive or  with wheezes []  No spontaneous  cough or may require suctioning []    Level of Activity Ambulatory []  Ambulatory w/ Assist  []  Non-ambulatory [x]  Paraplegic []  Quadriplegic []    Total    Score:___7____     Triage Score:_____4___      Tri       Triage:     1. (>20) Freq: Q3    2. (16-20) Freq: Q4   3. (11-15) Freq: QID & Albuterol Q2 PRN    4. (6-10) Freq: TID & Albuterol Q2 PRN    5. (0-5) Freq Q4prn

## 2019-05-15 LAB
ANAEROBIC CULTURE: ABNORMAL
CULTURE SURGICAL: ABNORMAL
CULTURE SURGICAL: ABNORMAL
GRAM STAIN RESULT: ABNORMAL
ORGANISM: ABNORMAL
ORGANISM: ABNORMAL

## 2019-05-15 RX ORDER — ASCORBIC ACID 500 MG
500 TABLET ORAL DAILY
COMMUNITY

## 2019-05-16 ENCOUNTER — OFFICE VISIT (OUTPATIENT)
Dept: GERIATRIC MEDICINE | Age: 69
End: 2019-05-16
Payer: MEDICARE

## 2019-05-16 DIAGNOSIS — R52 PAIN MANAGEMENT: ICD-10-CM

## 2019-05-16 DIAGNOSIS — M25.551 CHRONIC RIGHT HIP PAIN: Primary | ICD-10-CM

## 2019-05-16 DIAGNOSIS — G89.29 CHRONIC RIGHT HIP PAIN: Primary | ICD-10-CM

## 2019-05-16 PROCEDURE — 1123F ACP DISCUSS/DSCN MKR DOCD: CPT | Performed by: PHYSICIAN ASSISTANT

## 2019-05-16 PROCEDURE — 99308 SBSQ NF CARE LOW MDM 20: CPT | Performed by: PHYSICIAN ASSISTANT

## 2019-05-16 NOTE — PROGRESS NOTES
Physical Therapy  Facility/Department: [facility] OGWN [id number]/[id number]  Physical Therapy Discharge      NAME: Maliha Blanchard    : 1950 (79 y.o.)  MRN: [id number]    Account: [de-identified]  Gender: male      Patient has been discharged from acute care hospital. DC patient from current PT program.      Electronically signed by James Walsh PT on 19 at 8:46 AM

## 2019-05-18 NOTE — PROCEDURES
[address]                      [address], [address] [facility]                                 PROCEDURE NOTE    PATIENT NAME: Gely Mukherjee                      :        1950  MED REC NO:   [id number]                            ROOM:       T940  ACCOUNT NO:   [de-identified]                           ADMIT DATE: 2019  PROVIDER:     Piter Brice DO    DATE OF PROCEDURE:  2019    PROCEDURE PERFORMED:  Right lower extremity angiogram.    INDICATIONS:  Right foot nonhealing wound. PROCEDURE PERFORMED BY:  Alexandro Brice DO    COMPLICATIONS:  None. ACCESS SITE:  Right common femoral artery. DESCRIPTION OF THE PROCEDURE:  The patient was brought to the cardiac  cath lab suite where he was sterilely prepped and draped in the usual  fashion. 1% lidocaine was used to anesthetize the right inguinal area  and a 4-Gibraltarian arterial sheath was placed without any difficulty. Next,  a 4-Gibraltarian ARMAND was engaged in the right common iliac artery. Right  lower extremity angiogram with runoff was performed. Catheter was then  brought down to the level of the right popliteal artery where right  below-the-knee angiogram under DSA was obtained. The catheter was  pulled back out of the patient and pressure was held. The patient was  transferred to the post-cath holding area in stable and satisfactory  condition. FINDINGS:  Right lower extremity:  The right common iliac artery,  internal iliac artery, external iliac artery, common femoral artery, and  profunda are patent. The right SFA is patent with patent stent distally  with mild in-stent restenosis in the proximal segment of the stent. The  right popliteal artery has a focal distal 90% heavily calcified lesion  prior to the bifurcation of the anterior tibial artery and TP trunk.    The anterior tibial artery, TP trunk, peroneal, and posterior tibial  artery are all patent without any significant
[address]                      [address], [address] [facility]                                 PROCEDURE NOTE    PATIENT NAME: Darren Carlson                      :        1950  MED REC NO:   [id number]                            ROOM:       W270  ACCOUNT NO:   [de-identified]                           ADMIT DATE: 2019  PROVIDER:     DO Raffaele    Report addended on 19 to reflect appropriate procedure date. jk    DATE OF PROCEDURE:  2019    PROCEDURES PERFORMED:  Right lower extremity angiography and right  popliteal artery orbital atherectomy followed by PTA. INDICATIONS:  Right foot non-healing wounds. PROCEDURE PERFORMED BY:  Alexandro Brice DO.    COMPLICATIONS:  None. ACCESS SITE:  Left common femoral artery. DESCRIPTION OF PROCEDURE:  The patient was brought to the cardiac cath  lab suite, where he was sterilely prepped and draped in the usual  fashion. 1% lidocaine was used to anesthetize the left inguinal area  and a 5-Sammarinese arterial sheath was placed without any difficulty. Next,  a 5-Sammarinese ARMAND was engaged in the right common iliac artery and over an  0.035 wire, a 7-Sammarinese 90-cm sheath was placed over the wire into the  right distal SFA. Angiogram of the right below-the-knee vessels was  obtained under DSA. Next, an 0.014 wire placed into the distal tibial  vessel and exchanged for an 0.014 Viper wire and CSI orbital atherectomy  was performed across the right distal popliteal artery with the 1.25  solid crown steph at low speeds, followed by PTA with the 3.0 x 20 and  4.0 x 20 balloons at nominal pressures. A complete angiography was  performed, showing excellent results. The patient tolerated the  procedure well. The sheath was pulled back to the left external iliac  artery and left groin angiogram was obtained.   Next, a Mynx closure  device was deployed and there was noted some oozing around the site and  pressure
did well. Hemostasis was achieved. He was transferred to  the post-cath holding area in stable and satisfactory condition. FINDINGS:  The right common femoral artery is patent. The right  profunda is patent. The right SFA has an ostial 40% lesion. The right  distal SFA stent is patent with mild in-stent restenosis. The right  popliteal artery has a 95% distal focal stenosis. It is very heavily  calcified. The right anterior tibial artery, TP trunk, peroneal, and  posterior tibial artery are all patent without any significant stenosis. ASSESSMENT:  Status post successful right distal popliteal artery  orbital atherectomy, followed by PTA with 0% residual stenosis.             PLAN:  Postprocedure care as usual.        AJAY WALDROP DO    D: 05/13/2019 9:20:35       T: 05/13/2019 10:23:55     [email or web address]  Job#: [id number]     Doc#: [id number]    CC:

## 2019-05-20 ENCOUNTER — OFFICE VISIT (OUTPATIENT)
Dept: GERIATRIC MEDICINE | Age: 69
End: 2019-05-20
Payer: MEDICARE

## 2019-05-20 DIAGNOSIS — R23.8 SKIN BREAKDOWN: ICD-10-CM

## 2019-05-20 DIAGNOSIS — N30.90 CYSTITIS: ICD-10-CM

## 2019-05-20 DIAGNOSIS — E11.65 UNCONTROLLED TYPE 2 DIABETES MELLITUS WITH HYPERGLYCEMIA (HCC): Primary | ICD-10-CM

## 2019-05-20 DIAGNOSIS — R53.1 WEAKNESS: ICD-10-CM

## 2019-05-20 PROCEDURE — 1123F ACP DISCUSS/DSCN MKR DOCD: CPT | Performed by: INTERNAL MEDICINE

## 2019-05-20 PROCEDURE — 99305 1ST NF CARE MODERATE MDM 35: CPT | Performed by: INTERNAL MEDICINE

## 2019-05-20 PROCEDURE — 3045F PR MOST RECENT HEMOGLOBIN A1C LEVEL 7.0-9.0%: CPT | Performed by: INTERNAL MEDICINE

## 2019-05-21 ENCOUNTER — OFFICE VISIT (OUTPATIENT)
Dept: GERIATRIC MEDICINE | Age: 69
End: 2019-05-21
Payer: MEDICARE

## 2019-05-21 DIAGNOSIS — Z97.8 CHRONIC INDWELLING FOLEY CATHETER: ICD-10-CM

## 2019-05-21 DIAGNOSIS — R60.0 EDEMA OF RIGHT LOWER EXTREMITY: Primary | ICD-10-CM

## 2019-05-21 DIAGNOSIS — S81.809D MULTIPLE OPENS WOUND OF LOWER EXTREMITY, UNSPECIFIED LATERALITY, SUBSEQUENT ENCOUNTER: ICD-10-CM

## 2019-05-21 PROCEDURE — 99309 SBSQ NF CARE MODERATE MDM 30: CPT | Performed by: PHYSICIAN ASSISTANT

## 2019-05-21 PROCEDURE — 1123F ACP DISCUSS/DSCN MKR DOCD: CPT | Performed by: PHYSICIAN ASSISTANT

## 2019-05-22 LAB
BASOPHILS ABSOLUTE: ABNORMAL /ΜL
BASOPHILS RELATIVE PERCENT: 0.5 %
BUN BLDV-MCNC: 22 MG/DL
CALCIUM SERPL-MCNC: 8.3 MG/DL
CHLORIDE BLD-SCNC: 103 MMOL/L
CO2: 35 MMOL/L
CREAT SERPL-MCNC: 0.9 MG/DL
EOSINOPHILS ABSOLUTE: 0.3 /ΜL
EOSINOPHILS RELATIVE PERCENT: 4.8 %
GFR CALCULATED: NORMAL
GLUCOSE BLD-MCNC: 255 MG/DL
HCT VFR BLD CALC: 38.7 % (ref 41–53)
HEMOGLOBIN: 12.3 G/DL (ref 13.5–17.5)
LYMPHOCYTES ABSOLUTE: 1.1 /ΜL
LYMPHOCYTES RELATIVE PERCENT: 19.2 %
MCH RBC QN AUTO: 31.3 PG
MCHC RBC AUTO-ENTMCNC: 31.9 G/DL
MCV RBC AUTO: 98.1 FL
MONOCYTES ABSOLUTE: 0.5 /ΜL
MONOCYTES RELATIVE PERCENT: 9.1 %
NEUTROPHILS ABSOLUTE: 3.8 /ΜL
NEUTROPHILS RELATIVE PERCENT: 66.4 %
PLATELET # BLD: 242 K/ΜL
PMV BLD AUTO: 9.4 FL
POTASSIUM SERPL-SCNC: 5 MMOL/L
RBC # BLD: 3.94 10^6/ΜL
SODIUM BLD-SCNC: 144 MMOL/L
WBC # BLD: 5.8 10^3/ML

## 2019-05-23 ENCOUNTER — OFFICE VISIT (OUTPATIENT)
Dept: GERIATRIC MEDICINE | Age: 69
End: 2019-05-23
Payer: MEDICARE

## 2019-05-23 DIAGNOSIS — Z79.4 TYPE 2 DIABETES MELLITUS WITH HYPERGLYCEMIA, WITH LONG-TERM CURRENT USE OF INSULIN (HCC): Primary | Chronic | ICD-10-CM

## 2019-05-23 DIAGNOSIS — E11.65 TYPE 2 DIABETES MELLITUS WITH HYPERGLYCEMIA, WITH LONG-TERM CURRENT USE OF INSULIN (HCC): Primary | Chronic | ICD-10-CM

## 2019-05-23 PROCEDURE — 99308 SBSQ NF CARE LOW MDM 20: CPT | Performed by: PHYSICIAN ASSISTANT

## 2019-05-23 PROCEDURE — 1123F ACP DISCUSS/DSCN MKR DOCD: CPT | Performed by: PHYSICIAN ASSISTANT

## 2019-05-23 PROCEDURE — 3045F PR MOST RECENT HEMOGLOBIN A1C LEVEL 7.0-9.0%: CPT | Performed by: PHYSICIAN ASSISTANT

## 2019-05-24 NOTE — PROGRESS NOTES
PATIENT: Blair Villasenor : 1950 DOS: 2019     [facility]ANET [facility]    HPI: The patient is being today for acute followup for a change in pain medicine. The patient requested to see me due to change, has been getting order for oxycodone/APAP q.4hours p.r.n., but does not utilize very often, changed the order to q.6hours. I expressed to him concern of decreasing circulation of nonessential/nonutilized pain medicine. The patient understood. The patient said his wounds are healing very well. The patient has a right heel wound as well as right hip and left side amputation wound that is healing well. The patient's appetite is adequate. The patient is severely obese. Has some swelling on right lower extremity as well. We talked about increasing diuretic to get fluid off his leg. We will evaluate kidney function via BMP as well as CBC to check on white blood cell count. We will also add a diagnosis for chronic Babin catheter due to wounds. No additional complaints. MEDICATIONS: Reviewed. REVIEW OF SYSTEMS: CONSTITUTIONAL: Denies any fever, chills, nausea, vomiting, increased fatigue or weakness beyond baseline. No acute change in weight. The patient is obese. HEENT: No new headache, confusion, lightheadedness. No new rhinorrhea, stuffiness, sneezing, sore throat, hoarseness or allergy symptoms of note. CARDIAC: Denies chest pain, palpitations. PULMONARY: Does complain of shortness of breath, is on O2 intermittently. Does smoke. No new wheezing. MSK: The patient does have a left lower extremity amputation. PSYCHIATRIC: The patient denies any increased stress, anxiety or depressive symptoms today. VS: Vital signs are reviewed on site today. PHYSICAL EXAMINATION: GENERAL: Fair hygiene. Overall bright affect appears stated age. He is in no acute distress today. Seen outside, actively smoking during interview. HEENT: Pupils equal, round, reactive to light. No icterus.  Minor injection

## 2019-05-29 ENCOUNTER — HOSPITAL ENCOUNTER (OUTPATIENT)
Dept: WOUND CARE | Age: 69
Discharge: HOME | End: 2019-05-29
Payer: MEDICARE

## 2019-05-29 VITALS
SYSTOLIC BLOOD PRESSURE: 136 MMHG | HEART RATE: 78 BPM | TEMPERATURE: 98.3 F | DIASTOLIC BLOOD PRESSURE: 63 MMHG | RESPIRATION RATE: 18 BRPM

## 2019-05-29 VITALS — TEMPERATURE: 97.2 F | HEART RATE: 86 BPM | DIASTOLIC BLOOD PRESSURE: 78 MMHG | SYSTOLIC BLOOD PRESSURE: 124 MMHG

## 2019-05-29 DIAGNOSIS — L89.313 DECUBITUS ULCER OF ISCHIAL AREA, RIGHT, STAGE III (HCC): ICD-10-CM

## 2019-05-29 PROCEDURE — 99214 OFFICE O/P EST MOD 30 MIN: CPT

## 2019-05-29 NOTE — PROGRESS NOTES
[address]   Progress Note and Procedure Note      Luciano Meadows  MEDICAL RECORD NUMBER:  [id number]  AGE: 79 y.o. GENDER: male  : 1950  EPISODE DATE:  2019    Subjective:     Chief Complaint   Patient presents with    Wound Check     right buttock        HISTORY of PRESENT ILLNESS HPI     Luciano Meadows is a 79 y.o. male who presents today  for wound/ulcer evaluation. History of Wound Context: pressure ulcer  Wound/Ulcer Pain Timing/Severity: none  Quality of pain: N/A  Severity:  0 / 10   Modifying Factors: None  Associated Signs/Symptoms: edema and drainage  Wound looks pink, clean    Ulcer Identification:  Ulcer Type: pressure  Contributing Factors: chronic pressure, decreased mobility, shear force and obesity    Wound: N/A        PAST MEDICAL HISTORY        Diagnosis Date    Asthma     COPD [facility]chronic obstructive pulmonary disease) ([facility])     Diabetes mellitus due to underlying condition, controlled, with stage 2 chronic kidney disease ([facility]     Morbid obesity ([facility])     Neuropathy     PVD (peripheral vascular disease) ([facility])     1 stent in left leg placed 2018 and 2 stents in right leg    Sleep apnea     Type 2 diabetes mellitus without complication ([facility] [address]        PAST SURGICAL HISTORY    Past Surgical History:   Procedure Laterality Date    ME RMVL DEVITAL TISS N-SLCTV DBRDMT W/O ANES 1 SESS Right 5/10/2019    INCISION AND DEBRIDEMENT WOUND RIGHT BUTTOCK, ROOM 270 performed by Gregoria Quach MD at [address] [place] [address]  2015    patient had a boil break open inside rectum. needed surgery for infection removal.       FAMILY HISTORY    History reviewed. No pertinent family history.     SOCIAL HISTORY    Social History     Tobacco Use    Smoking status: Current Every Day Smoker     Packs/day: 1.00     Years: 58.00     Pack years: 58.00     Types: Cigarettes    Smokeless tobacco: Never Used   Substance Use Topics    Alcohol use: No    Drug use: Yes     Types: Marijuana       ALLERGIES    Allergies   Allergen Reactions    Coconut Oil Nausea And Vomiting     Coconut meat only. MEDICATIONS    Current Outpatient Medications on File Prior to Encounter   Medication Sig Dispense Refill    vitamin C (ASCORBIC ACID) 500 MG tablet Take 500 mg by mouth daily      insulin glargine (LANTUS) 100 UNIT/ML injection vial Inject 12 Units into the skin nightly 1 vial 3    insulin lispro (HUMALOG) 100 UNIT/ML injection vial Inject 0-12 Units into the skin 3 times daily (with meals) (Patient taking differently: Inject 0-10 Units into the skin 4 times daily (before meals and nightly) ) 1 vial 3    aspirin 81 MG chewable tablet Take 1 tablet by mouth daily 30 tablet 3    atorvastatin (LIPITOR) 20 MG tablet Take 1 tablet by mouth nightly 30 tablet 3    Clopidogrel Bisulfate (PLAVIX PO) Take 75 mg by mouth daily       gabapentin (NEURONTIN) 800 MG tablet Take 1 tablet by mouth 3 times daily for 30 days. . 90 tablet 0    albuterol sulfate  (90 Base) MCG/ACT inhaler Inhale 2 puffs into the lungs daily       ipratropium-albuterol (DUONEB) 0.5-2.5 (3) MG/3ML SOLN nebulizer solution Inhale 3 mLs into the lungs every 4 hours as needed        No current facility-administered medications on file prior to encounter. REVIEW OF SYSTEMS    Pertinent items are noted in HPI. Objective:      /63   Pulse 78   Temp 98.3 °F (36.8 °C) (Temporal)   Resp 18     Wt Readings from Last 3 Encounters:   05/14/19 (!) 360 lb 9.6 oz (163.6 kg)   11/20/18 (!) 357 lb 12.9 oz (162.3 kg)   11/13/18 (!) 355 lb (161 kg)       PHYSICAL EXAM    Patient awake, alert and oriented x3 and in no acute distress. Vascular exam: peripheral pulses symmetrical, present. non-pitting edema noted to right LE. Neurological exam: ache. Dermatological exam:  See below for ulcer location, description and size. Assessment:            Wounds on Rt Ischial region remain clean and pink. Will recommend wound vac. Active Hospital Problems    Diagnosis Date Noted    Decubitus ulcer of ischial area, right, stage III [facility]) [L89.313] 05/29/2019        Procedure Note  Indications:  Based on my examination of this patient's wound(s)/ulcer(s) today, debridement is not required to promote healing and evaluate the wound base. PWound/Ulcer Descriptions are Pre Debridement except measurements:    Wound 11/19/18 Skin tear Leg Anterior; Lateral;Right pt with wound vac on lower leg and open ulcer above wound vac area right heel reddened (Active)   Number of days: 190       Wound 11/19/18  Leg Left (Active)   Number of days: 191       Wound 11/20/18 Foot Left;Lateral tunneling apparent, 1.2 cm (Active)   Number of days: 190       Wound 11/20/18 Skin tear Foot Left; Anterior yellow and bruising, seeping (Active)   Number of days: 190       Wound 11/20/18 Leg Left; Anterior; Lower 5cm wide 4 cm long (Active)   Number of days: 190       Wound 11/20/18 Leg Left; Lower; Inner;Medial butterfly shape 2x1 (Active)   Number of days: 190       Wound 11/20/18 Leg Left; Lower;Distal 1 cm round back of leg, tunneling apparent (Active)   Number of days: 190       Wound 11/20/18 Leg Right; Lower; Anterior 7cm x 2.5cm black, maggots  (Active)   Number of days: 190       Wound 11/20/18 Blister Leg Right; Lower 4x3 cm round red blister area (Active)   Number of days: 190       Wound 11/20/18 Blister Leg Right; Lower 2 small blisters above large one (Active)   Number of days: 190       Wound 05/08/19 Ischium Right #1 (Active)   Wound Image   5/29/2019 11:18 AM   Wound Other 5/29/2019 11:18 AM   Dressing Status Clean;Dry; Intact; Changed 5/14/2019 12:01 PM   Dressing Changed Changed/New 5/14/2019 12:01 PM   Dressing/Treatment Moist to dry 5/14/2019 12:01 PM   Wound Cleansed Rinsed/Irrigated with saline 5/29/2019 11:18 AM   Wound Length (cm) 5.2 cm 5/29/2019 11:18 AM   Wound Width (cm) 11 cm 5/29/2019 11:18 AM   Wound Depth (cm) 3.2 cm 5/29/2019 11:18 AM   Wound Surface Area (cm^2) 57.2 cm^2 5/29/2019 11:18 AM   Change in Wound Size % (l*w) -197.14 5/29/2019 11:18 AM   Wound Volume (cm^3) 183.04 cm^3 5/29/2019 11:18 AM   Wound Assessment Brown;Slough 5/14/2019 12:01 PM   Drainage Amount Large 5/29/2019 11:18 AM   Drainage Description Serosanguinous 5/29/2019 11:18 AM   Odor Mild 5/29/2019 11:18 AM   Margins Undefined edges 5/29/2019 11:18 AM   Silvia-wound Assessment Intact;Fragile 5/29/2019 11:18 AM   Non-staged Wound Description Full thickness 5/29/2019 11:18 AM   Red%Wound Bed 85 5/29/2019 11:18 AM   Yellow%Wound Bed 15 5/29/2019 11:18 AM   Other%Wound Bed 100 5/8/2019 10:17 AM   Number of days: 21       Wound 05/07/19 Ischium Left #2 (Active)   Wound Deep tissue/Injury 5/14/2019 12:01 PM   Dressing Status Clean;Dry; Intact; Changed 5/13/2019  8:15 PM   Dressing Changed Changed/New 5/13/2019  8:15 PM   Dressing/Treatment Protective barrier 5/13/2019 10:50 AM   Dressing Change Due 05/14/19 5/13/2019 10:50 AM   Wound Length (cm) 2 cm 5/8/2019 10:17 AM   Wound Width (cm) 2.5 cm 5/8/2019 10:17 AM   Wound Surface Area (cm^2) 5 cm^2 5/8/2019 10:17 AM   Wound Assessment Intact; Purple 5/13/2019 10:50 AM   Drainage Amount None 5/13/2019 10:50 AM   Odor None 5/13/2019 10:50 AM   Purple%Wound Bed 100 5/9/2019  8:38 PM   Number of days: 22       Wound 05/07/19 Coccyx #3 (Active)   Wound Deep tissue/Injury 5/14/2019 12:01 PM   Dressing Status Clean;Dry; Intact; Changed 5/13/2019  8:15 PM   Dressing Changed Changed/New 5/13/2019  8:15 PM   Dressing/Treatment Protective barrier 5/13/2019 10:50 AM   Dressing Change Due 05/08/19 5/9/2019  8:38 PM   Wound Length (cm) 2 cm 5/8/2019 10:17 AM   Wound Width (cm) 1.5 cm 5/8/2019 10:17 AM   Wound Surface Area (cm^2) 3 cm^2 5/8/2019 10:17 AM   Wound Assessment Intact 5/13/2019 10:50 AM   Drainage Amount None 5/13/2019 10:50 AM   Odor None 5/10/2019  8:00 PM   Purple%Wound Bed 100 5/9/2019  8:38 PM   Number of days: 22 Wound 05/07/19 Leg Left; Anterior #4 (Active)   Wound Other 5/14/2019 12:01 PM   Dressing Status Clean;Dry; Intact 5/14/2019 12:01 PM   Dressing Changed Changed/New 5/13/2019  8:15 PM   Dressing/Treatment Barrier Film 5/13/2019 10:50 AM   Dressing Change Due 05/08/19 5/9/2019  8:38 PM   Wound Length (cm) 2 cm 5/8/2019 10:17 AM   Wound Width (cm) 5 cm 5/8/2019 10:17 AM   Wound Depth (cm) 0 cm 5/8/2019 10:17 AM   Wound Surface Area (cm^2) 10 cm^2 5/8/2019 10:17 AM   Wound Volume (cm^3) 0 cm^3 5/8/2019 10:17 AM   Wound Assessment Blood filled blister 5/13/2019 10:50 AM   Drainage Amount None 5/13/2019 10:50 AM   Odor None 5/13/2019 10:50 AM   Silvia-wound Assessment Non-blanchable erythema 5/10/2019 10:28 PM   Number of days: 22       Wound 05/29/19 Thigh Right;Upper #2 (Active)   Wound Image   5/29/2019 11:18 AM   Wound Pressure Stage  2 5/29/2019 11:18 AM   Wound Cleansed Rinsed/Irrigated with saline 5/29/2019 11:18 AM   Wound Length (cm) 2.1 cm 5/29/2019 11:18 AM   Wound Width (cm) 1.7 cm 5/29/2019 11:18 AM   Wound Depth (cm) 0.1 cm 5/29/2019 11:18 AM   Wound Surface Area (cm^2) 3.57 cm^2 5/29/2019 11:18 AM   Wound Volume (cm^3) 0.36 cm^3 5/29/2019 11:18 AM   Drainage Amount Small 5/29/2019 11:18 AM   Drainage Description Serosanguinous 5/29/2019 11:18 AM   Odor None 5/29/2019 11:18 AM   Margins Defined edges 5/29/2019 11:18 AM   Silvia-wound Assessment Intact;Fragile 5/29/2019 11:18 AM   Non-staged Wound Description Full thickness 5/29/2019 11:18 AM   Red%Wound Bed 10 5/29/2019 11:18 AM   Yellow%Wound Bed 90 5/29/2019 11:18 AM   Number of days: 0       Wound 05/29/19 Buttocks Right #3 (Active)   Wound Image   5/29/2019 11:18 AM   Wound Pressure Stage  2 5/29/2019 11:18 AM   Wound Cleansed Rinsed/Irrigated with saline 5/29/2019 11:18 AM   Wound Length (cm) 1.5 cm 5/29/2019 11:18 AM   Wound Width (cm) 1.7 cm 5/29/2019 11:18 AM   Wound Depth (cm) 0.1 cm 5/29/2019 11:18 AM   Wound Surface Area (cm^2) 2.55 cm^2 5/29/2019 11:18 AM   Wound Volume (cm^3) 0.26 cm^3 5/29/2019 11:18 AM   Drainage Amount Small 5/29/2019 11:18 AM   Drainage Description Serosanguinous 5/29/2019 11:18 AM   Odor None 5/29/2019 11:18 AM   Margins Defined edges 5/29/2019 11:18 AM   Non-staged Wound Description Full thickness 5/29/2019 11:18 AM   Red%Wound Bed 15 5/29/2019 11:18 AM   Yellow%Wound Bed 85 5/29/2019 11:18 AM   Number of days: 0       P    Plan:                       Recommend to continue wound dressing with wound vac       At 125, continous mode and change 3 times a week. Return in 4 weeks        Treatment Note please see attached Discharge Instructions    In my professional opinion this patient would benefit from HBO Therapy: No    Written patient dismissal instructions given to patient and signed by patient or POA. Thank you for choosing [facility] and allowing us to help heal your wounds. If you should have any questions after your visit, please call [phone]. Discharge Instructions       [facility]    Physician Orders and Discharge Instructions  [facility]  [address] [facility]  Telephone: [phone]      NAME:  Leo Jordan  YOB: 1950  MEDICAL RECORD NUMBER:  [id number]    Home Care: [email or web address]    Wound Location:  Right upper thigh;   Right buttock    Dressing orders: 1. Cleanse wound(s) with normal saline. 2. Apply dry SILVERCEL OR CALCIUM ALGINATE WITH Ag or eqivalent to wound bed. 3. Cover with 4x4's and wrap with gauze (theresa or kerlix)  4. Change  Every other day or Monday, Wednesday, and Friday    Wound Location: Right Ischeal    Dressing orders:  1. Cleanse wound(s) with Normal Saline. 2. Apply adaptic or mepitel over structures such as bone, tendon, and vessels  3. Picture frame wound with vac drape  4. Apply Black sponge per wound vac technique. 5. Bridge to a non-pressure area if necessary.   6. Settings: 125 mm Hg CONTINUOUS  7. Change Wound Vac 3 times a week. Today only:  Saline wet to moist packing. Compression: none    Other Instructions: Low air loss mattress. Turn patient side to side. Limit time sitting. Keep all dressings clean & dry. Keep pressure off the wound(s) at all times. Do not shower, take baths or get wound wet, unless otherwise instructed by your Wound Care doctor. Follow up visit   4 Weeks June 26, 2019 at    For Diabetic patients keep blood sugars below 150 for optimal wound healing. If you experience any of the following, please call the Wound Care Service during business hours: [phone]   *Increase in pain   *Temperature over 101   *Increase in drainage from your wound or a foul odor   *Uncontrolled swelling   *Need for compression bandage changes due to slippage, breakthrough drainage      If you need medical attention outside of business hours, please contact your Primary Care Doctor or go to the nearest emergency room. Keep next scheduled appointment. Please give 24 hour notice if unable to keep appointment. [phone]    PLEASE NOTE: IF YOU ARE UNABLE TO OBTAIN WOUND SUPPLIES, CONTINUE TO USE THE SUPPLIES YOU HAVE AVAILABLE UNTIL YOU ARE ABLE TO REACH US.  IT IS MOST IMPORTANT TO KEEP THE WOUND COVERED AT ALL TIMES  Electronically signed by Giselle Eric MD on 5/29/2019 at 11:47 AM                Electronically signed by Giselle Eric MD on 5/29/2019 at 11:54 AM

## 2019-05-29 NOTE — PROGRESS NOTES
PATIENT: Krissy Bruce : 1950 DOS: 2019     [facility] COMMU    CHIEF COMPLAINT: Uncontrolled diabetes, status post left AKA, weakness. HISTORY OF PRESENT ILLNESS: This is a morbidly obese, 62-year-old gentleman who was sent here from recent stay at [facility]. The patient is a diabetic, chronically uncontrolled. The patient had a recent fall after weakness of the right lower extremity. The patient was brought to ER for evaluation. The patient did undergo evaluation for possibility of injury to his left lower extremity stump site. The patient did undergo evaluation for pain in his right lower extremity and concerned for possibility of acute fracture. The patient had undergone course of physical therapy, occupational therapy, had undergone imaging, was seen by podiatry while hospitalized, evaluation for possibility of acute fracture, dislocation. The patient had been seen by physical therapy and occupational therapy. the patient did make gains. The patient was found to have evidence of swelling, possibility of cellulitis without evidence of acute abscess. No evidence of acute septic arthritis. No evidence of osteomyelitis. The patient was seen by surgical service. The patient has evidence of breakdown in the necrotic region in the right buttock ileocecal region. The patient received local wound care and did undergo evaluation by Dr. Harpreet Ruff. The patient showed some improvement. He was stabilized. He was also seen by cardiology for vascular disease and weakness. The patient has known history of diastolic dysfunction with last ejection fraction of 60%. The patient did make gains and subsequently transferred here for ongoing course of care.     PAST MEDICAL HISTORY: Past medical history included COPD, asthma, morbid obesity, neuropathy, weakness, degenerative joint disease, diabetes, peripheral vascular disease, respiratory impairment, HARSHA, on CPAP, hyperglycemia, peripheral vascular disease, diabetic ulcer of the right foot, weakness, area of sacral breakdown, venous stasis ulcers, status post left AKA. MEDICATIONS: His medications on arrival included the following. He is on Plavix 75 mg daily, vitamin C, sliding scale insulin, Lantus 12 units subcutaneous daily, Combivent 0.5/2.5 daily, Lipitor 20 mg daily, albuterol HFA, aspirin 81 mg daily, Cipro 500 mg twice daily, gabapentin 800 mg 3 times daily. FAMILY HISTORY: Negative for early onset neoplasm. Positive for diabetes and coronary artery disease. SOCIAL HISTORY: The patient currently is a nonsmoker, is a community indwelling individual, has been generally declining in terms of functional status. REVIEW OF SYSTEMS: The patient denied chest pain, palpitations, nausea, vomiting, emesis. No change in his bowel or bladder habits. He is globally weak. Has ongoing intermittent pain in his right lower extremity. No new headaches, fevers or chills. Rest of his 14-point review of systems is unremarkable. OBJECTIVE: His vital signs were stable. He was afebrile at 97.2, pulse is 86, blood pressure is 124/78. He is alert and oriented x3. Normocephalic, atraumatic. He is morbidly obese. Pupils are equal and reactive. Extraocular movements intact. Oral mucosa moist. Chest showed diminished breath sounds. No crackles. No wheezing. Cardiovascular showed a regular rate. Abdomen is soft, obese with positive bowel sounds. Extremities showed +1 dorsal pedal pulse in the right lower extremity. The left stump site is intact. Skin, please refer to nursing notes for his breakdown in the right heel and sacrum. ASSESSMENT AND PLAN:   1. Uncontrolled diabetes. We will monitor blood sugars. or his insulin regimen. 2.  Skin breakdown. The patient received local wound care to his sacrum and right foot. He will be referred to the wound care service, has been seen by surgical service while hospitalized. 3.  UTI.  The patient completed a course of antibiotic therapy. 4.  Weakness. The patient will undergo a course of physical therapy, occupational therapy with a goal of maximization of functional status. Please note, available hospital records, imaging reports, consultant notes, and laboratory results are reviewed.          Electronically Signed By: Iván Bardales M.D. on 05/28/2019 11:27:21  ______________________________  Iván Bardales M.D.  [id number]  D: 05/22/2019 18:20:17  T: 05/23/2019 03:51:17    cc: - [phone] [facility]

## 2019-05-30 ENCOUNTER — OFFICE VISIT (OUTPATIENT)
Dept: GERIATRIC MEDICINE | Age: 69
End: 2019-05-30
Payer: MEDICARE

## 2019-05-30 DIAGNOSIS — L89.313 DECUBITUS ULCER OF ISCHIAL AREA, RIGHT, STAGE III (HCC): ICD-10-CM

## 2019-05-30 DIAGNOSIS — I73.9 PAD (PERIPHERAL ARTERY DISEASE) (HCC): Primary | Chronic | ICD-10-CM

## 2019-05-30 PROCEDURE — 1123F ACP DISCUSS/DSCN MKR DOCD: CPT | Performed by: PHYSICIAN ASSISTANT

## 2019-05-30 PROCEDURE — 99308 SBSQ NF CARE LOW MDM 20: CPT | Performed by: PHYSICIAN ASSISTANT

## 2019-06-05 VITALS
TEMPERATURE: 97.8 F | SYSTOLIC BLOOD PRESSURE: 144 MMHG | BODY MASS INDEX: 46.79 KG/M2 | DIASTOLIC BLOOD PRESSURE: 74 MMHG | RESPIRATION RATE: 18 BRPM | HEART RATE: 60 BPM | WEIGHT: 315 LBS

## 2019-06-05 NOTE — PROGRESS NOTES
PATIENT: Gely Mukherjee : 1950 DOS: 2019     [facility] COMMU    HPI: The patient is being seen today for acute visit regarding ongoing buttocks pain due to ulceration. The patient has approximate 7-9 cm ulcer that is approximately 0.5 to 1 cm deep, healing well, seen by wound care nurse here, as well as wound care nurse practitioner. The patient states that when up in wheelchair, it is too painful for him to participate in PT/OT, and patient currently receives Percocet 5/325 q.6 hours p.r.n. Discussed with patient he does need to participate in physical therapy to continue ongoing recovery. The patient currently using Tylenol as well to help with pain control, although he states that it is not substantial. Discussed that we can increase or we can add a 1 half tab 5/325 mg tablet b.i.d. p.r.n. for breakthrough pain when he is up in his chair, only to be used at that time. The patient agreed. The patient otherwise states some groin pain that was relieved with rest, does not have any history he claims of herniation. The patient is morbidly obese, very difficult to palpate any evidence of hernia due to very severe obesity. The patient also stated his right lower extremity was tender in the calf area, deeper. The patient has had ultrasounds in the past that have been negative. The patient has a stent in place. For now, is on Plavix and aspirin as prophylaxis. Discussed that we will have follow up appointment to discuss potentially adding anticoagulation due to history. The patient agreed, may do ultrasound as well today if patient agrees. The patient was abstinent against doing another ultrasound. MEDICATIONS: Reviewed. REVIEW OF SYSTEMS: CONSTITUTIONAL: No new fever, chills, nausea, vomiting, or fatigue beyond patient's baseline. HEENT: Denies headache, confusion, lightheadedness or LOC. NEUROLOGIC: The patient does have some numbness and tingling in his right lower extremity.  No new focal weakness. ENT: No new rhinorrhea, stuffiness, sneezing or sore throat. CARDIAC: Denies chest pain or palpitations. PULMONARY: Denies cough, shortness of breath, pain on inspiration or wheezing. GI: States no abdominal pain while lying, some groin pain when up in chair. He denies any constipation or diarrhea. States he has regular bowel movements, had 1 this morning. PSYCHIATRIC: No new complaints of new issues or depression or anxiety symptoms. VS:blood pressure 144/74, pulse 60 bpm, respirations 18, blood sugar was 239, weight 345 pounds, temp 97.8. PHYSICAL EXAMINATION: GENERAL: Overall good hygiene, bright affect, in no acute distress. HEENT: Pupils are equal, round and reactive to light. No icterus or injection. MMM without erythema/exudate. Cardiac showed regular rate and rhythm, difficulty auscultating due to body habitus. The patient has right side lymphedema, plus 1-2 pitting edema and no JVD. Trace pedal pulse likely due to body habitus and edema, will likely conduct ultrasound. PULMONARY: Lungs CTA, difficulty auscultating, decreased entry and effort; however, likely due to body habitus preventing adequate listening. No wheezes or rhonchi. ABDOMINAL: Unable to distinguish bowel sounds, very distended abdomen with body fat, nontender. She has very hard time palpating for any herniation throughout abdomen and groin. MENTAL STATUS: The patient is awake and alert, oriented 3/3. ASSESSMENT AND PLAN:   1. Pain control/chronic coccyx ulcer - we will add Percocet 5/325 1 half tab p.o. b.i.d. p.r.n. for breakthrough pain while up in wheelchair. 2.  History of PAD / right leg stent placement - patient on Plavix and aspirin. We will continue. We will further evaluate for possible need for anticoagulation if patient is at severe enough risk. We will likely do ultrasound today to assess patency of venous system in lower extremity.          Electronically Signed By: Jessica Soria PA-C on 05/31/2019 10:30:50  ______________________________  JEWEL Zheng/[id number]  D: 05/30/2019 13:35:35  T: 05/31/2019 10:17:24    cc: - [phone] [facility]

## 2019-06-05 NOTE — PROGRESS NOTES
discharge, warmth, redness or cellulitis from wound site. MENTAL STATUS: The patient is awake, alert, oriented 3/3. ASSESSMENT AND PLAN:  1. Hyperglycemia/diabetes mellitus, type 2 - We will increase basaglar to 15 units from 12 prior. We will ensure carbohydrate diet is followed per the patient's tolerance and compliance. We will follow up as needed for any other changes that need to be made.          Electronically Signed By: Diane Barbour PA-C on 05/23/2019 22:14:45  ______________________________  Daine Barbour PA-C  /[id number]  D: 05/23/2019 13:12:11  T: 05/23/2019 13:43:18    cc: - [phone] [facility]

## 2019-06-11 ENCOUNTER — OFFICE VISIT (OUTPATIENT)
Dept: GERIATRIC MEDICINE | Age: 69
End: 2019-06-11
Payer: MEDICARE

## 2019-06-11 DIAGNOSIS — R52 PAIN MANAGEMENT: Primary | ICD-10-CM

## 2019-06-11 DIAGNOSIS — L97.119: ICD-10-CM

## 2019-06-11 PROCEDURE — 99308 SBSQ NF CARE LOW MDM 20: CPT | Performed by: PHYSICIAN ASSISTANT

## 2019-06-11 PROCEDURE — 1123F ACP DISCUSS/DSCN MKR DOCD: CPT | Performed by: PHYSICIAN ASSISTANT

## 2019-06-13 ENCOUNTER — OFFICE VISIT (OUTPATIENT)
Dept: GERIATRIC MEDICINE | Age: 69
End: 2019-06-13
Payer: MEDICARE

## 2019-06-13 DIAGNOSIS — L89.616 PRESSURE INJURY OF DEEP TISSUE OF RIGHT HEEL: ICD-10-CM

## 2019-06-13 DIAGNOSIS — S81.801D LEG WOUND, RIGHT, SUBSEQUENT ENCOUNTER: Primary | ICD-10-CM

## 2019-06-13 PROCEDURE — 99308 SBSQ NF CARE LOW MDM 20: CPT | Performed by: PHYSICIAN ASSISTANT

## 2019-06-13 PROCEDURE — 1123F ACP DISCUSS/DSCN MKR DOCD: CPT | Performed by: PHYSICIAN ASSISTANT

## 2019-06-18 ENCOUNTER — OFFICE VISIT (OUTPATIENT)
Dept: GERIATRIC MEDICINE | Age: 69
End: 2019-06-18
Payer: MEDICARE

## 2019-06-18 DIAGNOSIS — R63.4 ABNORMAL WEIGHT LOSS: Primary | ICD-10-CM

## 2019-06-18 DIAGNOSIS — R60.0 LEG EDEMA: ICD-10-CM

## 2019-06-18 PROCEDURE — 1123F ACP DISCUSS/DSCN MKR DOCD: CPT | Performed by: PHYSICIAN ASSISTANT

## 2019-06-18 PROCEDURE — 99308 SBSQ NF CARE LOW MDM 20: CPT | Performed by: PHYSICIAN ASSISTANT

## 2019-06-20 NOTE — PROGRESS NOTES
PATIENT: Pratima Odell : 1950 DOS: 2019     [facility] COMMU    HPI: The patient is being seen today for acute visit regarding ongoing chronic rear end hand and hip pain. The patient currently has Percocet 5/325 q.6hours. as well as a half tab q.12hours. The patient states that the half tablet q.12hours. is not spaced in a correct way to help him to discuss the strategies,   arrived the strategy of doing half tablet once a day and throughout the day for acute pain such as physical therapy and for 1 q.h.s. tab with pain related to transferring into bed and promoting sleep. The patient agreed. No further complaints noted today. MEDICATIONS:  Reviewed. REVIEW OF SYSTEMS:  CONSTITUTIONAL:  No new fever, chills, nausea, vomiting, increased fatigue or weakness. HEENT:  Denies headache, confusion, lightheadedness or change in vision. ENT:  Denies rhinorrhea, stuffiness, sneezing or sore throat. CARDIAC:  No chest pain or palpitations. PULMONARY:  Denies cough, SOB, POI or wheezing. SKIN: The patient does complain of coccyx ulcerations as well as on his hip, complaints of pain associated with these. Per wound care nursing and patient, they are doing well. PSYCHIATRIC:  The patient's mood is stable and within normal limits. PHYSICAL EXAMINATION: Vital signs are reviewed on site today. GENERAL:  Fair hygiene. Bright affect. No acute distress. HEENT:  Pupils are equal, round and reactive minimally. No injection or icterus noted. MMM. No erythema or exudate. CARDIAC:  Regular rate. No JVD. Some +1-2 pitting edema on remaining leg. PULMONARY:  LSCTA. ABDOMEN:  Normal bowel sounds. Soft, nontender. MENTAL STATUS:  The patient is awake, alert and oriented 3/3. ASSESSMENT AND PLAN:  Pain control with chronic ulcers - will change one half tab Percocet to 5/325 mg q.12 hours to one half tab q.d. and one half tablet q.h.s.   We will follow up for further date to assess patient's pain levels and tolerance and hopefully promote decrease in utilization of narcotic pain medications.         Electronically Signed By: Angélica Marie PA-C on 06/14/2019 17:39:51  ______________________________  Angélica Marie PA-C  [id number]  D: 06/12/2019 12:37:29  T: 06/13/2019 08:51:44    cc: - [phone] [facility]

## 2019-06-20 NOTE — PROGRESS NOTES
PATIENT: Rohit Anne : 1950 DOS: 2019     [facility] COMMU    HPI: The patient being seen today for presumed 13-pound weight loss in 1 week and requested that the patient be reweighed today. The patient's last weight was 327. Re-weighing today was 331 for a actual 4-pound weight gain since last week. The patient is obese and ambulates via electric wheelchair, has a left leg above-knee amputation. The patient is fairly noncompliant with his care, has significant right leg lymphedema history, very swollen right leg, does receive diuretic therapy. He states that his pulse is patent. The patient does have a trace pedal pulse on his right side and significant +2 pitting edema. The patient reports a new ulceration in the midfoot. It is being treated by wound care nurse here at facility. The patient does have a significant history of diabetes mellitus, type 2 and neuropathy. He states that he cannot feel his foot due to the ligament tear in the past. It does appreciate the neuropathy due to diabetes. The patient has a recent right foot x-ray showing no acute signs of osteomyelitis. We will continue to watch very closely. I educated the patient on the risks to his life and limb. He stated that he had to have amputation due to another significant wound. He would rather not live. Stated that there are things we can do to improve wound healing; however, the patient needs to be on accountable to his own care as much as possible. No further complaints today. MEDICATIONS: Reviewed. REVIEW OF SYSTEMS: CONSTITUTIONAL: Afebrile. No new nausea, vomiting, fatigue. HEENT: Denies headache, confusion. No new rhinorrhea, stuffiness, sore throat or sneezing. CARDIAC: Denies chest pain, palpitations. PULMONARY:  Denies cough, SOB, POI or wheezing at this time and does have history of orthopnea and dyspnea intermittently.    SKIN: The patient has some skin breakdown on right lateral lower leg as well as a new wound on the heel or sole of foot per x-ray does not penetrate to bone. PSYCHIATRIC: The patient's mood as stated within normal limits. Remaining 14-point review of systems unremarkable at this time. VS: Vital signs reviewed on site today. PHYSICAL EXAMINATION: GENERAL: Good hygiene. Bright affect. No acute distress. HEENT: Pupils are equal, round, reactive to light and mild icterus in bilateral conjunctivae. MMM without erythema or exudate. CARDIAC: Regular rate. No JVD noted. Significant right-sided lymphadenopathy and edema, +2 pitting. PULMONARY: Diminished lung sounds bilaterally. ABDOMEN: Normal bowel sounds, soft, nontender abdomen. Obesity. : The patient does have a Babin catheter present at this time. Urine is clear and straw colored. No evidence of hematuria or bacterial growth noted in tubing. SKIN: Since the right lower extremity has dry, flaky skin as well as some abrasions. Wound is covered and dressed as well as a wound on his right foot on the sole. MENTAL STATUS: The patient is awake, alert, oriented 3/3. ASSESSMENT AND PLAN:   1. Right lower leg wound and right foot wound-We will do CBC with diff to follow routinely throughout each week. We will also have wound care nurse continue to manage the patient as follow up this month for wound care physician at [facility].          Electronically Signed By: Debra Foss PA-C on 06/14/2019 17:40:13  ______________________________  Debra Foss PA-C  [id number]  D: 06/13/2019 11:06:30  T: 06/13/2019 14:43:11    cc: - [phone] [facility]

## 2019-06-25 ENCOUNTER — OFFICE VISIT (OUTPATIENT)
Dept: GERIATRIC MEDICINE | Age: 69
End: 2019-06-25
Payer: MEDICARE

## 2019-06-25 DIAGNOSIS — L03.90 CELLULITIS, UNSPECIFIED CELLULITIS SITE: Primary | ICD-10-CM

## 2019-06-25 DIAGNOSIS — I10 ESSENTIAL HYPERTENSION: ICD-10-CM

## 2019-06-25 DIAGNOSIS — E11.9 DIABETES MELLITUS WITHOUT COMPLICATION (HCC): ICD-10-CM

## 2019-06-25 PROCEDURE — 99309 SBSQ NF CARE MODERATE MDM 30: CPT | Performed by: INTERNAL MEDICINE

## 2019-06-25 PROCEDURE — 3045F PR MOST RECENT HEMOGLOBIN A1C LEVEL 7.0-9.0%: CPT | Performed by: INTERNAL MEDICINE

## 2019-06-25 PROCEDURE — 1123F ACP DISCUSS/DSCN MKR DOCD: CPT | Performed by: INTERNAL MEDICINE

## 2019-06-28 NOTE — PROGRESS NOTES
and reactive to light minimally. No icterus. No injection. Normal hearing. MMM:  No erythema, exudate, or thrush. CARDIAC:  Regular rate and rhythm. No JVD noted. Intact distal pulses, although difficult to palpate significant lymphedema on his remaining leg. PULMONARY:  lung sounds diminished mildly in both lung fields. Otherwise, no wheezes or rhonchi noted. ABDOMEN:  Normal bowel sounds, soft, nontender abdomen. Obesity noted. SKIN:  Remaining leg shows signs of significant lymphedema. Intact distal pulse however. Skin blanches, warm, dry, intact, normal skin turgor, some dry flaky areas as well on lower limb. MENTAL STATUS:  The patient is awake, alert, oriented, 3/3. ASSESSMENT AND PLAN:  Pain control/right hip pain - we will change Percocet order to 5/325 mg p.o. q.6 hours p.r.n. Revisit for pain medication needs in 2 weeks. We will suggest pain management follow up as tolerated per patient schedule.         Electronically Signed By: Haider Mendoza PA-C on 06/26/2019 19:11:29  ______________________________  Haider Mendoza PA-C  [id number]  D: 06/23/2019 21:03:02  T: 06/23/2019 23:06:06    cc: - [phone] [facility]

## 2019-07-02 ENCOUNTER — OFFICE VISIT (OUTPATIENT)
Dept: GERIATRIC MEDICINE | Age: 69
End: 2019-07-02
Payer: MEDICARE

## 2019-07-02 DIAGNOSIS — G57.93 NEUROPATHIC PAIN OF BOTH LEGS: Primary | ICD-10-CM

## 2019-07-02 DIAGNOSIS — Z91.14 POOR COMPLIANCE WITH MEDICATION: ICD-10-CM

## 2019-07-02 PROCEDURE — 99308 SBSQ NF CARE LOW MDM 20: CPT | Performed by: PHYSICIAN ASSISTANT

## 2019-07-02 PROCEDURE — 1123F ACP DISCUSS/DSCN MKR DOCD: CPT | Performed by: PHYSICIAN ASSISTANT

## 2019-07-23 ENCOUNTER — OFFICE VISIT (OUTPATIENT)
Dept: GERIATRIC MEDICINE | Age: 69
End: 2019-07-23
Payer: MEDICARE

## 2019-07-23 DIAGNOSIS — E08.22 DIABETES MELLITUS DUE TO UNDERLYING CONDITION, CONTROLLED, WITH STAGE 2 CHRONIC KIDNEY DISEASE, WITH LONG-TERM CURRENT USE OF INSULIN (HCC): ICD-10-CM

## 2019-07-23 DIAGNOSIS — Z86.39 HX OF HYPERLIPIDEMIA: Primary | ICD-10-CM

## 2019-07-23 DIAGNOSIS — N18.2 DIABETES MELLITUS DUE TO UNDERLYING CONDITION, CONTROLLED, WITH STAGE 2 CHRONIC KIDNEY DISEASE, WITH LONG-TERM CURRENT USE OF INSULIN (HCC): ICD-10-CM

## 2019-07-23 DIAGNOSIS — Z79.4 DIABETES MELLITUS DUE TO UNDERLYING CONDITION, CONTROLLED, WITH STAGE 2 CHRONIC KIDNEY DISEASE, WITH LONG-TERM CURRENT USE OF INSULIN (HCC): ICD-10-CM

## 2019-07-23 DIAGNOSIS — E66.9 OBESE BODY HABITUS: ICD-10-CM

## 2019-07-23 PROCEDURE — 1123F ACP DISCUSS/DSCN MKR DOCD: CPT | Performed by: PHYSICIAN ASSISTANT

## 2019-07-23 PROCEDURE — 99308 SBSQ NF CARE LOW MDM 20: CPT | Performed by: PHYSICIAN ASSISTANT

## 2019-07-27 LAB
BASOPHILS ABSOLUTE: 0.1 K/UL (ref 0–0.2)
BASOPHILS RELATIVE PERCENT: 1.1 %
EOSINOPHILS ABSOLUTE: 0.4 K/UL (ref 0–0.7)
EOSINOPHILS RELATIVE PERCENT: 4.1 %
HCT VFR BLD CALC: 38.7 % (ref 42–52)
HEMOGLOBIN: 12.9 G/DL (ref 14–18)
LYMPHOCYTES ABSOLUTE: 1.1 K/UL (ref 1–4.8)
LYMPHOCYTES RELATIVE PERCENT: 11.3 %
MCH RBC QN AUTO: 31.3 PG (ref 27–31.3)
MCHC RBC AUTO-ENTMCNC: 33.4 % (ref 33–37)
MCV RBC AUTO: 93.9 FL (ref 80–100)
MONOCYTES ABSOLUTE: 0.8 K/UL (ref 0.2–0.8)
MONOCYTES RELATIVE PERCENT: 8.2 %
NEUTROPHILS ABSOLUTE: 7.3 K/UL (ref 1.4–6.5)
NEUTROPHILS RELATIVE PERCENT: 75.3 %
PDW BLD-RTO: 15.6 % (ref 11.5–14.5)
PLATELET # BLD: 250 K/UL (ref 130–400)
RBC # BLD: 4.12 M/UL (ref 4.7–6.1)
WBC # BLD: 9.7 K/UL (ref 4.8–10.8)

## 2019-07-29 LAB
ALBUMIN SERPL-MCNC: 2.5 G/DL (ref 3.5–4.6)
ALP BLD-CCNC: 153 U/L (ref 35–104)
ALT SERPL-CCNC: <5 U/L (ref 0–41)
ANION GAP SERPL CALCULATED.3IONS-SCNC: 11 MEQ/L (ref 9–15)
AST SERPL-CCNC: 28 U/L (ref 0–40)
BILIRUB SERPL-MCNC: 0.6 MG/DL (ref 0.2–0.7)
BUN BLDV-MCNC: 28 MG/DL (ref 8–23)
CALCIUM SERPL-MCNC: 8.5 MG/DL (ref 8.5–9.9)
CHLORIDE BLD-SCNC: 103 MEQ/L (ref 95–107)
CO2: 27 MEQ/L (ref 20–31)
CREAT SERPL-MCNC: 0.8 MG/DL (ref 0.7–1.2)
GFR AFRICAN AMERICAN: >60
GFR NON-AFRICAN AMERICAN: >60
GLOBULIN: 3.1 G/DL (ref 2.3–3.5)
GLUCOSE BLD-MCNC: 228 MG/DL (ref 70–99)
POTASSIUM SERPL-SCNC: 4.7 MEQ/L (ref 3.4–4.9)
SODIUM BLD-SCNC: 141 MEQ/L (ref 135–144)
TOTAL PROTEIN: 5.6 G/DL (ref 6.3–8)

## 2019-07-29 NOTE — PROGRESS NOTES
wheezing. The patient is generally short of breath at baseline. Does smoke frequently throughout the day. Denies any GI or  complaints. PSYCHIATRIC:  No new anxiety, stress, depression or suicidal ideation. Remaining 14-point review of systems unremarkable. PHYSICAL EXAMINATION: VITAL SIGNS:  Reviewed on site today. GENERAL:  Good hygiene, bright affect today. No acute distress. The patient is pleasant and cooperative today. Seems to be intermittently compliant and/or cooperative on any given day. HEENT:  Pupils equal, round, reactive to light. No icterus or injection. MMM without erythema, exudate. CARDIAC:  Regular rate and rhythm. No JVD, +1 pitting lower extremity edema on right lower leg. Does have a history of lymphedema. Intact +1 pedal pulse on the right side. PULMONARY:  Overall increased effort and decreased entry. The patient has COPD history. No acute rhonchi or rales noted. There is some mild to moderate expiratory wheezing in bilateral lung fields. ABDOMEN: Normoactive bowel sounds, soft, nontender abdomen. The patient is obese throughout the abdomen. SKIN:  The patient's right lower limb shows some dryness and excoriation. Wound has been there on medial left lower leg for sometime. The patient has had lymphedema noted as well. MENTAL STATUS:  The patient is awake, alert, oriented 3/3. ASSESSMENT AND PLAN:  1. Hyperlipidemia - will do lipid panel on Thursday to reassess current therapy. We will follow up as needed. 2.   Obesity - patient educated on caloric access and overweight. No new acute desire to intervene. We will follow up if the patient needs assistance with dietary guidance. 3.   Diabetes mellitus type 2 - will do a hemoglobin A1c for Thursday along with lipid panel. We will assess for efficacy of therapy. We will adjust insulin and medications as needed.          Electronically Signed By: Danielle Bolivar PA-C on 07/24/2019

## 2019-07-30 ENCOUNTER — OFFICE VISIT (OUTPATIENT)
Dept: GERIATRIC MEDICINE | Age: 69
End: 2019-07-30
Payer: MEDICARE

## 2019-07-30 DIAGNOSIS — D72.829 LEUKOCYTOSIS, UNSPECIFIED TYPE: Primary | ICD-10-CM

## 2019-07-30 DIAGNOSIS — E86.0 DEHYDRATION: ICD-10-CM

## 2019-07-30 DIAGNOSIS — L08.9 LEFT FOOT INFECTION: ICD-10-CM

## 2019-07-30 DIAGNOSIS — R50.9 FEVER, UNSPECIFIED FEVER CAUSE: ICD-10-CM

## 2019-07-30 DIAGNOSIS — R79.9 ELEVATED BUN: ICD-10-CM

## 2019-07-30 PROCEDURE — 99309 SBSQ NF CARE MODERATE MDM 30: CPT | Performed by: PHYSICIAN ASSISTANT

## 2019-07-30 PROCEDURE — 1123F ACP DISCUSS/DSCN MKR DOCD: CPT | Performed by: PHYSICIAN ASSISTANT

## 2019-07-31 LAB
BASOPHILS ABSOLUTE: ABNORMAL /ΜL
BASOPHILS RELATIVE PERCENT: 0.4 %
BUN BLDV-MCNC: 45 MG/DL
CALCIUM SERPL-MCNC: 8.5 MG/DL
CHLORIDE BLD-SCNC: 100 MMOL/L
CO2: 29 MMOL/L
CREAT SERPL-MCNC: 0.9 MG/DL
EOSINOPHILS ABSOLUTE: ABNORMAL /ΜL
EOSINOPHILS RELATIVE PERCENT: 0.4 %
GFR CALCULATED: NORMAL
GLUCOSE BLD-MCNC: 162 MG/DL
HCT VFR BLD CALC: 32.5 % (ref 41–53)
HEMOGLOBIN: 10.4 G/DL (ref 13.5–17.5)
LYMPHOCYTES ABSOLUTE: 0.6 /ΜL
LYMPHOCYTES RELATIVE PERCENT: 7.1 %
MCH RBC QN AUTO: 30.3 PG
MCHC RBC AUTO-ENTMCNC: 32 G/DL
MCV RBC AUTO: 94.6 FL
MONOCYTES ABSOLUTE: 0.8 /ΜL
MONOCYTES RELATIVE PERCENT: 9 %
NEUTROPHILS ABSOLUTE: 7.5 /ΜL
NEUTROPHILS RELATIVE PERCENT: 83.1 %
PLATELET # BLD: 231 K/ΜL
PMV BLD AUTO: 9.7 FL
POTASSIUM SERPL-SCNC: 4.7 MMOL/L
RBC # BLD: 3.43 10^6/ΜL
SEDIMENTATION RATE, ERYTHROCYTE: 87
SODIUM BLD-SCNC: 139 MMOL/L
WBC # BLD: 9.1 10^3/ML

## 2019-08-01 ENCOUNTER — OFFICE VISIT (OUTPATIENT)
Dept: GERIATRIC MEDICINE | Age: 69
End: 2019-08-01
Payer: MEDICARE

## 2019-08-01 DIAGNOSIS — R82.998 DARK URINE: ICD-10-CM

## 2019-08-01 DIAGNOSIS — R89.5 POSITIVE CULTURE FINDINGS IN WOUND: ICD-10-CM

## 2019-08-01 DIAGNOSIS — I10 ESSENTIAL HYPERTENSION: Primary | ICD-10-CM

## 2019-08-01 PROCEDURE — 99309 SBSQ NF CARE MODERATE MDM 30: CPT | Performed by: PHYSICIAN ASSISTANT

## 2019-08-01 PROCEDURE — 1123F ACP DISCUSS/DSCN MKR DOCD: CPT | Performed by: PHYSICIAN ASSISTANT

## 2019-08-01 NOTE — PROGRESS NOTES
PATIENT: Safia Hoang : 1950 DOS: 2019     [facility] COMMU    HPI: The patient is being seen today for acute visit regarding acute bout of fever yesterday as well as an elevated white blood cell count that has continued to rise slowly. The patient's last white blood cell count was 9.7 a few days ago and today it was 11.7 approximately. The patient had fever of approximately 103 yesterday. However, with Tylenol therapy today, the patient is afebrile. He states that he has no new complaints or issues that he is feeling fine. Denies any acute issue. Orders placed for PICC line with IV Zosyn and vancomycin 3.375 mg q.8hours. and 1 g q.12hours. respectively as well as IV normal saline 1 L x60 mL an hour. These orders were not started yet. Upon arrival today, currently, lungs sound clear. We are still awaiting blood and wound cultures to come back. Currently, no reports. BUN today was approximately 38 showing some dehydration. The patient's urine on inspection was somewhat dark on Babin tubing. However, the patient does not say anything new is going on. Remaining right lower leg does show lymphedema and swelling, a little bit more redness than normal. Nursing staff reports that they found some possible maggots and/or flies on right lower extremity wound of foot, which is concerning for possible infection. The patient offers no new complaints. MEDICATIONS: Reviewed. REVIEW OF SYSTEMS: CONSTITUTIONAL: Denies any fever, chills, today. He does have a history of high fever over the past 48 hours. No nausea, vomiting, increased fatigue or weakness. HEENT: Denies any LOC, headache, confusion, lightheadedness. Yesterday staff reported some mild mental status changes. No new stuffiness, sneezing, sore throat, rhinorrhea or allergy symptoms of note. CARDIAC: No chest pain, palpitations, orthopnea, CANCINO or claudication. The patient does ambulate only with his walker.  Does not complain of any

## 2019-08-02 LAB
BASOPHILS ABSOLUTE: 0.1 /ΜL
BASOPHILS RELATIVE PERCENT: 0.7 %
BUN BLDV-MCNC: 37 MG/DL
CALCIUM SERPL-MCNC: 8.3 MG/DL
CHLORIDE BLD-SCNC: 104 MMOL/L
CO2: 27 MMOL/L
CREAT SERPL-MCNC: 0.7 MG/DL
EOSINOPHILS ABSOLUTE: 0.3 /ΜL
EOSINOPHILS RELATIVE PERCENT: 3.7 %
GFR CALCULATED: 113
GLUCOSE BLD-MCNC: 203 MG/DL
HCT VFR BLD CALC: 31.1 % (ref 41–53)
HEMOGLOBIN: 10.1 G/DL (ref 13.5–17.5)
LYMPHOCYTES ABSOLUTE: 0.8 /ΜL
LYMPHOCYTES RELATIVE PERCENT: 10.4 %
MCH RBC QN AUTO: 30.3 PG
MCHC RBC AUTO-ENTMCNC: 32.4 G/DL
MCV RBC AUTO: 93.8 FL
MONOCYTES ABSOLUTE: 0.7 /ΜL
MONOCYTES RELATIVE PERCENT: 8.6 %
NEUTROPHILS ABSOLUTE: 5.8 /ΜL
NEUTROPHILS RELATIVE PERCENT: 76.6 %
PLATELET # BLD: 282 K/ΜL
PMV BLD AUTO: 9.3 FL
POTASSIUM SERPL-SCNC: 4.5 MMOL/L
RBC # BLD: 0.1 10^6/ΜL
SODIUM BLD-SCNC: 140 MMOL/L
WBC # BLD: 7.6 10^3/ML

## 2019-08-05 ENCOUNTER — OFFICE VISIT (OUTPATIENT)
Dept: GERIATRIC MEDICINE | Age: 69
End: 2019-08-05
Payer: MEDICARE

## 2019-08-05 DIAGNOSIS — I73.9 PVD (PERIPHERAL VASCULAR DISEASE) (HCC): ICD-10-CM

## 2019-08-05 DIAGNOSIS — E11.42 DIABETIC POLYNEUROPATHY ASSOCIATED WITH TYPE 2 DIABETES MELLITUS (HCC): Primary | ICD-10-CM

## 2019-08-05 PROCEDURE — 1123F ACP DISCUSS/DSCN MKR DOCD: CPT | Performed by: INTERNAL MEDICINE

## 2019-08-05 PROCEDURE — 3045F PR MOST RECENT HEMOGLOBIN A1C LEVEL 7.0-9.0%: CPT | Performed by: INTERNAL MEDICINE

## 2019-08-05 PROCEDURE — 99309 SBSQ NF CARE MODERATE MDM 30: CPT | Performed by: INTERNAL MEDICINE

## 2019-08-06 ENCOUNTER — OFFICE VISIT (OUTPATIENT)
Dept: GERIATRIC MEDICINE | Age: 69
End: 2019-08-06
Payer: MEDICARE

## 2019-08-06 DIAGNOSIS — R60.0 LEG EDEMA, RIGHT: ICD-10-CM

## 2019-08-06 DIAGNOSIS — S81.801D OPEN WOUND OF RIGHT LOWER EXTREMITY, SUBSEQUENT ENCOUNTER: Primary | ICD-10-CM

## 2019-08-06 DIAGNOSIS — R63.5 ABNORMAL WEIGHT GAIN: ICD-10-CM

## 2019-08-06 PROCEDURE — 99309 SBSQ NF CARE MODERATE MDM 30: CPT | Performed by: PHYSICIAN ASSISTANT

## 2019-08-06 PROCEDURE — 1123F ACP DISCUSS/DSCN MKR DOCD: CPT | Performed by: PHYSICIAN ASSISTANT

## 2019-08-07 LAB
BASOPHILS ABSOLUTE: 0.1 /ΜL
BASOPHILS RELATIVE PERCENT: 0.8 %
BUN BLDV-MCNC: 24 MG/DL
C-REACTIVE PROTEIN: 117.7
CALCIUM SERPL-MCNC: 8 MG/DL
CHLORIDE BLD-SCNC: 106 MMOL/L
CO2: 23 MMOL/L
CREAT SERPL-MCNC: 1.1 MG/DL
EOSINOPHILS ABSOLUTE: 0.4 /ΜL
EOSINOPHILS RELATIVE PERCENT: 4.8 %
GFR CALCULATED: 67
GLUCOSE BLD-MCNC: 176 MG/DL
HCT VFR BLD CALC: 29.1 % (ref 41–53)
HEMOGLOBIN: 9.3 G/DL (ref 13.5–17.5)
LYMPHOCYTES ABSOLUTE: 1.2 /ΜL
LYMPHOCYTES RELATIVE PERCENT: 14.8 %
MCH RBC QN AUTO: 30.8 PG
MCHC RBC AUTO-ENTMCNC: 31.8 G/DL
MCV RBC AUTO: 96.9 FL
MONOCYTES ABSOLUTE: 0.7 /ΜL
MONOCYTES RELATIVE PERCENT: 8.8 %
NEUTROPHILS ABSOLUTE: 6 /ΜL
NEUTROPHILS RELATIVE PERCENT: 70.8 %
PLATELET # BLD: 362 K/ΜL
PMV BLD AUTO: 8.4 FL
POTASSIUM SERPL-SCNC: 5.3 MMOL/L
RBC # BLD: 3.01 10^6/ΜL
SODIUM BLD-SCNC: 141 MMOL/L
VANCOMYCIN TROUGH: 33.9
WBC # BLD: 8.5 10^3/ML

## 2019-08-08 ENCOUNTER — OFFICE VISIT (OUTPATIENT)
Dept: GERIATRIC MEDICINE | Age: 69
End: 2019-08-08
Payer: MEDICARE

## 2019-08-08 DIAGNOSIS — S91.301D OPEN WOUND OF RIGHT HEEL, SUBSEQUENT ENCOUNTER: Primary | ICD-10-CM

## 2019-08-08 DIAGNOSIS — Z86.718 HISTORY OF DVT (DEEP VEIN THROMBOSIS): ICD-10-CM

## 2019-08-08 PROCEDURE — 99308 SBSQ NF CARE LOW MDM 20: CPT | Performed by: PHYSICIAN ASSISTANT

## 2019-08-08 PROCEDURE — 1123F ACP DISCUSS/DSCN MKR DOCD: CPT | Performed by: PHYSICIAN ASSISTANT

## 2019-08-09 VITALS
SYSTOLIC BLOOD PRESSURE: 94 MMHG | HEART RATE: 62 BPM | TEMPERATURE: 97.6 F | OXYGEN SATURATION: 91 % | DIASTOLIC BLOOD PRESSURE: 50 MMHG | RESPIRATION RATE: 18 BRPM

## 2019-08-13 ENCOUNTER — OFFICE VISIT (OUTPATIENT)
Dept: GERIATRIC MEDICINE | Age: 69
End: 2019-08-13
Payer: MEDICARE

## 2019-08-13 DIAGNOSIS — E08.22 DIABETES MELLITUS DUE TO UNDERLYING CONDITION, CONTROLLED, WITH STAGE 2 CHRONIC KIDNEY DISEASE, WITH LONG-TERM CURRENT USE OF INSULIN (HCC): ICD-10-CM

## 2019-08-13 DIAGNOSIS — L97.519 DIABETIC ULCER OF RIGHT FOOT DUE TO TYPE 2 DIABETES MELLITUS (HCC): ICD-10-CM

## 2019-08-13 DIAGNOSIS — N18.2 DIABETES MELLITUS DUE TO UNDERLYING CONDITION, CONTROLLED, WITH STAGE 2 CHRONIC KIDNEY DISEASE, WITH LONG-TERM CURRENT USE OF INSULIN (HCC): ICD-10-CM

## 2019-08-13 DIAGNOSIS — L89.613 DECUBITUS ULCER OF RIGHT HEEL, STAGE 3 (HCC): ICD-10-CM

## 2019-08-13 DIAGNOSIS — L08.9 RIGHT FOOT INFECTION: Primary | ICD-10-CM

## 2019-08-13 DIAGNOSIS — Z79.4 DIABETES MELLITUS DUE TO UNDERLYING CONDITION, CONTROLLED, WITH STAGE 2 CHRONIC KIDNEY DISEASE, WITH LONG-TERM CURRENT USE OF INSULIN (HCC): ICD-10-CM

## 2019-08-13 DIAGNOSIS — E11.621 DIABETIC ULCER OF RIGHT FOOT DUE TO TYPE 2 DIABETES MELLITUS (HCC): ICD-10-CM

## 2019-08-13 PROCEDURE — 99309 SBSQ NF CARE MODERATE MDM 30: CPT | Performed by: PHYSICIAN ASSISTANT

## 2019-08-13 PROCEDURE — 1123F ACP DISCUSS/DSCN MKR DOCD: CPT | Performed by: PHYSICIAN ASSISTANT

## 2019-08-13 PROCEDURE — 3045F PR MOST RECENT HEMOGLOBIN A1C LEVEL 7.0-9.0%: CPT | Performed by: PHYSICIAN ASSISTANT

## 2019-08-14 ENCOUNTER — TELEPHONE (OUTPATIENT)
Dept: INFECTIOUS DISEASES | Age: 69
End: 2019-08-14

## 2019-08-14 NOTE — TELEPHONE ENCOUNTER
Per Review of Referral concerning the Right heel wound/infx, Dr Severo Israel states to give next available appt, have patient f/u. Called and no one available at the time. (12:10pm)  Faxed over Update with appt for 8-27-19 at 10am.     Will attempt another call. Called again to SNF nursing confirmed fax Recvd and appt date/time.

## 2019-08-15 ENCOUNTER — OFFICE VISIT (OUTPATIENT)
Dept: GERIATRIC MEDICINE | Age: 69
End: 2019-08-15
Payer: MEDICARE

## 2019-08-15 DIAGNOSIS — L97.519 DIABETIC ULCER OF RIGHT FOOT DUE TO TYPE 2 DIABETES MELLITUS (HCC): Primary | ICD-10-CM

## 2019-08-15 DIAGNOSIS — R60.0 EDEMA OF RIGHT LOWER EXTREMITY: ICD-10-CM

## 2019-08-15 DIAGNOSIS — E11.621 DIABETIC ULCER OF RIGHT FOOT DUE TO TYPE 2 DIABETES MELLITUS (HCC): Primary | ICD-10-CM

## 2019-08-15 PROCEDURE — 1123F ACP DISCUSS/DSCN MKR DOCD: CPT | Performed by: PHYSICIAN ASSISTANT

## 2019-08-15 PROCEDURE — 99308 SBSQ NF CARE LOW MDM 20: CPT | Performed by: PHYSICIAN ASSISTANT

## 2019-08-15 PROCEDURE — 3045F PR MOST RECENT HEMOGLOBIN A1C LEVEL 7.0-9.0%: CPT | Performed by: PHYSICIAN ASSISTANT

## 2019-08-19 LAB
BUN BLDV-MCNC: 24 MG/DL
CALCIUM SERPL-MCNC: 7.9 MG/DL
CHLORIDE BLD-SCNC: 105 MMOL/L
CO2: 27 MMOL/L
CREAT SERPL-MCNC: 1.1 MG/DL
GFR CALCULATED: 67
GLUCOSE BLD-MCNC: 172 MG/DL
POTASSIUM SERPL-SCNC: 4.7 MMOL/L
SODIUM BLD-SCNC: 142 MMOL/L

## 2019-08-20 ENCOUNTER — OFFICE VISIT (OUTPATIENT)
Dept: GERIATRIC MEDICINE | Age: 69
End: 2019-08-20
Payer: MEDICARE

## 2019-08-20 DIAGNOSIS — L08.9 RIGHT FOOT INFECTION: Primary | ICD-10-CM

## 2019-08-20 DIAGNOSIS — R60.0 EDEMA OF RIGHT LOWER EXTREMITY: ICD-10-CM

## 2019-08-20 PROCEDURE — 1123F ACP DISCUSS/DSCN MKR DOCD: CPT | Performed by: PHYSICIAN ASSISTANT

## 2019-08-20 PROCEDURE — 99309 SBSQ NF CARE MODERATE MDM 30: CPT | Performed by: PHYSICIAN ASSISTANT

## 2019-08-22 ENCOUNTER — HOSPITAL ENCOUNTER (OUTPATIENT)
Dept: NON INVASIVE DIAGNOSTICS | Age: 69
Discharge: HOME | End: 2019-08-22
Payer: MEDICARE

## 2019-08-22 ENCOUNTER — OFFICE VISIT (OUTPATIENT)
Dept: GERIATRIC MEDICINE | Age: 69
End: 2019-08-22
Payer: MEDICARE

## 2019-08-22 DIAGNOSIS — S81.801D NON-HEALING WOUND OF LOWER EXTREMITY, RIGHT, SUBSEQUENT ENCOUNTER: Primary | ICD-10-CM

## 2019-08-22 DIAGNOSIS — R60.0 EDEMA OF RIGHT LOWER EXTREMITY: ICD-10-CM

## 2019-08-22 LAB
LV EF: 60 %
LVEF MODALITY: NORMAL

## 2019-08-22 PROCEDURE — 99308 SBSQ NF CARE LOW MDM 20: CPT | Performed by: PHYSICIAN ASSISTANT

## 2019-08-22 PROCEDURE — 1123F ACP DISCUSS/DSCN MKR DOCD: CPT | Performed by: PHYSICIAN ASSISTANT

## 2019-08-22 PROCEDURE — 93306 TTE W/DOPPLER COMPLETE: CPT

## 2019-08-27 ENCOUNTER — OFFICE VISIT (OUTPATIENT)
Dept: GERIATRIC MEDICINE | Age: 69
End: 2019-08-27
Payer: MEDICARE

## 2019-08-27 ENCOUNTER — OFFICE VISIT (OUTPATIENT)
Dept: INFECTIOUS DISEASES | Age: 69
End: 2019-08-27
Payer: MEDICARE

## 2019-08-27 VITALS
DIASTOLIC BLOOD PRESSURE: 72 MMHG | SYSTOLIC BLOOD PRESSURE: 144 MMHG | HEART RATE: 68 BPM | HEIGHT: 72 IN | TEMPERATURE: 98.1 F | RESPIRATION RATE: 16 BRPM | BODY MASS INDEX: 46.79 KG/M2

## 2019-08-27 DIAGNOSIS — L89.313 DECUBITUS ULCER OF ISCHIAL AREA, RIGHT, STAGE III (HCC): Primary | ICD-10-CM

## 2019-08-27 DIAGNOSIS — I73.9 PVD (PERIPHERAL VASCULAR DISEASE) (HCC): ICD-10-CM

## 2019-08-27 DIAGNOSIS — F17.200 NEEDS SMOKING CESSATION EDUCATION: ICD-10-CM

## 2019-08-27 DIAGNOSIS — R60.0 EDEMA OF RIGHT LOWER LEG DUE TO PERIPHERAL VENOUS INSUFFICIENCY: ICD-10-CM

## 2019-08-27 DIAGNOSIS — S91.301D OPEN WOUND OF RIGHT HEEL, SUBSEQUENT ENCOUNTER: Primary | ICD-10-CM

## 2019-08-27 DIAGNOSIS — I87.2 EDEMA OF RIGHT LOWER LEG DUE TO PERIPHERAL VENOUS INSUFFICIENCY: ICD-10-CM

## 2019-08-27 PROCEDURE — 99309 SBSQ NF CARE MODERATE MDM 30: CPT | Performed by: PHYSICIAN ASSISTANT

## 2019-08-27 PROCEDURE — 1123F ACP DISCUSS/DSCN MKR DOCD: CPT | Performed by: PHYSICIAN ASSISTANT

## 2019-08-27 PROCEDURE — 4040F PNEUMOC VAC/ADMIN/RCVD: CPT | Performed by: INTERNAL MEDICINE

## 2019-08-27 PROCEDURE — G8417 CALC BMI ABV UP PARAM F/U: HCPCS | Performed by: INTERNAL MEDICINE

## 2019-08-27 PROCEDURE — 4004F PT TOBACCO SCREEN RCVD TLK: CPT | Performed by: INTERNAL MEDICINE

## 2019-08-27 PROCEDURE — G8427 DOCREV CUR MEDS BY ELIG CLIN: HCPCS | Performed by: INTERNAL MEDICINE

## 2019-08-27 PROCEDURE — 99214 OFFICE O/P EST MOD 30 MIN: CPT | Performed by: INTERNAL MEDICINE

## 2019-08-27 PROCEDURE — 1123F ACP DISCUSS/DSCN MKR DOCD: CPT | Performed by: INTERNAL MEDICINE

## 2019-08-27 ASSESSMENT — ENCOUNTER SYMPTOMS
GASTROINTESTINAL NEGATIVE: 1
RESPIRATORY NEGATIVE: 1

## 2019-08-27 NOTE — PROGRESS NOTES
Subjective:      Patient ID: Moraima Andrews is a 81 y.o. male. HPI    Osteomyelitis right foot   R heel chronic non healing ulcer  · RLE cellulitis       Developed new ulceration lateral aspect of the right foot      Culture from nursing home of the heel shows Proteus Pseudomonas MRSA  Placed on Vanco and Zosyn at nursing home      Was seen in May 2019 treated with Zosyn and discharged on oral Cipro from the hospital did not follow-up after that       Past Medical History        Past Medical History:   Diagnosis Date    Asthma      COPD [facility]      Morbid obesity ([facility]      Neuropathy      [facility]       1 stent in left leg placed 09/2018 and 2 stents in right leg    Sleep apnea      Type 2 diabetes mellitus without complication ([facility]              Past Surgical History         Past Surgical History:   Procedure Laterality Date    RECTAL SURGERY   2015     patient had a boil break open inside rectum. needed surgery for infection removal.            Current Medications:    Scheduled Medications    ipratropium-albuterol  3 mL Inhalation TID    sodium chloride flush  10 mL Intravenous 2 times per day    enoxaparin  40 mg Subcutaneous Daily    vancomycin  15 mg/kg (Adjusted) Intravenous Q12H    piperacillin-tazobactam  3.375 g Intravenous Q8H    insulin lispro  0-12 Units Subcutaneous TID WC    insulin lispro  0-6 Units Subcutaneous Nightly    vancomycin (VANCOCIN) intermittent dosing (placeholder)   Other RX Placeholder    clopidogrel  75 mg Oral Daily    insulin lispro protamine & lispro  10 Units Subcutaneous BID WC    collagenase   Topical Daily            Allergies:  Coconut oil     Social History          Review of Systems   Constitutional: Negative. Respiratory: Negative. Cardiovascular: Negative. Gastrointestinal: Negative. Genitourinary: Negative. Skin: Positive for wound.        Objective:   Physical Exam 96.8

## 2019-08-29 ENCOUNTER — HOSPITAL ENCOUNTER (OUTPATIENT)
Dept: GENERAL RADIOLOGY | Age: 69
Discharge: HOME | End: 2019-08-31
Payer: MEDICARE

## 2019-08-29 DIAGNOSIS — R52 PAIN: ICD-10-CM

## 2019-08-29 PROCEDURE — 73630 X-RAY EXAM OF FOOT: CPT

## 2019-08-30 LAB
BUN BLDV-MCNC: 27 MG/DL
CALCIUM SERPL-MCNC: 8 MG/DL
CHLORIDE BLD-SCNC: 99 MMOL/L
CO2: 35 MMOL/L
CREAT SERPL-MCNC: 1.2 MG/DL
GFR CALCULATED: 60
GLUCOSE BLD-MCNC: 109 MG/DL
POTASSIUM SERPL-SCNC: 3.9 MMOL/L
SODIUM BLD-SCNC: 143 MMOL/L
VANCOMYCIN TROUGH: 14

## 2019-09-03 ENCOUNTER — OFFICE VISIT (OUTPATIENT)
Dept: GERIATRIC MEDICINE | Age: 69
End: 2019-09-03
Payer: MEDICARE

## 2019-09-03 DIAGNOSIS — S81.801S: Primary | ICD-10-CM

## 2019-09-03 DIAGNOSIS — Z71.6 ENCOUNTER FOR SMOKING CESSATION COUNSELING: ICD-10-CM

## 2019-09-03 PROCEDURE — 99308 SBSQ NF CARE LOW MDM 20: CPT | Performed by: PHYSICIAN ASSISTANT

## 2019-09-03 PROCEDURE — 1123F ACP DISCUSS/DSCN MKR DOCD: CPT | Performed by: PHYSICIAN ASSISTANT

## 2019-09-05 ENCOUNTER — OFFICE VISIT (OUTPATIENT)
Dept: GERIATRIC MEDICINE | Age: 69
End: 2019-09-05
Payer: MEDICARE

## 2019-09-05 DIAGNOSIS — L97.519 ULCER OF RIGHT FOOT, UNSPECIFIED ULCER STAGE (HCC): Primary | ICD-10-CM

## 2019-09-05 DIAGNOSIS — I73.9 PVD (PERIPHERAL VASCULAR DISEASE) (HCC): ICD-10-CM

## 2019-09-05 DIAGNOSIS — M86.171: ICD-10-CM

## 2019-09-05 PROCEDURE — 1123F ACP DISCUSS/DSCN MKR DOCD: CPT | Performed by: PHYSICIAN ASSISTANT

## 2019-09-05 PROCEDURE — 99309 SBSQ NF CARE MODERATE MDM 30: CPT | Performed by: PHYSICIAN ASSISTANT

## 2019-09-10 ENCOUNTER — OFFICE VISIT (OUTPATIENT)
Dept: GERIATRIC MEDICINE | Age: 69
End: 2019-09-10
Payer: MEDICARE

## 2019-09-10 DIAGNOSIS — N18.2 DIABETES MELLITUS DUE TO UNDERLYING CONDITION, CONTROLLED, WITH STAGE 2 CHRONIC KIDNEY DISEASE, WITH LONG-TERM CURRENT USE OF INSULIN (HCC): ICD-10-CM

## 2019-09-10 DIAGNOSIS — E08.22 DIABETES MELLITUS DUE TO UNDERLYING CONDITION, CONTROLLED, WITH STAGE 2 CHRONIC KIDNEY DISEASE, WITH LONG-TERM CURRENT USE OF INSULIN (HCC): ICD-10-CM

## 2019-09-10 DIAGNOSIS — Z79.4 DIABETES MELLITUS DUE TO UNDERLYING CONDITION, CONTROLLED, WITH STAGE 2 CHRONIC KIDNEY DISEASE, WITH LONG-TERM CURRENT USE OF INSULIN (HCC): ICD-10-CM

## 2019-09-10 DIAGNOSIS — R60.0 EDEMA OF RIGHT LOWER EXTREMITY: Primary | ICD-10-CM

## 2019-09-10 DIAGNOSIS — E11.621 CHRONIC DIABETIC ULCER OF RIGHT FOOT DETERMINED BY EXAMINATION (HCC): ICD-10-CM

## 2019-09-10 DIAGNOSIS — L97.519 CHRONIC DIABETIC ULCER OF RIGHT FOOT DETERMINED BY EXAMINATION (HCC): ICD-10-CM

## 2019-09-10 PROCEDURE — 1123F ACP DISCUSS/DSCN MKR DOCD: CPT | Performed by: PHYSICIAN ASSISTANT

## 2019-09-10 PROCEDURE — 99308 SBSQ NF CARE LOW MDM 20: CPT | Performed by: PHYSICIAN ASSISTANT

## 2019-09-10 PROCEDURE — 3044F HG A1C LEVEL LT 7.0%: CPT | Performed by: PHYSICIAN ASSISTANT

## 2019-09-12 ENCOUNTER — OFFICE VISIT (OUTPATIENT)
Dept: GERIATRIC MEDICINE | Age: 69
End: 2019-09-12
Payer: MEDICARE

## 2019-09-12 DIAGNOSIS — R60.0 LEG EDEMA, RIGHT: ICD-10-CM

## 2019-09-12 DIAGNOSIS — Z12.11 COLON CANCER SCREENING: Primary | ICD-10-CM

## 2019-09-12 DIAGNOSIS — E11.621 CHRONIC DIABETIC ULCER OF RIGHT FOOT DETERMINED BY EXAMINATION (HCC): Primary | ICD-10-CM

## 2019-09-12 DIAGNOSIS — L97.519 CHRONIC DIABETIC ULCER OF RIGHT FOOT DETERMINED BY EXAMINATION (HCC): Primary | ICD-10-CM

## 2019-09-12 PROCEDURE — 3044F HG A1C LEVEL LT 7.0%: CPT | Performed by: PHYSICIAN ASSISTANT

## 2019-09-12 PROCEDURE — 99308 SBSQ NF CARE LOW MDM 20: CPT | Performed by: PHYSICIAN ASSISTANT

## 2019-09-12 PROCEDURE — 1123F ACP DISCUSS/DSCN MKR DOCD: CPT | Performed by: PHYSICIAN ASSISTANT

## 2019-09-17 ENCOUNTER — OFFICE VISIT (OUTPATIENT)
Dept: GERIATRIC MEDICINE | Age: 69
End: 2019-09-17
Payer: MEDICARE

## 2019-09-17 DIAGNOSIS — E08.621 DIABETIC ULCER OF RIGHT HEEL ASSOCIATED WITH DIABETES MELLITUS DUE TO UNDERLYING CONDITION, LIMITED TO BREAKDOWN OF SKIN (HCC): ICD-10-CM

## 2019-09-17 DIAGNOSIS — I73.9 PAD (PERIPHERAL ARTERY DISEASE) (HCC): Primary | Chronic | ICD-10-CM

## 2019-09-17 DIAGNOSIS — L97.411 DIABETIC ULCER OF RIGHT HEEL ASSOCIATED WITH DIABETES MELLITUS DUE TO UNDERLYING CONDITION, LIMITED TO BREAKDOWN OF SKIN (HCC): ICD-10-CM

## 2019-09-17 PROCEDURE — 99308 SBSQ NF CARE LOW MDM 20: CPT | Performed by: PHYSICIAN ASSISTANT

## 2019-09-17 PROCEDURE — 1123F ACP DISCUSS/DSCN MKR DOCD: CPT | Performed by: PHYSICIAN ASSISTANT

## 2019-10-01 ENCOUNTER — OFFICE VISIT (OUTPATIENT)
Dept: GERIATRIC MEDICINE | Age: 69
End: 2019-10-01
Payer: MEDICARE

## 2019-10-01 DIAGNOSIS — Z97.8 CHRONIC INDWELLING FOLEY CATHETER: ICD-10-CM

## 2019-10-01 DIAGNOSIS — R31.0 GROSS HEMATURIA: Primary | ICD-10-CM

## 2019-10-01 DIAGNOSIS — Z87.440 HX OF RECURRENT URINARY TRACT INFECTION: ICD-10-CM

## 2019-10-01 PROCEDURE — 99308 SBSQ NF CARE LOW MDM 20: CPT | Performed by: PHYSICIAN ASSISTANT

## 2019-10-01 PROCEDURE — G8484 FLU IMMUNIZE NO ADMIN: HCPCS | Performed by: PHYSICIAN ASSISTANT

## 2019-10-01 PROCEDURE — 1123F ACP DISCUSS/DSCN MKR DOCD: CPT | Performed by: PHYSICIAN ASSISTANT

## 2019-10-02 LAB
BILIRUBIN, URINE: NEGATIVE
BLOOD, URINE: POSITIVE
CLARITY: ABNORMAL
COLOR: YELLOW
GLUCOSE URINE: ABNORMAL
KETONES, URINE: NEGATIVE
LEUKOCYTE ESTERASE, URINE: POSITIVE
NITRITE, URINE: NEGATIVE
PH UA: 6 (ref 4.5–8)
PROTEIN UA: POSITIVE
SPECIFIC GRAVITY, URINE: 1.02
UROBILINOGEN, URINE: NORMAL
VANCOMYCIN TROUGH: 15.5

## 2019-10-03 ENCOUNTER — HOSPITAL ENCOUNTER (INPATIENT)
Age: 69
LOS: 16 days | Discharge: NURSING, REHAB OR LONG-TERM CARE | DRG: 853 | End: 2019-10-19
Attending: EMERGENCY MEDICINE | Admitting: INTERNAL MEDICINE
Payer: MEDICARE

## 2019-10-03 ENCOUNTER — APPOINTMENT (OUTPATIENT)
Dept: GENERAL RADIOLOGY | Age: 69
DRG: 853 | End: 2019-10-03
Payer: MEDICARE

## 2019-10-03 DIAGNOSIS — A41.9 SEPTICEMIA (HCC): ICD-10-CM

## 2019-10-03 DIAGNOSIS — M86.60 OTHER CHRONIC OSTEOMYELITIS, UNSPECIFIED SITE (HCC): Primary | ICD-10-CM

## 2019-10-03 DIAGNOSIS — S88.119A BELOW KNEE AMPUTATION (HCC): ICD-10-CM

## 2019-10-03 LAB
ALBUMIN SERPL-MCNC: 2.3 G/DL (ref 3.5–4.6)
ALP BLD-CCNC: 129 U/L (ref 35–104)
ALT SERPL-CCNC: <5 U/L (ref 0–41)
ANION GAP SERPL CALCULATED.3IONS-SCNC: 13 MEQ/L (ref 9–15)
AST SERPL-CCNC: 29 U/L (ref 0–40)
BACTERIA: NEGATIVE /HPF
BASOPHILS ABSOLUTE: 0 K/UL (ref 0–0.2)
BASOPHILS RELATIVE PERCENT: 0.2 %
BILIRUB SERPL-MCNC: 0.3 MG/DL (ref 0.2–0.7)
BILIRUBIN URINE: NEGATIVE
BLOOD, URINE: ABNORMAL
BUN BLDV-MCNC: 24 MG/DL (ref 8–23)
CALCIUM SERPL-MCNC: 8.8 MG/DL (ref 8.5–9.9)
CHLORIDE BLD-SCNC: 96 MEQ/L (ref 95–107)
CLARITY: ABNORMAL
CO2: 30 MEQ/L (ref 20–31)
COLOR: ABNORMAL
CREAT SERPL-MCNC: 0.98 MG/DL (ref 0.7–1.2)
EOSINOPHILS ABSOLUTE: 0.1 K/UL (ref 0–0.7)
EOSINOPHILS RELATIVE PERCENT: 1.7 %
EPITHELIAL CELLS, UA: ABNORMAL /HPF (ref 0–5)
GFR AFRICAN AMERICAN: >60
GFR NON-AFRICAN AMERICAN: >60
GLOBULIN: 4.8 G/DL (ref 2.3–3.5)
GLUCOSE BLD-MCNC: 142 MG/DL (ref 70–99)
GLUCOSE BLD-MCNC: 161 MG/DL (ref 60–115)
GLUCOSE URINE: NEGATIVE MG/DL
HBA1C MFR BLD: 6.5 % (ref 4.8–5.9)
HCT VFR BLD CALC: 35.9 % (ref 42–52)
HEMOGLOBIN: 11.3 G/DL (ref 14–18)
KETONES, URINE: ABNORMAL MG/DL
LACTIC ACID: 1.5 MMOL/L (ref 0.5–2.2)
LACTIC ACID: 3.2 MMOL/L (ref 0.5–2.2)
LEUKOCYTE ESTERASE, URINE: ABNORMAL
LYMPHOCYTES ABSOLUTE: 0.3 K/UL (ref 1–4.8)
LYMPHOCYTES RELATIVE PERCENT: 3 %
MCH RBC QN AUTO: 30.8 PG (ref 27–31.3)
MCHC RBC AUTO-ENTMCNC: 31.4 % (ref 33–37)
MCV RBC AUTO: 98.1 FL (ref 80–100)
MONOCYTES ABSOLUTE: 0.3 K/UL (ref 0.2–0.8)
MONOCYTES RELATIVE PERCENT: 3.8 %
NEUTROPHILS ABSOLUTE: 7.9 K/UL (ref 1.4–6.5)
NEUTROPHILS RELATIVE PERCENT: 91.3 %
NITRITE, URINE: NEGATIVE
PDW BLD-RTO: 18.4 % (ref 11.5–14.5)
PERFORMED ON: ABNORMAL
PH UA: 5 (ref 5–9)
PLATELET # BLD: 373 K/UL (ref 130–400)
POTASSIUM SERPL-SCNC: 4.5 MEQ/L (ref 3.4–4.9)
PROTEIN UA: >=300 MG/DL
RBC # BLD: 3.66 M/UL (ref 4.7–6.1)
RBC UA: ABNORMAL /HPF (ref 0–2)
SODIUM BLD-SCNC: 139 MEQ/L (ref 135–144)
SPECIFIC GRAVITY UA: 1.02 (ref 1–1.03)
TOTAL PROTEIN: 7.1 G/DL (ref 6.3–8)
URINE REFLEX TO CULTURE: YES
UROBILINOGEN, URINE: 1 E.U./DL
WBC # BLD: 8.7 K/UL (ref 4.8–10.8)
WBC UA: >100 /HPF (ref 0–5)
YEAST: PRESENT

## 2019-10-03 PROCEDURE — 2580000003 HC RX 258: Performed by: INTERNAL MEDICINE

## 2019-10-03 PROCEDURE — 6370000000 HC RX 637 (ALT 250 FOR IP): Performed by: INTERNAL MEDICINE

## 2019-10-03 PROCEDURE — 99284 EMERGENCY DEPT VISIT MOD MDM: CPT

## 2019-10-03 PROCEDURE — 87086 URINE CULTURE/COLONY COUNT: CPT

## 2019-10-03 PROCEDURE — 87158 CULTURE TYPING ADDED METHOD: CPT

## 2019-10-03 PROCEDURE — 6360000002 HC RX W HCPCS: Performed by: INTERNAL MEDICINE

## 2019-10-03 PROCEDURE — 71045 X-RAY EXAM CHEST 1 VIEW: CPT

## 2019-10-03 PROCEDURE — 85025 COMPLETE CBC W/AUTO DIFF WBC: CPT

## 2019-10-03 PROCEDURE — 73620 X-RAY EXAM OF FOOT: CPT

## 2019-10-03 PROCEDURE — 83036 HEMOGLOBIN GLYCOSYLATED A1C: CPT

## 2019-10-03 PROCEDURE — 87186 SC STD MICRODIL/AGAR DIL: CPT

## 2019-10-03 PROCEDURE — 6370000000 HC RX 637 (ALT 250 FOR IP): Performed by: EMERGENCY MEDICINE

## 2019-10-03 PROCEDURE — 81001 URINALYSIS AUTO W/SCOPE: CPT

## 2019-10-03 PROCEDURE — 83605 ASSAY OF LACTIC ACID: CPT

## 2019-10-03 PROCEDURE — 1210000000 HC MED SURG R&B

## 2019-10-03 PROCEDURE — 87040 BLOOD CULTURE FOR BACTERIA: CPT

## 2019-10-03 PROCEDURE — 80053 COMPREHEN METABOLIC PANEL: CPT

## 2019-10-03 PROCEDURE — 94664 DEMO&/EVAL PT USE INHALER: CPT

## 2019-10-03 PROCEDURE — 93005 ELECTROCARDIOGRAM TRACING: CPT | Performed by: INTERNAL MEDICINE

## 2019-10-03 PROCEDURE — 36415 COLL VENOUS BLD VENIPUNCTURE: CPT

## 2019-10-03 PROCEDURE — 2580000003 HC RX 258

## 2019-10-03 RX ORDER — NICOTINE POLACRILEX 4 MG
15 LOZENGE BUCCAL PRN
Status: DISCONTINUED | OUTPATIENT
Start: 2019-10-03 | End: 2019-10-19 | Stop reason: HOSPADM

## 2019-10-03 RX ORDER — ACETAMINOPHEN 500 MG
1000 TABLET ORAL ONCE
Status: COMPLETED | OUTPATIENT
Start: 2019-10-03 | End: 2019-10-03

## 2019-10-03 RX ORDER — FUROSEMIDE 40 MG/1
40 TABLET ORAL DAILY
COMMUNITY

## 2019-10-03 RX ORDER — CLOPIDOGREL BISULFATE 75 MG/1
75 TABLET ORAL DAILY
Status: DISCONTINUED | OUTPATIENT
Start: 2019-10-04 | End: 2019-10-19 | Stop reason: HOSPADM

## 2019-10-03 RX ORDER — ASPIRIN 81 MG/1
81 TABLET, CHEWABLE ORAL DAILY
Status: DISCONTINUED | OUTPATIENT
Start: 2019-10-04 | End: 2019-10-19 | Stop reason: HOSPADM

## 2019-10-03 RX ORDER — DEXTROSE MONOHYDRATE 50 MG/ML
100 INJECTION, SOLUTION INTRAVENOUS PRN
Status: DISCONTINUED | OUTPATIENT
Start: 2019-10-03 | End: 2019-10-19 | Stop reason: HOSPADM

## 2019-10-03 RX ORDER — INSULIN GLARGINE 100 [IU]/ML
12 INJECTION, SOLUTION SUBCUTANEOUS NIGHTLY
Status: DISCONTINUED | OUTPATIENT
Start: 2019-10-03 | End: 2019-10-13

## 2019-10-03 RX ORDER — IPRATROPIUM BROMIDE AND ALBUTEROL SULFATE 2.5; .5 MG/3ML; MG/3ML
1 SOLUTION RESPIRATORY (INHALATION) EVERY 8 HOURS PRN
Status: DISCONTINUED | OUTPATIENT
Start: 2019-10-03 | End: 2019-10-19 | Stop reason: HOSPADM

## 2019-10-03 RX ORDER — SODIUM CHLORIDE 9 MG/ML
INJECTION, SOLUTION INTRAVENOUS
Status: COMPLETED
Start: 2019-10-03 | End: 2019-10-04

## 2019-10-03 RX ORDER — GABAPENTIN 400 MG/1
800 CAPSULE ORAL 3 TIMES DAILY
Status: DISCONTINUED | OUTPATIENT
Start: 2019-10-03 | End: 2019-10-19 | Stop reason: HOSPADM

## 2019-10-03 RX ORDER — M-VIT,TX,IRON,MINS/CALC/FOLIC 27MG-0.4MG
1 TABLET ORAL DAILY
COMMUNITY

## 2019-10-03 RX ORDER — ATORVASTATIN CALCIUM 20 MG/1
20 TABLET, FILM COATED ORAL NIGHTLY
Status: DISCONTINUED | OUTPATIENT
Start: 2019-10-03 | End: 2019-10-19 | Stop reason: HOSPADM

## 2019-10-03 RX ORDER — HYDRALAZINE HYDROCHLORIDE 20 MG/ML
10 INJECTION INTRAMUSCULAR; INTRAVENOUS EVERY 4 HOURS PRN
Status: DISCONTINUED | OUTPATIENT
Start: 2019-10-03 | End: 2019-10-19 | Stop reason: HOSPADM

## 2019-10-03 RX ORDER — LIDOCAINE HYDROCHLORIDE 20 MG/ML
5 INJECTION, SOLUTION INFILTRATION; PERINEURAL ONCE
Status: DISCONTINUED | OUTPATIENT
Start: 2019-10-03 | End: 2019-10-10

## 2019-10-03 RX ORDER — SODIUM CHLORIDE 9 MG/ML
250 INJECTION, SOLUTION INTRAVENOUS ONCE
Status: COMPLETED | OUTPATIENT
Start: 2019-10-03 | End: 2019-10-03

## 2019-10-03 RX ORDER — ONDANSETRON 2 MG/ML
4 INJECTION INTRAMUSCULAR; INTRAVENOUS EVERY 6 HOURS PRN
Status: DISCONTINUED | OUTPATIENT
Start: 2019-10-03 | End: 2019-10-19 | Stop reason: HOSPADM

## 2019-10-03 RX ORDER — SODIUM CHLORIDE 0.9 % (FLUSH) 0.9 %
10 SYRINGE (ML) INJECTION PRN
Status: DISCONTINUED | OUTPATIENT
Start: 2019-10-03 | End: 2019-10-19 | Stop reason: HOSPADM

## 2019-10-03 RX ORDER — SODIUM CHLORIDE 0.9 % (FLUSH) 0.9 %
10 SYRINGE (ML) INJECTION EVERY 12 HOURS
Status: DISCONTINUED | OUTPATIENT
Start: 2019-10-03 | End: 2019-10-16

## 2019-10-03 RX ORDER — OXYCODONE HYDROCHLORIDE AND ACETAMINOPHEN 5; 325 MG/1; MG/1
1 TABLET ORAL EVERY 6 HOURS PRN
Status: ON HOLD | COMMUNITY
End: 2019-10-17 | Stop reason: HOSPADM

## 2019-10-03 RX ORDER — 0.9 % SODIUM CHLORIDE 0.9 %
30 INTRAVENOUS SOLUTION INTRAVENOUS ONCE
Status: COMPLETED | OUTPATIENT
Start: 2019-10-03 | End: 2019-10-04

## 2019-10-03 RX ORDER — DEXTROSE MONOHYDRATE 25 G/50ML
12.5 INJECTION, SOLUTION INTRAVENOUS PRN
Status: DISCONTINUED | OUTPATIENT
Start: 2019-10-03 | End: 2019-10-19 | Stop reason: HOSPADM

## 2019-10-03 RX ORDER — OXYCODONE HYDROCHLORIDE AND ACETAMINOPHEN 5; 325 MG/1; MG/1
1 TABLET ORAL EVERY 6 HOURS PRN
Status: DISCONTINUED | OUTPATIENT
Start: 2019-10-03 | End: 2019-10-15

## 2019-10-03 RX ORDER — ALBUTEROL SULFATE 90 UG/1
2 AEROSOL, METERED RESPIRATORY (INHALATION) DAILY
Status: DISCONTINUED | OUTPATIENT
Start: 2019-10-04 | End: 2019-10-07

## 2019-10-03 RX ADMIN — Medication 3129 ML: at 18:13

## 2019-10-03 RX ADMIN — SODIUM CHLORIDE 250 ML: 9 INJECTION, SOLUTION INTRAVENOUS at 22:31

## 2019-10-03 RX ADMIN — INSULIN GLARGINE 12 UNITS: 100 INJECTION, SOLUTION SUBCUTANEOUS at 23:08

## 2019-10-03 RX ADMIN — ACETAMINOPHEN 1000 MG: 500 TABLET ORAL at 18:10

## 2019-10-03 RX ADMIN — INSULIN LISPRO 1 UNITS: 100 INJECTION, SOLUTION INTRAVENOUS; SUBCUTANEOUS at 22:28

## 2019-10-03 RX ADMIN — GENTAMICIN SULFATE 618 MG: 40 INJECTION, SOLUTION INTRAMUSCULAR; INTRAVENOUS at 23:08

## 2019-10-03 RX ADMIN — GABAPENTIN 800 MG: 400 CAPSULE ORAL at 23:09

## 2019-10-03 RX ADMIN — SODIUM CHLORIDE 3129 ML: 9 INJECTION, SOLUTION INTRAVENOUS at 18:13

## 2019-10-03 ASSESSMENT — PAIN SCALES - GENERAL
PAINLEVEL_OUTOF10: 0
PAINLEVEL_OUTOF10: 0

## 2019-10-03 ASSESSMENT — ENCOUNTER SYMPTOMS
CHEST TIGHTNESS: 0
SORE THROAT: 0
NAUSEA: 0
EYE PAIN: 0
VOMITING: 0
COLOR CHANGE: 1
SHORTNESS OF BREATH: 0
ABDOMINAL PAIN: 0

## 2019-10-04 ENCOUNTER — APPOINTMENT (OUTPATIENT)
Dept: ULTRASOUND IMAGING | Age: 69
DRG: 853 | End: 2019-10-04
Payer: MEDICARE

## 2019-10-04 ENCOUNTER — APPOINTMENT (OUTPATIENT)
Dept: MRI IMAGING | Age: 69
DRG: 853 | End: 2019-10-04
Payer: MEDICARE

## 2019-10-04 LAB
ALBUMIN SERPL-MCNC: 1.9 G/DL (ref 3.5–4.6)
ALP BLD-CCNC: 117 U/L (ref 35–104)
ALT SERPL-CCNC: <5 U/L (ref 0–41)
ANION GAP SERPL CALCULATED.3IONS-SCNC: 12 MEQ/L (ref 9–15)
AST SERPL-CCNC: 44 U/L (ref 0–40)
BASOPHILS ABSOLUTE: 0 K/UL (ref 0–0.2)
BASOPHILS RELATIVE PERCENT: 0.8 %
BILIRUB SERPL-MCNC: 0.3 MG/DL (ref 0.2–0.7)
BILIRUBIN DIRECT: <0.2 MG/DL (ref 0–0.4)
BILIRUBIN, INDIRECT: ABNORMAL MG/DL (ref 0–0.6)
BUN BLDV-MCNC: 22 MG/DL (ref 8–23)
CALCIUM SERPL-MCNC: 8.2 MG/DL (ref 8.5–9.9)
CHLORIDE BLD-SCNC: 102 MEQ/L (ref 95–107)
CO2: 26 MEQ/L (ref 20–31)
CREAT SERPL-MCNC: 0.81 MG/DL (ref 0.7–1.2)
EOSINOPHILS ABSOLUTE: 0.1 K/UL (ref 0–0.7)
EOSINOPHILS RELATIVE PERCENT: 1 %
GFR AFRICAN AMERICAN: >60
GFR NON-AFRICAN AMERICAN: >60
GLUCOSE BLD-MCNC: 135 MG/DL (ref 60–115)
GLUCOSE BLD-MCNC: 136 MG/DL (ref 60–115)
GLUCOSE BLD-MCNC: 149 MG/DL (ref 60–115)
GLUCOSE BLD-MCNC: 153 MG/DL (ref 70–99)
GLUCOSE BLD-MCNC: 171 MG/DL (ref 60–115)
HCT VFR BLD CALC: 33.9 % (ref 42–52)
HEMOGLOBIN: 10.1 G/DL (ref 14–18)
LACTIC ACID: 1.1 MMOL/L (ref 0.5–2.2)
LYMPHOCYTES ABSOLUTE: 0.4 K/UL (ref 1–4.8)
LYMPHOCYTES RELATIVE PERCENT: 7.4 %
MCH RBC QN AUTO: 30.2 PG (ref 27–31.3)
MCHC RBC AUTO-ENTMCNC: 29.9 % (ref 33–37)
MCV RBC AUTO: 101 FL (ref 80–100)
MONOCYTES ABSOLUTE: 0.4 K/UL (ref 0.2–0.8)
MONOCYTES RELATIVE PERCENT: 7.5 %
NEUTROPHILS ABSOLUTE: 4.4 K/UL (ref 1.4–6.5)
NEUTROPHILS RELATIVE PERCENT: 83.3 %
PDW BLD-RTO: 18.6 % (ref 11.5–14.5)
PERFORMED ON: ABNORMAL
PLATELET # BLD: 265 K/UL (ref 130–400)
POTASSIUM REFLEX MAGNESIUM: 4.4 MEQ/L (ref 3.4–4.9)
RBC # BLD: 3.36 M/UL (ref 4.7–6.1)
SODIUM BLD-SCNC: 140 MEQ/L (ref 135–144)
TOTAL PROTEIN: 6 G/DL (ref 6.3–8)
WBC # BLD: 5.3 K/UL (ref 4.8–10.8)

## 2019-10-04 PROCEDURE — 85025 COMPLETE CBC W/AUTO DIFF WBC: CPT

## 2019-10-04 PROCEDURE — 87075 CULTR BACTERIA EXCEPT BLOOD: CPT

## 2019-10-04 PROCEDURE — 6370000000 HC RX 637 (ALT 250 FOR IP): Performed by: INTERNAL MEDICINE

## 2019-10-04 PROCEDURE — 6360000002 HC RX W HCPCS: Performed by: INTERNAL MEDICINE

## 2019-10-04 PROCEDURE — 99222 1ST HOSP IP/OBS MODERATE 55: CPT | Performed by: INTERNAL MEDICINE

## 2019-10-04 PROCEDURE — 2580000003 HC RX 258: Performed by: INTERNAL MEDICINE

## 2019-10-04 PROCEDURE — 93926 LOWER EXTREMITY STUDY: CPT

## 2019-10-04 PROCEDURE — 1210000000 HC MED SURG R&B

## 2019-10-04 PROCEDURE — 83605 ASSAY OF LACTIC ACID: CPT

## 2019-10-04 PROCEDURE — 36415 COLL VENOUS BLD VENIPUNCTURE: CPT

## 2019-10-04 PROCEDURE — 87077 CULTURE AEROBIC IDENTIFY: CPT

## 2019-10-04 PROCEDURE — 80076 HEPATIC FUNCTION PANEL: CPT

## 2019-10-04 PROCEDURE — 73718 MRI LOWER EXTREMITY W/O DYE: CPT

## 2019-10-04 PROCEDURE — 87186 SC STD MICRODIL/AGAR DIL: CPT

## 2019-10-04 PROCEDURE — 93010 ELECTROCARDIOGRAM REPORT: CPT | Performed by: INTERNAL MEDICINE

## 2019-10-04 PROCEDURE — 87070 CULTURE OTHR SPECIMN AEROBIC: CPT

## 2019-10-04 PROCEDURE — 99211 OFF/OP EST MAY X REQ PHY/QHP: CPT

## 2019-10-04 PROCEDURE — 2700000000 HC OXYGEN THERAPY PER DAY

## 2019-10-04 PROCEDURE — 80048 BASIC METABOLIC PNL TOTAL CA: CPT

## 2019-10-04 PROCEDURE — 87205 SMEAR GRAM STAIN: CPT

## 2019-10-04 RX ORDER — LINEZOLID 2 MG/ML
600 INJECTION, SOLUTION INTRAVENOUS EVERY 12 HOURS
Status: DISCONTINUED | OUTPATIENT
Start: 2019-10-04 | End: 2019-10-16

## 2019-10-04 RX ORDER — ACETAMINOPHEN 325 MG/1
650 TABLET ORAL EVERY 4 HOURS PRN
Status: DISCONTINUED | OUTPATIENT
Start: 2019-10-04 | End: 2019-10-16

## 2019-10-04 RX ADMIN — GABAPENTIN 800 MG: 400 CAPSULE ORAL at 21:35

## 2019-10-04 RX ADMIN — Medication 10 ML: at 09:00

## 2019-10-04 RX ADMIN — LINEZOLID 600 MG: 600 INJECTION, SOLUTION INTRAVENOUS at 13:55

## 2019-10-04 RX ADMIN — INSULIN GLARGINE 12 UNITS: 100 INJECTION, SOLUTION SUBCUTANEOUS at 22:15

## 2019-10-04 RX ADMIN — MEROPENEM 1 G: 1 INJECTION, POWDER, FOR SOLUTION INTRAVENOUS at 12:40

## 2019-10-04 RX ADMIN — OXYCODONE HYDROCHLORIDE AND ACETAMINOPHEN 1 TABLET: 5; 325 TABLET ORAL at 00:14

## 2019-10-04 RX ADMIN — OXYCODONE HYDROCHLORIDE AND ACETAMINOPHEN 1 TABLET: 5; 325 TABLET ORAL at 15:16

## 2019-10-04 RX ADMIN — OXYCODONE HYDROCHLORIDE AND ACETAMINOPHEN 1 TABLET: 5; 325 TABLET ORAL at 21:35

## 2019-10-04 RX ADMIN — INSULIN LISPRO 2 UNITS: 100 INJECTION, SOLUTION INTRAVENOUS; SUBCUTANEOUS at 12:41

## 2019-10-04 RX ADMIN — MEROPENEM 1 G: 1 INJECTION, POWDER, FOR SOLUTION INTRAVENOUS at 21:48

## 2019-10-04 RX ADMIN — OXYCODONE HYDROCHLORIDE AND ACETAMINOPHEN 1 TABLET: 5; 325 TABLET ORAL at 08:49

## 2019-10-04 RX ADMIN — GABAPENTIN 800 MG: 400 CAPSULE ORAL at 13:52

## 2019-10-04 RX ADMIN — INSULIN LISPRO 2 UNITS: 100 INJECTION, SOLUTION INTRAVENOUS; SUBCUTANEOUS at 09:36

## 2019-10-04 RX ADMIN — Medication 10 ML: at 21:54

## 2019-10-04 RX ADMIN — GABAPENTIN 800 MG: 400 CAPSULE ORAL at 08:49

## 2019-10-04 RX ADMIN — ACETAMINOPHEN 650 MG: 325 TABLET ORAL at 13:52

## 2019-10-04 RX ADMIN — ATORVASTATIN CALCIUM 20 MG: 20 TABLET, FILM COATED ORAL at 21:36

## 2019-10-04 ASSESSMENT — ENCOUNTER SYMPTOMS
GASTROINTESTINAL NEGATIVE: 1
CONSTIPATION: 0
NAUSEA: 0
SHORTNESS OF BREATH: 0
DIARRHEA: 0
COUGH: 0
RECTAL PAIN: 0
CHOKING: 0
VOMITING: 0

## 2019-10-04 ASSESSMENT — PAIN SCALES - GENERAL
PAINLEVEL_OUTOF10: 7
PAINLEVEL_OUTOF10: 7
PAINLEVEL_OUTOF10: 9
PAINLEVEL_OUTOF10: 10
PAINLEVEL_OUTOF10: 10
PAINLEVEL_OUTOF10: 9

## 2019-10-05 LAB
ANION GAP SERPL CALCULATED.3IONS-SCNC: 9 MEQ/L (ref 9–15)
BASOPHILS ABSOLUTE: 0 K/UL (ref 0–0.2)
BASOPHILS RELATIVE PERCENT: 0.6 %
BUN BLDV-MCNC: 23 MG/DL (ref 8–23)
C-REACTIVE PROTEIN, HIGH SENSITIVITY: 170.7 MG/L (ref 0–5)
CALCIUM SERPL-MCNC: 8.5 MG/DL (ref 8.5–9.9)
CHLORIDE BLD-SCNC: 101 MEQ/L (ref 95–107)
CO2: 29 MEQ/L (ref 20–31)
CREAT SERPL-MCNC: 0.88 MG/DL (ref 0.7–1.2)
EOSINOPHILS ABSOLUTE: 0.3 K/UL (ref 0–0.7)
EOSINOPHILS RELATIVE PERCENT: 6.5 %
GFR AFRICAN AMERICAN: >60
GFR NON-AFRICAN AMERICAN: >60
GLUCOSE BLD-MCNC: 135 MG/DL (ref 60–115)
GLUCOSE BLD-MCNC: 150 MG/DL (ref 60–115)
GLUCOSE BLD-MCNC: 171 MG/DL (ref 70–99)
GLUCOSE BLD-MCNC: 182 MG/DL (ref 60–115)
GLUCOSE BLD-MCNC: 184 MG/DL (ref 60–115)
HCT VFR BLD CALC: 32.4 % (ref 42–52)
HEMOGLOBIN: 10.1 G/DL (ref 14–18)
LYMPHOCYTES ABSOLUTE: 0.8 K/UL (ref 1–4.8)
LYMPHOCYTES RELATIVE PERCENT: 20 %
MCH RBC QN AUTO: 30.5 PG (ref 27–31.3)
MCHC RBC AUTO-ENTMCNC: 31.2 % (ref 33–37)
MCV RBC AUTO: 97.5 FL (ref 80–100)
MONOCYTES ABSOLUTE: 0.5 K/UL (ref 0.2–0.8)
MONOCYTES RELATIVE PERCENT: 13.3 %
NEUTROPHILS ABSOLUTE: 2.4 K/UL (ref 1.4–6.5)
NEUTROPHILS RELATIVE PERCENT: 59.6 %
ORGANISM: ABNORMAL
PDW BLD-RTO: 18.3 % (ref 11.5–14.5)
PERFORMED ON: ABNORMAL
PLATELET # BLD: 230 K/UL (ref 130–400)
POTASSIUM REFLEX MAGNESIUM: 4.6 MEQ/L (ref 3.4–4.9)
PROCALCITONIN: 2.2 NG/ML (ref 0–0.15)
RBC # BLD: 3.32 M/UL (ref 4.7–6.1)
SODIUM BLD-SCNC: 139 MEQ/L (ref 135–144)
URINE CULTURE, ROUTINE: ABNORMAL
WBC # BLD: 4 K/UL (ref 4.8–10.8)

## 2019-10-05 PROCEDURE — 6360000002 HC RX W HCPCS: Performed by: INTERNAL MEDICINE

## 2019-10-05 PROCEDURE — 97165 OT EVAL LOW COMPLEX 30 MIN: CPT

## 2019-10-05 PROCEDURE — 2700000000 HC OXYGEN THERAPY PER DAY

## 2019-10-05 PROCEDURE — 2580000003 HC RX 258: Performed by: INTERNAL MEDICINE

## 2019-10-05 PROCEDURE — 6370000000 HC RX 637 (ALT 250 FOR IP): Performed by: INTERNAL MEDICINE

## 2019-10-05 PROCEDURE — 36415 COLL VENOUS BLD VENIPUNCTURE: CPT

## 2019-10-05 PROCEDURE — 84145 PROCALCITONIN (PCT): CPT

## 2019-10-05 PROCEDURE — 1210000000 HC MED SURG R&B

## 2019-10-05 PROCEDURE — 99222 1ST HOSP IP/OBS MODERATE 55: CPT | Performed by: INTERNAL MEDICINE

## 2019-10-05 PROCEDURE — 80048 BASIC METABOLIC PNL TOTAL CA: CPT

## 2019-10-05 PROCEDURE — 99232 SBSQ HOSP IP/OBS MODERATE 35: CPT | Performed by: INTERNAL MEDICINE

## 2019-10-05 PROCEDURE — 85025 COMPLETE CBC W/AUTO DIFF WBC: CPT

## 2019-10-05 PROCEDURE — 86141 C-REACTIVE PROTEIN HS: CPT

## 2019-10-05 RX ADMIN — FLUCONAZOLE 100 MG: 200 INJECTION, SOLUTION INTRAVENOUS at 18:15

## 2019-10-05 RX ADMIN — INSULIN LISPRO 2 UNITS: 100 INJECTION, SOLUTION INTRAVENOUS; SUBCUTANEOUS at 18:15

## 2019-10-05 RX ADMIN — ATORVASTATIN CALCIUM 20 MG: 20 TABLET, FILM COATED ORAL at 22:41

## 2019-10-05 RX ADMIN — LINEZOLID 600 MG: 600 INJECTION, SOLUTION INTRAVENOUS at 00:12

## 2019-10-05 RX ADMIN — GABAPENTIN 800 MG: 400 CAPSULE ORAL at 22:41

## 2019-10-05 RX ADMIN — GENTAMICIN SULFATE 508.4 MG: 40 INJECTION, SOLUTION INTRAMUSCULAR; INTRAVENOUS at 14:46

## 2019-10-05 RX ADMIN — ENOXAPARIN SODIUM 40 MG: 40 INJECTION SUBCUTANEOUS at 09:19

## 2019-10-05 RX ADMIN — ASPIRIN 81 MG 81 MG: 81 TABLET ORAL at 09:20

## 2019-10-05 RX ADMIN — OXYCODONE HYDROCHLORIDE AND ACETAMINOPHEN 1 TABLET: 5; 325 TABLET ORAL at 11:07

## 2019-10-05 RX ADMIN — LINEZOLID 600 MG: 600 INJECTION, SOLUTION INTRAVENOUS at 22:43

## 2019-10-05 RX ADMIN — Medication 10 ML: at 09:29

## 2019-10-05 RX ADMIN — OXYCODONE HYDROCHLORIDE AND ACETAMINOPHEN 1 TABLET: 5; 325 TABLET ORAL at 05:06

## 2019-10-05 RX ADMIN — ACETAMINOPHEN 650 MG: 325 TABLET ORAL at 09:27

## 2019-10-05 RX ADMIN — LINEZOLID 600 MG: 600 INJECTION, SOLUTION INTRAVENOUS at 11:47

## 2019-10-05 RX ADMIN — OXYCODONE HYDROCHLORIDE AND ACETAMINOPHEN 1 TABLET: 5; 325 TABLET ORAL at 18:14

## 2019-10-05 RX ADMIN — INSULIN LISPRO 1 UNITS: 100 INJECTION, SOLUTION INTRAVENOUS; SUBCUTANEOUS at 22:42

## 2019-10-05 RX ADMIN — GABAPENTIN 800 MG: 400 CAPSULE ORAL at 09:20

## 2019-10-05 RX ADMIN — ACETAMINOPHEN 650 MG: 325 TABLET ORAL at 14:46

## 2019-10-05 RX ADMIN — MEROPENEM 1 G: 1 INJECTION, POWDER, FOR SOLUTION INTRAVENOUS at 03:39

## 2019-10-05 RX ADMIN — INSULIN LISPRO 2 UNITS: 100 INJECTION, SOLUTION INTRAVENOUS; SUBCUTANEOUS at 13:03

## 2019-10-05 RX ADMIN — CLOPIDOGREL BISULFATE 75 MG: 75 TABLET ORAL at 09:20

## 2019-10-05 RX ADMIN — INSULIN GLARGINE 12 UNITS: 100 INJECTION, SOLUTION SUBCUTANEOUS at 22:41

## 2019-10-05 RX ADMIN — GABAPENTIN 800 MG: 400 CAPSULE ORAL at 14:46

## 2019-10-05 RX ADMIN — MEROPENEM 1 G: 1 INJECTION, POWDER, FOR SOLUTION INTRAVENOUS at 11:05

## 2019-10-05 RX ADMIN — MEROPENEM 1 G: 1 INJECTION, POWDER, FOR SOLUTION INTRAVENOUS at 18:51

## 2019-10-05 ASSESSMENT — PAIN SCALES - GENERAL
PAINLEVEL_OUTOF10: 8
PAINLEVEL_OUTOF10: 7
PAINLEVEL_OUTOF10: 10
PAINLEVEL_OUTOF10: 7
PAINLEVEL_OUTOF10: 8
PAINLEVEL_OUTOF10: 5

## 2019-10-06 LAB
ANION GAP SERPL CALCULATED.3IONS-SCNC: 11 MEQ/L (ref 9–15)
BASOPHILS ABSOLUTE: 0.1 K/UL (ref 0–0.2)
BASOPHILS RELATIVE PERCENT: 1.3 %
BUN BLDV-MCNC: 23 MG/DL (ref 8–23)
CALCIUM SERPL-MCNC: 8.4 MG/DL (ref 8.5–9.9)
CHLORIDE BLD-SCNC: 98 MEQ/L (ref 95–107)
CO2: 25 MEQ/L (ref 20–31)
CREAT SERPL-MCNC: 0.86 MG/DL (ref 0.7–1.2)
EOSINOPHILS ABSOLUTE: 0.4 K/UL (ref 0–0.7)
EOSINOPHILS RELATIVE PERCENT: 10.2 %
GFR AFRICAN AMERICAN: >60
GFR NON-AFRICAN AMERICAN: >60
GLUCOSE BLD-MCNC: 137 MG/DL (ref 60–115)
GLUCOSE BLD-MCNC: 142 MG/DL (ref 70–99)
GLUCOSE BLD-MCNC: 146 MG/DL (ref 60–115)
GLUCOSE BLD-MCNC: 155 MG/DL (ref 60–115)
GLUCOSE BLD-MCNC: 161 MG/DL (ref 60–115)
GLUCOSE BLD-MCNC: 169 MG/DL (ref 60–115)
HCT VFR BLD CALC: 33.2 % (ref 42–52)
HEMOGLOBIN: 10.3 G/DL (ref 14–18)
LYMPHOCYTES ABSOLUTE: 1 K/UL (ref 1–4.8)
LYMPHOCYTES RELATIVE PERCENT: 25.8 %
MCH RBC QN AUTO: 31.1 PG (ref 27–31.3)
MCHC RBC AUTO-ENTMCNC: 31 % (ref 33–37)
MCV RBC AUTO: 100.2 FL (ref 80–100)
MONOCYTES ABSOLUTE: 0.5 K/UL (ref 0.2–0.8)
MONOCYTES RELATIVE PERCENT: 11.7 %
NEUTROPHILS ABSOLUTE: 2 K/UL (ref 1.4–6.5)
NEUTROPHILS RELATIVE PERCENT: 51 %
PDW BLD-RTO: 18.3 % (ref 11.5–14.5)
PERFORMED ON: ABNORMAL
PLATELET # BLD: 230 K/UL (ref 130–400)
POTASSIUM REFLEX MAGNESIUM: 4.6 MEQ/L (ref 3.4–4.9)
RBC # BLD: 3.31 M/UL (ref 4.7–6.1)
SODIUM BLD-SCNC: 134 MEQ/L (ref 135–144)
WBC # BLD: 4 K/UL (ref 4.8–10.8)

## 2019-10-06 PROCEDURE — 80048 BASIC METABOLIC PNL TOTAL CA: CPT

## 2019-10-06 PROCEDURE — 2580000003 HC RX 258: Performed by: INTERNAL MEDICINE

## 2019-10-06 PROCEDURE — 6360000002 HC RX W HCPCS: Performed by: INTERNAL MEDICINE

## 2019-10-06 PROCEDURE — 36415 COLL VENOUS BLD VENIPUNCTURE: CPT

## 2019-10-06 PROCEDURE — 6370000000 HC RX 637 (ALT 250 FOR IP): Performed by: INTERNAL MEDICINE

## 2019-10-06 PROCEDURE — 1210000000 HC MED SURG R&B

## 2019-10-06 PROCEDURE — 85025 COMPLETE CBC W/AUTO DIFF WBC: CPT

## 2019-10-06 PROCEDURE — 99232 SBSQ HOSP IP/OBS MODERATE 35: CPT | Performed by: INTERNAL MEDICINE

## 2019-10-06 RX ADMIN — Medication 10 ML: at 08:57

## 2019-10-06 RX ADMIN — Medication 10 ML: at 21:37

## 2019-10-06 RX ADMIN — GABAPENTIN 800 MG: 400 CAPSULE ORAL at 21:36

## 2019-10-06 RX ADMIN — ASPIRIN 81 MG 81 MG: 81 TABLET ORAL at 08:54

## 2019-10-06 RX ADMIN — MICAFUNGIN SODIUM 100 MG: 20 INJECTION, POWDER, LYOPHILIZED, FOR SOLUTION INTRAVENOUS at 14:55

## 2019-10-06 RX ADMIN — MEROPENEM 1 G: 1 INJECTION, POWDER, FOR SOLUTION INTRAVENOUS at 13:25

## 2019-10-06 RX ADMIN — ENOXAPARIN SODIUM 40 MG: 40 INJECTION SUBCUTANEOUS at 08:55

## 2019-10-06 RX ADMIN — MEROPENEM 1 G: 1 INJECTION, POWDER, FOR SOLUTION INTRAVENOUS at 04:26

## 2019-10-06 RX ADMIN — CLOPIDOGREL BISULFATE 75 MG: 75 TABLET ORAL at 08:54

## 2019-10-06 RX ADMIN — LINEZOLID 600 MG: 600 INJECTION, SOLUTION INTRAVENOUS at 11:55

## 2019-10-06 RX ADMIN — OXYCODONE HYDROCHLORIDE AND ACETAMINOPHEN 1 TABLET: 5; 325 TABLET ORAL at 15:00

## 2019-10-06 RX ADMIN — GABAPENTIN 800 MG: 400 CAPSULE ORAL at 08:54

## 2019-10-06 RX ADMIN — ACETAMINOPHEN 650 MG: 325 TABLET ORAL at 04:37

## 2019-10-06 RX ADMIN — ATORVASTATIN CALCIUM 20 MG: 20 TABLET, FILM COATED ORAL at 21:36

## 2019-10-06 RX ADMIN — INSULIN GLARGINE 12 UNITS: 100 INJECTION, SOLUTION SUBCUTANEOUS at 21:42

## 2019-10-06 RX ADMIN — LINEZOLID 600 MG: 600 INJECTION, SOLUTION INTRAVENOUS at 23:10

## 2019-10-06 RX ADMIN — OXYCODONE HYDROCHLORIDE AND ACETAMINOPHEN 1 TABLET: 5; 325 TABLET ORAL at 21:36

## 2019-10-06 RX ADMIN — OXYCODONE HYDROCHLORIDE AND ACETAMINOPHEN 1 TABLET: 5; 325 TABLET ORAL at 08:55

## 2019-10-06 RX ADMIN — MEROPENEM 1 G: 1 INJECTION, POWDER, FOR SOLUTION INTRAVENOUS at 22:35

## 2019-10-06 RX ADMIN — GABAPENTIN 800 MG: 400 CAPSULE ORAL at 15:01

## 2019-10-06 RX ADMIN — OXYCODONE HYDROCHLORIDE AND ACETAMINOPHEN 1 TABLET: 5; 325 TABLET ORAL at 00:01

## 2019-10-06 RX ADMIN — INSULIN LISPRO 2 UNITS: 100 INJECTION, SOLUTION INTRAVENOUS; SUBCUTANEOUS at 17:29

## 2019-10-06 ASSESSMENT — PAIN DESCRIPTION - LOCATION: LOCATION: FOOT

## 2019-10-06 ASSESSMENT — PAIN SCALES - GENERAL
PAINLEVEL_OUTOF10: 10
PAINLEVEL_OUTOF10: 10
PAINLEVEL_OUTOF10: 8
PAINLEVEL_OUTOF10: 9
PAINLEVEL_OUTOF10: 9
PAINLEVEL_OUTOF10: 10
PAINLEVEL_OUTOF10: 10

## 2019-10-06 ASSESSMENT — PAIN DESCRIPTION - PAIN TYPE: TYPE: ACUTE PAIN

## 2019-10-06 ASSESSMENT — PAIN DESCRIPTION - ORIENTATION: ORIENTATION: RIGHT

## 2019-10-06 ASSESSMENT — PAIN DESCRIPTION - DESCRIPTORS: DESCRIPTORS: THROBBING

## 2019-10-06 ASSESSMENT — PAIN DESCRIPTION - FREQUENCY: FREQUENCY: CONTINUOUS

## 2019-10-07 LAB
ANION GAP SERPL CALCULATED.3IONS-SCNC: 7 MEQ/L (ref 9–15)
BASOPHILS ABSOLUTE: 0 K/UL (ref 0–0.2)
BASOPHILS RELATIVE PERCENT: 0.9 %
BUN BLDV-MCNC: 23 MG/DL (ref 8–23)
CALCIUM SERPL-MCNC: 8.7 MG/DL (ref 8.5–9.9)
CHLORIDE BLD-SCNC: 101 MEQ/L (ref 95–107)
CO2: 31 MEQ/L (ref 20–31)
CREAT SERPL-MCNC: 0.79 MG/DL (ref 0.7–1.2)
EOSINOPHILS ABSOLUTE: 0.5 K/UL (ref 0–0.7)
EOSINOPHILS RELATIVE PERCENT: 10.1 %
GFR AFRICAN AMERICAN: >60
GFR NON-AFRICAN AMERICAN: >60
GLUCOSE BLD-MCNC: 112 MG/DL (ref 60–115)
GLUCOSE BLD-MCNC: 156 MG/DL (ref 60–115)
GLUCOSE BLD-MCNC: 156 MG/DL (ref 70–99)
GLUCOSE BLD-MCNC: 165 MG/DL (ref 60–115)
GLUCOSE BLD-MCNC: 171 MG/DL (ref 60–115)
HCT VFR BLD CALC: 31.9 % (ref 42–52)
HEMOGLOBIN: 10.2 G/DL (ref 14–18)
LYMPHOCYTES ABSOLUTE: 1.2 K/UL (ref 1–4.8)
LYMPHOCYTES RELATIVE PERCENT: 24.3 %
MCH RBC QN AUTO: 31.5 PG (ref 27–31.3)
MCHC RBC AUTO-ENTMCNC: 32.2 % (ref 33–37)
MCV RBC AUTO: 98.1 FL (ref 80–100)
MONOCYTES ABSOLUTE: 0.5 K/UL (ref 0.2–0.8)
MONOCYTES RELATIVE PERCENT: 9.3 %
NEUTROPHILS ABSOLUTE: 2.7 K/UL (ref 1.4–6.5)
NEUTROPHILS RELATIVE PERCENT: 55.4 %
PDW BLD-RTO: 18.1 % (ref 11.5–14.5)
PERFORMED ON: ABNORMAL
PERFORMED ON: NORMAL
PLATELET # BLD: 276 K/UL (ref 130–400)
POTASSIUM REFLEX MAGNESIUM: 5.5 MEQ/L (ref 3.4–4.9)
RBC # BLD: 3.25 M/UL (ref 4.7–6.1)
SODIUM BLD-SCNC: 139 MEQ/L (ref 135–144)
WBC # BLD: 4.9 K/UL (ref 4.8–10.8)

## 2019-10-07 PROCEDURE — 6370000000 HC RX 637 (ALT 250 FOR IP): Performed by: INTERNAL MEDICINE

## 2019-10-07 PROCEDURE — 99232 SBSQ HOSP IP/OBS MODERATE 35: CPT | Performed by: INTERNAL MEDICINE

## 2019-10-07 PROCEDURE — 2580000003 HC RX 258: Performed by: INTERNAL MEDICINE

## 2019-10-07 PROCEDURE — 2700000000 HC OXYGEN THERAPY PER DAY

## 2019-10-07 PROCEDURE — 87040 BLOOD CULTURE FOR BACTERIA: CPT

## 2019-10-07 PROCEDURE — 6360000002 HC RX W HCPCS: Performed by: INTERNAL MEDICINE

## 2019-10-07 PROCEDURE — 1210000000 HC MED SURG R&B

## 2019-10-07 PROCEDURE — 99233 SBSQ HOSP IP/OBS HIGH 50: CPT | Performed by: INTERNAL MEDICINE

## 2019-10-07 PROCEDURE — 85025 COMPLETE CBC W/AUTO DIFF WBC: CPT

## 2019-10-07 PROCEDURE — 36415 COLL VENOUS BLD VENIPUNCTURE: CPT

## 2019-10-07 PROCEDURE — 80048 BASIC METABOLIC PNL TOTAL CA: CPT

## 2019-10-07 RX ORDER — ALBUTEROL SULFATE 90 UG/1
2 AEROSOL, METERED RESPIRATORY (INHALATION) DAILY PRN
Status: DISCONTINUED | OUTPATIENT
Start: 2019-10-07 | End: 2019-10-19 | Stop reason: HOSPADM

## 2019-10-07 RX ADMIN — OXYCODONE HYDROCHLORIDE AND ACETAMINOPHEN 1 TABLET: 5; 325 TABLET ORAL at 22:06

## 2019-10-07 RX ADMIN — MEROPENEM 1 G: 1 INJECTION, POWDER, FOR SOLUTION INTRAVENOUS at 20:30

## 2019-10-07 RX ADMIN — GABAPENTIN 800 MG: 400 CAPSULE ORAL at 20:49

## 2019-10-07 RX ADMIN — INSULIN GLARGINE 12 UNITS: 100 INJECTION, SOLUTION SUBCUTANEOUS at 20:30

## 2019-10-07 RX ADMIN — MEROPENEM 1 G: 1 INJECTION, POWDER, FOR SOLUTION INTRAVENOUS at 10:41

## 2019-10-07 RX ADMIN — INSULIN LISPRO 2 UNITS: 100 INJECTION, SOLUTION INTRAVENOUS; SUBCUTANEOUS at 10:26

## 2019-10-07 RX ADMIN — OXYCODONE HYDROCHLORIDE AND ACETAMINOPHEN 1 TABLET: 5; 325 TABLET ORAL at 05:53

## 2019-10-07 RX ADMIN — ASPIRIN 81 MG 81 MG: 81 TABLET ORAL at 10:19

## 2019-10-07 RX ADMIN — LINEZOLID 600 MG: 600 INJECTION, SOLUTION INTRAVENOUS at 13:29

## 2019-10-07 RX ADMIN — CLOPIDOGREL BISULFATE 75 MG: 75 TABLET ORAL at 10:20

## 2019-10-07 RX ADMIN — Medication 10 ML: at 20:49

## 2019-10-07 RX ADMIN — LINEZOLID 600 MG: 600 INJECTION, SOLUTION INTRAVENOUS at 23:03

## 2019-10-07 RX ADMIN — INSULIN LISPRO 2 UNITS: 100 INJECTION, SOLUTION INTRAVENOUS; SUBCUTANEOUS at 17:50

## 2019-10-07 RX ADMIN — GABAPENTIN 800 MG: 400 CAPSULE ORAL at 10:20

## 2019-10-07 RX ADMIN — ENOXAPARIN SODIUM 40 MG: 40 INJECTION SUBCUTANEOUS at 10:21

## 2019-10-07 RX ADMIN — ATORVASTATIN CALCIUM 20 MG: 20 TABLET, FILM COATED ORAL at 20:49

## 2019-10-07 RX ADMIN — GABAPENTIN 800 MG: 400 CAPSULE ORAL at 15:57

## 2019-10-07 RX ADMIN — OXYCODONE HYDROCHLORIDE AND ACETAMINOPHEN 1 TABLET: 5; 325 TABLET ORAL at 15:57

## 2019-10-07 RX ADMIN — INSULIN LISPRO 1 UNITS: 100 INJECTION, SOLUTION INTRAVENOUS; SUBCUTANEOUS at 20:33

## 2019-10-07 RX ADMIN — MICAFUNGIN SODIUM 100 MG: 20 INJECTION, POWDER, LYOPHILIZED, FOR SOLUTION INTRAVENOUS at 12:21

## 2019-10-07 ASSESSMENT — ENCOUNTER SYMPTOMS
COUGH: 0
GASTROINTESTINAL NEGATIVE: 1
SHORTNESS OF BREATH: 0
CHOKING: 0
CONSTIPATION: 0
VOMITING: 0
NAUSEA: 0
RECTAL PAIN: 0
DIARRHEA: 0

## 2019-10-07 ASSESSMENT — PAIN SCALES - GENERAL
PAINLEVEL_OUTOF10: 10
PAINLEVEL_OUTOF10: 8
PAINLEVEL_OUTOF10: 9
PAINLEVEL_OUTOF10: 10

## 2019-10-07 ASSESSMENT — PAIN DESCRIPTION - PAIN TYPE: TYPE: ACUTE PAIN

## 2019-10-07 ASSESSMENT — PAIN DESCRIPTION - LOCATION: LOCATION: FOOT

## 2019-10-07 ASSESSMENT — PAIN DESCRIPTION - DESCRIPTORS: DESCRIPTORS: STABBING

## 2019-10-07 ASSESSMENT — PAIN DESCRIPTION - ORIENTATION: ORIENTATION: RIGHT

## 2019-10-07 ASSESSMENT — PAIN DESCRIPTION - FREQUENCY: FREQUENCY: CONTINUOUS

## 2019-10-08 LAB
ANAEROBIC CULTURE: ABNORMAL
ANION GAP SERPL CALCULATED.3IONS-SCNC: 10 MEQ/L (ref 9–15)
BASOPHILS ABSOLUTE: 0 K/UL (ref 0–0.2)
BASOPHILS RELATIVE PERCENT: 0.9 %
BUN BLDV-MCNC: 24 MG/DL (ref 8–23)
CALCIUM SERPL-MCNC: 8.6 MG/DL (ref 8.5–9.9)
CHLORIDE BLD-SCNC: 97 MEQ/L (ref 95–107)
CO2: 31 MEQ/L (ref 20–31)
CREAT SERPL-MCNC: 0.81 MG/DL (ref 0.7–1.2)
EOSINOPHILS ABSOLUTE: 0.6 K/UL (ref 0–0.7)
EOSINOPHILS RELATIVE PERCENT: 10.6 %
GFR AFRICAN AMERICAN: >60
GFR NON-AFRICAN AMERICAN: >60
GLUCOSE BLD-MCNC: 131 MG/DL (ref 70–99)
GLUCOSE BLD-MCNC: 147 MG/DL (ref 60–115)
GLUCOSE BLD-MCNC: 182 MG/DL (ref 60–115)
GLUCOSE BLD-MCNC: 190 MG/DL (ref 60–115)
GRAM STAIN RESULT: ABNORMAL
HCT VFR BLD CALC: 33.1 % (ref 42–52)
HEMOGLOBIN: 10.4 G/DL (ref 14–18)
INR BLD: 1
LYMPHOCYTES ABSOLUTE: 1.6 K/UL (ref 1–4.8)
LYMPHOCYTES RELATIVE PERCENT: 30.3 %
MCH RBC QN AUTO: 30.4 PG (ref 27–31.3)
MCHC RBC AUTO-ENTMCNC: 31.3 % (ref 33–37)
MCV RBC AUTO: 97 FL (ref 80–100)
MONOCYTES ABSOLUTE: 0.5 K/UL (ref 0.2–0.8)
MONOCYTES RELATIVE PERCENT: 9.4 %
NEUTROPHILS ABSOLUTE: 2.6 K/UL (ref 1.4–6.5)
NEUTROPHILS RELATIVE PERCENT: 48.8 %
ORGANISM: ABNORMAL
PDW BLD-RTO: 18 % (ref 11.5–14.5)
PERFORMED ON: ABNORMAL
PLATELET # BLD: 299 K/UL (ref 130–400)
POTASSIUM REFLEX MAGNESIUM: 4.9 MEQ/L (ref 3.4–4.9)
PROTHROMBIN TIME: 13 SEC (ref 12.3–14.9)
RBC # BLD: 3.41 M/UL (ref 4.7–6.1)
SODIUM BLD-SCNC: 138 MEQ/L (ref 135–144)
WBC # BLD: 5.3 K/UL (ref 4.8–10.8)
WOUND/ABSCESS: ABNORMAL

## 2019-10-08 PROCEDURE — 85025 COMPLETE CBC W/AUTO DIFF WBC: CPT

## 2019-10-08 PROCEDURE — 87040 BLOOD CULTURE FOR BACTERIA: CPT

## 2019-10-08 PROCEDURE — 2580000003 HC RX 258

## 2019-10-08 PROCEDURE — 6360000002 HC RX W HCPCS

## 2019-10-08 PROCEDURE — 80048 BASIC METABOLIC PNL TOTAL CA: CPT

## 2019-10-08 PROCEDURE — 6360000002 HC RX W HCPCS: Performed by: INTERNAL MEDICINE

## 2019-10-08 PROCEDURE — 2700000000 HC OXYGEN THERAPY PER DAY

## 2019-10-08 PROCEDURE — 36415 COLL VENOUS BLD VENIPUNCTURE: CPT

## 2019-10-08 PROCEDURE — 6370000000 HC RX 637 (ALT 250 FOR IP): Performed by: INTERNAL MEDICINE

## 2019-10-08 PROCEDURE — 99232 SBSQ HOSP IP/OBS MODERATE 35: CPT | Performed by: INTERNAL MEDICINE

## 2019-10-08 PROCEDURE — 2580000003 HC RX 258: Performed by: INTERNAL MEDICINE

## 2019-10-08 PROCEDURE — 2500000003 HC RX 250 WO HCPCS

## 2019-10-08 PROCEDURE — 1210000000 HC MED SURG R&B

## 2019-10-08 PROCEDURE — 85610 PROTHROMBIN TIME: CPT

## 2019-10-08 RX ORDER — DIPHENHYDRAMINE HCL 25 MG
50 TABLET ORAL ONCE
Status: DISCONTINUED | OUTPATIENT
Start: 2019-10-08 | End: 2019-10-19 | Stop reason: HOSPADM

## 2019-10-08 RX ORDER — SODIUM CHLORIDE 9 MG/ML
INJECTION, SOLUTION INTRAVENOUS CONTINUOUS
Status: DISCONTINUED | OUTPATIENT
Start: 2019-10-08 | End: 2019-10-10

## 2019-10-08 RX ADMIN — MICAFUNGIN SODIUM 100 MG: 20 INJECTION, POWDER, LYOPHILIZED, FOR SOLUTION INTRAVENOUS at 13:01

## 2019-10-08 RX ADMIN — LINEZOLID 600 MG: 600 INJECTION, SOLUTION INTRAVENOUS at 11:42

## 2019-10-08 RX ADMIN — MEROPENEM 1 G: 1 INJECTION, POWDER, FOR SOLUTION INTRAVENOUS at 05:17

## 2019-10-08 RX ADMIN — GABAPENTIN 800 MG: 400 CAPSULE ORAL at 22:51

## 2019-10-08 RX ADMIN — OXYCODONE HYDROCHLORIDE AND ACETAMINOPHEN 1 TABLET: 5; 325 TABLET ORAL at 17:50

## 2019-10-08 RX ADMIN — SODIUM CHLORIDE: 9 INJECTION, SOLUTION INTRAVENOUS at 22:57

## 2019-10-08 RX ADMIN — OXYCODONE HYDROCHLORIDE AND ACETAMINOPHEN 1 TABLET: 5; 325 TABLET ORAL at 08:20

## 2019-10-08 RX ADMIN — CLOPIDOGREL BISULFATE 75 MG: 75 TABLET ORAL at 08:13

## 2019-10-08 RX ADMIN — Medication 10 ML: at 08:14

## 2019-10-08 RX ADMIN — Medication 10 ML: at 05:18

## 2019-10-08 RX ADMIN — MEROPENEM 1 G: 1 INJECTION, POWDER, FOR SOLUTION INTRAVENOUS at 22:52

## 2019-10-08 RX ADMIN — SODIUM CHLORIDE: 9 INJECTION, SOLUTION INTRAVENOUS at 08:14

## 2019-10-08 RX ADMIN — GABAPENTIN 800 MG: 400 CAPSULE ORAL at 08:20

## 2019-10-08 RX ADMIN — ASPIRIN 81 MG 81 MG: 81 TABLET ORAL at 08:13

## 2019-10-08 RX ADMIN — MEROPENEM 1 G: 1 INJECTION, POWDER, FOR SOLUTION INTRAVENOUS at 14:40

## 2019-10-08 RX ADMIN — ATORVASTATIN CALCIUM 20 MG: 20 TABLET, FILM COATED ORAL at 22:52

## 2019-10-08 ASSESSMENT — ENCOUNTER SYMPTOMS
CONSTIPATION: 0
DIARRHEA: 0
SHORTNESS OF BREATH: 0
GASTROINTESTINAL NEGATIVE: 1
RECTAL PAIN: 0
CHOKING: 0
COUGH: 0
VOMITING: 0
NAUSEA: 0

## 2019-10-08 ASSESSMENT — PAIN SCALES - GENERAL
PAINLEVEL_OUTOF10: 9
PAINLEVEL_OUTOF10: 10

## 2019-10-09 ENCOUNTER — APPOINTMENT (OUTPATIENT)
Dept: CARDIAC CATH/INVASIVE PROCEDURES | Age: 69
DRG: 853 | End: 2019-10-09
Payer: MEDICARE

## 2019-10-09 PROBLEM — L97.411 DIABETIC ULCER OF RIGHT HEEL ASSOCIATED WITH DIABETES MELLITUS DUE TO UNDERLYING CONDITION, LIMITED TO BREAKDOWN OF SKIN (HCC): Status: ACTIVE | Noted: 2019-05-07

## 2019-10-09 PROBLEM — E08.621 DIABETIC ULCER OF RIGHT HEEL ASSOCIATED WITH DIABETES MELLITUS DUE TO UNDERLYING CONDITION, LIMITED TO BREAKDOWN OF SKIN (HCC): Status: ACTIVE | Noted: 2019-05-07

## 2019-10-09 LAB
ANION GAP SERPL CALCULATED.3IONS-SCNC: 8 MEQ/L (ref 9–15)
BASOPHILS ABSOLUTE: 0 K/UL (ref 0–0.2)
BASOPHILS RELATIVE PERCENT: 0.9 %
BUN BLDV-MCNC: 23 MG/DL (ref 8–23)
CALCIUM SERPL-MCNC: 8.7 MG/DL (ref 8.5–9.9)
CHLORIDE BLD-SCNC: 100 MEQ/L (ref 95–107)
CO2: 33 MEQ/L (ref 20–31)
CREAT SERPL-MCNC: 0.83 MG/DL (ref 0.7–1.2)
EOSINOPHILS ABSOLUTE: 0.6 K/UL (ref 0–0.7)
EOSINOPHILS RELATIVE PERCENT: 10.6 %
GFR AFRICAN AMERICAN: >60
GFR NON-AFRICAN AMERICAN: >60
GLUCOSE BLD-MCNC: 124 MG/DL (ref 60–115)
GLUCOSE BLD-MCNC: 145 MG/DL (ref 70–99)
GLUCOSE BLD-MCNC: 149 MG/DL (ref 60–115)
GLUCOSE BLD-MCNC: 156 MG/DL (ref 60–115)
GLUCOSE BLD-MCNC: 164 MG/DL (ref 60–115)
HCT VFR BLD CALC: 32.3 % (ref 42–52)
HEMOGLOBIN: 10.3 G/DL (ref 14–18)
LYMPHOCYTES ABSOLUTE: 1.4 K/UL (ref 1–4.8)
LYMPHOCYTES RELATIVE PERCENT: 25.4 %
MCH RBC QN AUTO: 30.7 PG (ref 27–31.3)
MCHC RBC AUTO-ENTMCNC: 31.8 % (ref 33–37)
MCV RBC AUTO: 96.6 FL (ref 80–100)
MONOCYTES ABSOLUTE: 0.4 K/UL (ref 0.2–0.8)
MONOCYTES RELATIVE PERCENT: 7.8 %
NEUTROPHILS ABSOLUTE: 3.1 K/UL (ref 1.4–6.5)
NEUTROPHILS RELATIVE PERCENT: 55.3 %
PDW BLD-RTO: 17.7 % (ref 11.5–14.5)
PERFORMED ON: ABNORMAL
PLATELET # BLD: 284 K/UL (ref 130–400)
POTASSIUM REFLEX MAGNESIUM: 5 MEQ/L (ref 3.4–4.9)
RBC # BLD: 3.35 M/UL (ref 4.7–6.1)
SODIUM BLD-SCNC: 141 MEQ/L (ref 135–144)
WBC # BLD: 5.6 K/UL (ref 4.8–10.8)

## 2019-10-09 PROCEDURE — 6360000002 HC RX W HCPCS: Performed by: INTERNAL MEDICINE

## 2019-10-09 PROCEDURE — 2580000003 HC RX 258: Performed by: INTERNAL MEDICINE

## 2019-10-09 PROCEDURE — 6360000004 HC RX CONTRAST MEDICATION: Performed by: INTERNAL MEDICINE

## 2019-10-09 PROCEDURE — 99232 SBSQ HOSP IP/OBS MODERATE 35: CPT | Performed by: INTERNAL MEDICINE

## 2019-10-09 PROCEDURE — C1894 INTRO/SHEATH, NON-LASER: HCPCS

## 2019-10-09 PROCEDURE — 2709999900 HC NON-CHARGEABLE SUPPLY

## 2019-10-09 PROCEDURE — B41F1ZZ FLUOROSCOPY OF RIGHT LOWER EXTREMITY ARTERIES USING LOW OSMOLAR CONTRAST: ICD-10-PCS | Performed by: INTERNAL MEDICINE

## 2019-10-09 PROCEDURE — 85025 COMPLETE CBC W/AUTO DIFF WBC: CPT

## 2019-10-09 PROCEDURE — 36247 INS CATH ABD/L-EXT ART 3RD: CPT | Performed by: INTERNAL MEDICINE

## 2019-10-09 PROCEDURE — 75710 ARTERY X-RAYS ARM/LEG: CPT | Performed by: INTERNAL MEDICINE

## 2019-10-09 PROCEDURE — 2060000000 HC ICU INTERMEDIATE R&B

## 2019-10-09 PROCEDURE — 6370000000 HC RX 637 (ALT 250 FOR IP): Performed by: INTERNAL MEDICINE

## 2019-10-09 PROCEDURE — 6360000002 HC RX W HCPCS

## 2019-10-09 PROCEDURE — 87040 BLOOD CULTURE FOR BACTERIA: CPT

## 2019-10-09 PROCEDURE — B41G1ZZ FLUOROSCOPY OF LEFT LOWER EXTREMITY ARTERIES USING LOW OSMOLAR CONTRAST: ICD-10-PCS | Performed by: INTERNAL MEDICINE

## 2019-10-09 PROCEDURE — 80048 BASIC METABOLIC PNL TOTAL CA: CPT

## 2019-10-09 PROCEDURE — 2500000003 HC RX 250 WO HCPCS

## 2019-10-09 PROCEDURE — 36415 COLL VENOUS BLD VENIPUNCTURE: CPT

## 2019-10-09 PROCEDURE — C1769 GUIDE WIRE: HCPCS

## 2019-10-09 PROCEDURE — 2700000000 HC OXYGEN THERAPY PER DAY

## 2019-10-09 RX ORDER — IODIXANOL 320 MG/ML
100 INJECTION, SOLUTION INTRAVASCULAR ONCE
Status: COMPLETED | OUTPATIENT
Start: 2019-10-09 | End: 2019-10-09

## 2019-10-09 RX ADMIN — Medication 10 ML: at 21:29

## 2019-10-09 RX ADMIN — OXYCODONE HYDROCHLORIDE AND ACETAMINOPHEN 1 TABLET: 5; 325 TABLET ORAL at 00:19

## 2019-10-09 RX ADMIN — OXYCODONE HYDROCHLORIDE AND ACETAMINOPHEN 1 TABLET: 5; 325 TABLET ORAL at 18:33

## 2019-10-09 RX ADMIN — HYDRALAZINE HYDROCHLORIDE 10 MG: 20 INJECTION INTRAMUSCULAR; INTRAVENOUS at 14:39

## 2019-10-09 RX ADMIN — INSULIN GLARGINE 12 UNITS: 100 INJECTION, SOLUTION SUBCUTANEOUS at 21:29

## 2019-10-09 RX ADMIN — LINEZOLID 600 MG: 600 INJECTION, SOLUTION INTRAVENOUS at 23:39

## 2019-10-09 RX ADMIN — MEROPENEM 1 G: 1 INJECTION, POWDER, FOR SOLUTION INTRAVENOUS at 05:52

## 2019-10-09 RX ADMIN — GABAPENTIN 800 MG: 400 CAPSULE ORAL at 21:28

## 2019-10-09 RX ADMIN — SODIUM CHLORIDE: 9 INJECTION, SOLUTION INTRAVENOUS at 21:29

## 2019-10-09 RX ADMIN — MICAFUNGIN SODIUM 100 MG: 20 INJECTION, POWDER, LYOPHILIZED, FOR SOLUTION INTRAVENOUS at 14:31

## 2019-10-09 RX ADMIN — IODIXANOL 35 ML: 320 INJECTION, SOLUTION INTRAVASCULAR at 16:37

## 2019-10-09 RX ADMIN — MEROPENEM 1 G: 1 INJECTION, POWDER, FOR SOLUTION INTRAVENOUS at 18:34

## 2019-10-09 RX ADMIN — LINEZOLID 600 MG: 600 INJECTION, SOLUTION INTRAVENOUS at 00:19

## 2019-10-09 RX ADMIN — INSULIN GLARGINE 12 UNITS: 100 INJECTION, SOLUTION SUBCUTANEOUS at 00:17

## 2019-10-09 RX ADMIN — ATORVASTATIN CALCIUM 20 MG: 20 TABLET, FILM COATED ORAL at 21:28

## 2019-10-09 RX ADMIN — Medication 10 ML: at 10:48

## 2019-10-09 RX ADMIN — LINEZOLID 600 MG: 600 INJECTION, SOLUTION INTRAVENOUS at 10:41

## 2019-10-09 ASSESSMENT — ENCOUNTER SYMPTOMS
DIARRHEA: 0
VOMITING: 0
CONSTIPATION: 0
COUGH: 0
RECTAL PAIN: 0
SHORTNESS OF BREATH: 0
CHOKING: 0
NAUSEA: 0
GASTROINTESTINAL NEGATIVE: 1

## 2019-10-09 ASSESSMENT — PAIN SCALES - GENERAL
PAINLEVEL_OUTOF10: 0
PAINLEVEL_OUTOF10: 9
PAINLEVEL_OUTOF10: 10

## 2019-10-10 ENCOUNTER — APPOINTMENT (OUTPATIENT)
Dept: INTERVENTIONAL RADIOLOGY/VASCULAR | Age: 69
DRG: 853 | End: 2019-10-10
Payer: MEDICARE

## 2019-10-10 LAB
ANION GAP SERPL CALCULATED.3IONS-SCNC: 9 MEQ/L (ref 9–15)
BASOPHILS ABSOLUTE: 0.1 K/UL (ref 0–0.2)
BASOPHILS RELATIVE PERCENT: 1.4 %
BUN BLDV-MCNC: 20 MG/DL (ref 8–23)
CALCIUM SERPL-MCNC: 8.4 MG/DL (ref 8.5–9.9)
CHLORIDE BLD-SCNC: 101 MEQ/L (ref 95–107)
CO2: 31 MEQ/L (ref 20–31)
CREAT SERPL-MCNC: 0.61 MG/DL (ref 0.7–1.2)
EOSINOPHILS ABSOLUTE: 0.6 K/UL (ref 0–0.7)
EOSINOPHILS RELATIVE PERCENT: 9.3 %
GFR AFRICAN AMERICAN: >60
GFR NON-AFRICAN AMERICAN: >60
GLUCOSE BLD-MCNC: 125 MG/DL (ref 70–99)
GLUCOSE BLD-MCNC: 152 MG/DL (ref 60–115)
GLUCOSE BLD-MCNC: 154 MG/DL (ref 60–115)
GLUCOSE BLD-MCNC: 161 MG/DL (ref 60–115)
HCT VFR BLD CALC: 29.3 % (ref 42–52)
HEMOGLOBIN: 9.4 G/DL (ref 14–18)
LYMPHOCYTES ABSOLUTE: 1.5 K/UL (ref 1–4.8)
LYMPHOCYTES RELATIVE PERCENT: 26 %
MCH RBC QN AUTO: 30.5 PG (ref 27–31.3)
MCHC RBC AUTO-ENTMCNC: 31.9 % (ref 33–37)
MCV RBC AUTO: 95.5 FL (ref 80–100)
MONOCYTES ABSOLUTE: 0.4 K/UL (ref 0.2–0.8)
MONOCYTES RELATIVE PERCENT: 6.3 %
NEUTROPHILS ABSOLUTE: 3.4 K/UL (ref 1.4–6.5)
NEUTROPHILS RELATIVE PERCENT: 57 %
PDW BLD-RTO: 17.7 % (ref 11.5–14.5)
PERFORMED ON: ABNORMAL
PLATELET # BLD: 289 K/UL (ref 130–400)
POTASSIUM REFLEX MAGNESIUM: 4.9 MEQ/L (ref 3.4–4.9)
RBC # BLD: 3.07 M/UL (ref 4.7–6.1)
SODIUM BLD-SCNC: 141 MEQ/L (ref 135–144)
WBC # BLD: 5.9 K/UL (ref 4.8–10.8)

## 2019-10-10 PROCEDURE — 02HV33Z INSERTION OF INFUSION DEVICE INTO SUPERIOR VENA CAVA, PERCUTANEOUS APPROACH: ICD-10-PCS | Performed by: INTERNAL MEDICINE

## 2019-10-10 PROCEDURE — 6360000002 HC RX W HCPCS: Performed by: INTERNAL MEDICINE

## 2019-10-10 PROCEDURE — 85025 COMPLETE CBC W/AUTO DIFF WBC: CPT

## 2019-10-10 PROCEDURE — APPNB30 APP NON BILLABLE TIME 0-30 MINS: Performed by: PHYSICIAN ASSISTANT

## 2019-10-10 PROCEDURE — 6370000000 HC RX 637 (ALT 250 FOR IP): Performed by: INTERNAL MEDICINE

## 2019-10-10 PROCEDURE — 36415 COLL VENOUS BLD VENIPUNCTURE: CPT

## 2019-10-10 PROCEDURE — 2580000003 HC RX 258: Performed by: INTERNAL MEDICINE

## 2019-10-10 PROCEDURE — 2500000003 HC RX 250 WO HCPCS: Performed by: INTERNAL MEDICINE

## 2019-10-10 PROCEDURE — 80048 BASIC METABOLIC PNL TOTAL CA: CPT

## 2019-10-10 PROCEDURE — 99232 SBSQ HOSP IP/OBS MODERATE 35: CPT | Performed by: INTERNAL MEDICINE

## 2019-10-10 PROCEDURE — 2060000000 HC ICU INTERMEDIATE R&B

## 2019-10-10 PROCEDURE — 36573 INSJ PICC RS&I 5 YR+: CPT | Performed by: RADIOLOGY

## 2019-10-10 PROCEDURE — 2700000000 HC OXYGEN THERAPY PER DAY

## 2019-10-10 PROCEDURE — 2709999900 IR PICC WO SQ PORT/PUMP > 5 YEARS

## 2019-10-10 RX ORDER — SODIUM CHLORIDE 0.9 % (FLUSH) 0.9 %
10 SYRINGE (ML) INJECTION EVERY 12 HOURS SCHEDULED
Status: DISCONTINUED | OUTPATIENT
Start: 2019-10-10 | End: 2019-10-16

## 2019-10-10 RX ORDER — LIDOCAINE HYDROCHLORIDE 20 MG/ML
5 INJECTION, SOLUTION INFILTRATION; PERINEURAL ONCE
Status: DISCONTINUED | OUTPATIENT
Start: 2019-10-10 | End: 2019-10-10

## 2019-10-10 RX ORDER — SODIUM CHLORIDE 0.9 % (FLUSH) 0.9 %
10 SYRINGE (ML) INJECTION PRN
Status: DISCONTINUED | OUTPATIENT
Start: 2019-10-10 | End: 2019-10-19 | Stop reason: HOSPADM

## 2019-10-10 RX ORDER — SODIUM CHLORIDE 9 MG/ML
INJECTION, SOLUTION INTRAVENOUS CONTINUOUS
Status: DISCONTINUED | OUTPATIENT
Start: 2019-10-10 | End: 2019-10-10

## 2019-10-10 RX ORDER — SODIUM CHLORIDE 9 MG/ML
250 INJECTION, SOLUTION INTRAVENOUS ONCE
Status: COMPLETED | OUTPATIENT
Start: 2019-10-10 | End: 2019-10-10

## 2019-10-10 RX ORDER — SODIUM CHLORIDE 0.9 % (FLUSH) 0.9 %
10 SYRINGE (ML) INJECTION EVERY 12 HOURS
Status: DISCONTINUED | OUTPATIENT
Start: 2019-10-10 | End: 2019-10-16

## 2019-10-10 RX ORDER — LIDOCAINE HYDROCHLORIDE 20 MG/ML
5 INJECTION, SOLUTION INFILTRATION; PERINEURAL ONCE
Status: COMPLETED | OUTPATIENT
Start: 2019-10-10 | End: 2019-10-10

## 2019-10-10 RX ORDER — SODIUM CHLORIDE 9 MG/ML
250 INJECTION, SOLUTION INTRAVENOUS ONCE
Status: DISCONTINUED | OUTPATIENT
Start: 2019-10-10 | End: 2019-10-19 | Stop reason: HOSPADM

## 2019-10-10 RX ADMIN — ASPIRIN 81 MG 81 MG: 81 TABLET ORAL at 08:53

## 2019-10-10 RX ADMIN — OXYCODONE HYDROCHLORIDE AND ACETAMINOPHEN 1 TABLET: 5; 325 TABLET ORAL at 09:03

## 2019-10-10 RX ADMIN — OXYCODONE HYDROCHLORIDE AND ACETAMINOPHEN 1 TABLET: 5; 325 TABLET ORAL at 15:07

## 2019-10-10 RX ADMIN — MEROPENEM 1 G: 1 INJECTION, POWDER, FOR SOLUTION INTRAVENOUS at 02:09

## 2019-10-10 RX ADMIN — MEROPENEM 1 G: 1 INJECTION, POWDER, FOR SOLUTION INTRAVENOUS at 09:20

## 2019-10-10 RX ADMIN — INSULIN GLARGINE 12 UNITS: 100 INJECTION, SOLUTION SUBCUTANEOUS at 21:17

## 2019-10-10 RX ADMIN — GABAPENTIN 800 MG: 400 CAPSULE ORAL at 20:56

## 2019-10-10 RX ADMIN — ATORVASTATIN CALCIUM 20 MG: 20 TABLET, FILM COATED ORAL at 20:56

## 2019-10-10 RX ADMIN — LINEZOLID 600 MG: 600 INJECTION, SOLUTION INTRAVENOUS at 22:37

## 2019-10-10 RX ADMIN — Medication 10 ML: at 22:42

## 2019-10-10 RX ADMIN — CLOPIDOGREL BISULFATE 75 MG: 75 TABLET ORAL at 08:53

## 2019-10-10 RX ADMIN — OXYCODONE HYDROCHLORIDE AND ACETAMINOPHEN 1 TABLET: 5; 325 TABLET ORAL at 02:09

## 2019-10-10 RX ADMIN — OXYCODONE HYDROCHLORIDE AND ACETAMINOPHEN 1 TABLET: 5; 325 TABLET ORAL at 20:58

## 2019-10-10 RX ADMIN — GABAPENTIN 800 MG: 400 CAPSULE ORAL at 08:53

## 2019-10-10 RX ADMIN — LINEZOLID 600 MG: 600 INJECTION, SOLUTION INTRAVENOUS at 10:26

## 2019-10-10 RX ADMIN — GABAPENTIN 800 MG: 400 CAPSULE ORAL at 14:35

## 2019-10-10 RX ADMIN — MEROPENEM 1 G: 1 INJECTION, POWDER, FOR SOLUTION INTRAVENOUS at 17:40

## 2019-10-10 RX ADMIN — Medication 10 ML: at 14:35

## 2019-10-10 RX ADMIN — INSULIN LISPRO 2 UNITS: 100 INJECTION, SOLUTION INTRAVENOUS; SUBCUTANEOUS at 17:46

## 2019-10-10 RX ADMIN — ENOXAPARIN SODIUM 40 MG: 40 INJECTION SUBCUTANEOUS at 08:53

## 2019-10-10 RX ADMIN — MICAFUNGIN SODIUM 100 MG: 20 INJECTION, POWDER, LYOPHILIZED, FOR SOLUTION INTRAVENOUS at 14:34

## 2019-10-10 RX ADMIN — LIDOCAINE HYDROCHLORIDE 5 ML: 20 INJECTION, SOLUTION INFILTRATION; PERINEURAL at 13:10

## 2019-10-10 RX ADMIN — SODIUM CHLORIDE 250 ML: 9 INJECTION, SOLUTION INTRAVENOUS at 13:10

## 2019-10-10 ASSESSMENT — ENCOUNTER SYMPTOMS
COLOR CHANGE: 0
VOMITING: 0
NAUSEA: 0

## 2019-10-10 ASSESSMENT — PAIN SCALES - GENERAL
PAINLEVEL_OUTOF10: 2
PAINLEVEL_OUTOF10: 10
PAINLEVEL_OUTOF10: 0
PAINLEVEL_OUTOF10: 0
PAINLEVEL_OUTOF10: 10
PAINLEVEL_OUTOF10: 7
PAINLEVEL_OUTOF10: 9

## 2019-10-11 LAB
ANION GAP SERPL CALCULATED.3IONS-SCNC: 7 MEQ/L (ref 9–15)
BUN BLDV-MCNC: 20 MG/DL (ref 8–23)
CALCIUM SERPL-MCNC: 8.5 MG/DL (ref 8.5–9.9)
CHLORIDE BLD-SCNC: 100 MEQ/L (ref 95–107)
CO2: 33 MEQ/L (ref 20–31)
CREAT SERPL-MCNC: 0.63 MG/DL (ref 0.7–1.2)
EKG ATRIAL RATE: 64 BPM
EKG ATRIAL RATE: 72 BPM
EKG P AXIS: 12 DEGREES
EKG P AXIS: 51 DEGREES
EKG P-R INTERVAL: 204 MS
EKG P-R INTERVAL: 204 MS
EKG Q-T INTERVAL: 416 MS
EKG Q-T INTERVAL: 436 MS
EKG QRS DURATION: 154 MS
EKG QRS DURATION: 162 MS
EKG QTC CALCULATION (BAZETT): 449 MS
EKG QTC CALCULATION (BAZETT): 455 MS
EKG R AXIS: 74 DEGREES
EKG R AXIS: 75 DEGREES
EKG T AXIS: -25 DEGREES
EKG T AXIS: -8 DEGREES
EKG VENTRICULAR RATE: 64 BPM
EKG VENTRICULAR RATE: 72 BPM
GFR AFRICAN AMERICAN: >60
GFR NON-AFRICAN AMERICAN: >60
GLUCOSE BLD-MCNC: 138 MG/DL (ref 70–99)
GLUCOSE BLD-MCNC: 145 MG/DL (ref 60–115)
GLUCOSE BLD-MCNC: 148 MG/DL (ref 60–115)
GLUCOSE BLD-MCNC: 203 MG/DL (ref 60–115)
GLUCOSE BLD-MCNC: 224 MG/DL (ref 60–115)
MISCELLANEOUS LAB TEST ORDER: NORMAL
MISCELLANEOUS LAB TEST RESULT: NORMAL
MISCELLANEOUS PROMPT 2: NORMAL
PERFORMED ON: ABNORMAL
POTASSIUM REFLEX MAGNESIUM: 5.2 MEQ/L (ref 3.4–4.9)
SODIUM BLD-SCNC: 140 MEQ/L (ref 135–144)
WHOPPER PROMPT: NORMAL

## 2019-10-11 PROCEDURE — 36415 COLL VENOUS BLD VENIPUNCTURE: CPT

## 2019-10-11 PROCEDURE — 93010 ELECTROCARDIOGRAM REPORT: CPT | Performed by: INTERNAL MEDICINE

## 2019-10-11 PROCEDURE — 6360000002 HC RX W HCPCS: Performed by: INTERNAL MEDICINE

## 2019-10-11 PROCEDURE — 2580000003 HC RX 258: Performed by: INTERNAL MEDICINE

## 2019-10-11 PROCEDURE — 93005 ELECTROCARDIOGRAM TRACING: CPT | Performed by: PHYSICIAN ASSISTANT

## 2019-10-11 PROCEDURE — 99232 SBSQ HOSP IP/OBS MODERATE 35: CPT | Performed by: INTERNAL MEDICINE

## 2019-10-11 PROCEDURE — 2700000000 HC OXYGEN THERAPY PER DAY

## 2019-10-11 PROCEDURE — 6370000000 HC RX 637 (ALT 250 FOR IP): Performed by: INTERNAL MEDICINE

## 2019-10-11 PROCEDURE — 2060000000 HC ICU INTERMEDIATE R&B

## 2019-10-11 PROCEDURE — 80048 BASIC METABOLIC PNL TOTAL CA: CPT

## 2019-10-11 RX ADMIN — OXYCODONE HYDROCHLORIDE AND ACETAMINOPHEN 1 TABLET: 5; 325 TABLET ORAL at 20:53

## 2019-10-11 RX ADMIN — OXYCODONE HYDROCHLORIDE AND ACETAMINOPHEN 1 TABLET: 5; 325 TABLET ORAL at 07:59

## 2019-10-11 RX ADMIN — Medication 10 ML: at 12:45

## 2019-10-11 RX ADMIN — MEROPENEM 1 G: 1 INJECTION, POWDER, FOR SOLUTION INTRAVENOUS at 02:57

## 2019-10-11 RX ADMIN — INSULIN LISPRO 4 UNITS: 100 INJECTION, SOLUTION INTRAVENOUS; SUBCUTANEOUS at 11:50

## 2019-10-11 RX ADMIN — GABAPENTIN 800 MG: 400 CAPSULE ORAL at 13:18

## 2019-10-11 RX ADMIN — ENOXAPARIN SODIUM 40 MG: 40 INJECTION SUBCUTANEOUS at 07:49

## 2019-10-11 RX ADMIN — Medication 10 ML: at 21:00

## 2019-10-11 RX ADMIN — INSULIN GLARGINE 12 UNITS: 100 INJECTION, SOLUTION SUBCUTANEOUS at 20:53

## 2019-10-11 RX ADMIN — GABAPENTIN 800 MG: 400 CAPSULE ORAL at 07:48

## 2019-10-11 RX ADMIN — OXYCODONE HYDROCHLORIDE AND ACETAMINOPHEN 1 TABLET: 5; 325 TABLET ORAL at 14:04

## 2019-10-11 RX ADMIN — MEROPENEM 1 G: 1 INJECTION, POWDER, FOR SOLUTION INTRAVENOUS at 09:54

## 2019-10-11 RX ADMIN — LINEZOLID 600 MG: 600 INJECTION, SOLUTION INTRAVENOUS at 11:49

## 2019-10-11 RX ADMIN — ATORVASTATIN CALCIUM 20 MG: 20 TABLET, FILM COATED ORAL at 20:53

## 2019-10-11 RX ADMIN — Medication 10 ML: at 09:59

## 2019-10-11 RX ADMIN — MEROPENEM 1 G: 1 INJECTION, POWDER, FOR SOLUTION INTRAVENOUS at 17:50

## 2019-10-11 RX ADMIN — Medication 10 ML: at 20:56

## 2019-10-11 RX ADMIN — Medication 10 ML: at 10:48

## 2019-10-11 RX ADMIN — MICAFUNGIN SODIUM 100 MG: 20 INJECTION, POWDER, LYOPHILIZED, FOR SOLUTION INTRAVENOUS at 13:18

## 2019-10-11 RX ADMIN — GABAPENTIN 800 MG: 400 CAPSULE ORAL at 20:53

## 2019-10-11 RX ADMIN — ASPIRIN 81 MG 81 MG: 81 TABLET ORAL at 07:49

## 2019-10-11 ASSESSMENT — ENCOUNTER SYMPTOMS
SHORTNESS OF BREATH: 0
DIARRHEA: 0
GASTROINTESTINAL NEGATIVE: 1
NAUSEA: 0
CONSTIPATION: 0
COLOR CHANGE: 0
COUGH: 0
VOMITING: 0
CHOKING: 0
RECTAL PAIN: 0

## 2019-10-11 ASSESSMENT — PAIN SCALES - GENERAL
PAINLEVEL_OUTOF10: 8
PAINLEVEL_OUTOF10: 8
PAINLEVEL_OUTOF10: 9

## 2019-10-11 ASSESSMENT — PAIN DESCRIPTION - ORIENTATION: ORIENTATION: RIGHT

## 2019-10-11 ASSESSMENT — PAIN DESCRIPTION - PAIN TYPE: TYPE: CHRONIC PAIN

## 2019-10-11 ASSESSMENT — PAIN DESCRIPTION - LOCATION: LOCATION: FOOT

## 2019-10-12 LAB
BASOPHILS ABSOLUTE: 0.1 K/UL (ref 0–0.2)
BASOPHILS RELATIVE PERCENT: 1 %
BLOOD CULTURE, ROUTINE: NORMAL
EOSINOPHILS ABSOLUTE: 0.6 K/UL (ref 0–0.7)
EOSINOPHILS RELATIVE PERCENT: 12.2 %
GLUCOSE BLD-MCNC: 124 MG/DL (ref 60–115)
GLUCOSE BLD-MCNC: 140 MG/DL (ref 60–115)
GLUCOSE BLD-MCNC: 149 MG/DL (ref 60–115)
GLUCOSE BLD-MCNC: 151 MG/DL (ref 60–115)
HCT VFR BLD CALC: 26.3 % (ref 42–52)
HEMOGLOBIN: 8.4 G/DL (ref 14–18)
LYMPHOCYTES ABSOLUTE: 1.6 K/UL (ref 1–4.8)
LYMPHOCYTES RELATIVE PERCENT: 32.5 %
MCH RBC QN AUTO: 30.6 PG (ref 27–31.3)
MCHC RBC AUTO-ENTMCNC: 31.9 % (ref 33–37)
MCV RBC AUTO: 95.9 FL (ref 80–100)
MONOCYTES ABSOLUTE: 0.4 K/UL (ref 0.2–0.8)
MONOCYTES RELATIVE PERCENT: 8.7 %
NEUTROPHILS ABSOLUTE: 2.2 K/UL (ref 1.4–6.5)
NEUTROPHILS RELATIVE PERCENT: 45.6 %
PDW BLD-RTO: 17.7 % (ref 11.5–14.5)
PERFORMED ON: ABNORMAL
PLATELET # BLD: 242 K/UL (ref 130–400)
RBC # BLD: 2.74 M/UL (ref 4.7–6.1)
WBC # BLD: 4.9 K/UL (ref 4.8–10.8)

## 2019-10-12 PROCEDURE — 1210000000 HC MED SURG R&B

## 2019-10-12 PROCEDURE — 6360000002 HC RX W HCPCS: Performed by: INTERNAL MEDICINE

## 2019-10-12 PROCEDURE — 2580000003 HC RX 258: Performed by: INTERNAL MEDICINE

## 2019-10-12 PROCEDURE — 6370000000 HC RX 637 (ALT 250 FOR IP): Performed by: INTERNAL MEDICINE

## 2019-10-12 PROCEDURE — 85025 COMPLETE CBC W/AUTO DIFF WBC: CPT

## 2019-10-12 PROCEDURE — 99232 SBSQ HOSP IP/OBS MODERATE 35: CPT | Performed by: INTERNAL MEDICINE

## 2019-10-12 PROCEDURE — 2700000000 HC OXYGEN THERAPY PER DAY

## 2019-10-12 RX ADMIN — OXYCODONE HYDROCHLORIDE AND ACETAMINOPHEN 1 TABLET: 5; 325 TABLET ORAL at 02:48

## 2019-10-12 RX ADMIN — INSULIN LISPRO 2 UNITS: 100 INJECTION, SOLUTION INTRAVENOUS; SUBCUTANEOUS at 07:56

## 2019-10-12 RX ADMIN — LINEZOLID 600 MG: 600 INJECTION, SOLUTION INTRAVENOUS at 12:20

## 2019-10-12 RX ADMIN — OXYCODONE HYDROCHLORIDE AND ACETAMINOPHEN 1 TABLET: 5; 325 TABLET ORAL at 09:34

## 2019-10-12 RX ADMIN — MEROPENEM 1 G: 1 INJECTION, POWDER, FOR SOLUTION INTRAVENOUS at 17:31

## 2019-10-12 RX ADMIN — MICAFUNGIN SODIUM 100 MG: 20 INJECTION, POWDER, LYOPHILIZED, FOR SOLUTION INTRAVENOUS at 11:09

## 2019-10-12 RX ADMIN — OXYCODONE HYDROCHLORIDE AND ACETAMINOPHEN 1 TABLET: 5; 325 TABLET ORAL at 16:13

## 2019-10-12 RX ADMIN — INSULIN GLARGINE 12 UNITS: 100 INJECTION, SOLUTION SUBCUTANEOUS at 20:45

## 2019-10-12 RX ADMIN — ATORVASTATIN CALCIUM 20 MG: 20 TABLET, FILM COATED ORAL at 20:45

## 2019-10-12 RX ADMIN — ENOXAPARIN SODIUM 40 MG: 40 INJECTION SUBCUTANEOUS at 07:54

## 2019-10-12 RX ADMIN — Medication 10 ML: at 00:13

## 2019-10-12 RX ADMIN — GABAPENTIN 800 MG: 400 CAPSULE ORAL at 07:55

## 2019-10-12 RX ADMIN — GABAPENTIN 800 MG: 400 CAPSULE ORAL at 15:01

## 2019-10-12 RX ADMIN — Medication 10 ML: at 07:58

## 2019-10-12 RX ADMIN — GABAPENTIN 800 MG: 400 CAPSULE ORAL at 20:45

## 2019-10-12 RX ADMIN — Medication 10 ML: at 20:46

## 2019-10-12 RX ADMIN — MEROPENEM 1 G: 1 INJECTION, POWDER, FOR SOLUTION INTRAVENOUS at 02:42

## 2019-10-12 RX ADMIN — LINEZOLID 600 MG: 600 INJECTION, SOLUTION INTRAVENOUS at 00:12

## 2019-10-12 RX ADMIN — ASPIRIN 81 MG 81 MG: 81 TABLET ORAL at 07:55

## 2019-10-12 RX ADMIN — MEROPENEM 1 G: 1 INJECTION, POWDER, FOR SOLUTION INTRAVENOUS at 10:10

## 2019-10-12 ASSESSMENT — PAIN DESCRIPTION - ORIENTATION: ORIENTATION: RIGHT

## 2019-10-12 ASSESSMENT — ENCOUNTER SYMPTOMS
CONSTIPATION: 0
GASTROINTESTINAL NEGATIVE: 1
NAUSEA: 0
SHORTNESS OF BREATH: 0
COLOR CHANGE: 0
COUGH: 0
CHOKING: 0
VOMITING: 0
RECTAL PAIN: 0
DIARRHEA: 0

## 2019-10-12 ASSESSMENT — PAIN DESCRIPTION - PAIN TYPE: TYPE: CHRONIC PAIN

## 2019-10-12 ASSESSMENT — PAIN SCALES - GENERAL
PAINLEVEL_OUTOF10: 0
PAINLEVEL_OUTOF10: 8
PAINLEVEL_OUTOF10: 9
PAINLEVEL_OUTOF10: 9

## 2019-10-12 ASSESSMENT — PAIN DESCRIPTION - LOCATION: LOCATION: FOOT

## 2019-10-12 ASSESSMENT — PAIN DESCRIPTION - DESCRIPTORS: DESCRIPTORS: CONSTANT;STABBING

## 2019-10-13 ENCOUNTER — PREP FOR PROCEDURE (OUTPATIENT)
Dept: ORTHOPEDIC SURGERY | Age: 69
End: 2019-10-13

## 2019-10-13 LAB
ANION GAP SERPL CALCULATED.3IONS-SCNC: 7 MEQ/L (ref 9–15)
BLOOD CULTURE, ROUTINE: ABNORMAL
BLOOD CULTURE, ROUTINE: ABNORMAL
BLOOD CULTURE, ROUTINE: NORMAL
BUN BLDV-MCNC: 23 MG/DL (ref 8–23)
CALCIUM SERPL-MCNC: 8.6 MG/DL (ref 8.5–9.9)
CHLORIDE BLD-SCNC: 99 MEQ/L (ref 95–107)
CO2: 33 MEQ/L (ref 20–31)
CREAT SERPL-MCNC: 0.72 MG/DL (ref 0.7–1.2)
CULTURE, BLOOD 2: ABNORMAL
CULTURE, BLOOD 2: ABNORMAL
GFR AFRICAN AMERICAN: >60
GFR NON-AFRICAN AMERICAN: >60
GLUCOSE BLD-MCNC: 128 MG/DL (ref 70–99)
GLUCOSE BLD-MCNC: 142 MG/DL (ref 60–115)
GLUCOSE BLD-MCNC: 145 MG/DL (ref 60–115)
GLUCOSE BLD-MCNC: 154 MG/DL (ref 60–115)
GLUCOSE BLD-MCNC: 165 MG/DL (ref 60–115)
ORGANISM: ABNORMAL
ORGANISM: ABNORMAL
PERFORMED ON: ABNORMAL
POTASSIUM REFLEX MAGNESIUM: 5.2 MEQ/L (ref 3.4–4.9)
SODIUM BLD-SCNC: 139 MEQ/L (ref 135–144)

## 2019-10-13 PROCEDURE — 6360000002 HC RX W HCPCS: Performed by: INTERNAL MEDICINE

## 2019-10-13 PROCEDURE — 2580000003 HC RX 258: Performed by: INTERNAL MEDICINE

## 2019-10-13 PROCEDURE — 1210000000 HC MED SURG R&B

## 2019-10-13 PROCEDURE — 99232 SBSQ HOSP IP/OBS MODERATE 35: CPT | Performed by: INTERNAL MEDICINE

## 2019-10-13 PROCEDURE — 6370000000 HC RX 637 (ALT 250 FOR IP): Performed by: INTERNAL MEDICINE

## 2019-10-13 PROCEDURE — 36591 DRAW BLOOD OFF VENOUS DEVICE: CPT

## 2019-10-13 PROCEDURE — 2700000000 HC OXYGEN THERAPY PER DAY

## 2019-10-13 PROCEDURE — 80048 BASIC METABOLIC PNL TOTAL CA: CPT

## 2019-10-13 RX ORDER — SODIUM CHLORIDE 0.9 % (FLUSH) 0.9 %
10 SYRINGE (ML) INJECTION PRN
Status: CANCELLED | OUTPATIENT
Start: 2019-10-13

## 2019-10-13 RX ORDER — INSULIN GLARGINE 100 [IU]/ML
12 INJECTION, SOLUTION SUBCUTANEOUS NIGHTLY
Status: DISCONTINUED | OUTPATIENT
Start: 2019-10-14 | End: 2019-10-19 | Stop reason: HOSPADM

## 2019-10-13 RX ORDER — FLUCONAZOLE 2 MG/ML
400 INJECTION, SOLUTION INTRAVENOUS EVERY 24 HOURS
Status: DISCONTINUED | OUTPATIENT
Start: 2019-10-13 | End: 2019-10-19 | Stop reason: HOSPADM

## 2019-10-13 RX ORDER — SODIUM CHLORIDE 0.9 % (FLUSH) 0.9 %
10 SYRINGE (ML) INJECTION EVERY 12 HOURS SCHEDULED
Status: CANCELLED | OUTPATIENT
Start: 2019-10-13

## 2019-10-13 RX ORDER — INSULIN GLARGINE 100 [IU]/ML
8 INJECTION, SOLUTION SUBCUTANEOUS NIGHTLY
Status: COMPLETED | OUTPATIENT
Start: 2019-10-13 | End: 2019-10-13

## 2019-10-13 RX ADMIN — FLUCONAZOLE 400 MG: 400 INJECTION, SOLUTION INTRAVENOUS at 17:39

## 2019-10-13 RX ADMIN — ATORVASTATIN CALCIUM 20 MG: 20 TABLET, FILM COATED ORAL at 20:12

## 2019-10-13 RX ADMIN — Medication 10 ML: at 23:45

## 2019-10-13 RX ADMIN — ASPIRIN 81 MG 81 MG: 81 TABLET ORAL at 08:49

## 2019-10-13 RX ADMIN — MEROPENEM 1 G: 1 INJECTION, POWDER, FOR SOLUTION INTRAVENOUS at 08:49

## 2019-10-13 RX ADMIN — LINEZOLID 600 MG: 600 INJECTION, SOLUTION INTRAVENOUS at 01:11

## 2019-10-13 RX ADMIN — GABAPENTIN 800 MG: 400 CAPSULE ORAL at 08:48

## 2019-10-13 RX ADMIN — Medication 10 ML: at 23:44

## 2019-10-13 RX ADMIN — ENOXAPARIN SODIUM 40 MG: 40 INJECTION SUBCUTANEOUS at 08:49

## 2019-10-13 RX ADMIN — LINEZOLID 600 MG: 600 INJECTION, SOLUTION INTRAVENOUS at 23:40

## 2019-10-13 RX ADMIN — INSULIN LISPRO 1 UNITS: 100 INJECTION, SOLUTION INTRAVENOUS; SUBCUTANEOUS at 21:52

## 2019-10-13 RX ADMIN — MEROPENEM 1 G: 1 INJECTION, POWDER, FOR SOLUTION INTRAVENOUS at 19:00

## 2019-10-13 RX ADMIN — Medication 10 ML: at 20:38

## 2019-10-13 RX ADMIN — Medication 10 ML: at 08:50

## 2019-10-13 RX ADMIN — MEROPENEM 1 G: 1 INJECTION, POWDER, FOR SOLUTION INTRAVENOUS at 02:23

## 2019-10-13 RX ADMIN — GABAPENTIN 800 MG: 400 CAPSULE ORAL at 20:11

## 2019-10-13 RX ADMIN — INSULIN GLARGINE 8 UNITS: 100 INJECTION, SOLUTION SUBCUTANEOUS at 21:52

## 2019-10-13 RX ADMIN — Medication 10 ML: at 20:10

## 2019-10-13 RX ADMIN — OXYCODONE HYDROCHLORIDE AND ACETAMINOPHEN 1 TABLET: 5; 325 TABLET ORAL at 20:11

## 2019-10-13 RX ADMIN — MICAFUNGIN SODIUM 100 MG: 20 INJECTION, POWDER, LYOPHILIZED, FOR SOLUTION INTRAVENOUS at 11:29

## 2019-10-13 RX ADMIN — LINEZOLID 600 MG: 600 INJECTION, SOLUTION INTRAVENOUS at 12:38

## 2019-10-13 RX ADMIN — OXYCODONE HYDROCHLORIDE AND ACETAMINOPHEN 1 TABLET: 5; 325 TABLET ORAL at 09:07

## 2019-10-13 RX ADMIN — GABAPENTIN 800 MG: 400 CAPSULE ORAL at 13:43

## 2019-10-13 ASSESSMENT — PAIN SCALES - GENERAL
PAINLEVEL_OUTOF10: 10
PAINLEVEL_OUTOF10: 5
PAINLEVEL_OUTOF10: 10
PAINLEVEL_OUTOF10: 0

## 2019-10-13 ASSESSMENT — ENCOUNTER SYMPTOMS
CONSTIPATION: 0
COUGH: 0
VOMITING: 0
RECTAL PAIN: 0
DIARRHEA: 0
GASTROINTESTINAL NEGATIVE: 1
CHOKING: 0
NAUSEA: 0
SHORTNESS OF BREATH: 0

## 2019-10-14 ENCOUNTER — ANESTHESIA (OUTPATIENT)
Dept: OPERATING ROOM | Age: 69
DRG: 853 | End: 2019-10-14
Payer: MEDICARE

## 2019-10-14 ENCOUNTER — ANESTHESIA EVENT (OUTPATIENT)
Dept: OPERATING ROOM | Age: 69
DRG: 853 | End: 2019-10-14
Payer: MEDICARE

## 2019-10-14 VITALS — DIASTOLIC BLOOD PRESSURE: 75 MMHG | SYSTOLIC BLOOD PRESSURE: 167 MMHG | OXYGEN SATURATION: 100 %

## 2019-10-14 LAB
ABO/RH: NORMAL
ANION GAP SERPL CALCULATED.3IONS-SCNC: 7 MEQ/L (ref 9–15)
ANTIBODY SCREEN: NORMAL
BASOPHILS ABSOLUTE: 0.1 K/UL (ref 0–0.2)
BASOPHILS RELATIVE PERCENT: 1.1 %
BLOOD CULTURE, ROUTINE: NORMAL
BUN BLDV-MCNC: 24 MG/DL (ref 8–23)
CALCIUM SERPL-MCNC: 8.6 MG/DL (ref 8.5–9.9)
CHLORIDE BLD-SCNC: 100 MEQ/L (ref 95–107)
CO2: 33 MEQ/L (ref 20–31)
CREAT SERPL-MCNC: 0.77 MG/DL (ref 0.7–1.2)
EOSINOPHILS ABSOLUTE: 0.4 K/UL (ref 0–0.7)
EOSINOPHILS RELATIVE PERCENT: 8.8 %
GFR AFRICAN AMERICAN: >60
GFR NON-AFRICAN AMERICAN: >60
GLUCOSE BLD-MCNC: 123 MG/DL (ref 70–99)
GLUCOSE BLD-MCNC: 148 MG/DL (ref 60–115)
GLUCOSE BLD-MCNC: 170 MG/DL (ref 60–115)
GLUCOSE BLD-MCNC: 180 MG/DL (ref 60–115)
HCT VFR BLD CALC: 25.5 % (ref 42–52)
HEMOGLOBIN: 8.2 G/DL (ref 14–18)
INR BLD: 1
LYMPHOCYTES ABSOLUTE: 1.4 K/UL (ref 1–4.8)
LYMPHOCYTES RELATIVE PERCENT: 30.8 %
MCH RBC QN AUTO: 30.6 PG (ref 27–31.3)
MCHC RBC AUTO-ENTMCNC: 32.1 % (ref 33–37)
MCV RBC AUTO: 95.5 FL (ref 80–100)
MONOCYTES ABSOLUTE: 0.4 K/UL (ref 0.2–0.8)
MONOCYTES RELATIVE PERCENT: 8.9 %
NEUTROPHILS ABSOLUTE: 2.3 K/UL (ref 1.4–6.5)
NEUTROPHILS RELATIVE PERCENT: 50.4 %
PDW BLD-RTO: 17.3 % (ref 11.5–14.5)
PERFORMED ON: ABNORMAL
PLATELET # BLD: 196 K/UL (ref 130–400)
POTASSIUM REFLEX MAGNESIUM: 5.1 MEQ/L (ref 3.4–4.9)
PROTHROMBIN TIME: 13.6 SEC (ref 12.3–14.9)
RBC # BLD: 2.67 M/UL (ref 4.7–6.1)
SODIUM BLD-SCNC: 140 MEQ/L (ref 135–144)
WBC # BLD: 4.5 K/UL (ref 4.8–10.8)

## 2019-10-14 PROCEDURE — 3600000003 HC SURGERY LEVEL 3 BASE: Performed by: ORTHOPAEDIC SURGERY

## 2019-10-14 PROCEDURE — 6360000002 HC RX W HCPCS: Performed by: INTERNAL MEDICINE

## 2019-10-14 PROCEDURE — 88307 TISSUE EXAM BY PATHOLOGIST: CPT

## 2019-10-14 PROCEDURE — 86901 BLOOD TYPING SEROLOGIC RH(D): CPT

## 2019-10-14 PROCEDURE — 2580000003 HC RX 258: Performed by: ANESTHESIOLOGY

## 2019-10-14 PROCEDURE — 86850 RBC ANTIBODY SCREEN: CPT

## 2019-10-14 PROCEDURE — 2709999900 HC NON-CHARGEABLE SUPPLY: Performed by: ORTHOPAEDIC SURGERY

## 2019-10-14 PROCEDURE — 85025 COMPLETE CBC W/AUTO DIFF WBC: CPT

## 2019-10-14 PROCEDURE — 6360000002 HC RX W HCPCS: Performed by: NURSE ANESTHETIST, CERTIFIED REGISTERED

## 2019-10-14 PROCEDURE — 99232 SBSQ HOSP IP/OBS MODERATE 35: CPT | Performed by: INTERNAL MEDICINE

## 2019-10-14 PROCEDURE — 1210000000 HC MED SURG R&B

## 2019-10-14 PROCEDURE — 7100000001 HC PACU RECOVERY - ADDTL 15 MIN: Performed by: ORTHOPAEDIC SURGERY

## 2019-10-14 PROCEDURE — 6370000000 HC RX 637 (ALT 250 FOR IP): Performed by: INTERNAL MEDICINE

## 2019-10-14 PROCEDURE — APPNB15 APP NON BILLABLE TIME 0-15 MINS: Performed by: PHYSICIAN ASSISTANT

## 2019-10-14 PROCEDURE — 86900 BLOOD TYPING SEROLOGIC ABO: CPT

## 2019-10-14 PROCEDURE — 85610 PROTHROMBIN TIME: CPT

## 2019-10-14 PROCEDURE — 3700000000 HC ANESTHESIA ATTENDED CARE: Performed by: ORTHOPAEDIC SURGERY

## 2019-10-14 PROCEDURE — 80048 BASIC METABOLIC PNL TOTAL CA: CPT

## 2019-10-14 PROCEDURE — 2580000003 HC RX 258: Performed by: INTERNAL MEDICINE

## 2019-10-14 PROCEDURE — 2700000000 HC OXYGEN THERAPY PER DAY

## 2019-10-14 PROCEDURE — 7100000000 HC PACU RECOVERY - FIRST 15 MIN: Performed by: ORTHOPAEDIC SURGERY

## 2019-10-14 PROCEDURE — 2580000003 HC RX 258: Performed by: ORTHOPAEDIC SURGERY

## 2019-10-14 PROCEDURE — 3700000001 HC ADD 15 MINUTES (ANESTHESIA): Performed by: ORTHOPAEDIC SURGERY

## 2019-10-14 PROCEDURE — 0Y6C0Z3 DETACHMENT AT RIGHT UPPER LEG, LOW, OPEN APPROACH: ICD-10-PCS | Performed by: ORTHOPAEDIC SURGERY

## 2019-10-14 PROCEDURE — 2580000003 HC RX 258

## 2019-10-14 PROCEDURE — 6360000002 HC RX W HCPCS: Performed by: ANESTHESIOLOGY

## 2019-10-14 PROCEDURE — 3600000013 HC SURGERY LEVEL 3 ADDTL 15MIN: Performed by: ORTHOPAEDIC SURGERY

## 2019-10-14 RX ORDER — FENTANYL CITRATE 50 UG/ML
INJECTION, SOLUTION INTRAMUSCULAR; INTRAVENOUS PRN
Status: DISCONTINUED | OUTPATIENT
Start: 2019-10-14 | End: 2019-10-14 | Stop reason: SDUPTHER

## 2019-10-14 RX ORDER — SODIUM CHLORIDE 9 MG/ML
INJECTION, SOLUTION INTRAVENOUS CONTINUOUS
Status: DISCONTINUED | OUTPATIENT
Start: 2019-10-14 | End: 2019-10-17

## 2019-10-14 RX ORDER — NICOTINE 21 MG/24HR
1 PATCH, TRANSDERMAL 24 HOURS TRANSDERMAL DAILY
Status: DISCONTINUED | OUTPATIENT
Start: 2019-10-14 | End: 2019-10-19 | Stop reason: HOSPADM

## 2019-10-14 RX ORDER — CEFAZOLIN SODIUM 1 G/3ML
INJECTION, POWDER, FOR SOLUTION INTRAMUSCULAR; INTRAVENOUS PRN
Status: DISCONTINUED | OUTPATIENT
Start: 2019-10-14 | End: 2019-10-14 | Stop reason: SDUPTHER

## 2019-10-14 RX ORDER — METOCLOPRAMIDE HYDROCHLORIDE 5 MG/ML
10 INJECTION INTRAMUSCULAR; INTRAVENOUS
Status: DISCONTINUED | OUTPATIENT
Start: 2019-10-14 | End: 2019-10-14 | Stop reason: HOSPADM

## 2019-10-14 RX ORDER — SODIUM CHLORIDE 0.9 % (FLUSH) 0.9 %
10 SYRINGE (ML) INJECTION PRN
Status: DISCONTINUED | OUTPATIENT
Start: 2019-10-14 | End: 2019-10-14 | Stop reason: HOSPADM

## 2019-10-14 RX ORDER — MORPHINE SULFATE 2 MG/ML
2 INJECTION, SOLUTION INTRAMUSCULAR; INTRAVENOUS EVERY 4 HOURS PRN
Status: COMPLETED | OUTPATIENT
Start: 2019-10-14 | End: 2019-10-15

## 2019-10-14 RX ORDER — MAGNESIUM HYDROXIDE 1200 MG/15ML
LIQUID ORAL CONTINUOUS PRN
Status: COMPLETED | OUTPATIENT
Start: 2019-10-14 | End: 2019-10-14

## 2019-10-14 RX ORDER — UREA 10 %
2 LOTION (ML) TOPICAL NIGHTLY
Status: DISCONTINUED | OUTPATIENT
Start: 2019-10-14 | End: 2019-10-19 | Stop reason: HOSPADM

## 2019-10-14 RX ORDER — FENTANYL CITRATE 50 UG/ML
50 INJECTION, SOLUTION INTRAMUSCULAR; INTRAVENOUS EVERY 10 MIN PRN
Status: DISCONTINUED | OUTPATIENT
Start: 2019-10-14 | End: 2019-10-14 | Stop reason: HOSPADM

## 2019-10-14 RX ORDER — SODIUM CHLORIDE 0.9 % (FLUSH) 0.9 %
10 SYRINGE (ML) INJECTION EVERY 12 HOURS SCHEDULED
Status: DISCONTINUED | OUTPATIENT
Start: 2019-10-14 | End: 2019-10-16

## 2019-10-14 RX ORDER — ONDANSETRON 2 MG/ML
4 INJECTION INTRAMUSCULAR; INTRAVENOUS
Status: DISCONTINUED | OUTPATIENT
Start: 2019-10-14 | End: 2019-10-14 | Stop reason: HOSPADM

## 2019-10-14 RX ORDER — SODIUM CHLORIDE 9 MG/ML
INJECTION, SOLUTION INTRAVENOUS
Status: COMPLETED
Start: 2019-10-14 | End: 2019-10-14

## 2019-10-14 RX ORDER — MIDAZOLAM HYDROCHLORIDE 1 MG/ML
INJECTION INTRAMUSCULAR; INTRAVENOUS PRN
Status: DISCONTINUED | OUTPATIENT
Start: 2019-10-14 | End: 2019-10-14 | Stop reason: SDUPTHER

## 2019-10-14 RX ORDER — SODIUM CHLORIDE, SODIUM LACTATE, POTASSIUM CHLORIDE, CALCIUM CHLORIDE 600; 310; 30; 20 MG/100ML; MG/100ML; MG/100ML; MG/100ML
INJECTION, SOLUTION INTRAVENOUS CONTINUOUS
Status: DISCONTINUED | OUTPATIENT
Start: 2019-10-14 | End: 2019-10-17

## 2019-10-14 RX ORDER — SODIUM CHLORIDE 0.9 % (FLUSH) 0.9 %
10 SYRINGE (ML) INJECTION EVERY 12 HOURS SCHEDULED
Status: DISCONTINUED | OUTPATIENT
Start: 2019-10-14 | End: 2019-10-14 | Stop reason: HOSPADM

## 2019-10-14 RX ORDER — PROPOFOL 10 MG/ML
INJECTION, EMULSION INTRAVENOUS CONTINUOUS PRN
Status: DISCONTINUED | OUTPATIENT
Start: 2019-10-14 | End: 2019-10-14 | Stop reason: SDUPTHER

## 2019-10-14 RX ORDER — HYDROCODONE BITARTRATE AND ACETAMINOPHEN 5; 325 MG/1; MG/1
2 TABLET ORAL PRN
Status: DISCONTINUED | OUTPATIENT
Start: 2019-10-14 | End: 2019-10-14 | Stop reason: HOSPADM

## 2019-10-14 RX ORDER — SODIUM CHLORIDE 0.9 % (FLUSH) 0.9 %
10 SYRINGE (ML) INJECTION PRN
Status: DISCONTINUED | OUTPATIENT
Start: 2019-10-14 | End: 2019-10-19 | Stop reason: HOSPADM

## 2019-10-14 RX ORDER — KETOROLAC TROMETHAMINE 15 MG/ML
15 INJECTION, SOLUTION INTRAMUSCULAR; INTRAVENOUS ONCE
Status: COMPLETED | OUTPATIENT
Start: 2019-10-14 | End: 2019-10-14

## 2019-10-14 RX ORDER — DIPHENHYDRAMINE HYDROCHLORIDE 50 MG/ML
12.5 INJECTION INTRAMUSCULAR; INTRAVENOUS
Status: DISCONTINUED | OUTPATIENT
Start: 2019-10-14 | End: 2019-10-14 | Stop reason: HOSPADM

## 2019-10-14 RX ORDER — LIDOCAINE HYDROCHLORIDE 10 MG/ML
1 INJECTION, SOLUTION EPIDURAL; INFILTRATION; INTRACAUDAL; PERINEURAL
Status: DISCONTINUED | OUTPATIENT
Start: 2019-10-14 | End: 2019-10-14 | Stop reason: HOSPADM

## 2019-10-14 RX ORDER — HYDROCODONE BITARTRATE AND ACETAMINOPHEN 5; 325 MG/1; MG/1
1 TABLET ORAL PRN
Status: DISCONTINUED | OUTPATIENT
Start: 2019-10-14 | End: 2019-10-14 | Stop reason: HOSPADM

## 2019-10-14 RX ADMIN — HYDROMORPHONE HYDROCHLORIDE 0.5 MG: 1 INJECTION, SOLUTION INTRAMUSCULAR; INTRAVENOUS; SUBCUTANEOUS at 16:14

## 2019-10-14 RX ADMIN — SODIUM CHLORIDE: 9 INJECTION, SOLUTION INTRAVENOUS at 18:22

## 2019-10-14 RX ADMIN — MORPHINE SULFATE 2 MG: 2 INJECTION, SOLUTION INTRAMUSCULAR; INTRAVENOUS at 21:40

## 2019-10-14 RX ADMIN — MIDAZOLAM HYDROCHLORIDE 2 MG: 1 INJECTION, SOLUTION INTRAMUSCULAR; INTRAVENOUS at 13:16

## 2019-10-14 RX ADMIN — SODIUM CHLORIDE: 9 INJECTION, SOLUTION INTRAVENOUS at 13:09

## 2019-10-14 RX ADMIN — FENTANYL CITRATE 50 MCG: 50 INJECTION, SOLUTION INTRAMUSCULAR; INTRAVENOUS at 16:08

## 2019-10-14 RX ADMIN — MEROPENEM 1 G: 1 INJECTION, POWDER, FOR SOLUTION INTRAVENOUS at 10:23

## 2019-10-14 RX ADMIN — SODIUM CHLORIDE, POTASSIUM CHLORIDE, SODIUM LACTATE AND CALCIUM CHLORIDE: 600; 310; 30; 20 INJECTION, SOLUTION INTRAVENOUS at 12:37

## 2019-10-14 RX ADMIN — HYDRALAZINE HYDROCHLORIDE 10 MG: 20 INJECTION INTRAMUSCULAR; INTRAVENOUS at 16:41

## 2019-10-14 RX ADMIN — ACETAMINOPHEN 650 MG: 325 TABLET ORAL at 23:41

## 2019-10-14 RX ADMIN — GABAPENTIN 800 MG: 400 CAPSULE ORAL at 21:33

## 2019-10-14 RX ADMIN — HYDROMORPHONE HYDROCHLORIDE 0.5 MG: 1 INJECTION, SOLUTION INTRAMUSCULAR; INTRAVENOUS; SUBCUTANEOUS at 16:24

## 2019-10-14 RX ADMIN — CEFAZOLIN 3000 MG: 330 INJECTION, POWDER, FOR SOLUTION INTRAMUSCULAR; INTRAVENOUS at 13:13

## 2019-10-14 RX ADMIN — OXYCODONE HYDROCHLORIDE AND ACETAMINOPHEN 1 TABLET: 5; 325 TABLET ORAL at 18:22

## 2019-10-14 RX ADMIN — FLUCONAZOLE 400 MG: 400 INJECTION, SOLUTION INTRAVENOUS at 18:27

## 2019-10-14 RX ADMIN — OXYCODONE HYDROCHLORIDE AND ACETAMINOPHEN 1 TABLET: 5; 325 TABLET ORAL at 04:42

## 2019-10-14 RX ADMIN — FENTANYL CITRATE 50 MCG: 50 INJECTION, SOLUTION INTRAMUSCULAR; INTRAVENOUS at 15:58

## 2019-10-14 RX ADMIN — FLUCONAZOLE 400 MG: 400 INJECTION, SOLUTION INTRAVENOUS at 17:15

## 2019-10-14 RX ADMIN — INSULIN LISPRO 1 UNITS: 100 INJECTION, SOLUTION INTRAVENOUS; SUBCUTANEOUS at 21:32

## 2019-10-14 RX ADMIN — INSULIN GLARGINE 12 UNITS: 100 INJECTION, SOLUTION SUBCUTANEOUS at 21:33

## 2019-10-14 RX ADMIN — Medication 10 ML: at 08:45

## 2019-10-14 RX ADMIN — SODIUM CHLORIDE: 9 INJECTION, SOLUTION INTRAVENOUS at 21:32

## 2019-10-14 RX ADMIN — KETOROLAC TROMETHAMINE 15 MG: 15 INJECTION, SOLUTION INTRAMUSCULAR; INTRAVENOUS at 21:40

## 2019-10-14 RX ADMIN — Medication 10 ML: at 21:41

## 2019-10-14 RX ADMIN — PROPOFOL 50 MCG/KG/MIN: 10 INJECTION, EMULSION INTRAVENOUS at 13:38

## 2019-10-14 RX ADMIN — MEROPENEM 1 G: 1 INJECTION, POWDER, FOR SOLUTION INTRAVENOUS at 02:25

## 2019-10-14 RX ADMIN — FENTANYL CITRATE 50 MCG: 50 INJECTION, SOLUTION INTRAMUSCULAR; INTRAVENOUS at 16:55

## 2019-10-14 RX ADMIN — Medication 2 MG: at 21:33

## 2019-10-14 RX ADMIN — MEROPENEM 1 G: 1 INJECTION, POWDER, FOR SOLUTION INTRAVENOUS at 18:51

## 2019-10-14 RX ADMIN — FENTANYL CITRATE 50 MCG: 50 INJECTION, SOLUTION INTRAMUSCULAR; INTRAVENOUS at 17:23

## 2019-10-14 RX ADMIN — FENTANYL CITRATE 50 MCG: 50 INJECTION, SOLUTION INTRAMUSCULAR; INTRAVENOUS at 16:34

## 2019-10-14 RX ADMIN — ATORVASTATIN CALCIUM 20 MG: 20 TABLET, FILM COATED ORAL at 21:33

## 2019-10-14 RX ADMIN — LINEZOLID 600 MG: 600 INJECTION, SOLUTION INTRAVENOUS at 11:09

## 2019-10-14 ASSESSMENT — PAIN SCALES - GENERAL
PAINLEVEL_OUTOF10: 8
PAINLEVEL_OUTOF10: 10
PAINLEVEL_OUTOF10: 6
PAINLEVEL_OUTOF10: 8
PAINLEVEL_OUTOF10: 8
PAINLEVEL_OUTOF10: 10
PAINLEVEL_OUTOF10: 5
PAINLEVEL_OUTOF10: 8
PAINLEVEL_OUTOF10: 10
PAINLEVEL_OUTOF10: 8
PAINLEVEL_OUTOF10: 8

## 2019-10-14 ASSESSMENT — PAIN DESCRIPTION - DESCRIPTORS
DESCRIPTORS: CONSTANT
DESCRIPTORS: BURNING

## 2019-10-14 ASSESSMENT — PULMONARY FUNCTION TESTS
PIF_VALUE: 1
PIF_VALUE: 0
PIF_VALUE: 1
PIF_VALUE: 0
PIF_VALUE: 1
PIF_VALUE: 0
PIF_VALUE: 1
PIF_VALUE: 0
PIF_VALUE: 1
PIF_VALUE: 0
PIF_VALUE: 1
PIF_VALUE: 1
PIF_VALUE: 0
PIF_VALUE: 1
PIF_VALUE: 0
PIF_VALUE: 1

## 2019-10-14 ASSESSMENT — ENCOUNTER SYMPTOMS
GASTROINTESTINAL NEGATIVE: 1
COUGH: 0
DIARRHEA: 0
RECTAL PAIN: 0
CHOKING: 0
SHORTNESS OF BREATH: 0
NAUSEA: 0
CONSTIPATION: 0
COLOR CHANGE: 0
VOMITING: 0

## 2019-10-14 ASSESSMENT — PAIN DESCRIPTION - ONSET
ONSET: SUDDEN
ONSET: ON-GOING

## 2019-10-14 ASSESSMENT — PAIN DESCRIPTION - LOCATION
LOCATION: LEG
LOCATION: ANKLE;LEG

## 2019-10-14 ASSESSMENT — PAIN DESCRIPTION - PAIN TYPE
TYPE: PHANTOM PAIN
TYPE: SURGICAL PAIN;PHANTOM PAIN

## 2019-10-14 ASSESSMENT — PAIN DESCRIPTION - ORIENTATION
ORIENTATION: RIGHT
ORIENTATION: RIGHT

## 2019-10-14 ASSESSMENT — PAIN - FUNCTIONAL ASSESSMENT: PAIN_FUNCTIONAL_ASSESSMENT: 0-10

## 2019-10-14 ASSESSMENT — COPD QUESTIONNAIRES: CAT_SEVERITY: NO INTERVAL CHANGE

## 2019-10-15 LAB
ANION GAP SERPL CALCULATED.3IONS-SCNC: 5 MEQ/L (ref 9–15)
BASOPHILS ABSOLUTE: 0 K/UL (ref 0–0.2)
BASOPHILS RELATIVE PERCENT: 0.5 %
BUN BLDV-MCNC: 25 MG/DL (ref 8–23)
CALCIUM SERPL-MCNC: 8.3 MG/DL (ref 8.5–9.9)
CHLORIDE BLD-SCNC: 100 MEQ/L (ref 95–107)
CO2: 32 MEQ/L (ref 20–31)
CREAT SERPL-MCNC: 0.62 MG/DL (ref 0.7–1.2)
EOSINOPHILS ABSOLUTE: 0 K/UL (ref 0–0.7)
EOSINOPHILS RELATIVE PERCENT: 0.2 %
GFR AFRICAN AMERICAN: >60
GFR NON-AFRICAN AMERICAN: >60
GLUCOSE BLD-MCNC: 115 MG/DL (ref 70–99)
GLUCOSE BLD-MCNC: 140 MG/DL (ref 60–115)
GLUCOSE BLD-MCNC: 148 MG/DL (ref 60–115)
GLUCOSE BLD-MCNC: 148 MG/DL (ref 60–115)
GLUCOSE BLD-MCNC: 152 MG/DL (ref 60–115)
HCT VFR BLD CALC: 22.7 % (ref 42–52)
HEMOGLOBIN: 7.2 G/DL (ref 14–18)
LYMPHOCYTES ABSOLUTE: 1 K/UL (ref 1–4.8)
LYMPHOCYTES RELATIVE PERCENT: 13.1 %
MCH RBC QN AUTO: 30 PG (ref 27–31.3)
MCHC RBC AUTO-ENTMCNC: 31.5 % (ref 33–37)
MCV RBC AUTO: 95.2 FL (ref 80–100)
MONOCYTES ABSOLUTE: 0.4 K/UL (ref 0.2–0.8)
MONOCYTES RELATIVE PERCENT: 5.9 %
NEUTROPHILS ABSOLUTE: 5.9 K/UL (ref 1.4–6.5)
NEUTROPHILS RELATIVE PERCENT: 80.3 %
PDW BLD-RTO: 17.1 % (ref 11.5–14.5)
PERFORMED ON: ABNORMAL
PLATELET # BLD: 167 K/UL (ref 130–400)
POTASSIUM REFLEX MAGNESIUM: 5.6 MEQ/L (ref 3.4–4.9)
RBC # BLD: 2.39 M/UL (ref 4.7–6.1)
SODIUM BLD-SCNC: 137 MEQ/L (ref 135–144)
WBC # BLD: 7.3 K/UL (ref 4.8–10.8)

## 2019-10-15 PROCEDURE — 6360000002 HC RX W HCPCS: Performed by: INTERNAL MEDICINE

## 2019-10-15 PROCEDURE — 1210000000 HC MED SURG R&B

## 2019-10-15 PROCEDURE — 6370000000 HC RX 637 (ALT 250 FOR IP): Performed by: INTERNAL MEDICINE

## 2019-10-15 PROCEDURE — 2580000003 HC RX 258: Performed by: ORTHOPAEDIC SURGERY

## 2019-10-15 PROCEDURE — 97110 THERAPEUTIC EXERCISES: CPT

## 2019-10-15 PROCEDURE — 2700000000 HC OXYGEN THERAPY PER DAY

## 2019-10-15 PROCEDURE — 99232 SBSQ HOSP IP/OBS MODERATE 35: CPT | Performed by: INTERNAL MEDICINE

## 2019-10-15 PROCEDURE — 97161 PT EVAL LOW COMPLEX 20 MIN: CPT

## 2019-10-15 PROCEDURE — 2580000003 HC RX 258: Performed by: INTERNAL MEDICINE

## 2019-10-15 PROCEDURE — 97165 OT EVAL LOW COMPLEX 30 MIN: CPT

## 2019-10-15 PROCEDURE — 85025 COMPLETE CBC W/AUTO DIFF WBC: CPT

## 2019-10-15 PROCEDURE — 80048 BASIC METABOLIC PNL TOTAL CA: CPT

## 2019-10-15 RX ORDER — OXYCODONE HYDROCHLORIDE AND ACETAMINOPHEN 5; 325 MG/1; MG/1
1 TABLET ORAL EVERY 4 HOURS PRN
Status: DISCONTINUED | OUTPATIENT
Start: 2019-10-15 | End: 2019-10-16

## 2019-10-15 RX ORDER — POLYETHYLENE GLYCOL 3350 17 G/17G
17 POWDER, FOR SOLUTION ORAL DAILY
Status: DISPENSED | OUTPATIENT
Start: 2019-10-15 | End: 2019-10-17

## 2019-10-15 RX ADMIN — OXYCODONE HYDROCHLORIDE AND ACETAMINOPHEN 1 TABLET: 5; 325 TABLET ORAL at 14:53

## 2019-10-15 RX ADMIN — INSULIN LISPRO 2 UNITS: 100 INJECTION, SOLUTION INTRAVENOUS; SUBCUTANEOUS at 09:05

## 2019-10-15 RX ADMIN — MEROPENEM 1 G: 1 INJECTION, POWDER, FOR SOLUTION INTRAVENOUS at 20:33

## 2019-10-15 RX ADMIN — LINEZOLID 600 MG: 600 INJECTION, SOLUTION INTRAVENOUS at 11:35

## 2019-10-15 RX ADMIN — Medication 2 MG: at 20:33

## 2019-10-15 RX ADMIN — MORPHINE SULFATE 2 MG: 2 INJECTION, SOLUTION INTRAMUSCULAR; INTRAVENOUS at 20:57

## 2019-10-15 RX ADMIN — MEROPENEM 1 G: 1 INJECTION, POWDER, FOR SOLUTION INTRAVENOUS at 03:42

## 2019-10-15 RX ADMIN — OXYCODONE HYDROCHLORIDE AND ACETAMINOPHEN 1 TABLET: 5; 325 TABLET ORAL at 23:14

## 2019-10-15 RX ADMIN — ASPIRIN 81 MG 81 MG: 81 TABLET ORAL at 09:00

## 2019-10-15 RX ADMIN — INSULIN GLARGINE 12 UNITS: 100 INJECTION, SOLUTION SUBCUTANEOUS at 20:55

## 2019-10-15 RX ADMIN — LINEZOLID 600 MG: 600 INJECTION, SOLUTION INTRAVENOUS at 00:42

## 2019-10-15 RX ADMIN — OXYCODONE HYDROCHLORIDE AND ACETAMINOPHEN 1 TABLET: 5; 325 TABLET ORAL at 09:00

## 2019-10-15 RX ADMIN — GABAPENTIN 800 MG: 400 CAPSULE ORAL at 09:00

## 2019-10-15 RX ADMIN — ENOXAPARIN SODIUM 40 MG: 40 INJECTION SUBCUTANEOUS at 08:59

## 2019-10-15 RX ADMIN — INSULIN LISPRO 2 UNITS: 100 INJECTION, SOLUTION INTRAVENOUS; SUBCUTANEOUS at 12:15

## 2019-10-15 RX ADMIN — FLUCONAZOLE 400 MG: 400 INJECTION, SOLUTION INTRAVENOUS at 15:56

## 2019-10-15 RX ADMIN — OXYCODONE HYDROCHLORIDE AND ACETAMINOPHEN 1 TABLET: 5; 325 TABLET ORAL at 00:36

## 2019-10-15 RX ADMIN — INSULIN LISPRO 2 UNITS: 100 INJECTION, SOLUTION INTRAVENOUS; SUBCUTANEOUS at 16:27

## 2019-10-15 RX ADMIN — MEROPENEM 1 G: 1 INJECTION, POWDER, FOR SOLUTION INTRAVENOUS at 12:54

## 2019-10-15 RX ADMIN — GABAPENTIN 800 MG: 400 CAPSULE ORAL at 14:50

## 2019-10-15 RX ADMIN — ATORVASTATIN CALCIUM 20 MG: 20 TABLET, FILM COATED ORAL at 20:33

## 2019-10-15 RX ADMIN — Medication 10 ML: at 21:00

## 2019-10-15 RX ADMIN — MORPHINE SULFATE 2 MG: 2 INJECTION, SOLUTION INTRAMUSCULAR; INTRAVENOUS at 12:15

## 2019-10-15 RX ADMIN — INSULIN LISPRO 1 UNITS: 100 INJECTION, SOLUTION INTRAVENOUS; SUBCUTANEOUS at 20:43

## 2019-10-15 RX ADMIN — Medication 10 ML: at 23:20

## 2019-10-15 RX ADMIN — MORPHINE SULFATE 2 MG: 2 INJECTION, SOLUTION INTRAMUSCULAR; INTRAVENOUS at 16:36

## 2019-10-15 RX ADMIN — GABAPENTIN 800 MG: 400 CAPSULE ORAL at 20:33

## 2019-10-15 ASSESSMENT — PAIN SCALES - GENERAL
PAINLEVEL_OUTOF10: 7
PAINLEVEL_OUTOF10: 10
PAINLEVEL_OUTOF10: 5
PAINLEVEL_OUTOF10: 6
PAINLEVEL_OUTOF10: 10
PAINLEVEL_OUTOF10: 5
PAINLEVEL_OUTOF10: 6
PAINLEVEL_OUTOF10: 2
PAINLEVEL_OUTOF10: 2

## 2019-10-15 ASSESSMENT — PAIN DESCRIPTION - LOCATION: LOCATION: LEG

## 2019-10-15 ASSESSMENT — ENCOUNTER SYMPTOMS
RECTAL PAIN: 0
GASTROINTESTINAL NEGATIVE: 1
CHOKING: 0
VOMITING: 0
CONSTIPATION: 0
DIARRHEA: 0
COUGH: 0
NAUSEA: 0
SHORTNESS OF BREATH: 0

## 2019-10-15 ASSESSMENT — PAIN DESCRIPTION - PAIN TYPE: TYPE: ACUTE PAIN

## 2019-10-16 PROBLEM — G63 NEUROPATHY DUE TO PERIPHERAL VASCULAR DISEASE (HCC): Status: ACTIVE | Noted: 2019-10-16

## 2019-10-16 PROBLEM — I73.9 NEUROPATHY DUE TO PERIPHERAL VASCULAR DISEASE (HCC): Status: ACTIVE | Noted: 2019-10-16

## 2019-10-16 PROBLEM — G89.28 CHRONIC PAIN FOLLOWING SURGERY OR PROCEDURE: Status: ACTIVE | Noted: 2019-10-16

## 2019-10-16 PROBLEM — G54.6 PHANTOM PAIN AFTER AMPUTATION OF LOWER EXTREMITY (HCC): Status: ACTIVE | Noted: 2019-10-16

## 2019-10-16 PROBLEM — G89.18 PAIN FOLLOWING SURGERY OR PROCEDURE: Status: ACTIVE | Noted: 2019-10-16

## 2019-10-16 PROBLEM — M79.2 NEUROPATHIC PAIN: Status: ACTIVE | Noted: 2019-10-16

## 2019-10-16 PROBLEM — I73.9 PVD (PERIPHERAL VASCULAR DISEASE) WITH CLAUDICATION (HCC): Status: ACTIVE | Noted: 2019-10-16

## 2019-10-16 LAB
ANION GAP SERPL CALCULATED.3IONS-SCNC: 8 MEQ/L (ref 9–15)
BASOPHILS ABSOLUTE: 0.1 K/UL (ref 0–0.2)
BASOPHILS RELATIVE PERCENT: 1 %
BUN BLDV-MCNC: 27 MG/DL (ref 8–23)
CALCIUM SERPL-MCNC: 8.3 MG/DL (ref 8.5–9.9)
CHLORIDE BLD-SCNC: 100 MEQ/L (ref 95–107)
CO2: 31 MEQ/L (ref 20–31)
CREAT SERPL-MCNC: 0.65 MG/DL (ref 0.7–1.2)
EOSINOPHILS ABSOLUTE: 0.2 K/UL (ref 0–0.7)
EOSINOPHILS RELATIVE PERCENT: 3.2 %
GFR AFRICAN AMERICAN: >60
GFR NON-AFRICAN AMERICAN: >60
GLUCOSE BLD-MCNC: 124 MG/DL (ref 70–99)
GLUCOSE BLD-MCNC: 127 MG/DL (ref 60–115)
GLUCOSE BLD-MCNC: 148 MG/DL (ref 60–115)
GLUCOSE BLD-MCNC: 187 MG/DL (ref 60–115)
GLUCOSE BLD-MCNC: 210 MG/DL (ref 60–115)
HCT VFR BLD CALC: 22.9 % (ref 42–52)
HEMOGLOBIN: 7.2 G/DL (ref 14–18)
LYMPHOCYTES ABSOLUTE: 1.4 K/UL (ref 1–4.8)
LYMPHOCYTES RELATIVE PERCENT: 26.2 %
MCH RBC QN AUTO: 29.9 PG (ref 27–31.3)
MCHC RBC AUTO-ENTMCNC: 31.3 % (ref 33–37)
MCV RBC AUTO: 95.6 FL (ref 80–100)
MONOCYTES ABSOLUTE: 0.7 K/UL (ref 0.2–0.8)
MONOCYTES RELATIVE PERCENT: 12.5 %
NEUTROPHILS ABSOLUTE: 3 K/UL (ref 1.4–6.5)
NEUTROPHILS RELATIVE PERCENT: 57.1 %
PDW BLD-RTO: 17.4 % (ref 11.5–14.5)
PERFORMED ON: ABNORMAL
PLATELET # BLD: 146 K/UL (ref 130–400)
POTASSIUM REFLEX MAGNESIUM: 5.4 MEQ/L (ref 3.4–4.9)
RBC # BLD: 2.39 M/UL (ref 4.7–6.1)
SODIUM BLD-SCNC: 139 MEQ/L (ref 135–144)
WBC # BLD: 5.2 K/UL (ref 4.8–10.8)

## 2019-10-16 PROCEDURE — 6370000000 HC RX 637 (ALT 250 FOR IP): Performed by: INTERNAL MEDICINE

## 2019-10-16 PROCEDURE — 2580000003 HC RX 258: Performed by: ORTHOPAEDIC SURGERY

## 2019-10-16 PROCEDURE — 6360000002 HC RX W HCPCS: Performed by: INTERNAL MEDICINE

## 2019-10-16 PROCEDURE — 6360000002 HC RX W HCPCS: Performed by: HOSPITALIST

## 2019-10-16 PROCEDURE — 99232 SBSQ HOSP IP/OBS MODERATE 35: CPT | Performed by: INTERNAL MEDICINE

## 2019-10-16 PROCEDURE — 80048 BASIC METABOLIC PNL TOTAL CA: CPT

## 2019-10-16 PROCEDURE — 2580000003 HC RX 258: Performed by: INTERNAL MEDICINE

## 2019-10-16 PROCEDURE — 1210000000 HC MED SURG R&B

## 2019-10-16 PROCEDURE — 6370000000 HC RX 637 (ALT 250 FOR IP): Performed by: ANESTHESIOLOGY

## 2019-10-16 PROCEDURE — 85025 COMPLETE CBC W/AUTO DIFF WBC: CPT

## 2019-10-16 RX ORDER — MORPHINE SULFATE 2 MG/ML
2 INJECTION, SOLUTION INTRAMUSCULAR; INTRAVENOUS EVERY 4 HOURS PRN
Status: COMPLETED | OUTPATIENT
Start: 2019-10-16 | End: 2019-10-16

## 2019-10-16 RX ORDER — MORPHINE SULFATE 2 MG/ML
2 INJECTION, SOLUTION INTRAMUSCULAR; INTRAVENOUS EVERY 6 HOURS PRN
Status: DISCONTINUED | OUTPATIENT
Start: 2019-10-16 | End: 2019-10-19 | Stop reason: HOSPADM

## 2019-10-16 RX ORDER — ACETAMINOPHEN 325 MG/1
650 TABLET ORAL EVERY 4 HOURS PRN
Status: DISCONTINUED | OUTPATIENT
Start: 2019-10-22 | End: 2019-10-19 | Stop reason: HOSPADM

## 2019-10-16 RX ORDER — ACETAMINOPHEN 325 MG/1
650 TABLET ORAL EVERY 6 HOURS SCHEDULED
Status: DISCONTINUED | OUTPATIENT
Start: 2019-10-17 | End: 2019-10-19 | Stop reason: HOSPADM

## 2019-10-16 RX ORDER — MORPHINE SULFATE 15 MG/1
15 TABLET, FILM COATED, EXTENDED RELEASE ORAL EVERY 12 HOURS SCHEDULED
Status: DISCONTINUED | OUTPATIENT
Start: 2019-10-16 | End: 2019-10-19 | Stop reason: HOSPADM

## 2019-10-16 RX ORDER — OXYCODONE HYDROCHLORIDE 5 MG/1
5 TABLET ORAL EVERY 4 HOURS PRN
Status: DISCONTINUED | OUTPATIENT
Start: 2019-10-16 | End: 2019-10-19 | Stop reason: HOSPADM

## 2019-10-16 RX ORDER — LIDOCAINE 40 MG/G
CREAM TOPICAL EVERY 6 HOURS SCHEDULED
Status: DISCONTINUED | OUTPATIENT
Start: 2019-10-17 | End: 2019-10-19 | Stop reason: HOSPADM

## 2019-10-16 RX ADMIN — Medication 10 ML: at 02:54

## 2019-10-16 RX ADMIN — Medication 10 ML: at 08:41

## 2019-10-16 RX ADMIN — OXYCODONE HYDROCHLORIDE AND ACETAMINOPHEN 1 TABLET: 5; 325 TABLET ORAL at 18:51

## 2019-10-16 RX ADMIN — OXYCODONE HYDROCHLORIDE AND ACETAMINOPHEN 1 TABLET: 5; 325 TABLET ORAL at 12:59

## 2019-10-16 RX ADMIN — MEROPENEM 1 G: 1 INJECTION, POWDER, FOR SOLUTION INTRAVENOUS at 20:10

## 2019-10-16 RX ADMIN — INSULIN LISPRO 4 UNITS: 100 INJECTION, SOLUTION INTRAVENOUS; SUBCUTANEOUS at 13:00

## 2019-10-16 RX ADMIN — OXYCODONE HYDROCHLORIDE AND ACETAMINOPHEN 1 TABLET: 5; 325 TABLET ORAL at 04:15

## 2019-10-16 RX ADMIN — SODIUM CHLORIDE: 9 INJECTION, SOLUTION INTRAVENOUS at 12:56

## 2019-10-16 RX ADMIN — ATORVASTATIN CALCIUM 20 MG: 20 TABLET, FILM COATED ORAL at 20:11

## 2019-10-16 RX ADMIN — Medication 2 MG: at 20:11

## 2019-10-16 RX ADMIN — ENOXAPARIN SODIUM 40 MG: 40 INJECTION SUBCUTANEOUS at 08:34

## 2019-10-16 RX ADMIN — INSULIN GLARGINE 12 UNITS: 100 INJECTION, SOLUTION SUBCUTANEOUS at 20:10

## 2019-10-16 RX ADMIN — MORPHINE SULFATE 15 MG: 15 TABLET, FILM COATED, EXTENDED RELEASE ORAL at 22:58

## 2019-10-16 RX ADMIN — LIDOCAINE: 4 CREAM TOPICAL at 22:59

## 2019-10-16 RX ADMIN — MORPHINE SULFATE 2 MG: 2 INJECTION, SOLUTION INTRAMUSCULAR; INTRAVENOUS at 20:11

## 2019-10-16 RX ADMIN — MORPHINE SULFATE 2 MG: 2 INJECTION, SOLUTION INTRAMUSCULAR; INTRAVENOUS at 15:58

## 2019-10-16 RX ADMIN — GABAPENTIN 800 MG: 400 CAPSULE ORAL at 15:58

## 2019-10-16 RX ADMIN — GABAPENTIN 800 MG: 400 CAPSULE ORAL at 20:11

## 2019-10-16 RX ADMIN — OXYCODONE HYDROCHLORIDE AND ACETAMINOPHEN 1 TABLET: 5; 325 TABLET ORAL at 08:34

## 2019-10-16 RX ADMIN — LINEZOLID 600 MG: 600 INJECTION, SOLUTION INTRAVENOUS at 01:50

## 2019-10-16 RX ADMIN — MORPHINE SULFATE 2 MG: 2 INJECTION, SOLUTION INTRAMUSCULAR; INTRAVENOUS at 02:53

## 2019-10-16 RX ADMIN — MEROPENEM 1 G: 1 INJECTION, POWDER, FOR SOLUTION INTRAVENOUS at 04:15

## 2019-10-16 RX ADMIN — MEROPENEM 1 G: 1 INJECTION, POWDER, FOR SOLUTION INTRAVENOUS at 12:58

## 2019-10-16 RX ADMIN — FLUCONAZOLE 400 MG: 400 INJECTION, SOLUTION INTRAVENOUS at 15:55

## 2019-10-16 RX ADMIN — ASPIRIN 81 MG 81 MG: 81 TABLET ORAL at 08:34

## 2019-10-16 RX ADMIN — MORPHINE SULFATE 2 MG: 2 INJECTION, SOLUTION INTRAMUSCULAR; INTRAVENOUS at 10:18

## 2019-10-16 RX ADMIN — ACETAMINOPHEN 650 MG: 325 TABLET ORAL at 22:57

## 2019-10-16 RX ADMIN — GABAPENTIN 800 MG: 400 CAPSULE ORAL at 08:33

## 2019-10-16 ASSESSMENT — PAIN DESCRIPTION - ONSET: ONSET: ON-GOING

## 2019-10-16 ASSESSMENT — ENCOUNTER SYMPTOMS
RESPIRATORY NEGATIVE: 1
RECTAL PAIN: 0
BACK PAIN: 1
GASTROINTESTINAL NEGATIVE: 1
COLOR CHANGE: 1
NAUSEA: 0
EYES NEGATIVE: 1
SHORTNESS OF BREATH: 0
DIARRHEA: 0
CONSTIPATION: 0
COLOR CHANGE: 0
COUGH: 0
ALLERGIC/IMMUNOLOGIC NEGATIVE: 1
VOMITING: 0
CHOKING: 0

## 2019-10-16 ASSESSMENT — PAIN DESCRIPTION - ORIENTATION: ORIENTATION: RIGHT

## 2019-10-16 ASSESSMENT — PAIN SCALES - GENERAL
PAINLEVEL_OUTOF10: 9
PAINLEVEL_OUTOF10: 9
PAINLEVEL_OUTOF10: 6
PAINLEVEL_OUTOF10: 9
PAINLEVEL_OUTOF10: 8
PAINLEVEL_OUTOF10: 8
PAINLEVEL_OUTOF10: 9
PAINLEVEL_OUTOF10: 10
PAINLEVEL_OUTOF10: 6
PAINLEVEL_OUTOF10: 8
PAINLEVEL_OUTOF10: 10
PAINLEVEL_OUTOF10: 10
PAINLEVEL_OUTOF10: 6
PAINLEVEL_OUTOF10: 9

## 2019-10-16 ASSESSMENT — PAIN DESCRIPTION - DESCRIPTORS: DESCRIPTORS: ACHING

## 2019-10-16 ASSESSMENT — PAIN DESCRIPTION - DIRECTION: RADIATING_TOWARDS: KNEE

## 2019-10-16 ASSESSMENT — PAIN DESCRIPTION - PAIN TYPE: TYPE: ACUTE PAIN

## 2019-10-16 ASSESSMENT — PAIN DESCRIPTION - LOCATION: LOCATION: LEG

## 2019-10-16 ASSESSMENT — PAIN DESCRIPTION - FREQUENCY: FREQUENCY: CONTINUOUS

## 2019-10-17 ENCOUNTER — OFFICE VISIT (OUTPATIENT)
Dept: GERIATRIC MEDICINE | Age: 69
End: 2019-10-17
Payer: MEDICARE

## 2019-10-17 DIAGNOSIS — R53.1 WEAKNESS: ICD-10-CM

## 2019-10-17 DIAGNOSIS — B49 FUNGEMIA: Primary | ICD-10-CM

## 2019-10-17 DIAGNOSIS — M86.9 OSTEOMYELITIS, UNSPECIFIED SITE, UNSPECIFIED TYPE (HCC): ICD-10-CM

## 2019-10-17 DIAGNOSIS — E11.9 DIABETES MELLITUS WITHOUT COMPLICATION (HCC): ICD-10-CM

## 2019-10-17 DIAGNOSIS — I10 ESSENTIAL HYPERTENSION: ICD-10-CM

## 2019-10-17 LAB
ANION GAP SERPL CALCULATED.3IONS-SCNC: 9 MEQ/L (ref 9–15)
BASOPHILS ABSOLUTE: 0 K/UL (ref 0–0.2)
BASOPHILS RELATIVE PERCENT: 1 %
BUN BLDV-MCNC: 31 MG/DL (ref 8–23)
CALCIUM SERPL-MCNC: 8.2 MG/DL (ref 8.5–9.9)
CHLORIDE BLD-SCNC: 102 MEQ/L (ref 95–107)
CO2: 29 MEQ/L (ref 20–31)
CREAT SERPL-MCNC: 0.73 MG/DL (ref 0.7–1.2)
EOSINOPHILS ABSOLUTE: 0.2 K/UL (ref 0–0.7)
EOSINOPHILS RELATIVE PERCENT: 3.7 %
GFR AFRICAN AMERICAN: >60
GFR NON-AFRICAN AMERICAN: >60
GLUCOSE BLD-MCNC: 112 MG/DL (ref 70–99)
GLUCOSE BLD-MCNC: 121 MG/DL (ref 60–115)
GLUCOSE BLD-MCNC: 128 MG/DL (ref 60–115)
GLUCOSE BLD-MCNC: 143 MG/DL (ref 60–115)
GLUCOSE BLD-MCNC: 169 MG/DL (ref 60–115)
HCT VFR BLD CALC: 22.7 % (ref 42–52)
HEMOGLOBIN: 7.1 G/DL (ref 14–18)
LYMPHOCYTES ABSOLUTE: 1.1 K/UL (ref 1–4.8)
LYMPHOCYTES RELATIVE PERCENT: 21.9 %
MCH RBC QN AUTO: 30.1 PG (ref 27–31.3)
MCHC RBC AUTO-ENTMCNC: 31.2 % (ref 33–37)
MCV RBC AUTO: 96.6 FL (ref 80–100)
MONOCYTES ABSOLUTE: 0.6 K/UL (ref 0.2–0.8)
MONOCYTES RELATIVE PERCENT: 12.5 %
NEUTROPHILS ABSOLUTE: 3.1 K/UL (ref 1.4–6.5)
NEUTROPHILS RELATIVE PERCENT: 60.9 %
PDW BLD-RTO: 17.9 % (ref 11.5–14.5)
PERFORMED ON: ABNORMAL
PLATELET # BLD: 116 K/UL (ref 130–400)
POTASSIUM REFLEX MAGNESIUM: 5.5 MEQ/L (ref 3.4–4.9)
RBC # BLD: 2.35 M/UL (ref 4.7–6.1)
SODIUM BLD-SCNC: 140 MEQ/L (ref 135–144)
WBC # BLD: 5.1 K/UL (ref 4.8–10.8)

## 2019-10-17 PROCEDURE — 6360000002 HC RX W HCPCS: Performed by: INTERNAL MEDICINE

## 2019-10-17 PROCEDURE — 1123F ACP DISCUSS/DSCN MKR DOCD: CPT | Performed by: INTERNAL MEDICINE

## 2019-10-17 PROCEDURE — 99232 SBSQ HOSP IP/OBS MODERATE 35: CPT | Performed by: INTERNAL MEDICINE

## 2019-10-17 PROCEDURE — 80048 BASIC METABOLIC PNL TOTAL CA: CPT

## 2019-10-17 PROCEDURE — 6370000000 HC RX 637 (ALT 250 FOR IP): Performed by: ANESTHESIOLOGY

## 2019-10-17 PROCEDURE — 1210000000 HC MED SURG R&B

## 2019-10-17 PROCEDURE — 6370000000 HC RX 637 (ALT 250 FOR IP): Performed by: INTERNAL MEDICINE

## 2019-10-17 PROCEDURE — 2580000003 HC RX 258: Performed by: INTERNAL MEDICINE

## 2019-10-17 PROCEDURE — 3044F HG A1C LEVEL LT 7.0%: CPT | Performed by: INTERNAL MEDICINE

## 2019-10-17 PROCEDURE — 99306 1ST NF CARE HIGH MDM 50: CPT | Performed by: INTERNAL MEDICINE

## 2019-10-17 PROCEDURE — G8484 FLU IMMUNIZE NO ADMIN: HCPCS | Performed by: INTERNAL MEDICINE

## 2019-10-17 PROCEDURE — 85025 COMPLETE CBC W/AUTO DIFF WBC: CPT

## 2019-10-17 RX ORDER — LIDOCAINE 40 MG/G
CREAM TOPICAL
Refills: 0 | DISCHARGE
Start: 2019-10-17

## 2019-10-17 RX ORDER — SODIUM POLYSTYRENE SULFONATE 15 G/60ML
15 SUSPENSION ORAL; RECTAL ONCE
Status: COMPLETED | OUTPATIENT
Start: 2019-10-17 | End: 2019-10-17

## 2019-10-17 RX ORDER — MORPHINE SULFATE 15 MG/1
15 TABLET, FILM COATED, EXTENDED RELEASE ORAL EVERY 12 HOURS SCHEDULED
Qty: 10 TABLET | Refills: 0 | Status: ON HOLD | OUTPATIENT
Start: 2019-10-17 | End: 2019-10-25

## 2019-10-17 RX ORDER — OXYCODONE HYDROCHLORIDE 5 MG/1
5 TABLET ORAL EVERY 4 HOURS PRN
Qty: 20 TABLET | Refills: 0 | Status: ON HOLD | OUTPATIENT
Start: 2019-10-17 | End: 2019-10-25

## 2019-10-17 RX ADMIN — GABAPENTIN 800 MG: 400 CAPSULE ORAL at 20:59

## 2019-10-17 RX ADMIN — GABAPENTIN 800 MG: 400 CAPSULE ORAL at 14:59

## 2019-10-17 RX ADMIN — Medication 10 ML: at 15:00

## 2019-10-17 RX ADMIN — ENOXAPARIN SODIUM 40 MG: 40 INJECTION SUBCUTANEOUS at 09:57

## 2019-10-17 RX ADMIN — ACETAMINOPHEN 650 MG: 325 TABLET ORAL at 06:39

## 2019-10-17 RX ADMIN — INSULIN LISPRO 2 UNITS: 100 INJECTION, SOLUTION INTRAVENOUS; SUBCUTANEOUS at 10:07

## 2019-10-17 RX ADMIN — MORPHINE SULFATE 15 MG: 15 TABLET, FILM COATED, EXTENDED RELEASE ORAL at 09:58

## 2019-10-17 RX ADMIN — LIDOCAINE: 4 CREAM TOPICAL at 22:31

## 2019-10-17 RX ADMIN — FLUCONAZOLE 400 MG: 400 INJECTION, SOLUTION INTRAVENOUS at 17:22

## 2019-10-17 RX ADMIN — LIDOCAINE: 4 CREAM TOPICAL at 06:40

## 2019-10-17 RX ADMIN — MORPHINE SULFATE 15 MG: 15 TABLET, FILM COATED, EXTENDED RELEASE ORAL at 20:59

## 2019-10-17 RX ADMIN — SODIUM POLYSTYRENE SULFONATE 15 G: 15 SUSPENSION ORAL; RECTAL at 20:58

## 2019-10-17 RX ADMIN — ALTEPLASE 1 MG: 2.2 INJECTION, POWDER, LYOPHILIZED, FOR SOLUTION INTRAVENOUS at 21:00

## 2019-10-17 RX ADMIN — MEROPENEM 1 G: 1 INJECTION, POWDER, FOR SOLUTION INTRAVENOUS at 21:00

## 2019-10-17 RX ADMIN — Medication 2 MG: at 20:59

## 2019-10-17 RX ADMIN — MEROPENEM 1 G: 1 INJECTION, POWDER, FOR SOLUTION INTRAVENOUS at 15:00

## 2019-10-17 RX ADMIN — ACETAMINOPHEN 650 MG: 325 TABLET ORAL at 22:03

## 2019-10-17 RX ADMIN — MEROPENEM 1 G: 1 INJECTION, POWDER, FOR SOLUTION INTRAVENOUS at 04:58

## 2019-10-17 RX ADMIN — OXYCODONE HYDROCHLORIDE 5 MG: 5 TABLET ORAL at 14:59

## 2019-10-17 RX ADMIN — GABAPENTIN 800 MG: 400 CAPSULE ORAL at 09:58

## 2019-10-17 RX ADMIN — ASPIRIN 81 MG 81 MG: 81 TABLET ORAL at 09:59

## 2019-10-17 RX ADMIN — ATORVASTATIN CALCIUM 20 MG: 20 TABLET, FILM COATED ORAL at 20:59

## 2019-10-17 RX ADMIN — ACETAMINOPHEN 650 MG: 325 TABLET ORAL at 15:09

## 2019-10-17 RX ADMIN — INSULIN GLARGINE 12 UNITS: 100 INJECTION, SOLUTION SUBCUTANEOUS at 21:00

## 2019-10-17 RX ADMIN — INSULIN LISPRO 2 UNITS: 100 INJECTION, SOLUTION INTRAVENOUS; SUBCUTANEOUS at 17:25

## 2019-10-17 ASSESSMENT — PAIN SCALES - GENERAL
PAINLEVEL_OUTOF10: 9
PAINLEVEL_OUTOF10: 5
PAINLEVEL_OUTOF10: 8
PAINLEVEL_OUTOF10: 5
PAINLEVEL_OUTOF10: 8
PAINLEVEL_OUTOF10: 9

## 2019-10-17 ASSESSMENT — ENCOUNTER SYMPTOMS
COLOR CHANGE: 1
DIARRHEA: 0
ALLERGIC/IMMUNOLOGIC NEGATIVE: 1
SHORTNESS OF BREATH: 0
NAUSEA: 0
RECTAL PAIN: 0
BACK PAIN: 0
GASTROINTESTINAL NEGATIVE: 1
CHOKING: 0
RESPIRATORY NEGATIVE: 1
CONSTIPATION: 0
EYES NEGATIVE: 1
COUGH: 0
VOMITING: 0

## 2019-10-18 LAB
ABO/RH: NORMAL
ALBUMIN SERPL-MCNC: 2.4 G/DL (ref 3.5–4.6)
ALP BLD-CCNC: 104 U/L (ref 35–104)
ALT SERPL-CCNC: <5 U/L (ref 0–41)
ANION GAP SERPL CALCULATED.3IONS-SCNC: 8 MEQ/L (ref 9–15)
ANION GAP SERPL CALCULATED.3IONS-SCNC: 9 MEQ/L (ref 9–15)
ANTIBODY SCREEN: NORMAL
AST SERPL-CCNC: 27 U/L (ref 0–40)
BASOPHILS ABSOLUTE: 0 K/UL (ref 0–0.2)
BASOPHILS RELATIVE PERCENT: 0.9 %
BILIRUB SERPL-MCNC: 0.3 MG/DL (ref 0.2–0.7)
BLOOD BANK DISPENSE STATUS: NORMAL
BLOOD BANK PRODUCT CODE: NORMAL
BPU ID: NORMAL
BUN BLDV-MCNC: 34 MG/DL (ref 8–23)
BUN BLDV-MCNC: 35 MG/DL (ref 8–23)
CALCIUM SERPL-MCNC: 8.3 MG/DL (ref 8.5–9.9)
CALCIUM SERPL-MCNC: 8.5 MG/DL (ref 8.5–9.9)
CHLORIDE BLD-SCNC: 104 MEQ/L (ref 95–107)
CHLORIDE BLD-SCNC: 104 MEQ/L (ref 95–107)
CO2: 31 MEQ/L (ref 20–31)
CO2: 31 MEQ/L (ref 20–31)
CREAT SERPL-MCNC: 0.74 MG/DL (ref 0.7–1.2)
CREAT SERPL-MCNC: 0.76 MG/DL (ref 0.7–1.2)
DESCRIPTION BLOOD BANK: NORMAL
EOSINOPHILS ABSOLUTE: 0.2 K/UL (ref 0–0.7)
EOSINOPHILS RELATIVE PERCENT: 3.8 %
GFR AFRICAN AMERICAN: >60
GFR AFRICAN AMERICAN: >60
GFR NON-AFRICAN AMERICAN: >60
GFR NON-AFRICAN AMERICAN: >60
GLOBULIN: 3.5 G/DL (ref 2.3–3.5)
GLUCOSE BLD-MCNC: 103 MG/DL (ref 70–99)
GLUCOSE BLD-MCNC: 122 MG/DL (ref 60–115)
GLUCOSE BLD-MCNC: 128 MG/DL (ref 60–115)
GLUCOSE BLD-MCNC: 128 MG/DL (ref 70–99)
GLUCOSE BLD-MCNC: 147 MG/DL (ref 60–115)
GLUCOSE BLD-MCNC: 148 MG/DL (ref 60–115)
HCT VFR BLD CALC: 21.1 % (ref 42–52)
HCT VFR BLD CALC: 24.8 % (ref 42–52)
HEMOGLOBIN: 6.8 G/DL (ref 14–18)
HEMOGLOBIN: 7.8 G/DL (ref 14–18)
LYMPHOCYTES ABSOLUTE: 1.2 K/UL (ref 1–4.8)
LYMPHOCYTES RELATIVE PERCENT: 23.3 %
MCH RBC QN AUTO: 31 PG (ref 27–31.3)
MCHC RBC AUTO-ENTMCNC: 32.2 % (ref 33–37)
MCV RBC AUTO: 96.4 FL (ref 80–100)
MONOCYTES ABSOLUTE: 0.5 K/UL (ref 0.2–0.8)
MONOCYTES RELATIVE PERCENT: 10.8 %
NEUTROPHILS ABSOLUTE: 3 K/UL (ref 1.4–6.5)
NEUTROPHILS RELATIVE PERCENT: 61.2 %
PDW BLD-RTO: 17.8 % (ref 11.5–14.5)
PERFORMED ON: ABNORMAL
PLATELET # BLD: 113 K/UL (ref 130–400)
POTASSIUM REFLEX MAGNESIUM: 5.7 MEQ/L (ref 3.4–4.9)
POTASSIUM SERPL-SCNC: 5.2 MEQ/L (ref 3.4–4.9)
RBC # BLD: 2.18 M/UL (ref 4.7–6.1)
SODIUM BLD-SCNC: 143 MEQ/L (ref 135–144)
SODIUM BLD-SCNC: 144 MEQ/L (ref 135–144)
TOTAL PROTEIN: 5.9 G/DL (ref 6.3–8)
WBC # BLD: 5 K/UL (ref 4.8–10.8)

## 2019-10-18 PROCEDURE — 36430 TRANSFUSION BLD/BLD COMPNT: CPT

## 2019-10-18 PROCEDURE — 86900 BLOOD TYPING SEROLOGIC ABO: CPT

## 2019-10-18 PROCEDURE — 80048 BASIC METABOLIC PNL TOTAL CA: CPT

## 2019-10-18 PROCEDURE — 2580000003 HC RX 258: Performed by: INTERNAL MEDICINE

## 2019-10-18 PROCEDURE — 99232 SBSQ HOSP IP/OBS MODERATE 35: CPT | Performed by: INTERNAL MEDICINE

## 2019-10-18 PROCEDURE — 2700000000 HC OXYGEN THERAPY PER DAY

## 2019-10-18 PROCEDURE — 6370000000 HC RX 637 (ALT 250 FOR IP): Performed by: INTERNAL MEDICINE

## 2019-10-18 PROCEDURE — 86923 COMPATIBILITY TEST ELECTRIC: CPT

## 2019-10-18 PROCEDURE — 80053 COMPREHEN METABOLIC PANEL: CPT

## 2019-10-18 PROCEDURE — 86901 BLOOD TYPING SEROLOGIC RH(D): CPT

## 2019-10-18 PROCEDURE — 85014 HEMATOCRIT: CPT

## 2019-10-18 PROCEDURE — 1210000000 HC MED SURG R&B

## 2019-10-18 PROCEDURE — 86850 RBC ANTIBODY SCREEN: CPT

## 2019-10-18 PROCEDURE — 6370000000 HC RX 637 (ALT 250 FOR IP): Performed by: ANESTHESIOLOGY

## 2019-10-18 PROCEDURE — P9016 RBC LEUKOCYTES REDUCED: HCPCS

## 2019-10-18 PROCEDURE — 85025 COMPLETE CBC W/AUTO DIFF WBC: CPT

## 2019-10-18 PROCEDURE — 85018 HEMOGLOBIN: CPT

## 2019-10-18 PROCEDURE — 6360000002 HC RX W HCPCS: Performed by: INTERNAL MEDICINE

## 2019-10-18 RX ORDER — SODIUM POLYSTYRENE SULFONATE 15 G/60ML
30 SUSPENSION ORAL; RECTAL ONCE
Status: COMPLETED | OUTPATIENT
Start: 2019-10-18 | End: 2019-10-18

## 2019-10-18 RX ORDER — 0.9 % SODIUM CHLORIDE 0.9 %
250 INTRAVENOUS SOLUTION INTRAVENOUS ONCE
Status: COMPLETED | OUTPATIENT
Start: 2019-10-18 | End: 2019-10-18

## 2019-10-18 RX ORDER — DEXTROSE MONOHYDRATE 25 G/50ML
25 INJECTION, SOLUTION INTRAVENOUS ONCE
Status: COMPLETED | OUTPATIENT
Start: 2019-10-18 | End: 2019-10-18

## 2019-10-18 RX ADMIN — INSULIN GLARGINE 12 UNITS: 100 INJECTION, SOLUTION SUBCUTANEOUS at 22:22

## 2019-10-18 RX ADMIN — OXYCODONE HYDROCHLORIDE 5 MG: 5 TABLET ORAL at 04:04

## 2019-10-18 RX ADMIN — ACETAMINOPHEN 650 MG: 325 TABLET ORAL at 04:04

## 2019-10-18 RX ADMIN — LIDOCAINE: 4 CREAM TOPICAL at 08:57

## 2019-10-18 RX ADMIN — GABAPENTIN 800 MG: 400 CAPSULE ORAL at 22:32

## 2019-10-18 RX ADMIN — ATORVASTATIN CALCIUM 20 MG: 20 TABLET, FILM COATED ORAL at 22:32

## 2019-10-18 RX ADMIN — SODIUM POLYSTYRENE SULFONATE 15 G: 15 SUSPENSION ORAL; RECTAL at 10:04

## 2019-10-18 RX ADMIN — DEXTROSE 50 % IN WATER (D50W) INTRAVENOUS SYRINGE 25 G: at 17:07

## 2019-10-18 RX ADMIN — MEROPENEM 1 G: 1 INJECTION, POWDER, FOR SOLUTION INTRAVENOUS at 04:04

## 2019-10-18 RX ADMIN — LIDOCAINE: 4 CREAM TOPICAL at 04:05

## 2019-10-18 RX ADMIN — FLUCONAZOLE 400 MG: 400 INJECTION, SOLUTION INTRAVENOUS at 17:04

## 2019-10-18 RX ADMIN — Medication 2 MG: at 22:31

## 2019-10-18 RX ADMIN — GABAPENTIN 800 MG: 400 CAPSULE ORAL at 08:55

## 2019-10-18 RX ADMIN — MORPHINE SULFATE 15 MG: 15 TABLET, FILM COATED, EXTENDED RELEASE ORAL at 22:32

## 2019-10-18 RX ADMIN — ACETAMINOPHEN 650 MG: 325 TABLET ORAL at 18:24

## 2019-10-18 RX ADMIN — SODIUM CHLORIDE 250 ML: 9 INJECTION, SOLUTION INTRAVENOUS at 13:48

## 2019-10-18 RX ADMIN — OXYCODONE HYDROCHLORIDE 5 MG: 5 TABLET ORAL at 18:25

## 2019-10-18 RX ADMIN — MORPHINE SULFATE 15 MG: 15 TABLET, FILM COATED, EXTENDED RELEASE ORAL at 08:55

## 2019-10-18 RX ADMIN — INSULIN HUMAN 10 UNITS: 100 INJECTION, SOLUTION PARENTERAL at 17:06

## 2019-10-18 RX ADMIN — Medication 10 ML: at 22:33

## 2019-10-18 ASSESSMENT — ENCOUNTER SYMPTOMS
GASTROINTESTINAL NEGATIVE: 1
CONSTIPATION: 0
COUGH: 0
NAUSEA: 0
DIARRHEA: 0
RECTAL PAIN: 0
VOMITING: 0
CHOKING: 0
SHORTNESS OF BREATH: 0

## 2019-10-18 ASSESSMENT — PAIN SCALES - GENERAL
PAINLEVEL_OUTOF10: 6
PAINLEVEL_OUTOF10: 5
PAINLEVEL_OUTOF10: 8
PAINLEVEL_OUTOF10: 9
PAINLEVEL_OUTOF10: 6

## 2019-10-19 VITALS
SYSTOLIC BLOOD PRESSURE: 145 MMHG | DIASTOLIC BLOOD PRESSURE: 36 MMHG | OXYGEN SATURATION: 100 % | HEART RATE: 56 BPM | RESPIRATION RATE: 20 BRPM | BODY MASS INDEX: 41.17 KG/M2 | HEIGHT: 72 IN | WEIGHT: 304 LBS | TEMPERATURE: 97.7 F

## 2019-10-19 LAB
GLUCOSE BLD-MCNC: 124 MG/DL (ref 60–115)
GLUCOSE BLD-MCNC: 177 MG/DL (ref 60–115)
PERFORMED ON: ABNORMAL
PERFORMED ON: ABNORMAL

## 2019-10-19 PROCEDURE — 36592 COLLECT BLOOD FROM PICC: CPT

## 2019-10-19 PROCEDURE — 6370000000 HC RX 637 (ALT 250 FOR IP): Performed by: ANESTHESIOLOGY

## 2019-10-19 PROCEDURE — 6370000000 HC RX 637 (ALT 250 FOR IP): Performed by: INTERNAL MEDICINE

## 2019-10-19 PROCEDURE — 99232 SBSQ HOSP IP/OBS MODERATE 35: CPT | Performed by: INTERNAL MEDICINE

## 2019-10-19 PROCEDURE — 2580000003 HC RX 258: Performed by: ORTHOPAEDIC SURGERY

## 2019-10-19 PROCEDURE — 2700000000 HC OXYGEN THERAPY PER DAY

## 2019-10-19 RX ORDER — FLUCONAZOLE 200 MG/1
400 TABLET ORAL DAILY
Qty: 7 TABLET | Refills: 0 | DISCHARGE
Start: 2019-10-19 | End: 2019-10-27

## 2019-10-19 RX ADMIN — ACETAMINOPHEN 650 MG: 325 TABLET ORAL at 11:33

## 2019-10-19 RX ADMIN — LIDOCAINE: 4 CREAM TOPICAL at 00:31

## 2019-10-19 RX ADMIN — OXYCODONE HYDROCHLORIDE 5 MG: 5 TABLET ORAL at 15:30

## 2019-10-19 RX ADMIN — ACETAMINOPHEN 650 MG: 325 TABLET ORAL at 00:30

## 2019-10-19 RX ADMIN — MORPHINE SULFATE 15 MG: 15 TABLET, FILM COATED, EXTENDED RELEASE ORAL at 08:52

## 2019-10-19 RX ADMIN — LIDOCAINE: 4 CREAM TOPICAL at 08:56

## 2019-10-19 RX ADMIN — OXYCODONE HYDROCHLORIDE 5 MG: 5 TABLET ORAL at 06:06

## 2019-10-19 RX ADMIN — LIDOCAINE: 4 CREAM TOPICAL at 14:58

## 2019-10-19 RX ADMIN — OXYCODONE HYDROCHLORIDE 5 MG: 5 TABLET ORAL at 11:34

## 2019-10-19 RX ADMIN — ACETAMINOPHEN 650 MG: 325 TABLET ORAL at 06:04

## 2019-10-19 RX ADMIN — GABAPENTIN 800 MG: 400 CAPSULE ORAL at 08:52

## 2019-10-19 RX ADMIN — INSULIN LISPRO 2 UNITS: 100 INJECTION, SOLUTION INTRAVENOUS; SUBCUTANEOUS at 12:25

## 2019-10-19 RX ADMIN — Medication 10 ML: at 08:53

## 2019-10-19 RX ADMIN — GABAPENTIN 800 MG: 400 CAPSULE ORAL at 15:30

## 2019-10-19 ASSESSMENT — PAIN SCALES - GENERAL
PAINLEVEL_OUTOF10: 8
PAINLEVEL_OUTOF10: 8
PAINLEVEL_OUTOF10: 10
PAINLEVEL_OUTOF10: 5
PAINLEVEL_OUTOF10: 5
PAINLEVEL_OUTOF10: 10

## 2019-10-21 ENCOUNTER — APPOINTMENT (OUTPATIENT)
Dept: CT IMAGING | Age: 69
DRG: 193 | End: 2019-10-21
Payer: MEDICARE

## 2019-10-21 ENCOUNTER — APPOINTMENT (OUTPATIENT)
Dept: GENERAL RADIOLOGY | Age: 69
DRG: 193 | End: 2019-10-21
Payer: MEDICARE

## 2019-10-21 ENCOUNTER — HOSPITAL ENCOUNTER (INPATIENT)
Age: 69
LOS: 4 days | Discharge: NURSING, REHAB OR LONG-TERM CARE | DRG: 193 | End: 2019-10-26
Attending: INTERNAL MEDICINE | Admitting: INTERNAL MEDICINE
Payer: MEDICARE

## 2019-10-21 DIAGNOSIS — R77.8 ELEVATED TROPONIN: ICD-10-CM

## 2019-10-21 DIAGNOSIS — J18.9 PNEUMONIA DUE TO ORGANISM: Primary | ICD-10-CM

## 2019-10-21 DIAGNOSIS — S88.119A BELOW KNEE AMPUTATION (HCC): ICD-10-CM

## 2019-10-21 DIAGNOSIS — E87.20 LACTIC ACIDOSIS: ICD-10-CM

## 2019-10-21 LAB
ALBUMIN SERPL-MCNC: 2.8 G/DL (ref 3.5–4.6)
ALP BLD-CCNC: 122 U/L (ref 35–104)
ALT SERPL-CCNC: <5 U/L (ref 0–41)
ANION GAP SERPL CALCULATED.3IONS-SCNC: 13 MEQ/L (ref 9–15)
ANISOCYTOSIS: ABNORMAL
APTT: 30.7 SEC (ref 24.4–36.8)
AST SERPL-CCNC: 31 U/L (ref 0–40)
ATYPICAL LYMPHOCYTE RELATIVE PERCENT: 1 %
BACTERIA: ABNORMAL /HPF
BANDED NEUTROPHILS RELATIVE PERCENT: 1 %
BASOPHILS ABSOLUTE: 0 K/UL (ref 0–0.2)
BASOPHILS RELATIVE PERCENT: 0.3 %
BILIRUB SERPL-MCNC: 0.4 MG/DL (ref 0.2–0.7)
BILIRUBIN URINE: NEGATIVE
BLOOD, URINE: ABNORMAL
BUN BLDV-MCNC: 51 MG/DL (ref 8–23)
CALCIUM SERPL-MCNC: 8.8 MG/DL (ref 8.5–9.9)
CHLORIDE BLD-SCNC: 100 MEQ/L (ref 95–107)
CLARITY: ABNORMAL
CO2: 29 MEQ/L (ref 20–31)
COLOR: YELLOW
CREAT SERPL-MCNC: 1.19 MG/DL (ref 0.7–1.2)
EOSINOPHILS ABSOLUTE: 0 K/UL (ref 0–0.7)
EOSINOPHILS RELATIVE PERCENT: 0.3 %
EPITHELIAL CELLS, UA: ABNORMAL /HPF (ref 0–5)
GFR AFRICAN AMERICAN: >60
GFR NON-AFRICAN AMERICAN: >60
GLOBULIN: 4.2 G/DL (ref 2.3–3.5)
GLUCOSE BLD-MCNC: 181 MG/DL (ref 60–115)
GLUCOSE BLD-MCNC: 199 MG/DL (ref 70–99)
GLUCOSE BLD-MCNC: 212 MG/DL (ref 60–115)
GLUCOSE URINE: NEGATIVE MG/DL
HCT VFR BLD CALC: 25.9 % (ref 42–52)
HEMOGLOBIN: 8.3 G/DL (ref 14–18)
HYALINE CASTS: ABNORMAL /HPF (ref 0–5)
HYPOCHROMIA: ABNORMAL
INR BLD: 1
KETONES, URINE: ABNORMAL MG/DL
LACTIC ACID: 2.7 MMOL/L (ref 0.5–2.2)
LEUKOCYTE ESTERASE, URINE: ABNORMAL
LIPASE: 7 U/L (ref 12–95)
LYMPHOCYTES ABSOLUTE: 1.3 K/UL (ref 1–4.8)
LYMPHOCYTES RELATIVE PERCENT: 8 %
MAGNESIUM: 2.2 MG/DL (ref 1.7–2.4)
MCH RBC QN AUTO: 29.8 PG (ref 27–31.3)
MCHC RBC AUTO-ENTMCNC: 32.2 % (ref 33–37)
MCV RBC AUTO: 92.7 FL (ref 80–100)
MONOCYTES ABSOLUTE: 1 K/UL (ref 0.2–0.8)
MONOCYTES RELATIVE PERCENT: 6.5 %
NEUTROPHILS ABSOLUTE: 12.2 K/UL (ref 1.4–6.5)
NEUTROPHILS RELATIVE PERCENT: 83 %
NITRITE, URINE: NEGATIVE
PDW BLD-RTO: 18.2 % (ref 11.5–14.5)
PERFORMED ON: ABNORMAL
PERFORMED ON: ABNORMAL
PH UA: 5 (ref 5–9)
PLATELET # BLD: 196 K/UL (ref 130–400)
PLATELET SLIDE REVIEW: NORMAL
POC CREATININE WHOLE BLOOD: 1.2
POTASSIUM SERPL-SCNC: 5.3 MEQ/L (ref 3.4–4.9)
PROTEIN UA: >=300 MG/DL
PROTHROMBIN TIME: 14 SEC (ref 12.3–14.9)
RBC # BLD: 2.8 M/UL (ref 4.7–6.1)
RBC UA: ABNORMAL /HPF (ref 0–5)
SLIDE REVIEW: ABNORMAL
SODIUM BLD-SCNC: 142 MEQ/L (ref 135–144)
SPECIFIC GRAVITY UA: 1.02 (ref 1–1.03)
TOTAL CK: 51 U/L (ref 0–190)
TOTAL PROTEIN: 7 G/DL (ref 6.3–8)
TROPONIN: 0.11 NG/ML (ref 0–0.01)
URINE REFLEX TO CULTURE: YES
UROBILINOGEN, URINE: 1 E.U./DL
WBC # BLD: 14.5 K/UL (ref 4.8–10.8)
WBC UA: ABNORMAL /HPF (ref 0–5)
YEAST: PRESENT

## 2019-10-21 PROCEDURE — 2700000000 HC OXYGEN THERAPY PER DAY

## 2019-10-21 PROCEDURE — 2500000003 HC RX 250 WO HCPCS: Performed by: PHYSICIAN ASSISTANT

## 2019-10-21 PROCEDURE — 93010 ELECTROCARDIOGRAM REPORT: CPT | Performed by: INTERNAL MEDICINE

## 2019-10-21 PROCEDURE — 94761 N-INVAS EAR/PLS OXIMETRY MLT: CPT

## 2019-10-21 PROCEDURE — 96365 THER/PROPH/DIAG IV INF INIT: CPT

## 2019-10-21 PROCEDURE — 84484 ASSAY OF TROPONIN QUANT: CPT

## 2019-10-21 PROCEDURE — 85025 COMPLETE CBC W/AUTO DIFF WBC: CPT

## 2019-10-21 PROCEDURE — G0378 HOSPITAL OBSERVATION PER HR: HCPCS

## 2019-10-21 PROCEDURE — 6370000000 HC RX 637 (ALT 250 FOR IP): Performed by: PHYSICIAN ASSISTANT

## 2019-10-21 PROCEDURE — 74176 CT ABD & PELVIS W/O CONTRAST: CPT

## 2019-10-21 PROCEDURE — 96366 THER/PROPH/DIAG IV INF ADDON: CPT

## 2019-10-21 PROCEDURE — 83605 ASSAY OF LACTIC ACID: CPT

## 2019-10-21 PROCEDURE — 87086 URINE CULTURE/COLONY COUNT: CPT

## 2019-10-21 PROCEDURE — 85610 PROTHROMBIN TIME: CPT

## 2019-10-21 PROCEDURE — 81001 URINALYSIS AUTO W/SCOPE: CPT

## 2019-10-21 PROCEDURE — 71045 X-RAY EXAM CHEST 1 VIEW: CPT

## 2019-10-21 PROCEDURE — 87040 BLOOD CULTURE FOR BACTERIA: CPT

## 2019-10-21 PROCEDURE — 2580000003 HC RX 258: Performed by: PHYSICIAN ASSISTANT

## 2019-10-21 PROCEDURE — 6360000002 HC RX W HCPCS: Performed by: PHYSICIAN ASSISTANT

## 2019-10-21 PROCEDURE — 83735 ASSAY OF MAGNESIUM: CPT

## 2019-10-21 PROCEDURE — 6360000002 HC RX W HCPCS: Performed by: NURSE PRACTITIONER

## 2019-10-21 PROCEDURE — 2580000003 HC RX 258: Performed by: NURSE PRACTITIONER

## 2019-10-21 PROCEDURE — 80053 COMPREHEN METABOLIC PANEL: CPT

## 2019-10-21 PROCEDURE — 85730 THROMBOPLASTIN TIME PARTIAL: CPT

## 2019-10-21 PROCEDURE — 82550 ASSAY OF CK (CPK): CPT

## 2019-10-21 PROCEDURE — 6360000002 HC RX W HCPCS: Performed by: INTERNAL MEDICINE

## 2019-10-21 PROCEDURE — 94664 DEMO&/EVAL PT USE INHALER: CPT

## 2019-10-21 PROCEDURE — 94640 AIRWAY INHALATION TREATMENT: CPT

## 2019-10-21 PROCEDURE — 99285 EMERGENCY DEPT VISIT HI MDM: CPT

## 2019-10-21 PROCEDURE — 36415 COLL VENOUS BLD VENIPUNCTURE: CPT

## 2019-10-21 PROCEDURE — 93005 ELECTROCARDIOGRAM TRACING: CPT | Performed by: NURSE PRACTITIONER

## 2019-10-21 PROCEDURE — 2580000003 HC RX 258: Performed by: INTERNAL MEDICINE

## 2019-10-21 PROCEDURE — 96367 TX/PROPH/DG ADDL SEQ IV INF: CPT

## 2019-10-21 PROCEDURE — 83690 ASSAY OF LIPASE: CPT

## 2019-10-21 RX ORDER — DEXTROSE MONOHYDRATE 50 MG/ML
100 INJECTION, SOLUTION INTRAVENOUS PRN
Status: DISCONTINUED | OUTPATIENT
Start: 2019-10-21 | End: 2019-10-24 | Stop reason: SDUPTHER

## 2019-10-21 RX ORDER — ALBUTEROL SULFATE 2.5 MG/3ML
2.5 SOLUTION RESPIRATORY (INHALATION)
Status: DISCONTINUED | OUTPATIENT
Start: 2019-10-21 | End: 2019-10-26 | Stop reason: HOSPADM

## 2019-10-21 RX ORDER — DEXTROSE MONOHYDRATE 25 G/50ML
12.5 INJECTION, SOLUTION INTRAVENOUS PRN
Status: DISCONTINUED | OUTPATIENT
Start: 2019-10-21 | End: 2019-10-21 | Stop reason: SDUPTHER

## 2019-10-21 RX ORDER — SODIUM CHLORIDE 0.9 % (FLUSH) 0.9 %
10 SYRINGE (ML) INJECTION PRN
Status: DISCONTINUED | OUTPATIENT
Start: 2019-10-21 | End: 2019-10-24 | Stop reason: SDUPTHER

## 2019-10-21 RX ORDER — NICOTINE 21 MG/24HR
1 PATCH, TRANSDERMAL 24 HOURS TRANSDERMAL DAILY
Status: DISCONTINUED | OUTPATIENT
Start: 2019-10-21 | End: 2019-10-26 | Stop reason: HOSPADM

## 2019-10-21 RX ORDER — SODIUM CHLORIDE 0.9 % (FLUSH) 0.9 %
10 SYRINGE (ML) INJECTION EVERY 12 HOURS SCHEDULED
Status: DISCONTINUED | OUTPATIENT
Start: 2019-10-21 | End: 2019-10-26 | Stop reason: HOSPADM

## 2019-10-21 RX ORDER — SODIUM CHLORIDE 9 MG/ML
INJECTION, SOLUTION INTRAVENOUS CONTINUOUS
Status: DISCONTINUED | OUTPATIENT
Start: 2019-10-21 | End: 2019-10-26 | Stop reason: HOSPADM

## 2019-10-21 RX ORDER — SODIUM CHLORIDE 9 MG/ML
250 INJECTION, SOLUTION INTRAVENOUS ONCE
Status: COMPLETED | OUTPATIENT
Start: 2019-10-21 | End: 2019-10-22

## 2019-10-21 RX ORDER — SODIUM CHLORIDE 0.9 % (FLUSH) 0.9 %
10 SYRINGE (ML) INJECTION PRN
Status: DISCONTINUED | OUTPATIENT
Start: 2019-10-21 | End: 2019-10-26 | Stop reason: HOSPADM

## 2019-10-21 RX ORDER — IPRATROPIUM BROMIDE AND ALBUTEROL SULFATE 2.5; .5 MG/3ML; MG/3ML
1 SOLUTION RESPIRATORY (INHALATION) 3 TIMES DAILY
Status: DISCONTINUED | OUTPATIENT
Start: 2019-10-22 | End: 2019-10-26 | Stop reason: HOSPADM

## 2019-10-21 RX ORDER — SODIUM CHLORIDE 0.9 % (FLUSH) 0.9 %
10 SYRINGE (ML) INJECTION EVERY 12 HOURS SCHEDULED
Status: DISCONTINUED | OUTPATIENT
Start: 2019-10-21 | End: 2019-10-24 | Stop reason: SDUPTHER

## 2019-10-21 RX ORDER — NICOTINE POLACRILEX 4 MG
15 LOZENGE BUCCAL PRN
Status: DISCONTINUED | OUTPATIENT
Start: 2019-10-21 | End: 2019-10-21 | Stop reason: SDUPTHER

## 2019-10-21 RX ORDER — ONDANSETRON 2 MG/ML
4 INJECTION INTRAMUSCULAR; INTRAVENOUS EVERY 6 HOURS PRN
Status: DISCONTINUED | OUTPATIENT
Start: 2019-10-21 | End: 2019-10-26 | Stop reason: HOSPADM

## 2019-10-21 RX ORDER — ACETAMINOPHEN 325 MG/1
650 TABLET ORAL EVERY 4 HOURS PRN
Status: DISCONTINUED | OUTPATIENT
Start: 2019-10-21 | End: 2019-10-26 | Stop reason: HOSPADM

## 2019-10-21 RX ORDER — DEXTROSE MONOHYDRATE 25 G/50ML
12.5 INJECTION, SOLUTION INTRAVENOUS PRN
Status: DISCONTINUED | OUTPATIENT
Start: 2019-10-21 | End: 2019-10-26 | Stop reason: HOSPADM

## 2019-10-21 RX ORDER — NICOTINE POLACRILEX 4 MG
15 LOZENGE BUCCAL PRN
Status: DISCONTINUED | OUTPATIENT
Start: 2019-10-21 | End: 2019-10-26 | Stop reason: HOSPADM

## 2019-10-21 RX ORDER — LIDOCAINE HYDROCHLORIDE 20 MG/ML
5 INJECTION, SOLUTION INFILTRATION; PERINEURAL ONCE
Status: COMPLETED | OUTPATIENT
Start: 2019-10-21 | End: 2019-10-22

## 2019-10-21 RX ORDER — DEXTROSE MONOHYDRATE 50 MG/ML
100 INJECTION, SOLUTION INTRAVENOUS PRN
Status: DISCONTINUED | OUTPATIENT
Start: 2019-10-21 | End: 2019-10-26 | Stop reason: HOSPADM

## 2019-10-21 RX ORDER — IPRATROPIUM BROMIDE AND ALBUTEROL SULFATE 2.5; .5 MG/3ML; MG/3ML
1 SOLUTION RESPIRATORY (INHALATION)
Status: DISCONTINUED | OUTPATIENT
Start: 2019-10-21 | End: 2019-10-21

## 2019-10-21 RX ADMIN — IPRATROPIUM BROMIDE AND ALBUTEROL SULFATE 1 AMPULE: .5; 3 SOLUTION RESPIRATORY (INHALATION) at 20:14

## 2019-10-21 RX ADMIN — Medication 10 ML: at 23:45

## 2019-10-21 RX ADMIN — PIPERACILLIN AND TAZOBACTAM 3.38 G: 3; .375 INJECTION, POWDER, LYOPHILIZED, FOR SOLUTION INTRAVENOUS at 23:59

## 2019-10-21 RX ADMIN — MICONAZOLE NITRATE: 20 POWDER TOPICAL at 21:57

## 2019-10-21 RX ADMIN — VANCOMYCIN HYDROCHLORIDE 1250 MG: 5 INJECTION, POWDER, LYOPHILIZED, FOR SOLUTION INTRAVENOUS at 21:53

## 2019-10-21 RX ADMIN — PIPERACILLIN AND TAZOBACTAM 3.38 G: 3; .375 INJECTION, POWDER, LYOPHILIZED, FOR SOLUTION INTRAVENOUS at 14:00

## 2019-10-21 RX ADMIN — SODIUM CHLORIDE: 9 INJECTION, SOLUTION INTRAVENOUS at 21:53

## 2019-10-21 RX ADMIN — Medication 10 ML: at 21:50

## 2019-10-21 ASSESSMENT — ENCOUNTER SYMPTOMS
SORE THROAT: 0
NAUSEA: 1
RHINORRHEA: 0
PHOTOPHOBIA: 0
EYE PAIN: 0
VOMITING: 0
ABDOMINAL PAIN: 1
SHORTNESS OF BREATH: 0
BACK PAIN: 0
DIARRHEA: 0
COUGH: 1

## 2019-10-21 ASSESSMENT — PAIN SCALES - GENERAL: PAINLEVEL_OUTOF10: 6

## 2019-10-22 ENCOUNTER — APPOINTMENT (OUTPATIENT)
Dept: INTERVENTIONAL RADIOLOGY/VASCULAR | Age: 69
DRG: 193 | End: 2019-10-22
Payer: MEDICARE

## 2019-10-22 LAB
ANION GAP SERPL CALCULATED.3IONS-SCNC: 12 MEQ/L (ref 9–15)
BASOPHILS ABSOLUTE: 0 K/UL (ref 0–0.2)
BASOPHILS RELATIVE PERCENT: 0.4 %
BUN BLDV-MCNC: 44 MG/DL (ref 8–23)
CALCIUM SERPL-MCNC: 8.4 MG/DL (ref 8.5–9.9)
CHLORIDE BLD-SCNC: 101 MEQ/L (ref 95–107)
CO2: 29 MEQ/L (ref 20–31)
CREAT SERPL-MCNC: 0.99 MG/DL (ref 0.7–1.2)
EOSINOPHILS ABSOLUTE: 0.1 K/UL (ref 0–0.7)
EOSINOPHILS RELATIVE PERCENT: 1 %
GFR AFRICAN AMERICAN: >60
GFR NON-AFRICAN AMERICAN: >60
GLUCOSE BLD-MCNC: 169 MG/DL (ref 70–99)
GLUCOSE BLD-MCNC: 171 MG/DL (ref 60–115)
GLUCOSE BLD-MCNC: 180 MG/DL (ref 60–115)
GLUCOSE BLD-MCNC: 186 MG/DL (ref 60–115)
GLUCOSE BLD-MCNC: 186 MG/DL (ref 60–115)
HCT VFR BLD CALC: 23.1 % (ref 42–52)
HEMOGLOBIN: 7.5 G/DL (ref 14–18)
LACTIC ACID: 1.2 MMOL/L (ref 0.5–2.2)
LYMPHOCYTES ABSOLUTE: 1.4 K/UL (ref 1–4.8)
LYMPHOCYTES RELATIVE PERCENT: 14.7 %
MCH RBC QN AUTO: 30.3 PG (ref 27–31.3)
MCHC RBC AUTO-ENTMCNC: 32.4 % (ref 33–37)
MCV RBC AUTO: 93.4 FL (ref 80–100)
MONOCYTES ABSOLUTE: 1.3 K/UL (ref 0.2–0.8)
MONOCYTES RELATIVE PERCENT: 14.1 %
NEUTROPHILS ABSOLUTE: 6.6 K/UL (ref 1.4–6.5)
NEUTROPHILS RELATIVE PERCENT: 69.8 %
PDW BLD-RTO: 18 % (ref 11.5–14.5)
PERFORMED ON: ABNORMAL
PLATELET # BLD: 133 K/UL (ref 130–400)
POTASSIUM REFLEX MAGNESIUM: 5.3 MEQ/L (ref 3.4–4.9)
PROCALCITONIN: 0.3 NG/ML (ref 0–0.15)
RBC # BLD: 2.47 M/UL (ref 4.7–6.1)
SODIUM BLD-SCNC: 142 MEQ/L (ref 135–144)
WBC # BLD: 9.5 K/UL (ref 4.8–10.8)

## 2019-10-22 PROCEDURE — 93005 ELECTROCARDIOGRAM TRACING: CPT | Performed by: INTERNAL MEDICINE

## 2019-10-22 PROCEDURE — 2500000003 HC RX 250 WO HCPCS: Performed by: INTERNAL MEDICINE

## 2019-10-22 PROCEDURE — 97165 OT EVAL LOW COMPLEX 30 MIN: CPT

## 2019-10-22 PROCEDURE — 2580000003 HC RX 258: Performed by: PHYSICIAN ASSISTANT

## 2019-10-22 PROCEDURE — 84145 PROCALCITONIN (PCT): CPT

## 2019-10-22 PROCEDURE — 94640 AIRWAY INHALATION TREATMENT: CPT

## 2019-10-22 PROCEDURE — 99223 1ST HOSP IP/OBS HIGH 75: CPT | Performed by: INTERNAL MEDICINE

## 2019-10-22 PROCEDURE — 2700000000 HC OXYGEN THERAPY PER DAY

## 2019-10-22 PROCEDURE — 36573 INSJ PICC RS&I 5 YR+: CPT

## 2019-10-22 PROCEDURE — 96366 THER/PROPH/DIAG IV INF ADDON: CPT

## 2019-10-22 PROCEDURE — 6360000002 HC RX W HCPCS: Performed by: PHYSICIAN ASSISTANT

## 2019-10-22 PROCEDURE — 2709999900 IR PICC WO SQ PORT/PUMP > 5 YEARS

## 2019-10-22 PROCEDURE — 85025 COMPLETE CBC W/AUTO DIFF WBC: CPT

## 2019-10-22 PROCEDURE — 6370000000 HC RX 637 (ALT 250 FOR IP): Performed by: INTERNAL MEDICINE

## 2019-10-22 PROCEDURE — 6360000002 HC RX W HCPCS: Performed by: INTERNAL MEDICINE

## 2019-10-22 PROCEDURE — 83605 ASSAY OF LACTIC ACID: CPT

## 2019-10-22 PROCEDURE — 97161 PT EVAL LOW COMPLEX 20 MIN: CPT

## 2019-10-22 PROCEDURE — 2580000003 HC RX 258: Performed by: INTERNAL MEDICINE

## 2019-10-22 PROCEDURE — 1210000000 HC MED SURG R&B

## 2019-10-22 PROCEDURE — 36415 COLL VENOUS BLD VENIPUNCTURE: CPT

## 2019-10-22 PROCEDURE — 6370000000 HC RX 637 (ALT 250 FOR IP): Performed by: PHYSICIAN ASSISTANT

## 2019-10-22 PROCEDURE — 80048 BASIC METABOLIC PNL TOTAL CA: CPT

## 2019-10-22 PROCEDURE — 02HV33Z INSERTION OF INFUSION DEVICE INTO SUPERIOR VENA CAVA, PERCUTANEOUS APPROACH: ICD-10-PCS | Performed by: RADIOLOGY

## 2019-10-22 PROCEDURE — 87040 BLOOD CULTURE FOR BACTERIA: CPT

## 2019-10-22 RX ORDER — OXYCODONE HYDROCHLORIDE 5 MG/1
5 TABLET ORAL EVERY 4 HOURS PRN
Status: DISCONTINUED | OUTPATIENT
Start: 2019-10-22 | End: 2019-10-26 | Stop reason: HOSPADM

## 2019-10-22 RX ORDER — IPRATROPIUM BROMIDE AND ALBUTEROL SULFATE 2.5; .5 MG/3ML; MG/3ML
3 SOLUTION RESPIRATORY (INHALATION) EVERY 4 HOURS PRN
Status: DISCONTINUED | OUTPATIENT
Start: 2019-10-22 | End: 2019-10-24

## 2019-10-22 RX ORDER — ISOSORBIDE MONONITRATE 30 MG/1
30 TABLET, EXTENDED RELEASE ORAL DAILY
Status: DISCONTINUED | OUTPATIENT
Start: 2019-10-22 | End: 2019-10-26 | Stop reason: HOSPADM

## 2019-10-22 RX ORDER — FUROSEMIDE 40 MG/1
40 TABLET ORAL DAILY
Status: DISCONTINUED | OUTPATIENT
Start: 2019-10-22 | End: 2019-10-26 | Stop reason: HOSPADM

## 2019-10-22 RX ORDER — FLUCONAZOLE 100 MG/1
400 TABLET ORAL DAILY
Status: DISCONTINUED | OUTPATIENT
Start: 2019-10-22 | End: 2019-10-26 | Stop reason: HOSPADM

## 2019-10-22 RX ORDER — GABAPENTIN 400 MG/1
800 CAPSULE ORAL 3 TIMES DAILY
Status: DISCONTINUED | OUTPATIENT
Start: 2019-10-22 | End: 2019-10-26 | Stop reason: HOSPADM

## 2019-10-22 RX ORDER — ATORVASTATIN CALCIUM 40 MG/1
40 TABLET, FILM COATED ORAL NIGHTLY
Status: DISCONTINUED | OUTPATIENT
Start: 2019-10-22 | End: 2019-10-26 | Stop reason: HOSPADM

## 2019-10-22 RX ORDER — ASPIRIN 81 MG/1
81 TABLET ORAL DAILY
Status: DISCONTINUED | OUTPATIENT
Start: 2019-10-22 | End: 2019-10-24

## 2019-10-22 RX ORDER — MORPHINE SULFATE 15 MG/1
15 TABLET, FILM COATED, EXTENDED RELEASE ORAL EVERY 12 HOURS SCHEDULED
Status: DISCONTINUED | OUTPATIENT
Start: 2019-10-22 | End: 2019-10-26 | Stop reason: HOSPADM

## 2019-10-22 RX ORDER — PANTOPRAZOLE SODIUM 40 MG/1
40 TABLET, DELAYED RELEASE ORAL
Status: DISCONTINUED | OUTPATIENT
Start: 2019-10-23 | End: 2019-10-26 | Stop reason: HOSPADM

## 2019-10-22 RX ORDER — INSULIN GLARGINE 100 [IU]/ML
12 INJECTION, SOLUTION SUBCUTANEOUS NIGHTLY
Status: DISCONTINUED | OUTPATIENT
Start: 2019-10-22 | End: 2019-10-26 | Stop reason: HOSPADM

## 2019-10-22 RX ADMIN — PIPERACILLIN AND TAZOBACTAM 3.38 G: 3; .375 INJECTION, POWDER, LYOPHILIZED, FOR SOLUTION INTRAVENOUS at 11:01

## 2019-10-22 RX ADMIN — ASPIRIN 81 MG: 81 TABLET, COATED ORAL at 13:16

## 2019-10-22 RX ADMIN — INSULIN GLARGINE 12 UNITS: 100 INJECTION, SOLUTION SUBCUTANEOUS at 20:33

## 2019-10-22 RX ADMIN — MICONAZOLE NITRATE: 20 POWDER TOPICAL at 11:01

## 2019-10-22 RX ADMIN — Medication 10 ML: at 11:01

## 2019-10-22 RX ADMIN — GABAPENTIN 800 MG: 400 CAPSULE ORAL at 20:33

## 2019-10-22 RX ADMIN — INSULIN LISPRO 2 UNITS: 100 INJECTION, SOLUTION INTRAVENOUS; SUBCUTANEOUS at 13:21

## 2019-10-22 RX ADMIN — MORPHINE SULFATE 15 MG: 15 TABLET, FILM COATED, EXTENDED RELEASE ORAL at 20:33

## 2019-10-22 RX ADMIN — ATORVASTATIN CALCIUM 40 MG: 40 TABLET, FILM COATED ORAL at 20:33

## 2019-10-22 RX ADMIN — VANCOMYCIN HYDROCHLORIDE 1250 MG: 5 INJECTION, POWDER, LYOPHILIZED, FOR SOLUTION INTRAVENOUS at 13:13

## 2019-10-22 RX ADMIN — IPRATROPIUM BROMIDE AND ALBUTEROL SULFATE 1 AMPULE: .5; 3 SOLUTION RESPIRATORY (INHALATION) at 07:31

## 2019-10-22 RX ADMIN — METOPROLOL TARTRATE 25 MG: 25 TABLET ORAL at 20:33

## 2019-10-22 RX ADMIN — FUROSEMIDE 40 MG: 40 TABLET ORAL at 16:06

## 2019-10-22 RX ADMIN — METOPROLOL TARTRATE 25 MG: 25 TABLET ORAL at 13:16

## 2019-10-22 RX ADMIN — IPRATROPIUM BROMIDE AND ALBUTEROL SULFATE 1 AMPULE: .5; 3 SOLUTION RESPIRATORY (INHALATION) at 13:25

## 2019-10-22 RX ADMIN — ISOSORBIDE MONONITRATE 30 MG: 30 TABLET, EXTENDED RELEASE ORAL at 13:16

## 2019-10-22 RX ADMIN — PIPERACILLIN AND TAZOBACTAM 3.38 G: 3; .375 INJECTION, POWDER, LYOPHILIZED, FOR SOLUTION INTRAVENOUS at 20:32

## 2019-10-22 RX ADMIN — IPRATROPIUM BROMIDE AND ALBUTEROL SULFATE 1 AMPULE: .5; 3 SOLUTION RESPIRATORY (INHALATION) at 19:27

## 2019-10-22 RX ADMIN — OXYCODONE HYDROCHLORIDE 5 MG: 5 TABLET ORAL at 02:01

## 2019-10-22 RX ADMIN — SODIUM CHLORIDE 250 ML: 9 INJECTION, SOLUTION INTRAVENOUS at 09:00

## 2019-10-22 RX ADMIN — FLUCONAZOLE 400 MG: 100 TABLET ORAL at 16:06

## 2019-10-22 RX ADMIN — LIDOCAINE HYDROCHLORIDE 5 ML: 20 INJECTION, SOLUTION INFILTRATION; PERINEURAL at 09:00

## 2019-10-22 RX ADMIN — Medication 10 ML: at 20:34

## 2019-10-22 RX ADMIN — GABAPENTIN 800 MG: 400 CAPSULE ORAL at 13:15

## 2019-10-22 ASSESSMENT — ENCOUNTER SYMPTOMS
RESPIRATORY NEGATIVE: 1
COUGH: 0
GASTROINTESTINAL NEGATIVE: 1
WHEEZING: 0
NAUSEA: 0
STRIDOR: 0
CHEST TIGHTNESS: 0
SHORTNESS OF BREATH: 0
EYES NEGATIVE: 1
BLOOD IN STOOL: 0

## 2019-10-22 ASSESSMENT — PAIN DESCRIPTION - ORIENTATION
ORIENTATION: RIGHT;UPPER
ORIENTATION: RIGHT;LEFT

## 2019-10-22 ASSESSMENT — PAIN SCALES - GENERAL
PAINLEVEL_OUTOF10: 6
PAINLEVEL_OUTOF10: 0
PAINLEVEL_OUTOF10: 0
PAINLEVEL_OUTOF10: 8
PAINLEVEL_OUTOF10: 10

## 2019-10-22 ASSESSMENT — PAIN DESCRIPTION - PAIN TYPE
TYPE: SURGICAL PAIN
TYPE: SURGICAL PAIN

## 2019-10-22 ASSESSMENT — PAIN DESCRIPTION - LOCATION
LOCATION: LEG
LOCATION: LEG

## 2019-10-23 LAB
ANION GAP SERPL CALCULATED.3IONS-SCNC: 9 MEQ/L (ref 9–15)
BASOPHILS ABSOLUTE: 0.1 K/UL (ref 0–0.2)
BASOPHILS RELATIVE PERCENT: 0.6 %
BUN BLDV-MCNC: 30 MG/DL (ref 8–23)
CALCIUM SERPL-MCNC: 8.3 MG/DL (ref 8.5–9.9)
CHLORIDE BLD-SCNC: 99 MEQ/L (ref 95–107)
CO2: 32 MEQ/L (ref 20–31)
CREAT SERPL-MCNC: 0.67 MG/DL (ref 0.7–1.2)
EOSINOPHILS ABSOLUTE: 0.3 K/UL (ref 0–0.7)
EOSINOPHILS RELATIVE PERCENT: 2.9 %
GFR AFRICAN AMERICAN: >60
GFR AFRICAN AMERICAN: >60
GFR NON-AFRICAN AMERICAN: >60
GFR NON-AFRICAN AMERICAN: >60
GLUCOSE BLD-MCNC: 127 MG/DL (ref 70–99)
GLUCOSE BLD-MCNC: 135 MG/DL (ref 60–115)
GLUCOSE BLD-MCNC: 138 MG/DL (ref 60–115)
GLUCOSE BLD-MCNC: 149 MG/DL (ref 60–115)
GLUCOSE BLD-MCNC: 164 MG/DL (ref 60–115)
HCT VFR BLD CALC: 24.6 % (ref 42–52)
HEMOGLOBIN: 7.8 G/DL (ref 14–18)
LACTIC ACID: 0.8 MMOL/L (ref 0.5–2.2)
LYMPHOCYTES ABSOLUTE: 1 K/UL (ref 1–4.8)
LYMPHOCYTES RELATIVE PERCENT: 11.5 %
MCH RBC QN AUTO: 29.5 PG (ref 27–31.3)
MCHC RBC AUTO-ENTMCNC: 31.6 % (ref 33–37)
MCV RBC AUTO: 93.2 FL (ref 80–100)
MONOCYTES ABSOLUTE: 0.9 K/UL (ref 0.2–0.8)
MONOCYTES RELATIVE PERCENT: 9.6 %
NEUTROPHILS ABSOLUTE: 6.7 K/UL (ref 1.4–6.5)
NEUTROPHILS RELATIVE PERCENT: 75.4 %
PDW BLD-RTO: 17.9 % (ref 11.5–14.5)
PERFORMED ON: ABNORMAL
PERFORMED ON: NORMAL
PLATELET # BLD: 138 K/UL (ref 130–400)
POC CREATININE: 1.2 MG/DL (ref 0.8–1.3)
POC SAMPLE TYPE: NORMAL
POTASSIUM REFLEX MAGNESIUM: 4.5 MEQ/L (ref 3.4–4.9)
RBC # BLD: 2.64 M/UL (ref 4.7–6.1)
SODIUM BLD-SCNC: 140 MEQ/L (ref 135–144)
URINE CULTURE, ROUTINE: NORMAL
VANCOMYCIN TROUGH: 19.9 UG/ML (ref 10–20)
WBC # BLD: 8.9 K/UL (ref 4.8–10.8)

## 2019-10-23 PROCEDURE — 80202 ASSAY OF VANCOMYCIN: CPT

## 2019-10-23 PROCEDURE — 99232 SBSQ HOSP IP/OBS MODERATE 35: CPT | Performed by: INTERNAL MEDICINE

## 2019-10-23 PROCEDURE — 80048 BASIC METABOLIC PNL TOTAL CA: CPT

## 2019-10-23 PROCEDURE — 6370000000 HC RX 637 (ALT 250 FOR IP): Performed by: INTERNAL MEDICINE

## 2019-10-23 PROCEDURE — 93010 ELECTROCARDIOGRAM REPORT: CPT | Performed by: INTERNAL MEDICINE

## 2019-10-23 PROCEDURE — 97535 SELF CARE MNGMENT TRAINING: CPT

## 2019-10-23 PROCEDURE — 97110 THERAPEUTIC EXERCISES: CPT

## 2019-10-23 PROCEDURE — 2580000003 HC RX 258: Performed by: PHYSICIAN ASSISTANT

## 2019-10-23 PROCEDURE — 36415 COLL VENOUS BLD VENIPUNCTURE: CPT

## 2019-10-23 PROCEDURE — 93005 ELECTROCARDIOGRAM TRACING: CPT | Performed by: INTERNAL MEDICINE

## 2019-10-23 PROCEDURE — 94640 AIRWAY INHALATION TREATMENT: CPT

## 2019-10-23 PROCEDURE — 83605 ASSAY OF LACTIC ACID: CPT

## 2019-10-23 PROCEDURE — 6360000002 HC RX W HCPCS: Performed by: INTERNAL MEDICINE

## 2019-10-23 PROCEDURE — 2580000003 HC RX 258: Performed by: INTERNAL MEDICINE

## 2019-10-23 PROCEDURE — 1210000000 HC MED SURG R&B

## 2019-10-23 PROCEDURE — 85025 COMPLETE CBC W/AUTO DIFF WBC: CPT

## 2019-10-23 PROCEDURE — 2700000000 HC OXYGEN THERAPY PER DAY

## 2019-10-23 PROCEDURE — 6360000002 HC RX W HCPCS: Performed by: PHYSICIAN ASSISTANT

## 2019-10-23 RX ADMIN — VANCOMYCIN HYDROCHLORIDE 1250 MG: 5 INJECTION, POWDER, LYOPHILIZED, FOR SOLUTION INTRAVENOUS at 14:12

## 2019-10-23 RX ADMIN — METOPROLOL TARTRATE 25 MG: 25 TABLET ORAL at 10:13

## 2019-10-23 RX ADMIN — ENOXAPARIN SODIUM 40 MG: 40 INJECTION SUBCUTANEOUS at 10:14

## 2019-10-23 RX ADMIN — IPRATROPIUM BROMIDE AND ALBUTEROL SULFATE 1 AMPULE: .5; 3 SOLUTION RESPIRATORY (INHALATION) at 07:44

## 2019-10-23 RX ADMIN — ATORVASTATIN CALCIUM 40 MG: 40 TABLET, FILM COATED ORAL at 21:23

## 2019-10-23 RX ADMIN — INSULIN GLARGINE 12 UNITS: 100 INJECTION, SOLUTION SUBCUTANEOUS at 21:32

## 2019-10-23 RX ADMIN — Medication 10 ML: at 00:36

## 2019-10-23 RX ADMIN — Medication 10 ML: at 10:14

## 2019-10-23 RX ADMIN — FUROSEMIDE 40 MG: 40 TABLET ORAL at 10:13

## 2019-10-23 RX ADMIN — VANCOMYCIN HYDROCHLORIDE 1250 MG: 5 INJECTION, POWDER, LYOPHILIZED, FOR SOLUTION INTRAVENOUS at 01:24

## 2019-10-23 RX ADMIN — MICONAZOLE NITRATE: 20 POWDER TOPICAL at 10:12

## 2019-10-23 RX ADMIN — METOPROLOL TARTRATE 25 MG: 25 TABLET ORAL at 21:28

## 2019-10-23 RX ADMIN — IPRATROPIUM BROMIDE AND ALBUTEROL SULFATE 1 AMPULE: .5; 3 SOLUTION RESPIRATORY (INHALATION) at 13:45

## 2019-10-23 RX ADMIN — Medication 10 ML: at 00:37

## 2019-10-23 RX ADMIN — MICONAZOLE NITRATE: 20 POWDER TOPICAL at 21:24

## 2019-10-23 RX ADMIN — GABAPENTIN 800 MG: 400 CAPSULE ORAL at 10:12

## 2019-10-23 RX ADMIN — Medication 10 ML: at 10:15

## 2019-10-23 RX ADMIN — ISOSORBIDE MONONITRATE 30 MG: 30 TABLET, EXTENDED RELEASE ORAL at 10:12

## 2019-10-23 RX ADMIN — IPRATROPIUM BROMIDE AND ALBUTEROL SULFATE 1 AMPULE: .5; 3 SOLUTION RESPIRATORY (INHALATION) at 18:59

## 2019-10-23 RX ADMIN — MICONAZOLE NITRATE: 20 POWDER TOPICAL at 00:36

## 2019-10-23 RX ADMIN — SODIUM CHLORIDE: 9 INJECTION, SOLUTION INTRAVENOUS at 22:17

## 2019-10-23 RX ADMIN — MORPHINE SULFATE 15 MG: 15 TABLET, FILM COATED, EXTENDED RELEASE ORAL at 10:14

## 2019-10-23 RX ADMIN — PIPERACILLIN AND TAZOBACTAM 3.38 G: 3; .375 INJECTION, POWDER, LYOPHILIZED, FOR SOLUTION INTRAVENOUS at 12:55

## 2019-10-23 RX ADMIN — GABAPENTIN 800 MG: 400 CAPSULE ORAL at 21:22

## 2019-10-23 RX ADMIN — PIPERACILLIN AND TAZOBACTAM 3.38 G: 3; .375 INJECTION, POWDER, LYOPHILIZED, FOR SOLUTION INTRAVENOUS at 04:33

## 2019-10-23 RX ADMIN — ASPIRIN 81 MG: 81 TABLET, COATED ORAL at 10:12

## 2019-10-23 RX ADMIN — Medication 10 ML: at 21:23

## 2019-10-23 RX ADMIN — FLUCONAZOLE 400 MG: 100 TABLET ORAL at 10:13

## 2019-10-23 RX ADMIN — MORPHINE SULFATE 15 MG: 15 TABLET, FILM COATED, EXTENDED RELEASE ORAL at 21:22

## 2019-10-23 RX ADMIN — PIPERACILLIN AND TAZOBACTAM 3.38 G: 3; .375 INJECTION, POWDER, LYOPHILIZED, FOR SOLUTION INTRAVENOUS at 21:21

## 2019-10-23 RX ADMIN — INSULIN LISPRO 2 UNITS: 100 INJECTION, SOLUTION INTRAVENOUS; SUBCUTANEOUS at 13:07

## 2019-10-23 RX ADMIN — Medication 10 ML: at 21:35

## 2019-10-23 RX ADMIN — Medication 10 ML: at 04:34

## 2019-10-23 ASSESSMENT — PAIN SCALES - GENERAL
PAINLEVEL_OUTOF10: 7
PAINLEVEL_OUTOF10: 8
PAINLEVEL_OUTOF10: 7
PAINLEVEL_OUTOF10: 3
PAINLEVEL_OUTOF10: 0
PAINLEVEL_OUTOF10: 0
PAINLEVEL_OUTOF10: 8

## 2019-10-23 ASSESSMENT — ENCOUNTER SYMPTOMS
RESPIRATORY NEGATIVE: 1
SHORTNESS OF BREATH: 0
WHEEZING: 0
BLOOD IN STOOL: 0
NAUSEA: 0
STRIDOR: 0
EYES NEGATIVE: 1
GASTROINTESTINAL NEGATIVE: 1
COUGH: 0
CHEST TIGHTNESS: 0

## 2019-10-23 ASSESSMENT — PAIN DESCRIPTION - PAIN TYPE: TYPE: SURGICAL PAIN

## 2019-10-23 ASSESSMENT — PAIN DESCRIPTION - ORIENTATION: ORIENTATION: RIGHT

## 2019-10-23 ASSESSMENT — PAIN DESCRIPTION - LOCATION: LOCATION: LEG

## 2019-10-24 LAB
ABO/RH: NORMAL
ANION GAP SERPL CALCULATED.3IONS-SCNC: 10 MEQ/L (ref 9–15)
ANTIBODY SCREEN: NORMAL
BASOPHILS ABSOLUTE: 0.1 K/UL (ref 0–0.2)
BASOPHILS RELATIVE PERCENT: 1.4 %
BLOOD BANK DISPENSE STATUS: NORMAL
BLOOD BANK PRODUCT CODE: NORMAL
BPU ID: NORMAL
BUN BLDV-MCNC: 25 MG/DL (ref 8–23)
CALCIUM SERPL-MCNC: 8.2 MG/DL (ref 8.5–9.9)
CHLORIDE BLD-SCNC: 98 MEQ/L (ref 95–107)
CO2: 32 MEQ/L (ref 20–31)
CREAT SERPL-MCNC: 0.59 MG/DL (ref 0.7–1.2)
DESCRIPTION BLOOD BANK: NORMAL
EOSINOPHILS ABSOLUTE: 0.3 K/UL (ref 0–0.7)
EOSINOPHILS RELATIVE PERCENT: 3.9 %
FERRITIN: 266.1 NG/ML (ref 30–400)
GFR AFRICAN AMERICAN: >60
GFR NON-AFRICAN AMERICAN: >60
GLUCOSE BLD-MCNC: 103 MG/DL (ref 70–99)
GLUCOSE BLD-MCNC: 107 MG/DL (ref 60–115)
GLUCOSE BLD-MCNC: 115 MG/DL (ref 60–115)
GLUCOSE BLD-MCNC: 128 MG/DL (ref 60–115)
GLUCOSE BLD-MCNC: 133 MG/DL (ref 60–115)
HCT VFR BLD CALC: 21.9 % (ref 42–52)
HEMOGLOBIN: 7 G/DL (ref 14–18)
IRON SATURATION: 12 % (ref 11–46)
IRON: 19 UG/DL (ref 59–158)
LACTIC ACID: 0.8 MMOL/L (ref 0.5–2.2)
LYMPHOCYTES ABSOLUTE: 1.4 K/UL (ref 1–4.8)
LYMPHOCYTES RELATIVE PERCENT: 16.4 %
MCH RBC QN AUTO: 29.9 PG (ref 27–31.3)
MCHC RBC AUTO-ENTMCNC: 31.8 % (ref 33–37)
MCV RBC AUTO: 94.1 FL (ref 80–100)
MONOCYTES ABSOLUTE: 0.9 K/UL (ref 0.2–0.8)
MONOCYTES RELATIVE PERCENT: 10.8 %
NEUTROPHILS ABSOLUTE: 5.7 K/UL (ref 1.4–6.5)
NEUTROPHILS RELATIVE PERCENT: 67.5 %
PDW BLD-RTO: 18.1 % (ref 11.5–14.5)
PERFORMED ON: ABNORMAL
PERFORMED ON: ABNORMAL
PERFORMED ON: NORMAL
PERFORMED ON: NORMAL
PLATELET # BLD: 195 K/UL (ref 130–400)
POTASSIUM REFLEX MAGNESIUM: 4.2 MEQ/L (ref 3.4–4.9)
RBC # BLD: 2.33 M/UL (ref 4.7–6.1)
SODIUM BLD-SCNC: 140 MEQ/L (ref 135–144)
TOTAL IRON BINDING CAPACITY: 162 UG/DL (ref 178–450)
WBC # BLD: 8.4 K/UL (ref 4.8–10.8)

## 2019-10-24 PROCEDURE — 99232 SBSQ HOSP IP/OBS MODERATE 35: CPT | Performed by: INTERNAL MEDICINE

## 2019-10-24 PROCEDURE — 86923 COMPATIBILITY TEST ELECTRIC: CPT

## 2019-10-24 PROCEDURE — 86850 RBC ANTIBODY SCREEN: CPT

## 2019-10-24 PROCEDURE — 82728 ASSAY OF FERRITIN: CPT

## 2019-10-24 PROCEDURE — 94640 AIRWAY INHALATION TREATMENT: CPT

## 2019-10-24 PROCEDURE — 83550 IRON BINDING TEST: CPT

## 2019-10-24 PROCEDURE — 94664 DEMO&/EVAL PT USE INHALER: CPT

## 2019-10-24 PROCEDURE — 36415 COLL VENOUS BLD VENIPUNCTURE: CPT

## 2019-10-24 PROCEDURE — 85025 COMPLETE CBC W/AUTO DIFF WBC: CPT

## 2019-10-24 PROCEDURE — 36430 TRANSFUSION BLD/BLD COMPNT: CPT

## 2019-10-24 PROCEDURE — 2580000003 HC RX 258: Performed by: PHYSICIAN ASSISTANT

## 2019-10-24 PROCEDURE — P9016 RBC LEUKOCYTES REDUCED: HCPCS

## 2019-10-24 PROCEDURE — 6370000000 HC RX 637 (ALT 250 FOR IP): Performed by: INTERNAL MEDICINE

## 2019-10-24 PROCEDURE — 86901 BLOOD TYPING SEROLOGIC RH(D): CPT

## 2019-10-24 PROCEDURE — 2580000003 HC RX 258: Performed by: INTERNAL MEDICINE

## 2019-10-24 PROCEDURE — 83540 ASSAY OF IRON: CPT

## 2019-10-24 PROCEDURE — 2700000000 HC OXYGEN THERAPY PER DAY

## 2019-10-24 PROCEDURE — 93010 ELECTROCARDIOGRAM REPORT: CPT | Performed by: INTERNAL MEDICINE

## 2019-10-24 PROCEDURE — 80048 BASIC METABOLIC PNL TOTAL CA: CPT

## 2019-10-24 PROCEDURE — 83605 ASSAY OF LACTIC ACID: CPT

## 2019-10-24 PROCEDURE — 6360000002 HC RX W HCPCS: Performed by: INTERNAL MEDICINE

## 2019-10-24 PROCEDURE — 6360000002 HC RX W HCPCS: Performed by: PHYSICIAN ASSISTANT

## 2019-10-24 PROCEDURE — 93005 ELECTROCARDIOGRAM TRACING: CPT | Performed by: INTERNAL MEDICINE

## 2019-10-24 PROCEDURE — 1210000000 HC MED SURG R&B

## 2019-10-24 PROCEDURE — 86900 BLOOD TYPING SEROLOGIC ABO: CPT

## 2019-10-24 RX ORDER — 0.9 % SODIUM CHLORIDE 0.9 %
250 INTRAVENOUS SOLUTION INTRAVENOUS ONCE
Status: DISCONTINUED | OUTPATIENT
Start: 2019-10-24 | End: 2019-10-26 | Stop reason: HOSPADM

## 2019-10-24 RX ORDER — FERROUS SULFATE 325(65) MG
325 TABLET ORAL 2 TIMES DAILY WITH MEALS
Status: DISCONTINUED | OUTPATIENT
Start: 2019-10-24 | End: 2019-10-26 | Stop reason: HOSPADM

## 2019-10-24 RX ADMIN — FLUCONAZOLE 400 MG: 100 TABLET ORAL at 09:52

## 2019-10-24 RX ADMIN — PANTOPRAZOLE SODIUM 40 MG: 40 TABLET, DELAYED RELEASE ORAL at 05:00

## 2019-10-24 RX ADMIN — PIPERACILLIN AND TAZOBACTAM 3.38 G: 3; .375 INJECTION, POWDER, LYOPHILIZED, FOR SOLUTION INTRAVENOUS at 22:06

## 2019-10-24 RX ADMIN — ISOSORBIDE MONONITRATE 30 MG: 30 TABLET, EXTENDED RELEASE ORAL at 09:52

## 2019-10-24 RX ADMIN — FERROUS SULFATE TAB 325 MG (65 MG ELEMENTAL FE) 325 MG: 325 (65 FE) TAB at 17:38

## 2019-10-24 RX ADMIN — GABAPENTIN 800 MG: 400 CAPSULE ORAL at 09:52

## 2019-10-24 RX ADMIN — ATORVASTATIN CALCIUM 40 MG: 40 TABLET, FILM COATED ORAL at 21:18

## 2019-10-24 RX ADMIN — IPRATROPIUM BROMIDE AND ALBUTEROL SULFATE 1 AMPULE: .5; 3 SOLUTION RESPIRATORY (INHALATION) at 13:31

## 2019-10-24 RX ADMIN — IPRATROPIUM BROMIDE AND ALBUTEROL SULFATE 1 AMPULE: .5; 3 SOLUTION RESPIRATORY (INHALATION) at 20:17

## 2019-10-24 RX ADMIN — MICONAZOLE NITRATE: 20 POWDER TOPICAL at 21:20

## 2019-10-24 RX ADMIN — INSULIN GLARGINE 12 UNITS: 100 INJECTION, SOLUTION SUBCUTANEOUS at 21:21

## 2019-10-24 RX ADMIN — MORPHINE SULFATE 15 MG: 15 TABLET, FILM COATED, EXTENDED RELEASE ORAL at 21:18

## 2019-10-24 RX ADMIN — MORPHINE SULFATE 15 MG: 15 TABLET, FILM COATED, EXTENDED RELEASE ORAL at 09:52

## 2019-10-24 RX ADMIN — VANCOMYCIN HYDROCHLORIDE 1250 MG: 5 INJECTION, POWDER, LYOPHILIZED, FOR SOLUTION INTRAVENOUS at 17:01

## 2019-10-24 RX ADMIN — METOPROLOL TARTRATE 25 MG: 25 TABLET ORAL at 09:52

## 2019-10-24 RX ADMIN — ENOXAPARIN SODIUM 40 MG: 40 INJECTION SUBCUTANEOUS at 09:51

## 2019-10-24 RX ADMIN — FUROSEMIDE 40 MG: 40 TABLET ORAL at 09:52

## 2019-10-24 RX ADMIN — ASPIRIN 81 MG: 81 TABLET, COATED ORAL at 09:52

## 2019-10-24 RX ADMIN — IPRATROPIUM BROMIDE AND ALBUTEROL SULFATE 1 AMPULE: .5; 3 SOLUTION RESPIRATORY (INHALATION) at 07:53

## 2019-10-24 RX ADMIN — PIPERACILLIN AND TAZOBACTAM 3.38 G: 3; .375 INJECTION, POWDER, LYOPHILIZED, FOR SOLUTION INTRAVENOUS at 13:27

## 2019-10-24 RX ADMIN — OXYCODONE HYDROCHLORIDE 5 MG: 5 TABLET ORAL at 13:27

## 2019-10-24 RX ADMIN — GABAPENTIN 800 MG: 400 CAPSULE ORAL at 21:18

## 2019-10-24 RX ADMIN — PIPERACILLIN AND TAZOBACTAM 3.38 G: 3; .375 INJECTION, POWDER, LYOPHILIZED, FOR SOLUTION INTRAVENOUS at 05:00

## 2019-10-24 RX ADMIN — GABAPENTIN 800 MG: 400 CAPSULE ORAL at 13:28

## 2019-10-24 RX ADMIN — VANCOMYCIN HYDROCHLORIDE 1250 MG: 5 INJECTION, POWDER, LYOPHILIZED, FOR SOLUTION INTRAVENOUS at 01:57

## 2019-10-24 RX ADMIN — SODIUM CHLORIDE: 9 INJECTION, SOLUTION INTRAVENOUS at 13:27

## 2019-10-24 ASSESSMENT — PAIN SCALES - GENERAL
PAINLEVEL_OUTOF10: 3
PAINLEVEL_OUTOF10: 9
PAINLEVEL_OUTOF10: 9
PAINLEVEL_OUTOF10: 7

## 2019-10-24 ASSESSMENT — ENCOUNTER SYMPTOMS
GASTROINTESTINAL NEGATIVE: 1
RESPIRATORY NEGATIVE: 1
CHEST TIGHTNESS: 0
WHEEZING: 0
EYES NEGATIVE: 1
COUGH: 0
NAUSEA: 0
BLOOD IN STOOL: 0
STRIDOR: 0
SHORTNESS OF BREATH: 0

## 2019-10-25 ENCOUNTER — APPOINTMENT (OUTPATIENT)
Dept: GENERAL RADIOLOGY | Age: 69
DRG: 193 | End: 2019-10-25
Payer: MEDICARE

## 2019-10-25 LAB
ANION GAP SERPL CALCULATED.3IONS-SCNC: 9 MEQ/L (ref 9–15)
BASOPHILS ABSOLUTE: 0 K/UL (ref 0–0.2)
BASOPHILS RELATIVE PERCENT: 0.3 %
BUN BLDV-MCNC: 21 MG/DL (ref 8–23)
CALCIUM SERPL-MCNC: 7.3 MG/DL (ref 8.5–9.9)
CHLORIDE BLD-SCNC: 100 MEQ/L (ref 95–107)
CO2: 31 MEQ/L (ref 20–31)
CREAT SERPL-MCNC: 0.6 MG/DL (ref 0.7–1.2)
EOSINOPHILS ABSOLUTE: 0.3 K/UL (ref 0–0.7)
EOSINOPHILS RELATIVE PERCENT: 4.4 %
GFR AFRICAN AMERICAN: >60
GFR NON-AFRICAN AMERICAN: >60
GLUCOSE BLD-MCNC: 120 MG/DL (ref 70–99)
GLUCOSE BLD-MCNC: 158 MG/DL (ref 60–115)
GLUCOSE BLD-MCNC: 171 MG/DL (ref 60–115)
GLUCOSE BLD-MCNC: 173 MG/DL (ref 60–115)
HCT VFR BLD CALC: 22.9 % (ref 42–52)
HEMOGLOBIN: 7.4 G/DL (ref 14–18)
LACTIC ACID: 0.9 MMOL/L (ref 0.5–2.2)
LYMPHOCYTES ABSOLUTE: 1.2 K/UL (ref 1–4.8)
LYMPHOCYTES RELATIVE PERCENT: 16.5 %
MCH RBC QN AUTO: 30.1 PG (ref 27–31.3)
MCHC RBC AUTO-ENTMCNC: 32.1 % (ref 33–37)
MCV RBC AUTO: 93.9 FL (ref 80–100)
MONOCYTES ABSOLUTE: 0.8 K/UL (ref 0.2–0.8)
MONOCYTES RELATIVE PERCENT: 11.2 %
NEUTROPHILS ABSOLUTE: 4.7 K/UL (ref 1.4–6.5)
NEUTROPHILS RELATIVE PERCENT: 67.6 %
PDW BLD-RTO: 17.1 % (ref 11.5–14.5)
PERFORMED ON: ABNORMAL
PLATELET # BLD: 196 K/UL (ref 130–400)
POTASSIUM REFLEX MAGNESIUM: 3.8 MEQ/L (ref 3.4–4.9)
RBC # BLD: 2.44 M/UL (ref 4.7–6.1)
SODIUM BLD-SCNC: 140 MEQ/L (ref 135–144)
VANCOMYCIN TROUGH: 28.7 UG/ML (ref 10–20)
WBC # BLD: 7 K/UL (ref 4.8–10.8)

## 2019-10-25 PROCEDURE — 6370000000 HC RX 637 (ALT 250 FOR IP): Performed by: INTERNAL MEDICINE

## 2019-10-25 PROCEDURE — 6360000002 HC RX W HCPCS: Performed by: PHYSICIAN ASSISTANT

## 2019-10-25 PROCEDURE — 97112 NEUROMUSCULAR REEDUCATION: CPT

## 2019-10-25 PROCEDURE — 6360000002 HC RX W HCPCS: Performed by: INTERNAL MEDICINE

## 2019-10-25 PROCEDURE — 2580000003 HC RX 258: Performed by: INTERNAL MEDICINE

## 2019-10-25 PROCEDURE — 85025 COMPLETE CBC W/AUTO DIFF WBC: CPT

## 2019-10-25 PROCEDURE — 93010 ELECTROCARDIOGRAM REPORT: CPT | Performed by: INTERNAL MEDICINE

## 2019-10-25 PROCEDURE — 99232 SBSQ HOSP IP/OBS MODERATE 35: CPT | Performed by: INTERNAL MEDICINE

## 2019-10-25 PROCEDURE — 97535 SELF CARE MNGMENT TRAINING: CPT

## 2019-10-25 PROCEDURE — 80048 BASIC METABOLIC PNL TOTAL CA: CPT

## 2019-10-25 PROCEDURE — 83605 ASSAY OF LACTIC ACID: CPT

## 2019-10-25 PROCEDURE — 80202 ASSAY OF VANCOMYCIN: CPT

## 2019-10-25 PROCEDURE — 2580000003 HC RX 258: Performed by: PHYSICIAN ASSISTANT

## 2019-10-25 PROCEDURE — 1210000000 HC MED SURG R&B

## 2019-10-25 PROCEDURE — 94640 AIRWAY INHALATION TREATMENT: CPT

## 2019-10-25 PROCEDURE — 2700000000 HC OXYGEN THERAPY PER DAY

## 2019-10-25 PROCEDURE — 71045 X-RAY EXAM CHEST 1 VIEW: CPT

## 2019-10-25 PROCEDURE — 93005 ELECTROCARDIOGRAM TRACING: CPT | Performed by: INTERNAL MEDICINE

## 2019-10-25 RX ORDER — ISOSORBIDE MONONITRATE 30 MG/1
30 TABLET, EXTENDED RELEASE ORAL DAILY
Qty: 30 TABLET | Refills: 3 | Status: SHIPPED | OUTPATIENT
Start: 2019-10-26

## 2019-10-25 RX ORDER — IPRATROPIUM BROMIDE AND ALBUTEROL SULFATE 2.5; .5 MG/3ML; MG/3ML
3 SOLUTION RESPIRATORY (INHALATION) 3 TIMES DAILY
Qty: 360 ML | Refills: 0 | Status: SHIPPED | OUTPATIENT
Start: 2019-10-25

## 2019-10-25 RX ORDER — MORPHINE SULFATE 15 MG/1
15 TABLET, FILM COATED, EXTENDED RELEASE ORAL EVERY 12 HOURS SCHEDULED
Qty: 10 TABLET | Refills: 0 | Status: SHIPPED | OUTPATIENT
Start: 2019-10-25 | End: 2019-10-26 | Stop reason: HOSPADM

## 2019-10-25 RX ORDER — FERROUS SULFATE 325(65) MG
325 TABLET ORAL 2 TIMES DAILY WITH MEALS
Qty: 30 TABLET | Refills: 3 | Status: SHIPPED | OUTPATIENT
Start: 2019-10-25

## 2019-10-25 RX ORDER — OXYCODONE HYDROCHLORIDE 5 MG/1
5 TABLET ORAL EVERY 4 HOURS PRN
Qty: 20 TABLET | Refills: 0 | Status: SHIPPED | OUTPATIENT
Start: 2019-10-25 | End: 2019-10-26 | Stop reason: HOSPADM

## 2019-10-25 RX ORDER — CEFUROXIME AXETIL 500 MG/1
500 TABLET ORAL 2 TIMES DAILY
Qty: 20 TABLET | Refills: 0 | Status: SHIPPED | OUTPATIENT
Start: 2019-10-25 | End: 2019-11-04

## 2019-10-25 RX ORDER — PANTOPRAZOLE SODIUM 40 MG/1
40 TABLET, DELAYED RELEASE ORAL
Qty: 30 TABLET | Refills: 3 | Status: SHIPPED | OUTPATIENT
Start: 2019-10-26

## 2019-10-25 RX ORDER — NICOTINE 21 MG/24HR
1 PATCH, TRANSDERMAL 24 HOURS TRANSDERMAL DAILY
Qty: 30 PATCH | Refills: 3 | Status: SHIPPED | OUTPATIENT
Start: 2019-10-26

## 2019-10-25 RX ORDER — LIDOCAINE 40 MG/G
CREAM TOPICAL PRN
Status: DISCONTINUED | OUTPATIENT
Start: 2019-10-25 | End: 2019-10-26 | Stop reason: HOSPADM

## 2019-10-25 RX ADMIN — VANCOMYCIN HYDROCHLORIDE 1250 MG: 5 INJECTION, POWDER, LYOPHILIZED, FOR SOLUTION INTRAVENOUS at 17:09

## 2019-10-25 RX ADMIN — GABAPENTIN 800 MG: 400 CAPSULE ORAL at 21:15

## 2019-10-25 RX ADMIN — IPRATROPIUM BROMIDE AND ALBUTEROL SULFATE 1 AMPULE: .5; 3 SOLUTION RESPIRATORY (INHALATION) at 20:29

## 2019-10-25 RX ADMIN — PIPERACILLIN AND TAZOBACTAM 3.38 G: 3; .375 INJECTION, POWDER, LYOPHILIZED, FOR SOLUTION INTRAVENOUS at 09:44

## 2019-10-25 RX ADMIN — GABAPENTIN 800 MG: 400 CAPSULE ORAL at 16:01

## 2019-10-25 RX ADMIN — MICONAZOLE NITRATE: 20 POWDER TOPICAL at 12:56

## 2019-10-25 RX ADMIN — ISOSORBIDE MONONITRATE 30 MG: 30 TABLET, EXTENDED RELEASE ORAL at 09:16

## 2019-10-25 RX ADMIN — PIPERACILLIN AND TAZOBACTAM 3.38 G: 3; .375 INJECTION, POWDER, LYOPHILIZED, FOR SOLUTION INTRAVENOUS at 21:15

## 2019-10-25 RX ADMIN — PANTOPRAZOLE SODIUM 40 MG: 40 TABLET, DELAYED RELEASE ORAL at 06:41

## 2019-10-25 RX ADMIN — FLUCONAZOLE 400 MG: 100 TABLET ORAL at 09:15

## 2019-10-25 RX ADMIN — FERROUS SULFATE TAB 325 MG (65 MG ELEMENTAL FE) 325 MG: 325 (65 FE) TAB at 16:01

## 2019-10-25 RX ADMIN — SODIUM CHLORIDE: 9 INJECTION, SOLUTION INTRAVENOUS at 22:03

## 2019-10-25 RX ADMIN — GABAPENTIN 800 MG: 400 CAPSULE ORAL at 09:15

## 2019-10-25 RX ADMIN — ATORVASTATIN CALCIUM 40 MG: 40 TABLET, FILM COATED ORAL at 21:15

## 2019-10-25 RX ADMIN — METOPROLOL TARTRATE 25 MG: 25 TABLET ORAL at 09:15

## 2019-10-25 RX ADMIN — LIDOCAINE: 4 CREAM TOPICAL at 17:08

## 2019-10-25 RX ADMIN — INSULIN LISPRO 2 UNITS: 100 INJECTION, SOLUTION INTRAVENOUS; SUBCUTANEOUS at 12:50

## 2019-10-25 RX ADMIN — Medication 10 ML: at 21:16

## 2019-10-25 RX ADMIN — METOPROLOL TARTRATE 25 MG: 25 TABLET ORAL at 21:15

## 2019-10-25 RX ADMIN — IPRATROPIUM BROMIDE AND ALBUTEROL SULFATE 1 AMPULE: .5; 3 SOLUTION RESPIRATORY (INHALATION) at 07:56

## 2019-10-25 RX ADMIN — MORPHINE SULFATE 15 MG: 15 TABLET, FILM COATED, EXTENDED RELEASE ORAL at 21:15

## 2019-10-25 RX ADMIN — FUROSEMIDE 40 MG: 40 TABLET ORAL at 09:16

## 2019-10-25 RX ADMIN — INSULIN GLARGINE 12 UNITS: 100 INJECTION, SOLUTION SUBCUTANEOUS at 21:14

## 2019-10-25 RX ADMIN — IPRATROPIUM BROMIDE AND ALBUTEROL SULFATE 1 AMPULE: .5; 3 SOLUTION RESPIRATORY (INHALATION) at 11:38

## 2019-10-25 RX ADMIN — ENOXAPARIN SODIUM 40 MG: 40 INJECTION SUBCUTANEOUS at 09:14

## 2019-10-25 RX ADMIN — MICONAZOLE NITRATE: 20 POWDER TOPICAL at 21:27

## 2019-10-25 RX ADMIN — FERROUS SULFATE TAB 325 MG (65 MG ELEMENTAL FE) 325 MG: 325 (65 FE) TAB at 09:15

## 2019-10-25 RX ADMIN — VANCOMYCIN HYDROCHLORIDE 1250 MG: 5 INJECTION, POWDER, LYOPHILIZED, FOR SOLUTION INTRAVENOUS at 05:52

## 2019-10-25 RX ADMIN — OXYCODONE HYDROCHLORIDE 5 MG: 5 TABLET ORAL at 06:49

## 2019-10-25 RX ADMIN — OXYCODONE HYDROCHLORIDE 5 MG: 5 TABLET ORAL at 16:02

## 2019-10-25 ASSESSMENT — PAIN DESCRIPTION - LOCATION
LOCATION: LEG
LOCATION: LEG

## 2019-10-25 ASSESSMENT — PAIN DESCRIPTION - PAIN TYPE
TYPE: SURGICAL PAIN
TYPE: SURGICAL PAIN

## 2019-10-25 ASSESSMENT — PAIN DESCRIPTION - FREQUENCY
FREQUENCY: INTERMITTENT
FREQUENCY: INTERMITTENT

## 2019-10-25 ASSESSMENT — PAIN SCALES - GENERAL
PAINLEVEL_OUTOF10: 8
PAINLEVEL_OUTOF10: 9
PAINLEVEL_OUTOF10: 9
PAINLEVEL_OUTOF10: 5
PAINLEVEL_OUTOF10: 8

## 2019-10-25 ASSESSMENT — ENCOUNTER SYMPTOMS
SHORTNESS OF BREATH: 0
EYES NEGATIVE: 1
COUGH: 0
NAUSEA: 0
WHEEZING: 0
BLOOD IN STOOL: 0
RESPIRATORY NEGATIVE: 1
GASTROINTESTINAL NEGATIVE: 1
CHEST TIGHTNESS: 0
STRIDOR: 0

## 2019-10-25 ASSESSMENT — PAIN DESCRIPTION - ORIENTATION
ORIENTATION: RIGHT
ORIENTATION: RIGHT

## 2019-10-25 ASSESSMENT — PAIN DESCRIPTION - DESCRIPTORS
DESCRIPTORS: ACHING
DESCRIPTORS: ACHING

## 2019-10-26 VITALS
HEART RATE: 50 BPM | HEIGHT: 72 IN | OXYGEN SATURATION: 100 % | BODY MASS INDEX: 39.18 KG/M2 | TEMPERATURE: 98.8 F | DIASTOLIC BLOOD PRESSURE: 82 MMHG | SYSTOLIC BLOOD PRESSURE: 119 MMHG | RESPIRATION RATE: 16 BRPM | WEIGHT: 289.24 LBS

## 2019-10-26 LAB
ANION GAP SERPL CALCULATED.3IONS-SCNC: 12 MEQ/L (ref 9–15)
BASOPHILS ABSOLUTE: 0 K/UL (ref 0–0.2)
BASOPHILS RELATIVE PERCENT: 0.9 %
BLOOD CULTURE, ROUTINE: NORMAL
BUN BLDV-MCNC: 21 MG/DL (ref 8–23)
CALCIUM SERPL-MCNC: 7 MG/DL (ref 8.5–9.9)
CHLORIDE BLD-SCNC: 89 MEQ/L (ref 95–107)
CO2: 27 MEQ/L (ref 20–31)
CREAT SERPL-MCNC: 0.77 MG/DL (ref 0.7–1.2)
EKG ATRIAL RATE: 50 BPM
EKG ATRIAL RATE: 56 BPM
EKG ATRIAL RATE: 57 BPM
EKG ATRIAL RATE: 57 BPM
EKG ATRIAL RATE: 64 BPM
EKG ATRIAL RATE: 72 BPM
EKG P AXIS: 47 DEGREES
EKG P AXIS: 5 DEGREES
EKG P AXIS: 54 DEGREES
EKG P AXIS: 56 DEGREES
EKG P AXIS: 61 DEGREES
EKG P AXIS: 66 DEGREES
EKG P-R INTERVAL: 180 MS
EKG P-R INTERVAL: 184 MS
EKG P-R INTERVAL: 192 MS
EKG P-R INTERVAL: 192 MS
EKG P-R INTERVAL: 194 MS
EKG P-R INTERVAL: 216 MS
EKG Q-T INTERVAL: 394 MS
EKG Q-T INTERVAL: 414 MS
EKG Q-T INTERVAL: 432 MS
EKG Q-T INTERVAL: 436 MS
EKG Q-T INTERVAL: 444 MS
EKG Q-T INTERVAL: 472 MS
EKG QRS DURATION: 102 MS
EKG QRS DURATION: 150 MS
EKG QRS DURATION: 152 MS
EKG QRS DURATION: 152 MS
EKG QRS DURATION: 158 MS
EKG QRS DURATION: 166 MS
EKG QTC CALCULATION (BAZETT): 416 MS
EKG QTC CALCULATION (BAZETT): 424 MS
EKG QTC CALCULATION (BAZETT): 427 MS
EKG QTC CALCULATION (BAZETT): 430 MS
EKG QTC CALCULATION (BAZETT): 431 MS
EKG QTC CALCULATION (BAZETT): 432 MS
EKG R AXIS: 12 DEGREES
EKG R AXIS: 43 DEGREES
EKG R AXIS: 67 DEGREES
EKG R AXIS: 69 DEGREES
EKG R AXIS: 72 DEGREES
EKG R AXIS: 95 DEGREES
EKG T AXIS: -20 DEGREES
EKG T AXIS: -24 DEGREES
EKG T AXIS: -38 DEGREES
EKG T AXIS: -40 DEGREES
EKG T AXIS: -43 DEGREES
EKG T AXIS: -50 DEGREES
EKG VENTRICULAR RATE: 50 BPM
EKG VENTRICULAR RATE: 56 BPM
EKG VENTRICULAR RATE: 57 BPM
EKG VENTRICULAR RATE: 57 BPM
EKG VENTRICULAR RATE: 64 BPM
EKG VENTRICULAR RATE: 72 BPM
EOSINOPHILS ABSOLUTE: 0.2 K/UL (ref 0–0.7)
EOSINOPHILS RELATIVE PERCENT: 3.3 %
GFR AFRICAN AMERICAN: >60
GFR NON-AFRICAN AMERICAN: >60
GLUCOSE BLD-MCNC: 122 MG/DL (ref 60–115)
GLUCOSE BLD-MCNC: 138 MG/DL (ref 60–115)
GLUCOSE BLD-MCNC: 205 MG/DL (ref 60–115)
GLUCOSE BLD-MCNC: 436 MG/DL (ref 70–99)
HCT VFR BLD CALC: 22.2 % (ref 42–52)
HEMOGLOBIN: 7.1 G/DL (ref 14–18)
LACTIC ACID: 0.8 MMOL/L (ref 0.5–2.2)
LYMPHOCYTES ABSOLUTE: 1 K/UL (ref 1–4.8)
LYMPHOCYTES RELATIVE PERCENT: 18.4 %
MCH RBC QN AUTO: 30.3 PG (ref 27–31.3)
MCHC RBC AUTO-ENTMCNC: 31.8 % (ref 33–37)
MCV RBC AUTO: 95.2 FL (ref 80–100)
MONOCYTES ABSOLUTE: 0.6 K/UL (ref 0.2–0.8)
MONOCYTES RELATIVE PERCENT: 11 %
NEUTROPHILS ABSOLUTE: 3.7 K/UL (ref 1.4–6.5)
NEUTROPHILS RELATIVE PERCENT: 66.4 %
PDW BLD-RTO: 18.1 % (ref 11.5–14.5)
PERFORMED ON: ABNORMAL
PLATELET # BLD: 226 K/UL (ref 130–400)
POTASSIUM REFLEX MAGNESIUM: 3.8 MEQ/L (ref 3.4–4.9)
RBC # BLD: 2.34 M/UL (ref 4.7–6.1)
SODIUM BLD-SCNC: 128 MEQ/L (ref 135–144)
VANCOMYCIN TROUGH: 35 UG/ML (ref 10–20)
WBC # BLD: 5.6 K/UL (ref 4.8–10.8)

## 2019-10-26 PROCEDURE — 6370000000 HC RX 637 (ALT 250 FOR IP): Performed by: INTERNAL MEDICINE

## 2019-10-26 PROCEDURE — 2580000003 HC RX 258: Performed by: PHYSICIAN ASSISTANT

## 2019-10-26 PROCEDURE — 83605 ASSAY OF LACTIC ACID: CPT

## 2019-10-26 PROCEDURE — 85025 COMPLETE CBC W/AUTO DIFF WBC: CPT

## 2019-10-26 PROCEDURE — 6360000002 HC RX W HCPCS: Performed by: PHYSICIAN ASSISTANT

## 2019-10-26 PROCEDURE — 93005 ELECTROCARDIOGRAM TRACING: CPT | Performed by: INTERNAL MEDICINE

## 2019-10-26 PROCEDURE — 80202 ASSAY OF VANCOMYCIN: CPT

## 2019-10-26 PROCEDURE — 99232 SBSQ HOSP IP/OBS MODERATE 35: CPT | Performed by: INTERNAL MEDICINE

## 2019-10-26 PROCEDURE — 2700000000 HC OXYGEN THERAPY PER DAY

## 2019-10-26 PROCEDURE — 80048 BASIC METABOLIC PNL TOTAL CA: CPT

## 2019-10-26 PROCEDURE — 94640 AIRWAY INHALATION TREATMENT: CPT

## 2019-10-26 PROCEDURE — 2580000003 HC RX 258: Performed by: INTERNAL MEDICINE

## 2019-10-26 PROCEDURE — 6360000002 HC RX W HCPCS: Performed by: INTERNAL MEDICINE

## 2019-10-26 RX ADMIN — OXYCODONE HYDROCHLORIDE 5 MG: 5 TABLET ORAL at 12:09

## 2019-10-26 RX ADMIN — METOPROLOL TARTRATE 25 MG: 25 TABLET ORAL at 19:42

## 2019-10-26 RX ADMIN — OXYCODONE HYDROCHLORIDE 5 MG: 5 TABLET ORAL at 19:41

## 2019-10-26 RX ADMIN — IPRATROPIUM BROMIDE AND ALBUTEROL SULFATE 1 AMPULE: .5; 3 SOLUTION RESPIRATORY (INHALATION) at 13:33

## 2019-10-26 RX ADMIN — ATORVASTATIN CALCIUM 40 MG: 40 TABLET, FILM COATED ORAL at 19:42

## 2019-10-26 RX ADMIN — GABAPENTIN 800 MG: 400 CAPSULE ORAL at 19:41

## 2019-10-26 RX ADMIN — MICONAZOLE NITRATE: 20 POWDER TOPICAL at 08:16

## 2019-10-26 RX ADMIN — VANCOMYCIN HYDROCHLORIDE 1250 MG: 5 INJECTION, POWDER, LYOPHILIZED, FOR SOLUTION INTRAVENOUS at 04:04

## 2019-10-26 RX ADMIN — PIPERACILLIN AND TAZOBACTAM 3.38 G: 3; .375 INJECTION, POWDER, LYOPHILIZED, FOR SOLUTION INTRAVENOUS at 06:07

## 2019-10-26 RX ADMIN — PANTOPRAZOLE SODIUM 40 MG: 40 TABLET, DELAYED RELEASE ORAL at 06:07

## 2019-10-26 RX ADMIN — IPRATROPIUM BROMIDE AND ALBUTEROL SULFATE 1 AMPULE: .5; 3 SOLUTION RESPIRATORY (INHALATION) at 19:27

## 2019-10-26 RX ADMIN — FERROUS SULFATE TAB 325 MG (65 MG ELEMENTAL FE) 325 MG: 325 (65 FE) TAB at 08:14

## 2019-10-26 RX ADMIN — GABAPENTIN 800 MG: 400 CAPSULE ORAL at 13:59

## 2019-10-26 RX ADMIN — ISOSORBIDE MONONITRATE 30 MG: 30 TABLET, EXTENDED RELEASE ORAL at 08:18

## 2019-10-26 RX ADMIN — FUROSEMIDE 40 MG: 40 TABLET ORAL at 08:14

## 2019-10-26 RX ADMIN — PIPERACILLIN AND TAZOBACTAM 3.38 G: 3; .375 INJECTION, POWDER, LYOPHILIZED, FOR SOLUTION INTRAVENOUS at 13:59

## 2019-10-26 RX ADMIN — FLUCONAZOLE 400 MG: 100 TABLET ORAL at 08:13

## 2019-10-26 RX ADMIN — MORPHINE SULFATE 15 MG: 15 TABLET, FILM COATED, EXTENDED RELEASE ORAL at 08:14

## 2019-10-26 RX ADMIN — IPRATROPIUM BROMIDE AND ALBUTEROL SULFATE 1 AMPULE: .5; 3 SOLUTION RESPIRATORY (INHALATION) at 07:14

## 2019-10-26 RX ADMIN — GABAPENTIN 800 MG: 400 CAPSULE ORAL at 08:13

## 2019-10-26 RX ADMIN — ENOXAPARIN SODIUM 40 MG: 40 INJECTION SUBCUTANEOUS at 08:13

## 2019-10-26 RX ADMIN — LIDOCAINE: 4 CREAM TOPICAL at 08:13

## 2019-10-26 RX ADMIN — METOPROLOL TARTRATE 25 MG: 25 TABLET ORAL at 08:14

## 2019-10-26 ASSESSMENT — ENCOUNTER SYMPTOMS
STRIDOR: 0
SHORTNESS OF BREATH: 0
COUGH: 0
RESPIRATORY NEGATIVE: 1
WHEEZING: 0
NAUSEA: 0
CHEST TIGHTNESS: 0
BLOOD IN STOOL: 0
GASTROINTESTINAL NEGATIVE: 1
EYES NEGATIVE: 1

## 2019-10-26 ASSESSMENT — PAIN SCALES - GENERAL
PAINLEVEL_OUTOF10: 8
PAINLEVEL_OUTOF10: 7
PAINLEVEL_OUTOF10: 4

## 2019-10-27 LAB — CULTURE, BLOOD 2: NORMAL

## 2019-10-28 ENCOUNTER — OFFICE VISIT (OUTPATIENT)
Dept: GERIATRIC MEDICINE | Age: 69
End: 2019-10-28
Payer: MEDICARE

## 2019-10-28 DIAGNOSIS — E11.9 DIABETES MELLITUS WITHOUT COMPLICATION (HCC): ICD-10-CM

## 2019-10-28 DIAGNOSIS — J44.9 CHRONIC OBSTRUCTIVE PULMONARY DISEASE, UNSPECIFIED COPD TYPE (HCC): Primary | ICD-10-CM

## 2019-10-28 DIAGNOSIS — J15.9 BACTERIAL PNEUMONIA: ICD-10-CM

## 2019-10-28 DIAGNOSIS — R53.1 WEAKNESS: ICD-10-CM

## 2019-10-28 LAB
AMMONIA: 51 UMOL/L (ref 16–60)
ANION GAP SERPL CALCULATED.3IONS-SCNC: 15 MEQ/L (ref 9–15)
BASOPHILS ABSOLUTE: 0.1 K/UL (ref 0–0.2)
BASOPHILS RELATIVE PERCENT: 0.8 %
BUN BLDV-MCNC: 32 MG/DL (ref 8–23)
CALCIUM SERPL-MCNC: 8.3 MG/DL (ref 8.5–9.9)
CHLORIDE BLD-SCNC: 97 MEQ/L (ref 95–107)
CO2: 27 MEQ/L (ref 20–31)
CREAT SERPL-MCNC: 1.62 MG/DL (ref 0.7–1.2)
EOSINOPHILS ABSOLUTE: 0.2 K/UL (ref 0–0.7)
EOSINOPHILS RELATIVE PERCENT: 2.8 %
GFR AFRICAN AMERICAN: 51.7
GFR NON-AFRICAN AMERICAN: 42.7
GLUCOSE BLD-MCNC: 92 MG/DL (ref 70–99)
HCT VFR BLD CALC: 26.2 % (ref 42–52)
HEMOGLOBIN: 8.1 G/DL (ref 14–18)
LYMPHOCYTES ABSOLUTE: 1.1 K/UL (ref 1–4.8)
LYMPHOCYTES RELATIVE PERCENT: 12.8 %
MCH RBC QN AUTO: 29.8 PG (ref 27–31.3)
MCHC RBC AUTO-ENTMCNC: 30.8 % (ref 33–37)
MCV RBC AUTO: 96.5 FL (ref 80–100)
MONOCYTES ABSOLUTE: 1 K/UL (ref 0.2–0.8)
MONOCYTES RELATIVE PERCENT: 11.8 %
NEUTROPHILS ABSOLUTE: 6.3 K/UL (ref 1.4–6.5)
NEUTROPHILS RELATIVE PERCENT: 71.8 %
PDW BLD-RTO: 18.7 % (ref 11.5–14.5)
PLATELET # BLD: 448 K/UL (ref 130–400)
POTASSIUM SERPL-SCNC: 4.4 MEQ/L (ref 3.4–4.9)
RBC # BLD: 2.72 M/UL (ref 4.7–6.1)
SODIUM BLD-SCNC: 139 MEQ/L (ref 135–144)
WBC # BLD: 8.8 K/UL (ref 4.8–10.8)

## 2019-10-28 PROCEDURE — 99305 1ST NF CARE MODERATE MDM 35: CPT | Performed by: INTERNAL MEDICINE

## 2019-10-28 PROCEDURE — 93010 ELECTROCARDIOGRAM REPORT: CPT | Performed by: INTERNAL MEDICINE

## 2019-10-28 PROCEDURE — 1123F ACP DISCUSS/DSCN MKR DOCD: CPT | Performed by: INTERNAL MEDICINE

## 2019-10-28 PROCEDURE — G8484 FLU IMMUNIZE NO ADMIN: HCPCS | Performed by: INTERNAL MEDICINE

## 2019-10-28 PROCEDURE — 3044F HG A1C LEVEL LT 7.0%: CPT | Performed by: INTERNAL MEDICINE

## 2019-10-28 RX ORDER — FLUCONAZOLE 200 MG/1
400 TABLET ORAL DAILY
COMMUNITY
Start: 2019-10-27 | End: 2019-11-04

## 2019-10-29 ENCOUNTER — OFFICE VISIT (OUTPATIENT)
Dept: GERIATRIC MEDICINE | Age: 69
End: 2019-10-29
Payer: MEDICARE

## 2019-10-29 DIAGNOSIS — R06.89 HYPERCAPNIA: ICD-10-CM

## 2019-10-29 DIAGNOSIS — R09.02 HYPOXIA: ICD-10-CM

## 2019-10-29 DIAGNOSIS — Z89.511 S/P BKA (BELOW KNEE AMPUTATION) UNILATERAL, RIGHT (HCC): Primary | ICD-10-CM

## 2019-10-29 PROCEDURE — G8484 FLU IMMUNIZE NO ADMIN: HCPCS | Performed by: PHYSICIAN ASSISTANT

## 2019-10-29 PROCEDURE — 1123F ACP DISCUSS/DSCN MKR DOCD: CPT | Performed by: PHYSICIAN ASSISTANT

## 2019-10-29 PROCEDURE — 99308 SBSQ NF CARE LOW MDM 20: CPT | Performed by: PHYSICIAN ASSISTANT

## 2019-11-01 ENCOUNTER — OFFICE VISIT (OUTPATIENT)
Dept: GERIATRIC MEDICINE | Age: 69
End: 2019-11-01
Payer: MEDICARE

## 2019-11-01 DIAGNOSIS — Z99.3 WHEELCHAIR DEPENDENCE: ICD-10-CM

## 2019-11-01 DIAGNOSIS — J18.9 PNEUMONIA OF RIGHT LOWER LOBE DUE TO INFECTIOUS ORGANISM: ICD-10-CM

## 2019-11-01 DIAGNOSIS — Z89.611 RIGHT ABOVE-KNEE AMPUTEE (HCC): Primary | ICD-10-CM

## 2019-11-01 LAB
BUN BLDV-MCNC: 28 MG/DL
CALCIUM SERPL-MCNC: 8.1 MG/DL
CHLORIDE BLD-SCNC: 101 MMOL/L
CO2: 33 MMOL/L
CREAT SERPL-MCNC: 1.2 MG/DL
GFR CALCULATED: 60
GLUCOSE BLD-MCNC: 103 MG/DL
POTASSIUM SERPL-SCNC: 4.4 MMOL/L
SODIUM BLD-SCNC: 143 MMOL/L

## 2019-11-01 PROCEDURE — 1123F ACP DISCUSS/DSCN MKR DOCD: CPT | Performed by: PHYSICIAN ASSISTANT

## 2019-11-01 PROCEDURE — 99309 SBSQ NF CARE MODERATE MDM 30: CPT | Performed by: PHYSICIAN ASSISTANT

## 2019-11-01 PROCEDURE — G8484 FLU IMMUNIZE NO ADMIN: HCPCS | Performed by: PHYSICIAN ASSISTANT

## 2019-11-08 VITALS
SYSTOLIC BLOOD PRESSURE: 160 MMHG | TEMPERATURE: 98.4 F | OXYGEN SATURATION: 87 % | DIASTOLIC BLOOD PRESSURE: 82 MMHG | RESPIRATION RATE: 18 BRPM | HEART RATE: 57 BPM

## 2019-11-12 LAB
BASOPHILS ABSOLUTE: ABNORMAL
BASOPHILS RELATIVE PERCENT: ABNORMAL
BUN BLDV-MCNC: 24 MG/DL
CALCIUM SERPL-MCNC: 8.5 MG/DL
CHLORIDE BLD-SCNC: 100 MMOL/L
CO2: 34 MMOL/L
CREAT SERPL-MCNC: 0.7 MG/DL
EOSINOPHILS ABSOLUTE: ABNORMAL
EOSINOPHILS RELATIVE PERCENT: ABNORMAL
GFR CALCULATED: 112
GLUCOSE BLD-MCNC: 189 MG/DL
HCT VFR BLD CALC: 36.4 % (ref 41–53)
HEMOGLOBIN: 11.2 G/DL (ref 13.5–17.5)
LYMPHOCYTES ABSOLUTE: ABNORMAL
LYMPHOCYTES RELATIVE PERCENT: ABNORMAL
MCH RBC QN AUTO: 29.7 PG
MCHC RBC AUTO-ENTMCNC: 30.7 G/DL
MCV RBC AUTO: 96.6 FL
MONOCYTES ABSOLUTE: ABNORMAL
MONOCYTES RELATIVE PERCENT: ABNORMAL
NEUTROPHILS ABSOLUTE: ABNORMAL
NEUTROPHILS RELATIVE PERCENT: ABNORMAL
PLATELET # BLD: 214 K/ΜL
PMV BLD AUTO: 10.7 FL
POTASSIUM SERPL-SCNC: 4.3 MMOL/L
RBC # BLD: 3.77 10^6/ΜL
SODIUM BLD-SCNC: 142 MMOL/L
WBC # BLD: 5.6 10^3/ML

## 2019-11-13 ENCOUNTER — OFFICE VISIT (OUTPATIENT)
Dept: GERIATRIC MEDICINE | Age: 69
End: 2019-11-13
Payer: MEDICARE

## 2019-11-13 DIAGNOSIS — J96.12 CHRONIC HYPERCAPNIC RESPIRATORY FAILURE (HCC): Primary | ICD-10-CM

## 2019-11-13 DIAGNOSIS — Z89.511 HISTORY OF BELOW-KNEE AMPUTATION OF BOTH LOWER EXTREMITIES (HCC): ICD-10-CM

## 2019-11-13 DIAGNOSIS — Z89.512 HISTORY OF BELOW-KNEE AMPUTATION OF BOTH LOWER EXTREMITIES (HCC): ICD-10-CM

## 2019-11-13 DIAGNOSIS — M62.81 WEAKNESS OF TRUNK MUSCULATURE: ICD-10-CM

## 2019-11-13 PROCEDURE — 99309 SBSQ NF CARE MODERATE MDM 30: CPT | Performed by: PHYSICIAN ASSISTANT

## 2019-11-13 PROCEDURE — G8484 FLU IMMUNIZE NO ADMIN: HCPCS | Performed by: PHYSICIAN ASSISTANT

## 2019-11-13 PROCEDURE — 1123F ACP DISCUSS/DSCN MKR DOCD: CPT | Performed by: PHYSICIAN ASSISTANT

## 2019-11-25 LAB
BUN BLDV-MCNC: 35 MG/DL
CALCIUM SERPL-MCNC: 8.8 MG/DL
CHLORIDE BLD-SCNC: 100 MMOL/L
CO2: 34 MMOL/L
CREAT SERPL-MCNC: 0.9 MG/DL
GFR CALCULATED: 102
GLUCOSE BLD-MCNC: 236 MG/DL
POTASSIUM SERPL-SCNC: 4.7 MMOL/L
SODIUM BLD-SCNC: 140 MMOL/L

## 2019-12-03 ENCOUNTER — OFFICE VISIT (OUTPATIENT)
Dept: GERIATRIC MEDICINE | Age: 69
End: 2019-12-03
Payer: MEDICARE

## 2019-12-03 PROCEDURE — G8484 FLU IMMUNIZE NO ADMIN: HCPCS | Performed by: PHYSICIAN ASSISTANT

## 2019-12-03 PROCEDURE — 99308 SBSQ NF CARE LOW MDM 20: CPT | Performed by: PHYSICIAN ASSISTANT

## 2019-12-03 PROCEDURE — 1123F ACP DISCUSS/DSCN MKR DOCD: CPT | Performed by: PHYSICIAN ASSISTANT

## 2019-12-17 ENCOUNTER — OFFICE VISIT (OUTPATIENT)
Dept: GERIATRIC MEDICINE | Age: 69
End: 2019-12-17
Payer: MEDICARE

## 2019-12-17 PROCEDURE — 1123F ACP DISCUSS/DSCN MKR DOCD: CPT | Performed by: PHYSICIAN ASSISTANT

## 2019-12-17 PROCEDURE — 99308 SBSQ NF CARE LOW MDM 20: CPT | Performed by: PHYSICIAN ASSISTANT

## 2019-12-17 PROCEDURE — G8484 FLU IMMUNIZE NO ADMIN: HCPCS | Performed by: PHYSICIAN ASSISTANT

## 2019-12-29 VITALS — SYSTOLIC BLOOD PRESSURE: 114 MMHG | DIASTOLIC BLOOD PRESSURE: 60 MMHG | TEMPERATURE: 97.7 F | HEART RATE: 82 BPM

## 2020-01-02 ENCOUNTER — OFFICE VISIT (OUTPATIENT)
Dept: GERIATRIC MEDICINE | Age: 70
End: 2020-01-02
Payer: MEDICARE

## 2020-01-02 PROCEDURE — G8484 FLU IMMUNIZE NO ADMIN: HCPCS | Performed by: PHYSICIAN ASSISTANT

## 2020-01-02 PROCEDURE — 99307 SBSQ NF CARE SF MDM 10: CPT | Performed by: PHYSICIAN ASSISTANT

## 2020-01-02 PROCEDURE — 1123F ACP DISCUSS/DSCN MKR DOCD: CPT | Performed by: PHYSICIAN ASSISTANT

## 2020-01-06 NOTE — PROGRESS NOTES
PATIENT: Vito Goel : 1950 DOS: 2020     [facility]    HPI: However, the patient is slightly hypotensive today with a systolic BP less than of 96 mmHg. The patient overall doing well today without any acute distress; however, the patient has had some ongoing issues with hypotension episodes with some mild dizziness and the patient is in wheelchair predominantly in bed, uses electric wheelchair. Denies any acute chest pain, shortness of breath, palpitations. The patient is ongoing smoker as well. Denies any new orthopnea, CANCINO, PND. The patient has mild expiratory wheeze. Denies any fever, chills, nausea, vomiting. No GI or  complaints at this time. Denies any recent syncopal episodes. Overall the patient offers no new complaints. We will manipulate the patient's medications to achieve a more stable appropriate BP. MEDICATIONS: Reviewed. ALLERGIES: Reviewed. REVIEW OF SYSTEMS: See HPI for pertinent ROS. PHYSICAL EXAMINATION: Vital signs are reviewed on site today, overall within normal limits. General: The patient displays fair hygiene overall, bright affect today, no acute distress, seen in common area in the hallway. We started with conversation. The patient is in very good mood today. Cardiac: Regular rate and rhythm. No JVD noted today. No significant lower leg edema at this time. The patient is bilateral BKA. Pulmonary: The patient does show some inspiratory and expiratory wheezing. No new rhonchi, rales, or crackles. Abdominal: Normal bowel sounds, soft, nontender abdomen. Mental Status: The patient is awake, alert, oriented 3/3. ASSESSMENT AND PLAN: Intermittent hypotension d/t medication - will adjust metoprolol tartrate to 12.5 mg p.o. b.i.d. We will reassess BP regularly to check out efficacy of therapy.          Electronically Signed By: Jeanne Chacon PA-C on 2020 21:00:06  ______________________________  Jeanne Chacon PA-C  [id number]  D: 2020

## 2020-01-09 ENCOUNTER — OFFICE VISIT (OUTPATIENT)
Dept: GERIATRIC MEDICINE | Age: 70
End: 2020-01-09
Payer: MEDICARE

## 2020-01-09 PROCEDURE — G8484 FLU IMMUNIZE NO ADMIN: HCPCS | Performed by: PHYSICIAN ASSISTANT

## 2020-01-09 PROCEDURE — 1123F ACP DISCUSS/DSCN MKR DOCD: CPT | Performed by: PHYSICIAN ASSISTANT

## 2020-01-09 PROCEDURE — 99308 SBSQ NF CARE LOW MDM 20: CPT | Performed by: PHYSICIAN ASSISTANT

## 2020-01-14 ENCOUNTER — OFFICE VISIT (OUTPATIENT)
Dept: GERIATRIC MEDICINE | Age: 70
End: 2020-01-14
Payer: MEDICARE

## 2020-01-14 VITALS
RESPIRATION RATE: 18 BRPM | DIASTOLIC BLOOD PRESSURE: 54 MMHG | WEIGHT: 249 LBS | HEART RATE: 63 BPM | TEMPERATURE: 98.1 F | BODY MASS INDEX: 33.77 KG/M2 | SYSTOLIC BLOOD PRESSURE: 91 MMHG

## 2020-01-14 PROCEDURE — 1123F ACP DISCUSS/DSCN MKR DOCD: CPT | Performed by: PHYSICIAN ASSISTANT

## 2020-01-14 PROCEDURE — 99307 SBSQ NF CARE SF MDM 10: CPT | Performed by: PHYSICIAN ASSISTANT

## 2020-01-14 PROCEDURE — G8484 FLU IMMUNIZE NO ADMIN: HCPCS | Performed by: PHYSICIAN ASSISTANT

## 2020-01-14 NOTE — PROGRESS NOTES
PATIENT: Ata Saez : 1950 DOS: 2019     [place]    HISTORY OF PRESENT ILLNESS: The patient is seen today for acute visit regarding recent history of ongoing hypotensive issues as well as the patient requests to go over medical record and discuss all medications in detail. I spent approximately 50 minutes with the patient to discuss his care and answer questions related to his medications in detail. The patient remits to take so many medications he states. He states he does not want to be on all of them if he does not have to be and does not understand the reasoning behind them. The patient shows some moderate ignorance to his medical history and reasoning for his medication list and dosages. We helped to educate the patient today, stated he needed that to feel comfortable with care. The patient's hypotension has been ongoing, currently on Imdur 30 mg p.o. q.d., metoprolol tartrate 25 mg p.o. b.i.d. for hypertension. Discussed adding parameters to meds to hold metoprolol dosage for blood pressure of less than 110 or heart rate less than 60. The patient felt this was a good idea. The patient is also on 40 mg Protonix, has been on this for several years. Supposedly the patient now wants it down to 20 mg. We will watch the patient for the time being. If symptoms persist, as stated that it is a good idea to go back up to current dose. The patient agreed. We will follow through plan. No other issues today. The patient is otherwise at baseline. MEDICATIONS: Reviewed. ALLERGIES: Reviewed. REVIEW OF SYSTEMS: Constitutional: Afebrile. No new NVD. The patient is fatigued and weakness at baseline. No new changes, positive or negative. No new diaphoresis or acute weight changes. Cardiac: No new chest pain, palpitations. The patient has bilateral lower leg BKA. Does have mild to moderate orthopnea at baseline when lying flat.  Pulmonary: No new cough, is baseline short of

## 2020-01-20 LAB
AVERAGE GLUCOSE: 174
HBA1C MFR BLD: 7.7 %

## 2020-01-21 ENCOUNTER — OFFICE VISIT (OUTPATIENT)
Dept: GERIATRIC MEDICINE | Age: 70
End: 2020-01-21
Payer: MEDICAID

## 2020-01-21 PROCEDURE — 99308 SBSQ NF CARE LOW MDM 20: CPT | Performed by: PHYSICIAN ASSISTANT

## 2020-01-21 PROCEDURE — 1123F ACP DISCUSS/DSCN MKR DOCD: CPT | Performed by: PHYSICIAN ASSISTANT

## 2020-01-21 PROCEDURE — 3051F HG A1C>EQUAL 7.0%<8.0%: CPT | Performed by: PHYSICIAN ASSISTANT

## 2020-01-21 PROCEDURE — G8484 FLU IMMUNIZE NO ADMIN: HCPCS | Performed by: PHYSICIAN ASSISTANT

## 2020-01-23 NOTE — PROGRESS NOTES
PATIENT: Donita Gallo : 1950 DOS: 2020     [facility]    HISTORY OF PRESENT ILLNESS:  Vital signs reviewed on site today, see [facility]. The patient is being seen today for acute visit today due to increased phantom pain in right lower extremity where he had right BKA. The patient is currently on gabapentin 600 mg p.o. t.i.d. The patient reports, however, that he has been on much more in the past.  I expressed to the patient that this is slightly outside of a comfort zone due to this not being a very high dosage, recommended the patient to see pain management and/or neurology if symptom persists. Expressed the patient to attempt utilizing other pain medications and that we will potentially increase gabapentin to higher level. The patient does also have a history of being on Lyrica. We will possibly consider dose adjustment of this as well, otherwise the patient offers no additional complaints today. Has been recovering well, gaining some minor trunk stability albeit very small amount. He is using a wheelchair that is appropriate for his needs at this time. The patient does continue to smoke regularly and denies any further education or assistance with cessation. No further complaints. MEDICATIONS:  Reviewed. ALLERGIES:  Reviewed. REVIEW OF SYSTEMS:  Constitutional:  No NVD. The patient's fatigue has slightly diminished. No new weakness, fever, chills, nausea, vomiting. The patient's weight has been fairly stable with some slight gains in the past few weeks. No new headache, confusion, lightheadedness, LOC. Cardiac:  Denies chest pain, palpitations, orthopnea, CANCINO, or significant  upper leg edema. Pulmonary:  No new cough, wheezing, SOB, or POI. GI:  No new complaints. :  No new complaints, is on chronic Babin catheter at this time. Psychiatric:  The patient's mood is overall stable. No acute changes in this regard. Remaining 14-point ROS unremarkable.

## 2020-01-28 NOTE — PROGRESS NOTES
PATIENT: Bobby Oscar : 1950 DOS: 2020     [facility]    HPI:  The patient is being seen today for acute visit regarding ongoing loose stools/diarrhea times nine days per patient. Nursing staff reports there has been nothing going on this long and is intermittent at best. However, patient does desires some form of intervention at this time. The patient offers no further complaints, otherwise; we will offer patient Imodium A-D, one tab p.o. q.d., plus one half tab for any additional loose stools for three days. The patient obliged. We will do further workup and intervention if the stool continues to be an issue including stool bulking agents if patient could tolerate. MEDICATIONS: Reviewed. ALLERGIES: Reviewed. REVIEW OF SYSTEMS: CONSTITUTIONAL: Afebrile. No nausea, vomiting, does have some mild intermittent diarrhea. No new weakness, fatigue, or acute weight changes. CARDIAC: No chest pain, palpitations, orthopnea reported at this time. No new claudication. 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 PULMONARY: No new cough. Does have chronic mild-to-moderate shortness of breath, ongoing smoker for over 30 years, ongoing mild-to-moderate wheezing as well. GI: Intermittent mild-to-moderate diarrhea. No new abdominal pain. No evidence of C. diff. : The patient has a chronic Babin catheter. No new issues at this time. PSYCHIATRIC: The patient's mod is stable. Remaining 14-point ROS unremarkable. PHYSICAL EXAMINATION: Vital signs reviewed on site today. See [facility]. GENERAL: Fair hygiene, bright affect, mild distress over complains of diarrhea, seen in his wheelchair today in his room with his roommate present. CARDIAC: Regular rate and rhythm. No new murmurs, rubs, or gallops noted on auscultation. Some mild lower extremity edema near amputation site within normal limits compared to last visit.  PULMONARY: Lung sounds show moderate expiratory and inspiratory wheezing. No new rhonchi or crackles noted throughout lungs. ABDOMINAL: Normoactive bowel sounds. Distention due to obesity. MENTAL STATUS: A/O x3/3. ASSESSMENT AND PLAN: Acute on chronic diarrhea - will add Imodium x3 days per order sheet. We will follow up if any further complaints.          Electronically Signed By: Shayne Hdez PA-C on 01/26/2020 20:53:07  ______________________________  JEWEL Rene/[id number]  D: 01/24/2020 20:57:43  T: 01/25/2020 17:13:02    cc: - [phone]

## 2020-02-09 ENCOUNTER — HOSPITAL ENCOUNTER (INPATIENT)
Age: 70
LOS: 3 days | Discharge: NURSING, REHAB OR LONG-TERM CARE | DRG: 698 | End: 2020-02-12
Attending: FAMILY MEDICINE | Admitting: INTERNAL MEDICINE
Payer: MEDICARE

## 2020-02-09 ENCOUNTER — APPOINTMENT (OUTPATIENT)
Dept: GENERAL RADIOLOGY | Age: 70
DRG: 698 | End: 2020-02-09
Payer: MEDICARE

## 2020-02-09 LAB
ALBUMIN SERPL-MCNC: 2.7 G/DL (ref 3.5–4.6)
ALP BLD-CCNC: 108 U/L (ref 35–104)
ALT SERPL-CCNC: <5 U/L (ref 0–41)
ANION GAP SERPL CALCULATED.3IONS-SCNC: 14 MEQ/L (ref 9–15)
AST SERPL-CCNC: 27 U/L (ref 0–40)
BASE EXCESS ARTERIAL: 2 (ref -3–3)
BASOPHILS ABSOLUTE: 0 K/UL (ref 0–0.2)
BASOPHILS RELATIVE PERCENT: 0.4 %
BILIRUB SERPL-MCNC: 0.4 MG/DL (ref 0.2–0.7)
BUN BLDV-MCNC: 43 MG/DL (ref 8–23)
CALCIUM IONIZED: 1.17 MMOL/L (ref 1.12–1.32)
CALCIUM SERPL-MCNC: 9.1 MG/DL (ref 8.5–9.9)
CHLORIDE BLD-SCNC: 98 MEQ/L (ref 95–107)
CO2: 27 MEQ/L (ref 20–31)
CREAT SERPL-MCNC: 1.31 MG/DL (ref 0.7–1.2)
EOSINOPHILS ABSOLUTE: 0 K/UL (ref 0–0.7)
EOSINOPHILS RELATIVE PERCENT: 0.4 %
GFR AFRICAN AMERICAN: >60
GFR AFRICAN AMERICAN: >60
GFR NON-AFRICAN AMERICAN: 50
GFR NON-AFRICAN AMERICAN: 54.5
GLOBULIN: 3.8 G/DL (ref 2.3–3.5)
GLUCOSE BLD-MCNC: 229 MG/DL (ref 70–99)
GLUCOSE BLD-MCNC: 260 MG/DL (ref 60–115)
HCO3 ARTERIAL: 26.2 MMOL/L (ref 21–29)
HCT VFR BLD CALC: 40.6 % (ref 42–52)
HEMOGLOBIN: 12.2 GM/DL (ref 13.5–17.5)
HEMOGLOBIN: 13.4 G/DL (ref 14–18)
INFLUENZA A BY PCR: NEGATIVE
INFLUENZA B BY PCR: NEGATIVE
LACTATE: 1.07 MMOL/L (ref 0.4–2)
LACTIC ACID, SEPSIS: 1.8 MMOL/L (ref 0.5–1.9)
LACTIC ACID, SEPSIS: 2.5 MMOL/L (ref 0.5–1.9)
LYMPHOCYTES ABSOLUTE: 0.7 K/UL (ref 1–4.8)
LYMPHOCYTES RELATIVE PERCENT: 6.5 %
MCH RBC QN AUTO: 31.8 PG (ref 27–31.3)
MCHC RBC AUTO-ENTMCNC: 33 % (ref 33–37)
MCV RBC AUTO: 96.3 FL (ref 80–100)
MONOCYTES ABSOLUTE: 0.4 K/UL (ref 0.2–0.8)
MONOCYTES RELATIVE PERCENT: 3.9 %
NEUTROPHILS ABSOLUTE: 10.2 K/UL (ref 1.4–6.5)
NEUTROPHILS RELATIVE PERCENT: 88.8 %
O2 SAT, ARTERIAL: 98 % (ref 93–100)
PCO2 ARTERIAL: 38 MM HG (ref 35–45)
PDW BLD-RTO: 16.5 % (ref 11.5–14.5)
PERFORMED ON: ABNORMAL
PH ARTERIAL: 7.45 (ref 7.35–7.45)
PLATELET # BLD: 193 K/UL (ref 130–400)
PO2 ARTERIAL: 96 MM HG (ref 75–108)
POC CHLORIDE: 105 MEQ/L (ref 99–110)
POC CREATININE: 1.4 MG/DL (ref 0.8–1.3)
POC FIO2: 40
POC HEMATOCRIT: 36 % (ref 41–53)
POC POTASSIUM: 3.8 MEQ/L (ref 3.5–5.1)
POC SAMPLE TYPE: ABNORMAL
POC SODIUM: 140 MEQ/L (ref 136–145)
POTASSIUM SERPL-SCNC: 4.6 MEQ/L (ref 3.4–4.9)
RBC # BLD: 4.22 M/UL (ref 4.7–6.1)
SODIUM BLD-SCNC: 139 MEQ/L (ref 135–144)
TCO2 ARTERIAL: 27 (ref 22–29)
TOTAL PROTEIN: 6.5 G/DL (ref 6.3–8)
WBC # BLD: 11.4 K/UL (ref 4.8–10.8)

## 2020-02-09 PROCEDURE — 87040 BLOOD CULTURE FOR BACTERIA: CPT

## 2020-02-09 PROCEDURE — 82330 ASSAY OF CALCIUM: CPT

## 2020-02-09 PROCEDURE — 82435 ASSAY OF BLOOD CHLORIDE: CPT

## 2020-02-09 PROCEDURE — 82565 ASSAY OF CREATININE: CPT

## 2020-02-09 PROCEDURE — 82803 BLOOD GASES ANY COMBINATION: CPT

## 2020-02-09 PROCEDURE — 80053 COMPREHEN METABOLIC PANEL: CPT

## 2020-02-09 PROCEDURE — 83605 ASSAY OF LACTIC ACID: CPT

## 2020-02-09 PROCEDURE — 36600 WITHDRAWAL OF ARTERIAL BLOOD: CPT

## 2020-02-09 PROCEDURE — 36415 COLL VENOUS BLD VENIPUNCTURE: CPT

## 2020-02-09 PROCEDURE — 94660 CPAP INITIATION&MGMT: CPT

## 2020-02-09 PROCEDURE — 6360000002 HC RX W HCPCS: Performed by: FAMILY MEDICINE

## 2020-02-09 PROCEDURE — 71045 X-RAY EXAM CHEST 1 VIEW: CPT

## 2020-02-09 PROCEDURE — 84295 ASSAY OF SERUM SODIUM: CPT

## 2020-02-09 PROCEDURE — 85025 COMPLETE CBC W/AUTO DIFF WBC: CPT

## 2020-02-09 PROCEDURE — 2580000003 HC RX 258: Performed by: FAMILY MEDICINE

## 2020-02-09 PROCEDURE — 85014 HEMATOCRIT: CPT

## 2020-02-09 PROCEDURE — 6370000000 HC RX 637 (ALT 250 FOR IP): Performed by: FAMILY MEDICINE

## 2020-02-09 PROCEDURE — 99285 EMERGENCY DEPT VISIT HI MDM: CPT

## 2020-02-09 PROCEDURE — 87502 INFLUENZA DNA AMP PROBE: CPT

## 2020-02-09 PROCEDURE — 84132 ASSAY OF SERUM POTASSIUM: CPT

## 2020-02-09 PROCEDURE — 1210000000 HC MED SURG R&B

## 2020-02-09 RX ORDER — ACETAMINOPHEN 650 MG/1
650 SUPPOSITORY RECTAL ONCE
Status: DISCONTINUED | OUTPATIENT
Start: 2020-02-09 | End: 2020-02-09

## 2020-02-09 RX ORDER — ACETAMINOPHEN 500 MG
1000 TABLET ORAL ONCE
Status: COMPLETED | OUTPATIENT
Start: 2020-02-09 | End: 2020-02-09

## 2020-02-09 RX ORDER — SODIUM CHLORIDE 9 MG/ML
INJECTION, SOLUTION INTRAVENOUS CONTINUOUS
Status: DISCONTINUED | OUTPATIENT
Start: 2020-02-09 | End: 2020-02-10 | Stop reason: HOSPADM

## 2020-02-09 RX ORDER — 0.9 % SODIUM CHLORIDE 0.9 %
1000 INTRAVENOUS SOLUTION INTRAVENOUS ONCE
Status: COMPLETED | OUTPATIENT
Start: 2020-02-09 | End: 2020-02-10

## 2020-02-09 RX ADMIN — ACETAMINOPHEN 1000 MG: 500 TABLET ORAL at 20:58

## 2020-02-09 RX ADMIN — VANCOMYCIN HYDROCHLORIDE 1000 MG: 1 INJECTION, POWDER, LYOPHILIZED, FOR SOLUTION INTRAVENOUS at 21:22

## 2020-02-09 RX ADMIN — SODIUM CHLORIDE 1000 ML: 9 INJECTION, SOLUTION INTRAVENOUS at 21:11

## 2020-02-09 ASSESSMENT — ENCOUNTER SYMPTOMS
SHORTNESS OF BREATH: 1
EYES NEGATIVE: 1
CHEST TIGHTNESS: 1
GASTROINTESTINAL NEGATIVE: 1

## 2020-02-09 ASSESSMENT — PAIN SCALES - GENERAL
PAINLEVEL_OUTOF10: 0
PAINLEVEL_OUTOF10: 0

## 2020-02-10 ENCOUNTER — APPOINTMENT (OUTPATIENT)
Dept: GENERAL RADIOLOGY | Age: 70
DRG: 698 | End: 2020-02-10
Payer: MEDICARE

## 2020-02-10 PROBLEM — G82.20 PARAPLEGIA (HCC): Status: ACTIVE | Noted: 2018-11-20

## 2020-02-10 PROBLEM — N39.0 SEPSIS SECONDARY TO UTI (HCC): Status: ACTIVE | Noted: 2019-10-03

## 2020-02-10 LAB
ALBUMIN SERPL-MCNC: 2.4 G/DL (ref 3.5–4.6)
ALP BLD-CCNC: 92 U/L (ref 35–104)
ALT SERPL-CCNC: <5 U/L (ref 0–41)
ANION GAP SERPL CALCULATED.3IONS-SCNC: 14 MEQ/L (ref 9–15)
AST SERPL-CCNC: 29 U/L (ref 0–40)
BACTERIA: ABNORMAL /HPF
BASOPHILS ABSOLUTE: 0 K/UL (ref 0–0.2)
BASOPHILS RELATIVE PERCENT: 0.4 %
BILIRUB SERPL-MCNC: 0.4 MG/DL (ref 0.2–0.7)
BILIRUBIN URINE: NEGATIVE
BLOOD, URINE: ABNORMAL
BUN BLDV-MCNC: 44 MG/DL (ref 8–23)
CALCIUM SERPL-MCNC: 8.4 MG/DL (ref 8.5–9.9)
CHLORIDE BLD-SCNC: 106 MEQ/L (ref 95–107)
CLARITY: ABNORMAL
CO2: 23 MEQ/L (ref 20–31)
COLOR: YELLOW
CREAT SERPL-MCNC: 1.39 MG/DL (ref 0.7–1.2)
EOSINOPHILS ABSOLUTE: 0 K/UL (ref 0–0.7)
EOSINOPHILS RELATIVE PERCENT: 0.1 %
EPITHELIAL CELLS, UA: ABNORMAL /HPF (ref 0–5)
GFR AFRICAN AMERICAN: >60
GFR NON-AFRICAN AMERICAN: 50.9
GLOBULIN: 3.5 G/DL (ref 2.3–3.5)
GLUCOSE BLD-MCNC: 182 MG/DL (ref 60–115)
GLUCOSE BLD-MCNC: 203 MG/DL (ref 60–115)
GLUCOSE BLD-MCNC: 224 MG/DL (ref 70–99)
GLUCOSE BLD-MCNC: 272 MG/DL (ref 60–115)
GLUCOSE BLD-MCNC: 299 MG/DL (ref 60–115)
GLUCOSE URINE: NEGATIVE MG/DL
HCT VFR BLD CALC: 40.2 % (ref 42–52)
HEMOGLOBIN: 13 G/DL (ref 14–18)
HYALINE CASTS: ABNORMAL /HPF (ref 0–5)
KETONES, URINE: NEGATIVE MG/DL
LACTIC ACID: 1.3 MMOL/L (ref 0.5–2.2)
LEUKOCYTE ESTERASE, URINE: ABNORMAL
LYMPHOCYTES ABSOLUTE: 0.9 K/UL (ref 1–4.8)
LYMPHOCYTES RELATIVE PERCENT: 7.7 %
MCH RBC QN AUTO: 32.5 PG (ref 27–31.3)
MCHC RBC AUTO-ENTMCNC: 32.4 % (ref 33–37)
MCV RBC AUTO: 100.3 FL (ref 80–100)
MONOCYTES ABSOLUTE: 0.7 K/UL (ref 0.2–0.8)
MONOCYTES RELATIVE PERCENT: 5.9 %
NEUTROPHILS ABSOLUTE: 9.6 K/UL (ref 1.4–6.5)
NEUTROPHILS RELATIVE PERCENT: 85.9 %
NITRITE, URINE: NEGATIVE
PDW BLD-RTO: 16.8 % (ref 11.5–14.5)
PERFORMED ON: ABNORMAL
PH UA: 5 (ref 5–9)
PLATELET # BLD: 176 K/UL (ref 130–400)
POTASSIUM REFLEX MAGNESIUM: 4.5 MEQ/L (ref 3.4–4.9)
PROCALCITONIN: 1.47 NG/ML (ref 0–0.15)
PROTEIN UA: >=300 MG/DL
RBC # BLD: 4.01 M/UL (ref 4.7–6.1)
RBC UA: ABNORMAL /HPF (ref 0–2)
SODIUM BLD-SCNC: 143 MEQ/L (ref 135–144)
SPECIFIC GRAVITY UA: 1.02 (ref 1–1.03)
TOTAL PROTEIN: 5.9 G/DL (ref 6.3–8)
URINE REFLEX TO CULTURE: YES
UROBILINOGEN, URINE: 1 E.U./DL
WBC # BLD: 11.2 K/UL (ref 4.8–10.8)
WBC UA: >100 /HPF (ref 0–5)

## 2020-02-10 PROCEDURE — 94761 N-INVAS EAR/PLS OXIMETRY MLT: CPT

## 2020-02-10 PROCEDURE — 2580000003 HC RX 258: Performed by: INTERNAL MEDICINE

## 2020-02-10 PROCEDURE — 6370000000 HC RX 637 (ALT 250 FOR IP): Performed by: FAMILY MEDICINE

## 2020-02-10 PROCEDURE — 87186 SC STD MICRODIL/AGAR DIL: CPT

## 2020-02-10 PROCEDURE — 71046 X-RAY EXAM CHEST 2 VIEWS: CPT

## 2020-02-10 PROCEDURE — 99223 1ST HOSP IP/OBS HIGH 75: CPT | Performed by: INTERNAL MEDICINE

## 2020-02-10 PROCEDURE — 87086 URINE CULTURE/COLONY COUNT: CPT

## 2020-02-10 PROCEDURE — 94660 CPAP INITIATION&MGMT: CPT

## 2020-02-10 PROCEDURE — 1210000000 HC MED SURG R&B

## 2020-02-10 PROCEDURE — 81001 URINALYSIS AUTO W/SCOPE: CPT

## 2020-02-10 PROCEDURE — 84145 PROCALCITONIN (PCT): CPT

## 2020-02-10 PROCEDURE — 94664 DEMO&/EVAL PT USE INHALER: CPT

## 2020-02-10 PROCEDURE — 2580000003 HC RX 258: Performed by: NURSE PRACTITIONER

## 2020-02-10 PROCEDURE — 99222 1ST HOSP IP/OBS MODERATE 55: CPT | Performed by: INTERNAL MEDICINE

## 2020-02-10 PROCEDURE — 6370000000 HC RX 637 (ALT 250 FOR IP): Performed by: NURSE PRACTITIONER

## 2020-02-10 PROCEDURE — 6370000000 HC RX 637 (ALT 250 FOR IP): Performed by: INTERNAL MEDICINE

## 2020-02-10 PROCEDURE — 85025 COMPLETE CBC W/AUTO DIFF WBC: CPT

## 2020-02-10 PROCEDURE — 36415 COLL VENOUS BLD VENIPUNCTURE: CPT

## 2020-02-10 PROCEDURE — 83605 ASSAY OF LACTIC ACID: CPT

## 2020-02-10 PROCEDURE — 94640 AIRWAY INHALATION TREATMENT: CPT

## 2020-02-10 PROCEDURE — 80053 COMPREHEN METABOLIC PANEL: CPT

## 2020-02-10 PROCEDURE — 2700000000 HC OXYGEN THERAPY PER DAY

## 2020-02-10 PROCEDURE — 87077 CULTURE AEROBIC IDENTIFY: CPT

## 2020-02-10 PROCEDURE — 6360000002 HC RX W HCPCS: Performed by: NURSE PRACTITIONER

## 2020-02-10 RX ORDER — M-VIT,TX,IRON,MINS/CALC/FOLIC 27MG-0.4MG
1 TABLET ORAL DAILY
Status: DISCONTINUED | OUTPATIENT
Start: 2020-02-10 | End: 2020-02-12 | Stop reason: HOSPADM

## 2020-02-10 RX ORDER — NICOTINE POLACRILEX 4 MG
15 LOZENGE BUCCAL PRN
Status: DISCONTINUED | OUTPATIENT
Start: 2020-02-10 | End: 2020-02-12 | Stop reason: HOSPADM

## 2020-02-10 RX ORDER — ATORVASTATIN CALCIUM 20 MG/1
20 TABLET, FILM COATED ORAL NIGHTLY
Status: DISCONTINUED | OUTPATIENT
Start: 2020-02-10 | End: 2020-02-12 | Stop reason: HOSPADM

## 2020-02-10 RX ORDER — IPRATROPIUM BROMIDE AND ALBUTEROL SULFATE 2.5; .5 MG/3ML; MG/3ML
1 SOLUTION RESPIRATORY (INHALATION) 3 TIMES DAILY
Status: DISCONTINUED | OUTPATIENT
Start: 2020-02-10 | End: 2020-02-11

## 2020-02-10 RX ORDER — FUROSEMIDE 40 MG/1
40 TABLET ORAL DAILY
Status: DISCONTINUED | OUTPATIENT
Start: 2020-02-10 | End: 2020-02-12 | Stop reason: HOSPADM

## 2020-02-10 RX ORDER — DEXTROSE MONOHYDRATE 50 MG/ML
100 INJECTION, SOLUTION INTRAVENOUS PRN
Status: DISCONTINUED | OUTPATIENT
Start: 2020-02-10 | End: 2020-02-12 | Stop reason: HOSPADM

## 2020-02-10 RX ORDER — ASPIRIN 81 MG/1
81 TABLET, CHEWABLE ORAL DAILY
Status: DISCONTINUED | OUTPATIENT
Start: 2020-02-10 | End: 2020-02-12 | Stop reason: HOSPADM

## 2020-02-10 RX ORDER — PANTOPRAZOLE SODIUM 40 MG/1
40 TABLET, DELAYED RELEASE ORAL
Status: DISCONTINUED | OUTPATIENT
Start: 2020-02-11 | End: 2020-02-12 | Stop reason: HOSPADM

## 2020-02-10 RX ORDER — ALBUTEROL SULFATE 90 UG/1
2 AEROSOL, METERED RESPIRATORY (INHALATION) DAILY
Status: DISCONTINUED | OUTPATIENT
Start: 2020-02-10 | End: 2020-02-11

## 2020-02-10 RX ORDER — PETROLATUM,WHITE/LANOLIN
OINTMENT (GRAM) TOPICAL 4 TIMES DAILY PRN
Status: DISCONTINUED | OUTPATIENT
Start: 2020-02-10 | End: 2020-02-10 | Stop reason: CLARIF

## 2020-02-10 RX ORDER — DEXTROSE MONOHYDRATE 25 G/50ML
12.5 INJECTION, SOLUTION INTRAVENOUS PRN
Status: DISCONTINUED | OUTPATIENT
Start: 2020-02-10 | End: 2020-02-12 | Stop reason: HOSPADM

## 2020-02-10 RX ORDER — 0.9 % SODIUM CHLORIDE 0.9 %
1000 INTRAVENOUS SOLUTION INTRAVENOUS ONCE
Status: COMPLETED | OUTPATIENT
Start: 2020-02-10 | End: 2020-02-10

## 2020-02-10 RX ORDER — LANOLIN AND PETROLATUM 136.4; 469.9 MG/G; MG/G
1 OINTMENT TOPICAL 4 TIMES DAILY PRN
Status: DISCONTINUED | OUTPATIENT
Start: 2020-02-10 | End: 2020-02-12 | Stop reason: HOSPADM

## 2020-02-10 RX ORDER — FERROUS SULFATE 325(65) MG
325 TABLET ORAL 2 TIMES DAILY WITH MEALS
Status: DISCONTINUED | OUTPATIENT
Start: 2020-02-10 | End: 2020-02-12 | Stop reason: HOSPADM

## 2020-02-10 RX ORDER — IPRATROPIUM BROMIDE AND ALBUTEROL SULFATE 2.5; .5 MG/3ML; MG/3ML
1 SOLUTION RESPIRATORY (INHALATION)
Status: DISCONTINUED | OUTPATIENT
Start: 2020-02-10 | End: 2020-02-10

## 2020-02-10 RX ORDER — IPRATROPIUM BROMIDE AND ALBUTEROL SULFATE 2.5; .5 MG/3ML; MG/3ML
3 SOLUTION RESPIRATORY (INHALATION) 3 TIMES DAILY
Status: DISCONTINUED | OUTPATIENT
Start: 2020-02-10 | End: 2020-02-12 | Stop reason: HOSPADM

## 2020-02-10 RX ORDER — ACETAMINOPHEN 325 MG/1
650 TABLET ORAL EVERY 4 HOURS PRN
Status: DISCONTINUED | OUTPATIENT
Start: 2020-02-10 | End: 2020-02-12 | Stop reason: HOSPADM

## 2020-02-10 RX ORDER — SODIUM CHLORIDE 9 MG/ML
INJECTION, SOLUTION INTRAVENOUS CONTINUOUS
Status: DISCONTINUED | OUTPATIENT
Start: 2020-02-10 | End: 2020-02-12 | Stop reason: HOSPADM

## 2020-02-10 RX ORDER — ISOSORBIDE MONONITRATE 30 MG/1
30 TABLET, EXTENDED RELEASE ORAL DAILY
Status: DISCONTINUED | OUTPATIENT
Start: 2020-02-10 | End: 2020-02-12 | Stop reason: HOSPADM

## 2020-02-10 RX ORDER — SODIUM CHLORIDE 0.9 % (FLUSH) 0.9 %
10 SYRINGE (ML) INJECTION PRN
Status: DISCONTINUED | OUTPATIENT
Start: 2020-02-10 | End: 2020-02-12 | Stop reason: HOSPADM

## 2020-02-10 RX ORDER — SODIUM CHLORIDE 0.9 % (FLUSH) 0.9 %
10 SYRINGE (ML) INJECTION EVERY 12 HOURS SCHEDULED
Status: DISCONTINUED | OUTPATIENT
Start: 2020-02-10 | End: 2020-02-12 | Stop reason: HOSPADM

## 2020-02-10 RX ORDER — INSULIN GLARGINE 100 [IU]/ML
12 INJECTION, SOLUTION SUBCUTANEOUS NIGHTLY
Status: DISCONTINUED | OUTPATIENT
Start: 2020-02-10 | End: 2020-02-12 | Stop reason: HOSPADM

## 2020-02-10 RX ORDER — ONDANSETRON 2 MG/ML
4 INJECTION INTRAMUSCULAR; INTRAVENOUS EVERY 6 HOURS PRN
Status: DISCONTINUED | OUTPATIENT
Start: 2020-02-10 | End: 2020-02-12 | Stop reason: HOSPADM

## 2020-02-10 RX ORDER — ALBUTEROL SULFATE 2.5 MG/3ML
2.5 SOLUTION RESPIRATORY (INHALATION)
Status: DISCONTINUED | OUTPATIENT
Start: 2020-02-10 | End: 2020-02-12 | Stop reason: HOSPADM

## 2020-02-10 RX ORDER — ASCORBIC ACID 500 MG
500 TABLET ORAL DAILY
Status: DISCONTINUED | OUTPATIENT
Start: 2020-02-10 | End: 2020-02-12 | Stop reason: HOSPADM

## 2020-02-10 RX ORDER — GABAPENTIN 400 MG/1
800 CAPSULE ORAL 3 TIMES DAILY
Status: DISCONTINUED | OUTPATIENT
Start: 2020-02-10 | End: 2020-02-12 | Stop reason: HOSPADM

## 2020-02-10 RX ADMIN — FERROUS SULFATE TAB 325 MG (65 MG ELEMENTAL FE) 325 MG: 325 (65 FE) TAB at 13:30

## 2020-02-10 RX ADMIN — SODIUM CHLORIDE 1000 ML: 9 INJECTION, SOLUTION INTRAVENOUS at 03:59

## 2020-02-10 RX ADMIN — INSULIN LISPRO 4 UNITS: 100 INJECTION, SOLUTION INTRAVENOUS; SUBCUTANEOUS at 18:46

## 2020-02-10 RX ADMIN — OXYCODONE HYDROCHLORIDE AND ACETAMINOPHEN 500 MG: 500 TABLET ORAL at 13:30

## 2020-02-10 RX ADMIN — IPRATROPIUM BROMIDE AND ALBUTEROL SULFATE 1 AMPULE: .5; 3 SOLUTION RESPIRATORY (INHALATION) at 19:42

## 2020-02-10 RX ADMIN — INSULIN LISPRO 6 UNITS: 100 INJECTION, SOLUTION INTRAVENOUS; SUBCUTANEOUS at 13:28

## 2020-02-10 RX ADMIN — FUROSEMIDE 40 MG: 40 TABLET ORAL at 13:30

## 2020-02-10 RX ADMIN — MULTIPLE VITAMINS W/ MINERALS TAB 1 TABLET: TAB at 13:29

## 2020-02-10 RX ADMIN — INSULIN GLARGINE 12 UNITS: 100 INJECTION, SOLUTION SUBCUTANEOUS at 22:14

## 2020-02-10 RX ADMIN — GABAPENTIN 800 MG: 400 CAPSULE ORAL at 22:14

## 2020-02-10 RX ADMIN — ASPIRIN 81 MG 81 MG: 81 TABLET ORAL at 13:29

## 2020-02-10 RX ADMIN — ATORVASTATIN CALCIUM 20 MG: 20 TABLET, FILM COATED ORAL at 22:15

## 2020-02-10 RX ADMIN — SODIUM CHLORIDE: 9 INJECTION, SOLUTION INTRAVENOUS at 22:13

## 2020-02-10 RX ADMIN — GABAPENTIN 800 MG: 400 CAPSULE ORAL at 13:30

## 2020-02-10 RX ADMIN — IPRATROPIUM BROMIDE AND ALBUTEROL SULFATE 1 AMPULE: .5; 3 SOLUTION RESPIRATORY (INHALATION) at 11:36

## 2020-02-10 RX ADMIN — METOPROLOL TARTRATE 25 MG: 25 TABLET ORAL at 13:30

## 2020-02-10 RX ADMIN — ENOXAPARIN SODIUM 40 MG: 40 INJECTION SUBCUTANEOUS at 10:27

## 2020-02-10 RX ADMIN — SODIUM CHLORIDE: 9 INJECTION, SOLUTION INTRAVENOUS at 05:25

## 2020-02-10 RX ADMIN — ISOSORBIDE MONONITRATE 30 MG: 30 TABLET, EXTENDED RELEASE ORAL at 13:30

## 2020-02-10 RX ADMIN — PIPERACILLIN AND TAZOBACTAM 3.38 G: 3; .375 INJECTION, POWDER, LYOPHILIZED, FOR SOLUTION INTRAVENOUS at 18:45

## 2020-02-10 RX ADMIN — PIPERACILLIN AND TAZOBACTAM 3.38 G: 3; .375 INJECTION, POWDER, LYOPHILIZED, FOR SOLUTION INTRAVENOUS at 10:27

## 2020-02-10 NOTE — FLOWSHEET NOTE
Pt. A&Ox4. Pt. Has no c/o pain. Pt's BP was low and hospitalist was notified and bolus of normal saline was ordered. Pt. Was bput on BiPap due to O2 levels dropping down to low 80's.

## 2020-02-10 NOTE — PROGRESS NOTES
Physical Therapy  Facility/Department: [facility] MED SURG G751/[id number]  Physical Therapy Discharge      NAME: Franky Godoy    : 1950 (81 y.o.)  MRN: [id number]    Account: [de-identified]  Gender: male  PT evaluation and treatment orders received. Chart reviewed. PT eval attempted. Pt is dependent with all functional mobility. Pt states that he cannot currently participate in bed mobility or slideboard transfers due to rash on his buttocks. Otherwise, pt has been [facility] for most transfers at [facility] where he resides with total care. No skilled PT needs identified at this time. Pt understands DC of PT orders and reports he will participate once rash is cleared.     We thank you for your referral.  [facility]) PT Department  Electronically signed by Army Clint PT on 2/10/20 at 10:35 AM

## 2020-02-10 NOTE — PROGRESS NOTES
pack years  []   Pulmonary Disorder  (acute or chronic)  [x]   Severe or Chronic w/ Exacerbation  []     Surgical Status No [x]   Surgeries     General []   Surgery Lower []   Abdominal Thoracic or []   Upper Abdominal Thoracic with  PulmonaryDisorder  []     Chest X-ray Clear/Not  Ordered     []  Chronic Changes  Results Pending  []  Infiltrates, atelectasis, pleural effusion, or edema  []  Infiltrates in more than one lobe [x]  Infiltrate + Atelectasis, &/or pleural effusion  []    Respiratory Pattern Regular,  RR = 12-20 [x]  Increased,  RR = 21-25 []  CANCINO, irregular,  or RR = 26-30 []  Decreased FEV1  or RR = 31-35 []  Severe SOB, use  of accessory muscles, or RR ? 35  []    Mental Status Alert, oriented,  Cooperative [x]  Confused but Follows commands []  Lethargic or unable to follow commands []  Obtunded  []  Comatose  []    Breath Sounds Clear to  auscultation  []  Decreased unilaterally or  in bases only []  Decreased  bilaterally  [x]  Crackles or intermittent wheezes []  Wheezes []    Cough Strong, Spontan., & nonproductive [x]  Strong,  spontaneous, &  productive []  Weak,  Nonproductive []  Weak, productive or  with wheezes []  No spontaneous  cough or may require suctioning []    Level of Activity Ambulatory []  Ambulatory w/ Assist  [x]  Non-ambulatory []  Paraplegic []  Quadriplegic []    Total    Score:__9_____     Triage Score:_4_______      Tri       Triage:     1. (>20) Freq: Q3    2. (16-20) Freq: Q4   3. (11-15) Freq: QID & Albuterol Q2 PRN    4. (6-10) Freq: TID & Albuterol Q2 PRN    5. (0-5) Freq Q4prn

## 2020-02-10 NOTE — H&P
Hospital Medicine History & Physical      PCP: Artis Mcdowell MD    Date of Admission: 2/9/2020      Chief Complaint: Altered mental status/fever      History Of Present Illness:    81 y.o. male who presented with known history of obstructive sleep apnea peripheral vascular disease type 2 diabetes bilateral above-knee amputation chronic cellulitis presented to the ER today with fever confusion started few hours ago patient normally on 2 L of oxygen today with setting upper 86 with him on a nonrebreather on arrival to the ER awake alert but confused    Past Medical History:          Diagnosis Date    Asthma     COPD (chronic obstructive pulmonary disease) ([address]     Diabetes mellitus due to underlying condition, controlled, with stage 2 chronic kidney disease ([address]     Morbid obesity ([address]     Neuropathy     PVD (peripheral vascular disease) ([address]     1 stent in left leg placed 09/2018 and 2 stents in right leg    Sleep apnea     Type 2 diabetes mellitus without complication ([address]        Past Surgical History:          Procedure Laterality Date    AMPUTATION ABOVE KNEE Right 10/14/2019    LEG AMPUTATION ABOVE KNEE [facility] N-SLCTV DBRDMT W/O ANES 1 SESS Right 5/10/2019    INCISION AND DEBRIDEMENT WOUND RIGHT BUTTOCK, ROOM 270 performed by Eduardo Irving MD at [address] [place] [address]  2015    patient had a boil break open inside rectum. needed surgery for infection removal.       Medications Prior to Admission:      Prior to Admission medications    Medication Sig Start Date End Date Taking?  Authorizing Provider   isosorbide mononitrate (IMDUR) 30 MG extended release tablet Take 1 tablet by mouth daily 10/26/19   Jax Hill, DO   metoprolol tartrate (LOPRESSOR) 25 MG tablet Take 1 tablet by mouth 2 times daily 10/25/19   Jax Hill, DO   ferrous sulfate 325 (65 Fe) MG tablet Take 1 tablet by mouth 2 times daily (with meals) 10/25/19 DM, CAD, Cancer, CVA. Positive as follows:    History reviewed. No pertinent family history. REVIEW OF SYSTEMS:   Pertinent positives as noted in the HPI. All other systems reviewed and negative. PHYSICAL EXAM:    Vitals:    02/09/20 2042 02/09/20 2100 02/09/20 2101   BP: (!) 85/47  (!) 91/47   Pulse: 77  75   Resp: 22 24 12   Temp: 103 °F (39.4 °C)     TempSrc: Oral     SpO2: 90%  98%   Weight: 250 lb (113.4 kg)         General appearance:  He is obese. He is ill-appearing. HEENT:  Normal cephalic, atraumatic without obvious deformity. Pupils equal, round, and reactive to light. Extra ocular muscles intact. Conjunctivae/corneas clear. Neck: Supple, with full range of motion. No jugular venous distention. Trachea midline. Respiratory: Positive for chest tightness and shortness of breath. Diminished with Rales heard throughout lung sounds out bilaterally    Cardiovascular:  Regular rate and rhythm with normal S1/S2 without murmurs, rubs or gallops. Abdomen: Soft, non-tender, non-distended with normal bowel sounds. Musculoskeletal: Comments: Right above-knee amputation significant tenderness and swelling noted to the right upper thigh  Skin: Skin color, texture, turgor normal.  No rashes or lesions. Neurologic:  Neurovascularly intact without any focal sensory/motor deficits.  Cranial nerves: II-XII intact, grossly non-focal.  Psychiatric:  Alert and oriented, thought content appropriate, normal insight  Capillary Refill: Brisk,< 3 seconds   Peripheral Pulses: +2 palpable, equal bilaterally       Labs:     Recent Labs     02/09/20 2045 02/09/20 2227   WBC 11.4*  --    HGB 13.4* 12.2*   HCT 40.6*  --      --      Recent Labs     02/09/20 2045 02/09/20 2227     --    K 4.6  --    CL 98  --    CO2 27  --    BUN 43*  --    CREATININE 1.31* 1.4*   CALCIUM 9.1  --      Recent Labs     02/09/20 2045   AST 27   ALT <5   BILITOT 0.4   ALKPHOS 108*     No results for input(s): INR in the last 72 hours. No results for input(s): Isaac Harrislloyd in the last 72 hours. Urinalysis:      Lab Results   Component Value Date    NITRU Negative 10/21/2019    WBCUA 20-50 10/21/2019    BACTERIA RARE 10/21/2019    RBCUA 3-5 10/21/2019    BLOODU TRACE 10/21/2019    SPECGRAV 1.018 10/21/2019    GLUCOSEU Negative 10/21/2019       Radiology:     CXR: I have reviewed the CXR with the following interpretation: Per radiologist  EKG:  I have reviewed the EKG with the following interpretation: Please review    XR CHEST PORTABLE    (Results Pending)       ASSESSMENT:    Active Hospital Problems    Diagnosis Date Noted    Pneumonia [J18.9] 10/21/2019       PLAN:  Pneumonia of both lungs due to infectious organism, unspecified part of lung: Pulmonologist consulted, respiratory consulted to evaluate treat and assess, duo nebs, incentive spirometer, Acapella, encourage patient to cough and deep breathe, IV Zosyn, IV Vanco to be dosed per pharmacy. Vital signs every shift and as needed. Follow recommendations of consults. Sepsis without acute organ dysfunction, due to unspecified organism [facility]): Infectious disease consulted, will maintain current antibiotics until infectious disease assesses IV Zosyn, IV Vanco which is dosed per pharmacy, vital signs every shift and as needed, repeat lactic, repeat labs in a.m. Will monitor on telemetry, monitor intake and output, and follow recommendations of infectious disease. Cellulitis of the right upper leg: Infectious disease to follow patient, assess treat and evaluate. We will continue IV antibiotics as appropriate. Vital signs every shift and as needed. Will follow recommendations of infectious disease    DVT Prophylaxis: Lovenox 40 mg subcutaneously daily  Diet: Carb controlled diet   code Status: Full code  PT/OT Eval Status:  To evaluate and assess/treat    Dispo -be determined       Amil Libman, APRN - CNP    Thank you Lise Larson MD for the opportunity to be involved

## 2020-02-10 NOTE — ED PROVIDER NOTES
[facility] ED  eMERGENCY dEPARTMENT eNCOUnter      Pt Name: Broderick Stewart  MRN: [id number]  [place] 1950  Date of evaluation: 2/9/2020  Provider: Vanesa Whaley MD    [address] [place] [address]       Chief Complaint   Patient presents with    Altered Mental Status    Fever         HISTORY OF PRESENT ILLNESS   (Location/Symptom, Timing/Onset,Context/Setting, Quality, Duration, Modifying Factors, Severity)  Note limiting factors. Broderick Stewart is a 81 y.o. male who presents to the emergency department     81years old male patient with known history of obstructive sleep apnea peripheral vascular disease type 2 diabetes bilateral above-knee amputation chronic cellulitis presented to the ER today with fever confusion started few hours ago patient normally on 2 L of oxygen today with setting upper 86 with him on a nonrebreather on arrival to the ER awake alert but confused    The history is provided by the patient. NursingNotes were reviewed. REVIEW OF SYSTEMS    (2-9 systems for level 4, 10 or more for level 5)     Review of Systems   Constitutional: Positive for chills and fever. Eyes: Negative. Respiratory: Positive for chest tightness and shortness of breath. Cardiovascular: Negative. Gastrointestinal: Negative. Endocrine: Negative. Genitourinary: Negative. Neurological: Negative. Hematological: Negative. Psychiatric/Behavioral: Negative. Except as noted above the remainder of the review of systems was reviewed and negative.        PAST MEDICAL HISTORY     Past Medical History:   Diagnosis Date    Asthma     COPD (chronic obstructive pulmonary disease) ([address]     Diabetes mellitus due to underlying condition, controlled, with stage 2 chronic kidney disease ([address]     Morbid obesity ([address]     Neuropathy     PVD (peripheral vascular disease) ([address]     1 stent in left leg placed 09/2018 and 2 stents in right leg    Sleep apnea     Type 2 diabetes mellitus without complication ([address]          SURGICALHISTORY       Past Surgical History:   Procedure Laterality Date    AMPUTATION ABOVE KNEE Right 10/14/2019    LEG AMPUTATION ABOVE KNEE performed by James Steinberg MD at . [place] [address] TISS N-SLCTV DBRDMT W/O ANES 1 SESS Right 5/10/2019    INCISION AND DEBRIDEMENT WOUND RIGHT BUTTOCK, ROOM 270 performed by Ramon Bernabe MD at . [address] 2015    patient had a boil break open inside rectum. needed surgery for infection removal.         CURRENT MEDICATIONS       Previous Medications    ALBUTEROL SULFATE  (90 BASE) MCG/ACT INHALER    Inhale 2 puffs into the lungs daily     ASPIRIN 81 MG CHEWABLE TABLET    Take 1 tablet by mouth daily    ATORVASTATIN (LIPITOR) 20 MG TABLET    Take 1 tablet by mouth nightly    FERROUS SULFATE 325 (65 FE) MG TABLET    Take 1 tablet by mouth 2 times daily (with meals)    FUROSEMIDE (LASIX) 40 MG TABLET    Take 40 mg by mouth daily Give 30 minutes after metolazone    GABAPENTIN (NEURONTIN) 800 MG TABLET    Take 1 tablet by mouth 3 times daily for 30 days. .    INSULIN GLARGINE (LANTUS) 100 UNIT/ML INJECTION VIAL    Inject 12 Units into the skin nightly    INSULIN LISPRO (HUMALOG) 100 UNIT/ML INJECTION VIAL    Inject 0-12 Units into the skin 3 times daily (with meals)    IPRATROPIUM-ALBUTEROL (DUONEB) 0.5-2.5 (3) MG/3ML SOLN NEBULIZER SOLUTION    Inhale 3 mLs into the lungs three times daily    ISOSORBIDE MONONITRATE (IMDUR) 30 MG EXTENDED RELEASE TABLET    Take 1 tablet by mouth daily    LIDOCAINE (LMX) 4 % CREAM    Apply topically as needed.     METOPROLOL TARTRATE (LOPRESSOR) 25 MG TABLET    Take 1 tablet by mouth 2 times daily    MULTIPLE VITAMINS-MINERALS (THERAPEUTIC MULTIVITAMIN-MINERALS) TABLET    Take 1 tablet by mouth daily    NICOTINE (NICODERM CQ) 21 MG/24HR    Place 1 patch onto the skin daily    PANTOPRAZOLE (PROTONIX) 40 MG TABLET    Take 1 tablet by mouth every morning (before breakfast)    VITAMIN C (ASCORBIC ACID) 500 MG TABLET    Take 500 mg by mouth daily       ALLERGIES     Coconut oil    FAMILY HISTORY     History reviewed. No pertinent family history. SOCIAL HISTORY       Social History     Socioeconomic History    Marital status:      Spouse name: None    Number of children: None    Years of education: None    Highest education level: None   Occupational History    None   Social Needs    Financial resource strain: None    Food insecurity:     Worry: None     Inability: None    Transportation needs:     Medical: None     Non-medical: None   Tobacco Use    Smoking status: Current Every Day Smoker     Packs/day: 0.25     Years: 58.00     Pack years: 14.50     Types: Cigarettes    Smokeless tobacco: Never Used   Substance and Sexual Activity    Alcohol use: No    Drug use: Yes     Types: Marijuana    Sexual activity: None   Lifestyle    Physical activity:     Days per week: None     Minutes per session: None    Stress: None   Relationships    Social connections:     Talks on phone: None     Gets together: None     Attends Mandaeism service: None     Active member of club or organization: None     Attends meetings of clubs or organizations: None     Relationship status: None    Intimate partner violence:     Fear of current or ex partner: None     Emotionally abused: None     Physically abused: None     Forced sexual activity: None   Other Topics Concern    None   Social History Narrative         Lives With: Alone     Pt is from home but home was not going well prior to this admission. Pt lives alone. Pt was recently at a [facility] but he does not want to return there. Pt is agreeable to [facility] if they will take him. Referral called to [place] at [place].       Type of Home: Apartment One level    Home Access: Stairs to enter without rails - Number of Steps: 1 (working with landlord to get a ramp)    Bathroom Shower/Tub: Tub/Shower unit(w/c does Ultrasound and MRI are read by the radiologist. Plain radiographic images are visualized and preliminarily interpreted by the emergency physician with the below findings:        Interpretation per the Radiologist below, if available at the time ofthis note:    XR CHEST PORTABLE    (Results Pending)     Bilateral infiltrates    ED BEDSIDE ULTRASOUND:   Performed by ED Physician - none    LABS:  Labs Reviewed   LACTATE, SEPSIS - Abnormal; Notable for the following components:       Result Value    Lactic Acid, Sepsis 2.5 (*)     All other components within normal limits   CBC WITH AUTO DIFFERENTIAL - Abnormal; Notable for the following components:    WBC 11.4 (*)     RBC 4.22 (*)     Hemoglobin 13.4 (*)     Hematocrit 40.6 (*)     MCH 31.8 (*)     RDW 16.5 (*)     Neutrophils Absolute 10.2 (*)     Lymphocytes Absolute 0.7 (*)     All other components within normal limits   COMPREHENSIVE METABOLIC PANEL - Abnormal; Notable for the following components:    Glucose 229 (*)     BUN 43 (*)     CREATININE 1.31 (*)     GFR Non- 54.5 (*)     Alb 2.7 (*)     Alkaline Phosphatase 108 (*)     Globulin 3.8 (*)     All other components within normal limits   RAPID INFLUENZA A/B ANTIGENS   CULTURE BLOOD #1   CULTURE BLOOD #2   LACTATE, SEPSIS   URINE RT REFLEX TO CULTURE   POCT ISTAT BLOOD GAS, CREATININE, ACT-K       All other labs were within normal range or not returned as of this dictation.     EMERGENCY DEPARTMENT COURSE and DIFFERENTIAL DIAGNOSIS/MDM:   Vitals:    Vitals:    02/09/20 2042 02/09/20 2100 02/09/20 2101   BP: (!) 85/47  (!) 91/47   Pulse: 77  75   Resp: 22 24 12   Temp: 103 °F (39.4 °C)     TempSrc: Oral     SpO2: 90%  98%   Weight: 250 lb (113.4 kg)          MDM  Number of Diagnoses or Management Options  Pneumonia of both lungs due to infectious organism, unspecified part of lung:   Sepsis without acute organ dysfunction, due to unspecified organism [facility]):   Diagnosis management comments: 79

## 2020-02-10 NOTE — PROGRESS NOTES
Occupational Therapy  Facility/Department: [facility] T327/J322-77  Interdisciplinary Communication Note      To the referring provider,     While we thank you for your referral, Lupe Garcia was not seen for an evaluation as: The patient was observed as DEP; skilled services would not improve the patient's functional status. At baseline pt. uses janes lift for transfers and has assist with bathing/dressing at a bed level. Pt. states he is 'not doing therapy' currently due to a rash on his rear lj area; pt. feels he is at his baseline and no acute therapy indicated. Thank you,    Electronically signed by CARIE Vargas on 2/10/20 at 10:25 AM    [facility] EMERGENCY MEDICAL [place] AT [facility]. Department.

## 2020-02-10 NOTE — ED NOTES
pts  spo2  Reading have  Been eradic  At some poits  He  Is 98 % on ra   Then 100% on venti masjk at 50%   Pt a/ox4  sjows  No distress   Pt placed on bipap       Magdi Wiley RN  02/09/20 6419

## 2020-02-10 NOTE — DISCHARGE INSTR - COC
Continuity of Care Form    Patient Name: Fareed Kan   :  1950  MRN:  [id number]    Admit date:  2020  Discharge date:  20    Code Status Order: Full Code   Advance Directives:   [facility] Documentation     Date/Time Healthcare Directive Type of Healthcare Directive Copy in [address] Agent's Name Healthcare Agent's Phone Number    02/10/20 0451  Yes, patient has an advance directive for healthcare treatment  Health care treatment directive  Other (Comment) doesn't know where paperwork is located  --  Josiah Arora  --          Admitting Physician:  Rosa Miller MD  PCP: Jeramie Rogel MD    Discharging Nurse: lb  [address] Unit/Room#: Y174/J590-76  Discharging Unit Phone Number: [phone]    Emergency Contact:   Extended Emergency Contact Information  Primary Emergency Contact: En[address] Phone: [phone]  Work Phone: [phone]  Mobile Phone: [phone]  Relation: Other    Past Surgical History:  Past Surgical History:   Procedure Laterality Date    AMPUTATION ABOVE KNEE Right 10/14/2019    LEG AMPUTATION ABOVE KNEE performed by Jd Watson MD at . [facility] TISS N-SLCTV DBRDMT W/O ANES 1 SESS Right 5/10/2019    INCISION AND DEBRIDEMENT WOUND RIGHT BUTTOCK, ROOM 270 performed by Vaibhav Shea MD at [address] [place] [address]      patient had a boil break open inside rectum.  needed surgery for infection removal.       Immunization History:   Immunization History   Administered Date(s) Administered    Influenza, Intradermal, Quadrivalent, Preservative Free 2015    Zoster Recombinant (Shingrix) 2015       Active Problems:  Patient Active Problem List   Diagnosis Code    Acute and chronic respiratory failure with hypoxia (HCC) J96.21    BMI 45.0-49.9, adult ([facility] Z68.42    History of tobacco use Z87.891    HARSHA on CPAP G47.33, Z99.89    Wheezing R06.2  Cellulitis L03.90    Leg wound, left S81.802A    Leukocytosis D72.829    Unable to ambulate R26.2    Elevated bilirubin R17    Cellulitis of left foot L03. 116    Gangrene associated with type 2 diabetes mellitus ([facility]) E11.52    Venous stasis ulcer limited to breakdown of skin without varicose veins ([facility]) I87.2, L97.901    Gas gangrene of foot ([facility]) A48.0    Hyperglycemia due to type 2 diabetes mellitus ([facility]) E11.65    PAD (peripheral artery disease) ([facility]) I73.9    Diabetic ulcer of right heel associated with diabetes mellitus due to underlying condition, limited to breakdown of skin ([address] L11.974, L97.411    Gait abnormality R26.9    Cellulitis of lower leg L03.119    Cellulitis of right leg L03.115    Decubitus ulcer of right heel, stage 3 ([facility]) L89.613    Diabetes mellitus due to underlying condition, controlled, with stage 2 chronic kidney disease ([facility]) E08.22, N18.2    Diabetic peripheral neuropathy associated with type 2 diabetes mellitus ([facility]) E11.42    Decubitus ulcer of ischial area, right, stage III ([facility]) L89.313    Sepsis ([facility]) A41.9    Neuropathic pain M79.2    PVD (peripheral vascular disease) with claudication ([facility]) I73.9    Neuropathy due to peripheral vascular disease ([facility]) I73.9, G63    Pain following surgery or procedure G89.18    Chronic pain following surgery or procedure G89.28    Phantom pain after amputation of lower extremity ([address] G54.6    Pneumonia J18.9       Isolation/Infection:   Isolation          No Isolation        Patient Infection Status     Infection Onset Added Last Indicated Last Indicated By Review Planned Expiration Resolved Resolved By    ESBL (Extended Spectrum Beta Lactamase) 10/04/19 10/08/19 10/04/19 ANAEROBIC AND AEROBIC CULTURE        E. Coli ESBL left foot on 10/04/2019. Electronically signed by Antonette Mai RN on 10/8/2019 at 9:48 AM     VRE 05/10/19 05/15/19 10/04/19 ANAEROBIC AND AEROBIC CULTURE        E.  Faecium VRE left foot on 10/04/2019. Electronically signed by Stephen Batista RN on 10/8/2019 at 9:48 AM           Nurse Assessment:  Last Vital Signs: BP (!) 87/40   Pulse 69   Temp 99.5 °F (37.5 °C) (Oral)   Resp 16   Ht 6' (1.829 m)   Wt 250 lb (113.4 kg)   SpO2 94%   BMI 33.91 kg/m²     Last documented pain score (0-10 scale): Pain Level: 0  Last Weight:   Wt Readings from Last 1 Encounters:   02/09/20 250 lb (113.4 kg)     Mental Status:  oriented and alert    IV Access:  - None    Nursing Mobility/ADLs:  Walking   Dependent  Transfer  Dependent  Bathing  Dependent  Dressing  Dependent  Toileting  Dependent  Feeding  Independent  Med Admin  Independent  Med Delivery   whole    Wound Care Documentation and Therapy:  Wound 05/08/19 Ischium Right #1 (Active)   Number of days: 278       Wound 10/04/19 Buttocks Right (Active)   Number of days: 129       Wound 10/04/19 Heel Right necrotic tissue (Active)   Number of days: 129        Elimination:  Continence:   · Bowel: Yes  · Bladder: No  Urinary Catheter: None   Colostomy/Ileostomy/Ileal Conduit: No       Date of Last BM: 2/11/20    Intake/Output Summary (Last 24 hours) at 2/10/2020 1115  Last data filed at 2/10/2020 0845  Gross per 24 hour   Intake --   Output 200 ml   Net -200 ml     No intake/output data recorded. Safety Concerns:     History of Falls (last 30 days) and At Risk for Falls    Impairments/Disabilities:      Amputation - bilateral BKA's    Nutrition Therapy:  Current Nutrition Therapy:   - Oral Diet:  Carb Control 3 carbs/meal (1500kcals/day)    Routes of Feeding: Oral  Liquids: No Restrictions  Daily Fluid Restriction: no  Last Modified Barium Swallow with Video (Video Swallowing Test): not done    Treatments at the Time of Hospital Discharge:   Respiratory Treatments: Duonebs  Oxygen Therapy:  is on oxygen at 3 L/min per nasal cannula.   Ventilator:    - BiPAP   IPAP: 12 cmH20, CPAP/EPAP: 6 cmH2O only when sleeping    Rehab Therapies: [facility] Wh[facility] at [facility]  Weight Bearing Status/Restrictions: Bilateral BKA  Other Medical Equipment (for information only, NOT a DME order):  wheelchair, hospital bed and janes lift  Other Treatments:     Patient's personal belongings (please select all that are sent with patient):  holger    RN SIGNATURE:  Electronically signed by Luke Nielsen RN on 2/12/20 at 3:00 PM    CASE MANAGEMENT/SOCIAL WORK SECTION    Inpatient Status Date: 02/09/2020    Readmission Risk Assessment Score:  Readmission Risk              Risk of Unplanned Readmission:        26           Discharging to Facility/ Agency   · Name: [facility]  · Address:  · Phone:  · Fax:    Dialysis Facility (if applicable)   · Name:  · Address:  · Dialysis Schedule:  · Phone:  · Fax:    / signature: Electronically signed by JING Larry on 2/10/20 at 11:16 AM    PHYSICIAN SECTION    Prognosis: Good    Condition at Discharge: Stable    Rehab Potential (if transferring to Rehab): Good    Recommended Labs or Other Treatments After Discharge: po antibiotics    Physician Certification: I certify the above information and transfer of Stephen Griffin  is necessary for the continuing treatment of the diagnosis listed and that he requires Intermediate Nursing Care for greater 30 days.      Update Admission H&P: No change in H&P    PHYSICIAN SIGNATURE:  Electronically signed by Fernando Boles MD on 2/12/20 at 1:59 PM

## 2020-02-10 NOTE — CONSULTS
gabapentin  800 mg Oral TID    insulin glargine  12 Units Subcutaneous Nightly    ipratropium-albuterol  3 mL Inhalation TID    isosorbide mononitrate  30 mg Oral Daily    metoprolol tartrate  25 mg Oral BID    therapeutic multivitamin-minerals  1 tablet Oral Daily    [START ON 2/11/2020] pantoprazole  40 mg Oral QAM AC    vitamin C  500 mg Oral Daily       Allergies:  Coconut oil    Social History     Socioeconomic History    Marital status:      Spouse name: Not on file    Number of children: Not on file    Years of education: Not on file    Highest education level: Not on file   Occupational History    Not on file   Social Needs    Financial resource strain: Not on file    Food insecurity:     Worry: Not on file     Inability: Not on file    Transportation needs:     Medical: Not on file     Non-medical: Not on file   Tobacco Use    Smoking status: Current Every Day Smoker     Packs/day: 0.25     Years: 58.00     Pack years: 14.50     Types: Cigarettes    Smokeless tobacco: Never Used   Substance and Sexual Activity    Alcohol use: No    Drug use: Yes     Types: Marijuana    Sexual activity: Not on file   Lifestyle    Physical activity:     Days per week: Not on file     Minutes per session: Not on file    Stress: Not on file   Relationships    Social connections:     Talks on phone: Not on file     Gets together: Not on file     Attends Jew service: Not on file     Active member of club or organization: Not on file     Attends meetings of clubs or organizations: Not on file     Relationship status: Not on file    Intimate partner violence:     Fear of current or ex partner: Not on file     Emotionally abused: Not on file     Physically abused: Not on file     Forced sexual activity: Not on file   Other Topics Concern    Not on file   Social History Narrative         Lives With: Alone     Pt is from home but home was not going well prior to this admission. Pt lives alone. Pt was recently at a SNF [facility] but he does not want to return there. Pt is agreeable to [facility] if they will take him. Referral called to Agnes Capone at [place]. Type of Home: Apartment One level    Home Access: Stairs to enter without rails - Number of Steps: 1 (working with landlord to get a ramp)    Bathroom Shower/Tub: Tub/Shower unit(w/c does not fit into the bathroom)    Bathroom Equipment: 3-in-1 commode    Home Equipment: [employer]    ADL Assistance: Independent(\"With baby wipes, not very good\")    Homemaking Assistance: Needs assistance    Ambulation Assistance: (w/c)    Transfer Assistance: Independent(was using slideboard at home)    Active : No    Additional Comments: Has been sleeping on the sofa, cannot get into the bedroom or bathroom at home as [facility] does not fit in the doorways. Girlfriend or guys from the apartment do his shopping    Social/Functional History              Family History:   History reviewed. No pertinent family history. Review of Systems  14 system review is negative other than HPI    Physical Exam  Vitals:    02/10/20 0205 02/10/20 0401 02/10/20 1136 02/10/20 1328   BP:    (!) 128/56   Pulse:    88   Resp:  16     Temp:       TempSrc:       SpO2:   100%    Weight:       Height: 6' (1.829 m)        General Appearance: alert and oriented to person, place and time, well-developed and well-nourished, in no acute distress. On RA  Skin: warm and dry, no rash. Head: normocephalic and atraumatic  Eyes: extraocular eye movements intact, conjunctivae normal, anicteric sclerae  ENT: oropharynx clear and moist with normal mucous membranes.  No thrush  Lungs: normal respiratory effort, diminished BS, no rhonchi, no crackles, no wheezes  Heart:RRR, nl S1/S2, no murmur  Abdomen: soft, no tenderness, no H-S-megaly, + BS  NEUROLOGICAL: alert and oriented x 3, no focal deficits  No erythema, no warmth, no tenderness  B AKA, healed      DATA:    Lab

## 2020-02-11 LAB
GLUCOSE BLD-MCNC: 135 MG/DL (ref 60–115)
GLUCOSE BLD-MCNC: 154 MG/DL (ref 60–115)
GLUCOSE BLD-MCNC: 155 MG/DL (ref 60–115)
GLUCOSE BLD-MCNC: 170 MG/DL (ref 60–115)
PERFORMED ON: ABNORMAL

## 2020-02-11 PROCEDURE — 6360000002 HC RX W HCPCS: Performed by: NURSE PRACTITIONER

## 2020-02-11 PROCEDURE — 2580000003 HC RX 258: Performed by: NURSE PRACTITIONER

## 2020-02-11 PROCEDURE — 6370000000 HC RX 637 (ALT 250 FOR IP): Performed by: NURSE PRACTITIONER

## 2020-02-11 PROCEDURE — 2700000000 HC OXYGEN THERAPY PER DAY

## 2020-02-11 PROCEDURE — 6370000000 HC RX 637 (ALT 250 FOR IP): Performed by: FAMILY MEDICINE

## 2020-02-11 PROCEDURE — 1210000000 HC MED SURG R&B

## 2020-02-11 PROCEDURE — 99232 SBSQ HOSP IP/OBS MODERATE 35: CPT | Performed by: INTERNAL MEDICINE

## 2020-02-11 RX ORDER — NICOTINE 21 MG/24HR
1 PATCH, TRANSDERMAL 24 HOURS TRANSDERMAL DAILY
Status: DISCONTINUED | OUTPATIENT
Start: 2020-02-11 | End: 2020-02-12 | Stop reason: HOSPADM

## 2020-02-11 RX ADMIN — SODIUM CHLORIDE: 9 INJECTION, SOLUTION INTRAVENOUS at 14:54

## 2020-02-11 RX ADMIN — ACETAMINOPHEN 650 MG: 325 TABLET ORAL at 09:33

## 2020-02-11 RX ADMIN — METOPROLOL TARTRATE 25 MG: 25 TABLET ORAL at 21:42

## 2020-02-11 RX ADMIN — PIPERACILLIN AND TAZOBACTAM 3.38 G: 3; .375 INJECTION, POWDER, LYOPHILIZED, FOR SOLUTION INTRAVENOUS at 03:12

## 2020-02-11 RX ADMIN — FERROUS SULFATE TAB 325 MG (65 MG ELEMENTAL FE) 325 MG: 325 (65 FE) TAB at 21:42

## 2020-02-11 RX ADMIN — GABAPENTIN 800 MG: 400 CAPSULE ORAL at 14:54

## 2020-02-11 RX ADMIN — FERROUS SULFATE TAB 325 MG (65 MG ELEMENTAL FE) 325 MG: 325 (65 FE) TAB at 14:54

## 2020-02-11 RX ADMIN — PIPERACILLIN AND TAZOBACTAM 3.38 G: 3; .375 INJECTION, POWDER, LYOPHILIZED, FOR SOLUTION INTRAVENOUS at 18:53

## 2020-02-11 RX ADMIN — ASPIRIN 81 MG 81 MG: 81 TABLET ORAL at 09:33

## 2020-02-11 RX ADMIN — OXYCODONE HYDROCHLORIDE AND ACETAMINOPHEN 500 MG: 500 TABLET ORAL at 09:34

## 2020-02-11 RX ADMIN — PIPERACILLIN AND TAZOBACTAM 3.38 G: 3; .375 INJECTION, POWDER, LYOPHILIZED, FOR SOLUTION INTRAVENOUS at 09:40

## 2020-02-11 RX ADMIN — ISOSORBIDE MONONITRATE 30 MG: 30 TABLET, EXTENDED RELEASE ORAL at 09:33

## 2020-02-11 RX ADMIN — INSULIN GLARGINE 12 UNITS: 100 INJECTION, SOLUTION SUBCUTANEOUS at 21:42

## 2020-02-11 RX ADMIN — ATORVASTATIN CALCIUM 20 MG: 20 TABLET, FILM COATED ORAL at 21:43

## 2020-02-11 RX ADMIN — METOPROLOL TARTRATE 25 MG: 25 TABLET ORAL at 09:34

## 2020-02-11 RX ADMIN — GABAPENTIN 800 MG: 400 CAPSULE ORAL at 09:33

## 2020-02-11 RX ADMIN — MULTIPLE VITAMINS W/ MINERALS TAB 1 TABLET: TAB at 09:34

## 2020-02-11 RX ADMIN — PANTOPRAZOLE SODIUM 40 MG: 40 TABLET, DELAYED RELEASE ORAL at 07:40

## 2020-02-11 RX ADMIN — FUROSEMIDE 40 MG: 40 TABLET ORAL at 09:33

## 2020-02-11 RX ADMIN — INSULIN LISPRO 2 UNITS: 100 INJECTION, SOLUTION INTRAVENOUS; SUBCUTANEOUS at 09:34

## 2020-02-11 RX ADMIN — ENOXAPARIN SODIUM 40 MG: 40 INJECTION SUBCUTANEOUS at 09:33

## 2020-02-11 RX ADMIN — INSULIN LISPRO 2 UNITS: 100 INJECTION, SOLUTION INTRAVENOUS; SUBCUTANEOUS at 18:54

## 2020-02-11 RX ADMIN — GABAPENTIN 800 MG: 400 CAPSULE ORAL at 21:42

## 2020-02-11 ASSESSMENT — PAIN SCALES - GENERAL
PAINLEVEL_OUTOF10: 0
PAINLEVEL_OUTOF10: 0
PAINLEVEL_OUTOF10: 2

## 2020-02-11 NOTE — CONSULTS
Pulmonary Medicine  Consult Note      Reason for consultation: Pneumonia    HISTORY OF PRESENT ILLNESS:      This 44-year-old male who lives in [address] [place] [address] was brought to ER with fever, confusion started few hours ago prior to coming to ER. Patient normally on 2 L O2 via nasal cannula and O2 saturation in mid 80s requiring oxygen via nonrebreather. Patient was alert but confused in ER. He has a history of sleep apnea on CPAP therapy, chronic tobacco abuse, COPD, morbid obesity, peripheral vascular disease, diabetes mellitus,  bilateral above-knee amputation and chronic cellulitis. Patient was placed on BiPAP due to O2 level dropping down in low 80s. Pulmonary consult was done regarding pneumonia. Mild cough mostly after breathing treatment. Urine is very cloudy. Past Medical History:        Diagnosis Date    Asthma     COPD (chronic obstructive pulmonary disease) ([address]     Diabetes mellitus due to underlying condition, controlled, with stage 2 chronic kidney disease ([address]     Morbid obesity ([address]     Neuropathy     PVD [facility]peripheral vascular disease) ([facility])     1 stent in left leg placed 09/2018 and 2 stents in right leg    Sleep apnea     Type 2 diabetes mellitus without complication ([address]        Past Surgical History:        Procedure Laterality Date    AMPUTATION ABOVE KNEE Right 10/14/2019    LEG AMPUTATION ABOVE KNEE performed by Sheela Cheung MD at . [facility] 126 TISS N-SLCTV DBRDMT W/O ANES 1 SESS Right 5/10/2019    INCISION AND DEBRIDEMENT WOUND RIGHT BUTTOCK, ROOM 270 performed by Chin Hayes MD at [address] [place] [address]  2015    patient had a boil break open inside rectum. needed surgery for infection removal.       Social History:     reports that he has been smoking cigarettes. He has a 14.50 pack-year smoking history. He has never used smokeless tobacco. He reports current drug use. Drug: Marijuana. He reports that he does not drink alcohol.     Family History:   History reviewed. No pertinent family history. Allergies:  Coconut oil        MEDICATIONS during current hospitalization:    Continuous Infusions:   sodium chloride 75 mL/hr at 02/10/20 0525    dextrose         Scheduled Meds:   sodium chloride flush  10 mL Intravenous 2 times per day    enoxaparin  40 mg Subcutaneous Daily    piperacillin-tazobactam  3.375 g Intravenous Q8H    insulin lispro  0-12 Units Subcutaneous TID WC    insulin lispro  0-6 Units Subcutaneous Nightly    ipratropium-albuterol  1 ampule Inhalation TID    albuterol sulfate HFA  2 puff Inhalation Daily    aspirin  81 mg Oral Daily    atorvastatin  20 mg Oral Nightly    ferrous sulfate  325 mg Oral BID WC    furosemide  40 mg Oral Daily    gabapentin  800 mg Oral TID    insulin glargine  12 Units Subcutaneous Nightly    ipratropium-albuterol  3 mL Inhalation TID    isosorbide mononitrate  30 mg Oral Daily    metoprolol tartrate  25 mg Oral BID    therapeutic multivitamin-minerals  1 tablet Oral Daily    [START ON 2/11/2020] pantoprazole  40 mg Oral QAM AC    vitamin C  500 mg Oral Daily       PRN Meds:sodium chloride flush, magnesium hydroxide, ondansetron, acetaminophen, glucose, dextrose, glucagon (rDNA), dextrose, albuterol, lanolin-petrolatum    REVIEW OF SYSTEMS:  As in history of present illness  Other 14 point review of system is negative. PHYSICAL EXAM:    Vitals:  BP (!) 128/56   Pulse 88   Temp 99.5 °F (37.5 °C) (Oral)   Resp 16   Ht 6' (1.829 m)   Wt 250 lb (113.4 kg)   SpO2 100%   BMI 33.91 kg/m²   General: Obese, alert, awake,.comfortable in bed, No distress. Head: Atraumatic , Normocephalic   Eyes: PERRL. No sclera icterus. No conjunctival injection. No discharge   ENT: No nasal  discharge. Pharynx clear. Neck:  Trachea midline. No thyromegaly, no JVD, No cervical adenopathy. Chest : Bilaterally symmetrical ,Normal effort,  No accessory muscle use  Lung : Diminished BS bilateraly. Bibasilar Rales. No wheezing. No rhonchi. Heart[de-identified] Normal  rate. Regular rhythm. No mumur ,  Rub or gallop  ABD: Non-tender. Non-distended. No masses. No organmegaly. Normal bowel sounds. No hernia. Extremities: Bilateral above-knee amputation  Neuro: no cranial nerve abnormality,no focal weakness   Skin: Warm and dry. No erythema rash on exposed extremities. Data Review  Recent Labs     02/09/20  2045 02/09/20  2227 02/10/20  0527   WBC 11.4*  --  11.2*   HGB 13.4* 12.2* 13.0*   HCT 40.6*  --  40.2*     --  176      Recent Labs     02/09/20  2045 02/09/20  2227 02/10/20  0527     --  143   K 4.6  --  4.5   CL 98  --  106   CO2 27  --  23   BUN 43*  --  44*   CREATININE 1.31* 1.4* 1.39*   GLUCOSE 229*  --  224*       MV Settings: ABGs:   Recent Labs     02/09/20 2227   PHART 7.446   ENB8JQE 38   PO2ART 96*   KZW9OMC 26.2   BEART 2   D4UHIIAG 98*   RIT9YUA 27     O2 Device: PAP (positive airway pressure)  O2 Flow Rate (L/min): 15 L/min  Lab Results   Component Value Date    LACTA 1.3 02/10/2020    LACTA 0.8 10/26/2019    LACTA 0.9 10/25/2019       Radiology  Xr Chest Standard (2 Vw)    Result Date: 2/10/2020  EXAMINATION: XR CHEST (2 VW) CLINICAL HISTORY: PNEUMONIA COMPARISONS: FEBRUARY 9, 2020 . FINDINGS: Osseous structures intact. Cardiopericardial silhouette enlarged. Aorta calcified. Patchy area of increased opacity again identified right hilum/infrahilar, and left midlung. BILATERAL ATELECTASIS/PNEUMONIA, UNCHANGED. Xr Chest Portable    Result Date: 2/10/2020  EXAMINATION: XR CHEST PORTABLE CLINICAL HISTORY: SEPSIS COMPARISONS: OCTOBER 25, 2019 FINDINGS: Lung volumes low. Kyphotic. Cardiac silhouette normal. Ill-defined areas of increased opacity found right lower, and left midlung. RIGHT LOWER AND LEFT MID LUNG ATELECTASIS/PNEUMONIA. Assessment and  plan:     1. Sepsis secondary to UTI and pneumonia  2. Bilateral atelectasis/pneumonia.   3. Acute on chronic hypoxic respiratory failure  4. COPD with mild exacerbation  5. Obstructive sleep apnea on CPAP therapy  6. Abuse  7. Peripheral vascular disease   8. bilateral above-knee amputation    Patient is normally using 2 L O2 via nasal cannula requiring 100% nonrebreather and BiPAP therapy. Chest x-ray shows bilateral basilar infiltration and atelectasis more on the right lower and left midlung area. He also having cloudy urine and likely UTI. Altered mental status likely due to sepsis due to UTI and pneumonia. He had ABG done [id number] pH 7.44 with PCO2 38 PO2 96 saturation 98 bicarb 26.2. ID is following is currently on IV Zosyn 3.375 g every 8 hourly. Nebulizer with DuoNeb 3 times daily albuterol every 2 hours as needed, BiPAP therapy with IPAP 12 EPAP of 6 with O2 to keep saturation 90% or above. Continue present treatment plan follow-up chest x-ray in 2 days.     Thank you for this consult    Electronically signed by Román Livingston MD, FCCP on 2/10/2020 at 8:57 PM

## 2020-02-11 NOTE — PROGRESS NOTES
Infectious Diseases Inpatient Progress Note          HISTORY OF PRESENT ILLNESS:  Follow up sepsis and UTI on IV Zosyn, well tolerated . Patient had remarkable clinical improvement with decreased weakness and obtundation, decreased fevers. + chronic buttock rash    Current Medications:     sodium chloride flush  10 mL Intravenous 2 times per day    enoxaparin  40 mg Subcutaneous Daily    piperacillin-tazobactam  3.375 g Intravenous Q8H    insulin lispro  0-12 Units Subcutaneous TID WC    insulin lispro  0-6 Units Subcutaneous Nightly    aspirin  81 mg Oral Daily    atorvastatin  20 mg Oral Nightly    ferrous sulfate  325 mg Oral BID WC    furosemide  40 mg Oral Daily    gabapentin  800 mg Oral TID    insulin glargine  12 Units Subcutaneous Nightly    ipratropium-albuterol  3 mL Inhalation TID    isosorbide mononitrate  30 mg Oral Daily    metoprolol tartrate  25 mg Oral BID    therapeutic multivitamin-minerals  1 tablet Oral Daily    pantoprazole  40 mg Oral QAM AC    vitamin C  500 mg Oral Daily       Allergies:  Coconut oil      Review of Systems  14 system review is negative other than HPI    Physical Exam  Vitals:    02/10/20 2128 02/10/20 2213 02/10/20 2332 02/11/20 0737   BP:  (!) 91/45  (!) 131/58   Pulse:  88  60   Resp: 24 20 20 18   Temp:    98.6 °F (37 °C)   TempSrc:    Oral   SpO2:    96%   Weight:       Height:           General Appearance: alert and oriented to person, place and time, well-developed and well-nourished, in no acute distress. On RA  Skin: warm and dry, no rash. Head: normocephalic and atraumatic  Eyes: extraocular eye movements intact, conjunctivae normal, anicteric sclerae  ENT: oropharynx clear and moist with normal mucous membranes.  No thrush  Lungs: normal respiratory effort, diminished BS, no rhonchi, no crackles, no wheezes  Heart:RRR, nl S1/S2, no murmur  Abdomen: soft, no tenderness, no H-S-megaly, + BS  NEUROLOGICAL: alert and oriented x 3, no focal

## 2020-02-11 NOTE — PROGRESS NOTES
ipratropium-albuterol  3 mL Inhalation TID    isosorbide mononitrate  30 mg Oral Daily    metoprolol tartrate  25 mg Oral BID    therapeutic multivitamin-minerals  1 tablet Oral Daily    pantoprazole  40 mg Oral QAM AC    vitamin C  500 mg Oral Daily     PRN Meds: sodium chloride flush, magnesium hydroxide, ondansetron, acetaminophen, glucose, dextrose, glucagon (rDNA), dextrose, albuterol, lanolin-petrolatum    Labs:   Recent Labs     02/09/20  2045 02/09/20  2227 02/10/20  0527   WBC 11.4*  --  11.2*   HGB 13.4* 12.2* 13.0*   HCT 40.6*  --  40.2*     --  176     Recent Labs     02/09/20  2045 02/09/20  2227 02/10/20  0527     --  143   K 4.6  --  4.5   CL 98  --  106   CO2 27  --  23   BUN 43*  --  44*   CREATININE 1.31* 1.4* 1.39*   CALCIUM 9.1  --  8.4*     Recent Labs     02/09/20  2045 02/10/20  0527   AST 27 29   ALT <5 <5   BILITOT 0.4 0.4   ALKPHOS 108* 92     No results for input(s): INR in the last 72 hours. No results for input(s): [place] in the last 72 hours. Urinalysis:   Lab Results   Component Value Date    NITRU Negative 02/10/2020    WBCUA >100 02/10/2020    BACTERIA MANY 02/10/2020    RBCUA 5-10 02/10/2020    BLOODU MODERATE 02/10/2020    SPECGRAV 1.019 02/10/2020    GLUCOSEU Negative 02/10/2020       Radiology:   Most recent    Chest CT      WITH CONTRAST:No results found for this or any previous visit. WITHOUT CONTRAST: No results found for this or any previous visit. CXR      2-view:   Results for orders placed during the hospital encounter of 02/09/20   XR CHEST STANDARD (2 VW)    Narrative EXAMINATION: XR CHEST (2 VW)    CLINICAL HISTORY: PNEUMONIA    COMPARISONS: FEBRUARY 9, 2020  . FINDINGS: Osseous structures intact. Cardiopericardial silhouette enlarged. Aorta calcified. Patchy area of increased opacity again identified right hilum/infrahilar, and left midlung. Impression BILATERAL ATELECTASIS/PNEUMONIA, UNCHANGED.         Portable:   Results for orders placed during the hospital encounter of 02/09/20   XR CHEST PORTABLE    Narrative EXAMINATION: XR CHEST PORTABLE    CLINICAL HISTORY: SEPSIS    COMPARISONS: OCTOBER 25, 2019    FINDINGS: Lung volumes low. Kyphotic. Cardiac silhouette normal. Ill-defined areas of increased opacity found right lower, and left midlung. Impression RIGHT LOWER AND LEFT MID LUNG ATELECTASIS/PNEUMONIA. Echo No results found for this or any previous visit. Assessment/Plan:    Active Hospital Problems    Diagnosis Date Noted    Pseudomonas urinary tract infection [N39.0, B96.5]     Chronic indwelling Babin catheter [Z96.0]     Pneumonia [J18.9] 10/21/2019    Sepsis secondary to UTI [facility] [A41.9, N39.0] 10/03/2019    Paraplegia ([facility] [G82.20] 11/20/2018     1 - Sepsis due to UTI, poss PNA   2/10 - iv vancomycin and zosyn, blood cultures, urine culture. ID on consult due to recurrent infection. 2/11 - improving quickly, awaiting c&s due to complex hx of esbl    2 - Acute on chronic respiratory failure hypoxia   2/10 - improving, wean oxygen as able.     3 - Septic encephalopathy   2/10 - noted in ER, resolved on my exam.    4 - DVT proph - lovenox      2/11 - Dispo - poss dc 1-2 days      Electronically signed by Osmar Silva MD on 2/11/2020 at 4:19 PM

## 2020-02-11 NOTE — PROGRESS NOTES
Hospitalist Daily Progress Note  Name: Natalia Koehler  Age: 81 y.o. Gender: male  CodeStatus: Full Code  Allergies: Coconut Oil    Chief Complaint:Altered Mental Status and Fever      Primary Care Provider: Hitesh Carroll MD    InpatientTreatment Team: Treatment Team: Attending Provider: Prerna Alejandro MD; Consulting Physician: Augustine Mariscal MD; Consulting Physician: Juan Ramon Sumner DO; : Young Mitchell RN; Patient Care Tech: Jenny Owen; Registered Nurse: Gasper Brandt RN    Admission Date: 2/9/2020      Subjective: No chest pain, nausea. SOB improved. Patient weak. Physical Exam  Vitals signs and nursing note reviewed. Constitutional:       Appearance: Normal appearance. Cardiovascular:      Rate and Rhythm: Normal rate and regular rhythm. Pulmonary:      Effort: Pulmonary effort is normal.      Breath sounds: Rhonchi present. Abdominal:      General: Bowel sounds are normal.      Palpations: Abdomen is soft. Musculoskeletal: Normal range of motion. Skin:     General: Skin is warm and dry. Neurological:      General: No focal deficit present. Mental Status: He is alert and oriented to person, place, and time. Mental status is at baseline.    Psychiatric:         Mood and Affect: Mood normal.         Behavior: Behavior normal.         Medications:  Reviewed    Infusion Medications:    sodium chloride 75 mL/hr at 02/10/20 2213    dextrose       Scheduled Medications:    sodium chloride flush  10 mL Intravenous 2 times per day    enoxaparin  40 mg Subcutaneous Daily    piperacillin-tazobactam  3.375 g Intravenous Q8H    insulin lispro  0-12 Units Subcutaneous TID WC    insulin lispro  0-6 Units Subcutaneous Nightly    aspirin  81 mg Oral Daily    atorvastatin  20 mg Oral Nightly    ferrous sulfate  325 mg Oral BID WC    furosemide  40 mg Oral Daily    gabapentin  800 mg Oral TID    insulin glargine  12 Units Subcutaneous Nightly    for orders placed during the hospital encounter of 02/09/20   XR CHEST PORTABLE    Narrative EXAMINATION: XR CHEST PORTABLE    CLINICAL HISTORY: SEPSIS    COMPARISONS: OCTOBER 25, 2019    FINDINGS: Lung volumes low. Kyphotic. Cardiac silhouette normal. Ill-defined areas of increased opacity found right lower, and left midlung. Impression RIGHT LOWER AND LEFT MID LUNG ATELECTASIS/PNEUMONIA. Echo No results found for this or any previous visit. Assessment/Plan:    Active Hospital Problems    Diagnosis Date Noted    Pseudomonas urinary tract infection [N39.0, B96.5]     Chronic indwelling Babin catheter [Z96.0]     Pneumonia [J18.9] 10/21/2019    S[facility] to [facility] [A41.9, N39.0] 10/03/2019    Paraplegia ([facility] [G82.20] 11/20/2018     1 - Sepsis due to UTI, poss PNA   2/10 - iv vancomycin and zosyn, blood cultures, urine culture. ID on consult due to recurrent infection. 2 - Acute on chronic respiratory failure hypoxia   2/10 - improving, wean oxygen as able.     3 - Septic encephalopathy   2/10 - noted in ER, resolved on my exam.    4 - DVT proph - lovenox    Electronically signed by Christine Carey MD on 2/11/2020 at 12:02 PM

## 2020-02-11 NOTE — PROGRESS NOTES
INPATIENT PROGRESS NOTES    PATIENT NAME: Jaylene Lepe  MRN: [id number]  SERVICE DATE:  February 11, 2020   SERVICE TIME:  4:14 PM      PRIMARY SERVICE: Pulmonary Disease    CHIEF COMPLAIN: Pneumonia    INTERVAL HPI: Patient seen and examined at bedside, Interval Notes, orders reviewed. Nursing notes noted  Patient said he is feeling good, currently on 10 L O2 via nasal cannula and oxygen saturation is 96%. He said his BiPAP is too strong to use and he does not like for place mask. .  No complaint of fever or chills. No chest pain pleuritic pain. No cough or sputum exam.  No nausea vomiting or diarrhea. OBJECTIVE    Body mass index is 33.91 kg/m². PHYSICAL EXAM:  Vitals:  BP (!) 131/58   Pulse 60   Temp 98.6 °F (37 °C) (Oral)   Resp 18   Ht 6' (1.829 m)   Wt 250 lb (113.4 kg)   SpO2 96%   BMI 33.91 kg/m²   General:Alert, awake . comfortable in bed, No distress. appears stated age and cooperative  Head: Atraumatic , Normocephalic   Eyes: PERRL. No sclera icterus. No conjunctival injection. No discharge   ENT: No nasal  discharge. Pharynx clear. lips, teeth, mucosa and gums are normal, tongue protrudes in the midline  Neck:  Trachea midline. No thyromegaly, no JVD, No cervical adenopathy. Chest : Bilaterally symmetrical ,Normal effort,  No accessory muscle use  Lung : . Fair BS bilateral, decreased BS at bases. No Rales. No wheezing. No rhonchi. No dullness on percussion. Heart[de-identified] Normal  rate. Regular rhythm. No mumur ,  Rub or gallop  ABD: Non-tender. Non-distended. No masses. No organmegaly. Normal bowel sounds. No hernia.   Ext : No Pitting both leg , No Cyanosis No clubbing  Neuro: no focal weakness          DATA:   Recent Labs     02/09/20  2045 02/09/20  2227 02/10/20  0527   WBC 11.4*  --  11.2*   HGB 13.4* 12.2* 13.0*   HCT 40.6*  --  40.2*   MCV 96.3  --  100.3*     --  176     Recent Labs     02/09/20  2045 02/09/20  2227 02/10/20  0527     --  143   K 4.6  --  4.5   CL 98  -- 106   CO2 27  --  23   BUN 43*  --  44*   CREATININE 1.31* 1.4* 1.39*   GLUCOSE 229*  --  224*   CALCIUM 9.1  --  8.4*   PROT 6.5  --  5.9*   LABALBU 2.7*  --  2.4*   BILITOT 0.4  --  0.4   ALKPHOS 108*  --  92   AST 27  --  29   ALT <5  --  <5   LABGLOM 54.5* 50* 50.9*   GFRAA >60.0 >60 >60.0   GLOB 3.8*  --  3.5       MV Settings:     FiO2 : 40 %    Recent Labs     02/09/20  2227   PHART 7.446   YSG5LFD 38   PO2ART 96*   WZR8VHZ 26.2   BEART 2   T3NYNYGP 98*       O2 Device: Nasal cannula  O2 Flow Rate (L/min): 10 L/min    DIET CARB CONTROL;     MEDICATIONS during current hospitalization:    Continuous Infusions:   sodium chloride 75 mL/hr at 02/11/20 1454    dextrose         Scheduled Meds:   nicotine  1 patch Transdermal Daily    sodium chloride flush  10 mL Intravenous 2 times per day    enoxaparin  40 mg Subcutaneous Daily    piperacillin-tazobactam  3.375 g Intravenous Q8H    insulin lispro  0-12 Units Subcutaneous TID WC    insulin lispro  0-6 Units Subcutaneous Nightly    aspirin  81 mg Oral Daily    atorvastatin  20 mg Oral Nightly    ferrous sulfate  325 mg Oral BID WC    furosemide  40 mg Oral Daily    gabapentin  800 mg Oral TID    insulin glargine  12 Units Subcutaneous Nightly    ipratropium-albuterol  3 mL Inhalation TID    isosorbide mononitrate  30 mg Oral Daily    metoprolol tartrate  25 mg Oral BID    therapeutic multivitamin-minerals  1 tablet Oral Daily    pantoprazole  40 mg Oral QAM AC    vitamin C  500 mg Oral Daily       PRN Meds:sodium chloride flush, magnesium hydroxide, ondansetron, acetaminophen, glucose, dextrose, glucagon (rDNA), dextrose, albuterol, lanolin-petrolatum    Radiology  Xr Chest Standard (2 Vw)    Result Date: 2/10/2020  EXAMINATION: XR CHEST (2 VW) CLINICAL HISTORY: PNEUMONIA COMPARISONS: FEBRUARY 9, 2020 . FINDINGS: Osseous structures intact. Cardiopericardial silhouette enlarged. Aorta calcified.  Patchy area of increased opacity again identified right hilum/infrahilar, and left midlung. BILATERAL ATELECTASIS/PNEUMONIA, UNCHANGED. Xr Chest Portable    Result Date: 2/10/2020  EXAMINATION: XR CHEST PORTABLE CLINICAL HISTORY: SEPSIS COMPARISONS: OCTOBER 25, 2019 FINDINGS: Lung volumes low. Kyphotic. Cardiac silhouette normal. Ill-defined areas of increased opacity found right lower, and left midlung. RIGHT LOWER AND LEFT MID LUNG ATELECTASIS/PNEUMONIA. IMPRESSION AND SUGGESTION:  1. Sepsis secondary to UTI and possible pneumonia  2. Bilateral atelectasis/pneumonia. 3. Acute on chronic hypoxic respiratory failure  4. COPD with mild exacerbation  5. Obstructive sleep apnea on CPAP therapy  6. Abuse  7. Peripheral vascular disease   8. bilateral above-knee amputation    Continue antibiotic as per ID. His BiPAP pressure is only 12/6 but he thinks it is too strong and does not want to use it. Continue bronchodilator therapy with DuoNeb 3 times daily and albuterol every 2 hours as needed.       Electronically signed by Augustine Mariscal MD, FCCP on 2/11/2020 at 4:14 PM

## 2020-02-11 NOTE — PROGRESS NOTES
Patient's IV accidentally got removed by the patient. Several attempts were made to restart an IV. ABEL May placed a 22 in the right hands. Patient is receiving his antibiotic. Gowns and sheet were changed. Patient has his call light.

## 2020-02-12 VITALS
DIASTOLIC BLOOD PRESSURE: 56 MMHG | OXYGEN SATURATION: 99 % | HEIGHT: 72 IN | BODY MASS INDEX: 33.86 KG/M2 | TEMPERATURE: 98.2 F | SYSTOLIC BLOOD PRESSURE: 119 MMHG | WEIGHT: 250 LBS | HEART RATE: 53 BPM | RESPIRATION RATE: 20 BRPM

## 2020-02-12 LAB
GLUCOSE BLD-MCNC: 154 MG/DL (ref 60–115)
GLUCOSE BLD-MCNC: 155 MG/DL (ref 60–115)
GLUCOSE BLD-MCNC: 191 MG/DL (ref 60–115)
ORGANISM: ABNORMAL
PERFORMED ON: ABNORMAL
URINE CULTURE, ROUTINE: ABNORMAL

## 2020-02-12 PROCEDURE — 99232 SBSQ HOSP IP/OBS MODERATE 35: CPT | Performed by: INTERNAL MEDICINE

## 2020-02-12 PROCEDURE — 2700000000 HC OXYGEN THERAPY PER DAY

## 2020-02-12 PROCEDURE — 6370000000 HC RX 637 (ALT 250 FOR IP): Performed by: FAMILY MEDICINE

## 2020-02-12 PROCEDURE — 2580000003 HC RX 258: Performed by: NURSE PRACTITIONER

## 2020-02-12 PROCEDURE — 6370000000 HC RX 637 (ALT 250 FOR IP): Performed by: NURSE PRACTITIONER

## 2020-02-12 PROCEDURE — 6360000002 HC RX W HCPCS: Performed by: NURSE PRACTITIONER

## 2020-02-12 RX ORDER — LANOLIN AND PETROLATUM 136.4; 469.9 MG/G; MG/G
1 OINTMENT TOPICAL 4 TIMES DAILY PRN
Qty: 1 TUBE | Refills: 0
Start: 2020-02-12

## 2020-02-12 RX ORDER — CIPROFLOXACIN 500 MG/1
500 TABLET, FILM COATED ORAL 2 TIMES DAILY
Qty: 14 TABLET | Refills: 0
Start: 2020-02-12 | End: 2020-02-19

## 2020-02-12 RX ADMIN — PIPERACILLIN AND TAZOBACTAM 3.38 G: 3; .375 INJECTION, POWDER, LYOPHILIZED, FOR SOLUTION INTRAVENOUS at 12:48

## 2020-02-12 RX ADMIN — INSULIN LISPRO 2 UNITS: 100 INJECTION, SOLUTION INTRAVENOUS; SUBCUTANEOUS at 09:12

## 2020-02-12 RX ADMIN — FERROUS SULFATE TAB 325 MG (65 MG ELEMENTAL FE) 325 MG: 325 (65 FE) TAB at 09:12

## 2020-02-12 RX ADMIN — ISOSORBIDE MONONITRATE 30 MG: 30 TABLET, EXTENDED RELEASE ORAL at 09:12

## 2020-02-12 RX ADMIN — OXYCODONE HYDROCHLORIDE AND ACETAMINOPHEN 500 MG: 500 TABLET ORAL at 09:12

## 2020-02-12 RX ADMIN — INSULIN LISPRO 2 UNITS: 100 INJECTION, SOLUTION INTRAVENOUS; SUBCUTANEOUS at 17:19

## 2020-02-12 RX ADMIN — FERROUS SULFATE TAB 325 MG (65 MG ELEMENTAL FE) 325 MG: 325 (65 FE) TAB at 17:20

## 2020-02-12 RX ADMIN — FUROSEMIDE 40 MG: 40 TABLET ORAL at 09:12

## 2020-02-12 RX ADMIN — MULTIPLE VITAMINS W/ MINERALS TAB 1 TABLET: TAB at 09:12

## 2020-02-12 RX ADMIN — ASPIRIN 81 MG 81 MG: 81 TABLET ORAL at 09:12

## 2020-02-12 RX ADMIN — ENOXAPARIN SODIUM 40 MG: 40 INJECTION SUBCUTANEOUS at 09:11

## 2020-02-12 RX ADMIN — GABAPENTIN 800 MG: 400 CAPSULE ORAL at 15:20

## 2020-02-12 RX ADMIN — PANTOPRAZOLE SODIUM 40 MG: 40 TABLET, DELAYED RELEASE ORAL at 09:19

## 2020-02-12 RX ADMIN — Medication 10 ML: at 09:12

## 2020-02-12 RX ADMIN — SODIUM CHLORIDE: 9 INJECTION, SOLUTION INTRAVENOUS at 04:47

## 2020-02-12 RX ADMIN — GABAPENTIN 800 MG: 400 CAPSULE ORAL at 09:12

## 2020-02-12 RX ADMIN — PIPERACILLIN AND TAZOBACTAM 3.38 G: 3; .375 INJECTION, POWDER, LYOPHILIZED, FOR SOLUTION INTRAVENOUS at 04:41

## 2020-02-12 RX ADMIN — INSULIN LISPRO 2 UNITS: 100 INJECTION, SOLUTION INTRAVENOUS; SUBCUTANEOUS at 12:49

## 2020-02-12 ASSESSMENT — PAIN SCALES - GENERAL: PAINLEVEL_OUTOF10: 0

## 2020-02-12 NOTE — DISCHARGE SUMMARY
Physician Discharge Summary     Patient ID:  Bere Wu  [id number]  89 y.o.  1950    Admit date: 2/9/2020    Discharge date : 02/12/20     Admitting Physician: Renaldo De Leon MD     Discharge Physician: Antonio Villanueva MD     Admission Diagnoses: Pneumonia [J18.9]    Discharge Diagnoses: Sepsis due to chronic catheter related uti, present on admission, septic encephalopathy, acute on chronic respiratory failure with hypoxia    Admission Condition: fair    Discharged Condition: good    Hospital Course:   1 - Sepsis due to UTI              2/10 - iv vancomycin and zosyn, blood cultures, urine culture. ID on consult due to recurrent infection. 2/11 - improving quickly, awaiting c&s due to complex hx of esbl    2/12 - to po cipro, d/w ID    2 - Acute on chronic respiratory failure hypoxia              2/10 - improving, wean oxygen as able.    2/12 - at baseline     3 - Septic encephalopathy              2/10 - noted in ER, resolved on my exam.   2/12 - resolved    Consults: ID, pulmonology    Significant Diagnostic Studies: as below; klebsiella pneumoniae in urine    Discharge Exam:  BP (!) 119/56   Pulse 53   Temp 98.2 °F (36.8 °C) (Oral)   Resp 20   Ht 6' (1.829 m)   Wt 250 lb (113.4 kg)   SpO2 99%   BMI 33.91 kg/m²   General appearance: alert, appears stated age and cooperative  Lungs: clear to auscultation bilaterally  Heart: regular rate and rhythm, S1, S2 normal, no murmur, click, rub or gallop  Abdomen: soft, non-tender; bowel sounds normal; no masses,  no organomegaly  Extremities: extremities normal, atraumatic, no cyanosis or edema  Skin: Skin color, texture, turgor normal. No rashes or lesions    Labs:   Recent Labs     02/09/20  2045 02/09/20  2227 02/10/20  0527   WBC 11.4*  --  11.2*   HGB 13.4* 12.2* 13.0*   HCT 40.6*  --  40.2*     --  176     Recent Labs     02/09/20  2045 02/09/20  2227 02/10/20  0527     --  143   K 4.6  --  4.5   CL 98  --  106   CO2 27  -- gabapentin (NEURONTIN) 800 MG tablet Take 1 tablet by mouth 3 times daily for 30 days. Alpha Stabs: 90 tablet, Refills: 0    Associated Diagnoses: Neuropathy      albuterol sulfate  (90 Base) MCG/ACT inhaler Inhale 2 puffs into the lungs daily            Activity: activity as tolerated  Diet: regular diet  Wound Care: as directed    Follow-up with Dr. Mars Almonte in 1 week.     DC time 35 minutes    Signed:  Electronically signed by Marty Davila MD on 2/12/2020 at 12:00 PM

## 2020-02-12 NOTE — PROGRESS NOTES
INPATIENT PROGRESS NOTES    PATIENT NAME: Lupe Garcia  MRN: [id number]  SERVICE DATE:  February 12, 2020   SERVICE TIME:  12:11 PM      PRIMARY SERVICE: Pulmonary Disease    CHIEF COMPLAIN: Pneumonia    INTERVAL HPI: Patient seen and examined at bedside, Interval Notes, orders reviewed. Nursing notes noted  Patient said he is feeling good, currently on 4 L O2 via nasal cannula and oxygen saturation is 99%. He is using 3 L O2 in nursing home. Decreased about 2 to 3 L. Did not use BiPAP last night. No complaint of fever or chills. No chest pain pleuritic pain. No cough or sputum exam.  No nausea vomiting or diarrhea. He wants to go back to nursing home    OBJECTIVE    Body mass index is 33.91 kg/m². PHYSICAL EXAM:  Vitals:  BP (!) 119/56   Pulse 53   Temp 98.2 °F (36.8 °C) (Oral)   Resp 20   Ht 6' (1.829 m)   Wt 250 lb (113.4 kg)   SpO2 99%   BMI 33.91 kg/m²   General:Alert, awake . comfortable in bed, No distress. appears stated age and cooperative  Head: Atraumatic , Normocephalic   Eyes: PERRL. No sclera icterus. No conjunctival injection. No discharge   ENT: No nasal  discharge. Pharynx clear. lips, teeth, mucosa and gums are normal, tongue protrudes in the midline  Neck:  Trachea midline. No thyromegaly, no JVD, No cervical adenopathy. Chest : Bilaterally symmetrical ,Normal effort,  No accessory muscle use  Lung : . Fair BS bilateral, decreased BS at bases. No Rales. No wheezing. No rhonchi. No dullness on percussion. Heart[de-identified] Normal  rate. Regular rhythm. No mumur ,  Rub or gallop  ABD: Non-tender. Non-distended. No masses. No organmegaly. Normal bowel sounds. No hernia. Ext : Bilateral above-knee amputation.   No Pitting both leg , No Cyanosis No clubbing  Neuro: no focal weakness          DATA:   Recent Labs     02/09/20  2045 02/09/20  2227 02/10/20  0588   WBC 11.4*  --  11.2*   HGB 13.4* 12.2* 13.0*   HCT 40.6*  --  40.2*   MCV 96.3  --  100.3*     --  176     Recent Labs Cardiopericardial silhouette enlarged. Aorta calcified. Patchy area of increased opacity again identified right hilum/infrahilar, and left midlung. BILATERAL ATELECTASIS/PNEUMONIA, UNCHANGED. Xr Chest Portable    Result Date: 2/10/2020  EXAMINATION: XR CHEST PORTABLE CLINICAL HISTORY: SEPSIS COMPARISONS: OCTOBER 25, 2019 FINDINGS: Lung volumes low. Kyphotic. Cardiac silhouette normal. Ill-defined areas of increased opacity found right lower, and left midlung. RIGHT LOWER AND LEFT MID LUNG ATELECTASIS/PNEUMONIA. IMPRESSION AND SUGGESTION:  1. Sepsis secondary to UTI with Klebsiella pneumonia and possible pneumonia  2. Bilateral atelectasis/pneumonia. 3. Acute on chronic hypoxic respiratory failure  4. COPD with mild exacerbation  5. Obstructive sleep apnea on CPAP therapy  6. Abuse  7. Peripheral vascular disease   8. bilateral above-knee amputation    Continue antibiotic as per ID. Continue 3 L O2 via nasal cannula. Keep saturation 90% above continue bronchodilator therapy with DuoNeb 3 times daily and albuterol every 2 hours as needed.       Electronically signed by Vane Price MD, FCCP on 2/12/2020 at 12:11 PM

## 2020-02-12 NOTE — FLOWSHEET NOTE
2140 Assessment done. A & O x4. Lung sounds clear. Pt right sclera is redden, no drainage, denies itching, or crust noted. Pt believes it's from the BiPap machine having a high flow. Denies discomfort. Silvia care done and applies A&D ointment Repositioned. No other needs. Call light in reach.

## 2020-02-13 ENCOUNTER — OFFICE VISIT (OUTPATIENT)
Dept: GERIATRIC MEDICINE | Age: 70
End: 2020-02-13
Payer: MEDICARE

## 2020-02-13 PROCEDURE — 1123F ACP DISCUSS/DSCN MKR DOCD: CPT | Performed by: PHYSICIAN ASSISTANT

## 2020-02-13 PROCEDURE — 99308 SBSQ NF CARE LOW MDM 20: CPT | Performed by: PHYSICIAN ASSISTANT

## 2020-02-13 PROCEDURE — G8484 FLU IMMUNIZE NO ADMIN: HCPCS | Performed by: PHYSICIAN ASSISTANT

## 2020-02-14 ENCOUNTER — OFFICE VISIT (OUTPATIENT)
Dept: GERIATRIC MEDICINE | Age: 70
End: 2020-02-14
Payer: MEDICARE

## 2020-02-14 PROCEDURE — G8484 FLU IMMUNIZE NO ADMIN: HCPCS | Performed by: FAMILY MEDICINE

## 2020-02-14 PROCEDURE — 1123F ACP DISCUSS/DSCN MKR DOCD: CPT | Performed by: FAMILY MEDICINE

## 2020-02-14 PROCEDURE — 99306 1ST NF CARE HIGH MDM 50: CPT | Performed by: FAMILY MEDICINE

## 2020-02-15 LAB
BLOOD CULTURE, ROUTINE: NORMAL
CULTURE, BLOOD 2: NORMAL

## 2020-02-19 ENCOUNTER — OFFICE VISIT (OUTPATIENT)
Dept: GERIATRIC MEDICINE | Age: 70
End: 2020-02-19
Payer: MEDICARE

## 2020-02-19 PROCEDURE — 99304 1ST NF CARE SF/LOW MDM 25: CPT | Performed by: INTERNAL MEDICINE

## 2020-02-19 PROCEDURE — 1123F ACP DISCUSS/DSCN MKR DOCD: CPT | Performed by: INTERNAL MEDICINE

## 2020-02-19 PROCEDURE — G8484 FLU IMMUNIZE NO ADMIN: HCPCS | Performed by: INTERNAL MEDICINE

## 2020-02-27 ENCOUNTER — OFFICE VISIT (OUTPATIENT)
Dept: GERIATRIC MEDICINE | Age: 70
End: 2020-02-27
Payer: MEDICARE

## 2020-02-27 PROCEDURE — 99308 SBSQ NF CARE LOW MDM 20: CPT | Performed by: PHYSICIAN ASSISTANT

## 2020-02-27 PROCEDURE — G8484 FLU IMMUNIZE NO ADMIN: HCPCS | Performed by: PHYSICIAN ASSISTANT

## 2020-02-27 PROCEDURE — 1123F ACP DISCUSS/DSCN MKR DOCD: CPT | Performed by: PHYSICIAN ASSISTANT

## 2020-03-01 VITALS
HEART RATE: 56 BPM | OXYGEN SATURATION: 18 % | DIASTOLIC BLOOD PRESSURE: 52 MMHG | TEMPERATURE: 97.5 F | SYSTOLIC BLOOD PRESSURE: 98 MMHG

## 2020-03-04 NOTE — PROGRESS NOTES
PATIENT: Maxwell Kirkpatrick : 1950 DOS: 2020     [facility]    HPI: Following up with patient from hospital visit. Patient did not have pneumonia in hospital, negative on chest x-ray. The patient was positive for UTI, was treated with Cipro 500 mg p.o. daily x7 days with acidophilus. Patient is complaining today of lower leg phantom pain and neuropathic pain. Patient's gabapentin dose was restarted retirement through this hospital stay; however, currently not on his order sheet, will return, otherwise patient is breathing well. Doing well on current management, up today in hallway, joking around and laughing with nurses. He is overall stable and significantly improved. We will monitor over the next several weeks. MEDICATIONS: Reviewed. ALLERGIES: Reviewed. REVIEW OF SYSTEMS: CONSTITUTIONAL: No new fever, chills, nausea, vomiting, increased weakness or fatigue beyond his baseline. HEENT: MMM. No erythema or exudate. No thrush. No evidence of upper respiratory infections at this time other than some wheezing, only audible to auscultation. CARDIAC: No chest pain. Does complain of orthopnea; history of HARSHA. Denies BiPAP usage routinely. PULMONARY: No new cough, SOB, wheezing, or hemoptysis beyond baseline. GI: Denies any complaints. : No suprapubic tenderness. Denies any urinary symptoms. No blood in urine or pus or evidence of discharge. MSK: The patient does complain of bilateral lower leg phantom pain and neuropathic pain associated with bilateral BKA. PSYCHIATRIC: Patient's mood is stable. He is improved. Happy to be back in comfortable environment. Remaining 14-point ROS unremarkable. PHYSICAL EXAMINATION: VS: Stable, wnl. GENERAL: Fair hygiene overall, bright affect today. No acute distress. Seen in wheelchair in the hallway near nurses' station. CARDIAC: Regular rate and rhythm. No JVD noted today. No significant lower leg edema.  Above-knee distal pulses, posterior

## 2020-03-10 ENCOUNTER — OFFICE VISIT (OUTPATIENT)
Dept: GERIATRIC MEDICINE | Age: 70
End: 2020-03-10
Payer: MEDICARE

## 2020-03-10 PROCEDURE — G8484 FLU IMMUNIZE NO ADMIN: HCPCS | Performed by: PHYSICIAN ASSISTANT

## 2020-03-10 PROCEDURE — 99308 SBSQ NF CARE LOW MDM 20: CPT | Performed by: PHYSICIAN ASSISTANT

## 2020-03-10 PROCEDURE — 1123F ACP DISCUSS/DSCN MKR DOCD: CPT | Performed by: PHYSICIAN ASSISTANT

## 2020-03-18 NOTE — PROGRESS NOTES
[facility]    Seen for an admission history and physical.     CHIEF COMPLAINT: Pneumonia, urosepsis. HISTORY OF PRESENT ILLNESS: This is a 53-year-old gentleman who has been a patient with known history of acute on chronic hypoxemia. The patient does have respiratory failure worsening beyond his baseline, was hospitalized. The patient did undergo evaluation, was found to have evidence of possible pneumonia as well as possibly catheter associated urinary tract infection. The patient has been placed on antibiotic therapy. Fever curve and white count were monitored. The patient did have generalized weakness. The patient did undergo a course of therapy, has been on a course antibiotic therapy, respiratory treatments. Fever curve was monitored as was white count. The patient's oral intake had been stabilized, subsequently returned to this facility for ongoing course of care. PAST MEDICAL HISTORY: Included hypertension, recent pneumonia, weakness, degenerative joint disease, obesity, neuropathy, hyperlipidemia. MEDICATIONS: Per the nursing home record. FAMILY HISTORY: Positive for diabetes, coronary artery disease. SOCIAL HISTORY: The patient currently is nonsmoker. REVIEW OF SYSTEMS: The patient denied chest pain, palpitations, nausea, vomiting, emesis, headaches, fevers, chills. No change in his bowel or bladder habits. Nursing staff report the patient has been clinically stable. OBJECTIVE: He appeared at his baseline. Pupils are small, but they are reactive. Extraocular movements are intact. Oral mucosa is moist. Chest showed no crackles, no wheezing. Cardiovascular exam showed a regular rate. Abdomen was obese with positive bowel sounds. No involuntary guarding or rigidity. Extremities showed trace dorsal pedal pulse. ASSESSMENT AND PLAN:  1. Hypertension: Blood pressure is stable. No orthostasis.   2. Pneumonia: Completing course of antibiotic therapy, respiratory treatments, and supplemental oxygen as needed. 3. Urinary tract infection: Has had prior catheter in place. Monitoring clinically. 4. Constipation: No new further impaction. We will monitor clinically. Please note, available hospital record, imaging report, consultant notes, laboratory results reviewed.

## 2020-03-27 VITALS
RESPIRATION RATE: 18 BRPM | SYSTOLIC BLOOD PRESSURE: 118 MMHG | DIASTOLIC BLOOD PRESSURE: 76 MMHG | HEART RATE: 74 BPM | TEMPERATURE: 97.9 F | OXYGEN SATURATION: 93 %

## 2020-03-27 NOTE — PROGRESS NOTES
sounds in all quadrants. He has bilateral above knee amputations and skin is warm and dry except for a healed wound in the inner buttock (not examined today). He is alert and oriented x3 with muscle strength 4/5 in the upper extremities. He has appropriate mood and affect. ASSESSMENT AND PLAN:  1. Acute UTI, catheter associated, unspecified organisms: Continue taking ciprofloxacin 500 mg tablet as directed. 2. Pneumonia, unspecified organism: The patient will continue taking doxycycline and ciprofloxacin as directed. 3. Generalized muscle weakness: PT/OT/ST to evaluate and treat accordingly. 4. Tobacco use disorder, continuous: He was counseled regarding nicotine cessation, but he declined counseling or any form of treatment at this time.         Inocencia Lombardo MD  Attending Geriatrician        Electronically Signed By: Artis Robert MD on 03/08/2020 22:25:36  ______________________________  Artis Robert MD  /[id number]  D: 02/17/2020 07:59:56  T: 02/18/2020 03:12:23    cc: - [phone] [place] [address]

## 2020-03-30 ENCOUNTER — VIRTUAL VISIT (OUTPATIENT)
Dept: GERIATRIC MEDICINE | Age: 70
End: 2020-03-30
Payer: MEDICAID

## 2020-03-30 PROCEDURE — 99441 PR PHYS/QHP TELEPHONE EVALUATION 5-10 MIN: CPT | Performed by: PHYSICIAN ASSISTANT

## 2020-03-31 NOTE — PROGRESS NOTES
Stephen Griffin is a 81 y.o. male evaluated via telephone on 3/30/2020. Consent:  He and/or health care decision maker is aware that that he may receive a bill for this telephone service, depending on his insurance coverage, and has provided verbal consent to proceed: Yes      Documentation:  I communicated with the patient and/or health care decision maker about patient's coccyx wound is improving. Healed over; skin is intact. No new issues of skin breakdown. Patient's appetite is good. Discussion today of continue patient vitamin protocol. Will DC due to patient recovered from wound. Will reinstate if further wound develops. Will have wound care nurse follow patient routinely while in facility. Patient otherwise stable at this time. .   Details of this discussion including any medical advice provided: DC wound vitamin protocol. See chart for details. I affirm this is a Patient Initiated Episode with an Established Patient who has not had a related appointment within my department in the past 7 days or scheduled within the next 24 hours.     Total Time: minutes: 5-10 minutes    Note: not billable if this call serves to triage the patient into an appointment for the relevant concern      Cristhian Cardenas

## 2020-04-15 NOTE — PROGRESS NOTES
[facility]    [address]    Phone: [phone]  Fax:  [phone]       PATIENT: Aliyah Melton : 1950 DOS: 2020     [facility]    HISTORY OF PRESENT ILLNESS: The patient is being seen today as acute patient for ongoing history of hypoxia with chronic hypercapnia. The patient is on a BiPAP at night due to significant orthopnea and hypercapnia. The patient continues to refuse day to day, and we discussed with patient why he does not want to use it. He says Marylou Cartwright does not need it and he has never needed it in the past.\" Expressed the lab values that he have had in the past and why it is important that he get CO2 off his lungs. The patient seems to understand; however, was noncompliant. We will do a BMP to reevaluate and DC BiPAP if the patient continues to refuse due to cost and nonuse. Otherwise, the patient offers no new complaints and is stable concerning other areas. The patient denies any fever, chills, nausea, vomiting, weakness, fatigue beyond his baseline. Denies any acute cardiopulmonary symptoms. Does have history of orthopnea, PND, and CANCINO due to inactivity. No new SOB, POI, or wheezing today. Denies any GI/ complaints. Currently, mood is stable without any acute depression or anxiety reported. MEDICATIONS: Reviewed. ALLERGIES: Reviewed. REVIEW OF SYSTEMS: See HPI. PHYSICAL EXAMINATION: Vital signs reviewed, see [facility] for details. GENERAL: Fair hygiene, bright affect, no distress; seen in his wheelchair today in his room with his roommate present. CARDIAC: Regular rate and rhythm. No new murmurs, rubs, or gallops noted on auscultation. Some mild lower extremity edema near amputation site within normal limits compared to last visit. PULMONARY: Lung sounds show limited expiratory and inspiratory wheezing. No new rhonchi or crackles noted throughout lungs. Patient continues to smoke routinely.  ABDOMINAL:

## 2020-04-24 NOTE — PROGRESS NOTES
PATIENT: Paz Murillo : 1950 DOS: 03/10/2020     [facility]    HPI: Vital signs reviewed on site today, please see [facility] for details. The patient is being seen today for monthly follow up visit for history of obesity, HARSHA, and GERD. The patient is continuing to refuse Protonix 40 mg p.o. q.d. Explained the importance of continuing prophylactic PPI for history of GERD and its protection against esophagitis and possible GI bleed. The patient is indifferent, seems to be intolerant of medication based on incorrect perceptions on health status. We will continue to approach the patient about this in the coming days and weeks. The patient does refuse his BiPAP at this time. States that his breathing is fine, again seems to be incorrect in his thought process regarding his sleep apnea. He has been sleeping fairly well and propped up slightly at 20 to 30 degree angle. We will continue encouraging this. Encouraging the patient not to sleep flat on his back due to obesity and longstanding HARSHA. The patient's weight has been stable. He has not gained any significant weight at this time. He is still considered. His trunk stability has improved as well, which was a point of concern after his left lower leg BKA. We will continue to monitor and do intermittent PT/OT for increased strength and stability in his wheelchair as needed. MEDICATIONS: Reviewed. ALLERGIES: Reviewed. REVIEW OF SYSTEMS: Constitutional: Denies any fever, chills, nausea, vomiting, weakness, fatigue. Cardiopulmonary: No CP or CANCINO reported. Does have significant orthopnea and sleep apnea. No lower leg edema due to bilateral BKA. Pulmonary: No new cough, POI, wheezing or SOB at this time. GI: Denies any bowel changes. No bleeding or hemoptysis. Denies any GERD symptoms, dysphagia, or indigestion. No abdominal pain at this time. : No new changes. Does have chronic Babin catheter in place.  No hematuria, pus or discharge

## 2020-05-01 ENCOUNTER — VIRTUAL VISIT (OUTPATIENT)
Dept: GERIATRIC MEDICINE | Age: 70
End: 2020-05-01
Payer: MEDICARE

## 2020-05-01 PROCEDURE — 3051F HG A1C>EQUAL 7.0%<8.0%: CPT | Performed by: INTERNAL MEDICINE

## 2020-05-01 PROCEDURE — 1123F ACP DISCUSS/DSCN MKR DOCD: CPT | Performed by: INTERNAL MEDICINE

## 2020-05-01 PROCEDURE — 99309 SBSQ NF CARE MODERATE MDM 30: CPT | Performed by: INTERNAL MEDICINE

## 2020-05-27 NOTE — PROGRESS NOTES
tablet by mouth 2 times daily 60 tablet 3    ferrous sulfate 325 (65 Fe) MG tablet Take 1 tablet by mouth 2 times daily (with meals) 30 tablet 3    pantoprazole (PROTONIX) 40 MG tablet Take 1 tablet by mouth every morning (before breakfast) 30 tablet 3    nicotine (NICODERM CQ) 21 MG/24HR Place 1 patch onto the skin daily 30 patch 3    ipratropium-albuterol (DUONEB) 0.5-2.5 (3) MG/3ML SOLN nebulizer solution Inhale 3 mLs into the lungs three times daily 360 mL 0    lidocaine (LMX) 4 % cream Apply topically as needed. 0    furosemide (LASIX) 40 MG tablet Take 40 mg by mouth daily Give 30 minutes after metolazone      Multiple Vitamins-Minerals (THERAPEUTIC MULTIVITAMIN-MINERALS) tablet Take 1 tablet by mouth daily      vitamin C (ASCORBIC ACID) 500 MG tablet Take 500 mg by mouth daily      insulin glargine (LANTUS) 100 UNIT/ML injection vial Inject 12 Units into the skin nightly 1 vial 3    insulin lispro (HUMALOG) 100 UNIT/ML injection vial Inject 0-12 Units into the skin 3 times daily (with meals) (Patient taking differently: Inject 5 Units into the skin 3 times daily (before meals) ) 1 vial 3    aspirin 81 MG chewable tablet Take 1 tablet by mouth daily 30 tablet 3    atorvastatin (LIPITOR) 20 MG tablet Take 1 tablet by mouth nightly 30 tablet 3    gabapentin (NEURONTIN) 800 MG tablet Take 1 tablet by mouth 3 times daily for 30 days. . 90 tablet 0    albuterol sulfate  (90 Base) MCG/ACT inhaler Inhale 2 puffs into the lungs daily        No current facility-administered medications on file prior to visit.       Past Medical History:   Diagnosis Date    Asthma     COPD (chronic obstructive pulmonary disease) ([address]     Diabetes mellitus due to underlying condition, controlled, with stage 2 chronic kidney disease ([address]     Morbid obesity ([address]     Neuropathy     PVD (peripheral vascular disease) ([facility]     1 stent in left leg placed 09/2018 and 2 stents in right leg    Sleep apnea     Type 2 diabetes mellitus without complication [facility])      Past Surgical History:   Procedure Laterality Date    AMPUTATION ABOVE KNEE Right 10/14/2019    LEG AMPUTATION ABOVE KNEE performed by Emily Jones MD at . [place] TISS N-SLCTV DBRDMT W/O ANES 1 SESS Right 5/10/2019    INCISION AND DEBRIDEMENT WOUND RIGHT BUTTOCK, ROOM 270 performed by Sid Arias MD at [facility]  2015    patient had a boil break open inside rectum. needed surgery for infection removal.     No family history on file. Social History     Tobacco Use    Smoking status: Current Every Day Smoker     Packs/day: 0.25     Years: 58.00     Pack years: 14.50     Types: Cigarettes    Smokeless tobacco: Never Used   Substance Use Topics    Alcohol use: No    Drug use: Yes     Types: Marijuana     ROS: no new SOB no CP no bleeding diathesis    O:VSS  Obese  Alert Ox3  Chest: diminished BS 2nd to habitus per nursing exam  CVS: RR per nursing exam  ABD: +BS NT ND per nursing exam  EXT: bilateral AKA  Skin: refer nursing notes    Lab Results   Component Value Date    LABA1C 7.7 01/20/2020     No results found for: EAG  Lab Results   Component Value Date     02/10/2020    K 4.5 02/10/2020     02/10/2020    CO2 23 02/10/2020    BUN 44 02/10/2020    CREATININE 1.39 02/10/2020    GLUCOSE 224 02/10/2020    CALCIUM 8.4 02/10/2020      Lab Results   Component Value Date    WBC 11.2 (H) 02/10/2020    HGB 13.0 (L) 02/10/2020    HCT 40.2 (L) 02/10/2020    .3 (H) 02/10/2020     02/10/2020     AP:    Diagnosis Orders   1. Neuropathy     2. Diabetes mellitus without complication ([address]     3. Chronic bronchitis, unspecified chronic bronchitis type ([address]       Doing well with phantom pain. Following BS, repeat HBA1C.

## 2020-06-16 ENCOUNTER — OFFICE VISIT (OUTPATIENT)
Dept: GERIATRIC MEDICINE | Age: 70
End: 2020-06-16
Payer: MEDICARE

## 2020-06-16 PROCEDURE — 99308 SBSQ NF CARE LOW MDM 20: CPT | Performed by: PHYSICIAN ASSISTANT

## 2020-06-16 PROCEDURE — 1123F ACP DISCUSS/DSCN MKR DOCD: CPT | Performed by: PHYSICIAN ASSISTANT

## 2020-06-23 ENCOUNTER — OFFICE VISIT (OUTPATIENT)
Dept: GERIATRIC MEDICINE | Age: 70
End: 2020-06-23
Payer: MEDICARE

## 2020-06-23 LAB
BUN BLDV-MCNC: 45 MG/DL
CALCIUM SERPL-MCNC: 8.4 MG/DL
CHLORIDE BLD-SCNC: 102 MMOL/L
CO2: 31 MMOL/L
CREAT SERPL-MCNC: 1.1 MG/DL
GFR CALCULATED: 67
GLUCOSE BLD-MCNC: 152 MG/DL
POTASSIUM SERPL-SCNC: 4.4 MMOL/L
SODIUM BLD-SCNC: 144 MMOL/L

## 2020-06-23 PROCEDURE — 3051F HG A1C>EQUAL 7.0%<8.0%: CPT | Performed by: PHYSICIAN ASSISTANT

## 2020-06-23 PROCEDURE — 99309 SBSQ NF CARE MODERATE MDM 30: CPT | Performed by: PHYSICIAN ASSISTANT

## 2020-06-23 PROCEDURE — 1123F ACP DISCUSS/DSCN MKR DOCD: CPT | Performed by: PHYSICIAN ASSISTANT

## 2020-06-26 LAB
ANION GAP SERPL CALCULATED.3IONS-SCNC: 10 MEQ/L (ref 9–15)
BASOPHILS ABSOLUTE: 0 K/UL (ref 0–0.2)
BASOPHILS RELATIVE PERCENT: 0.6 %
BUN BLDV-MCNC: 45 MG/DL (ref 8–23)
CALCIUM SERPL-MCNC: 9 MG/DL (ref 8.5–9.9)
CHLORIDE BLD-SCNC: 100 MEQ/L (ref 95–107)
CO2: 33 MEQ/L (ref 20–31)
CREAT SERPL-MCNC: 1.18 MG/DL (ref 0.7–1.2)
EOSINOPHILS ABSOLUTE: 0.2 K/UL (ref 0–0.7)
EOSINOPHILS RELATIVE PERCENT: 3.6 %
GFR AFRICAN AMERICAN: >60
GFR NON-AFRICAN AMERICAN: >60
GLUCOSE BLD-MCNC: 187 MG/DL (ref 70–99)
HCT VFR BLD CALC: 47.4 % (ref 42–52)
HEMOGLOBIN: 15 G/DL (ref 14–18)
LYMPHOCYTES ABSOLUTE: 1.6 K/UL (ref 1–4.8)
LYMPHOCYTES RELATIVE PERCENT: 23.2 %
MCH RBC QN AUTO: 32 PG (ref 27–31.3)
MCHC RBC AUTO-ENTMCNC: 31.7 % (ref 33–37)
MCV RBC AUTO: 101 FL (ref 80–100)
MONOCYTES ABSOLUTE: 0.4 K/UL (ref 0.2–0.8)
MONOCYTES RELATIVE PERCENT: 6.6 %
NEUTROPHILS ABSOLUTE: 4.5 K/UL (ref 1.4–6.5)
NEUTROPHILS RELATIVE PERCENT: 66 %
PDW BLD-RTO: 15.3 % (ref 11.5–14.5)
PLATELET # BLD: 195 K/UL (ref 130–400)
POTASSIUM SERPL-SCNC: 4.5 MEQ/L (ref 3.4–4.9)
PRO-BNP: 4861 PG/ML
RBC # BLD: 4.69 M/UL (ref 4.7–6.1)
SODIUM BLD-SCNC: 143 MEQ/L (ref 135–144)
WBC # BLD: 6.8 K/UL (ref 4.8–10.8)

## 2020-06-30 ENCOUNTER — OFFICE VISIT (OUTPATIENT)
Dept: GERIATRIC MEDICINE | Age: 70
End: 2020-06-30
Payer: MEDICARE

## 2020-06-30 LAB
BASOPHILS ABSOLUTE: 0.1 /ΜL
BASOPHILS RELATIVE PERCENT: 0.9 %
BUN BLDV-MCNC: 53 MG/DL
CALCIUM SERPL-MCNC: 8.9 MG/DL
CHLORIDE BLD-SCNC: 98 MMOL/L
CO2: 33 MMOL/L
CREAT SERPL-MCNC: 0.9 MG/DL
EOSINOPHILS ABSOLUTE: NORMAL
EOSINOPHILS RELATIVE PERCENT: 0.2 %
GFR CALCULATED: 84
GLUCOSE BLD-MCNC: 260 MG/DL
HCT VFR BLD CALC: 46.8 % (ref 41–53)
HEMOGLOBIN: 14.6 G/DL (ref 13.5–17.5)
LYMPHOCYTES ABSOLUTE: 1.7 /ΜL
LYMPHOCYTES RELATIVE PERCENT: 21 %
MCH RBC QN AUTO: 31.5 PG
MCHC RBC AUTO-ENTMCNC: 31.3 G/DL
MCV RBC AUTO: 100.9 FL
MONOCYTES ABSOLUTE: 0.7 /ΜL
MONOCYTES RELATIVE PERCENT: 8 %
NEUTROPHILS ABSOLUTE: 5.8 /ΜL
NEUTROPHILS RELATIVE PERCENT: 69.9 %
PLATELET # BLD: 196 K/ΜL
PMV BLD AUTO: 10.4 FL
POTASSIUM SERPL-SCNC: 4.5 MMOL/L
RBC # BLD: 4.64 10^6/ΜL
SODIUM BLD-SCNC: 141 MMOL/L
WBC # BLD: 8.2 10^3/ML

## 2020-06-30 PROCEDURE — 99309 SBSQ NF CARE MODERATE MDM 30: CPT | Performed by: PHYSICIAN ASSISTANT

## 2020-06-30 PROCEDURE — 1123F ACP DISCUSS/DSCN MKR DOCD: CPT | Performed by: PHYSICIAN ASSISTANT

## 2020-07-08 ENCOUNTER — OFFICE VISIT (OUTPATIENT)
Dept: GERIATRIC MEDICINE | Age: 70
End: 2020-07-08
Payer: MEDICARE

## 2020-07-08 PROCEDURE — 99308 SBSQ NF CARE LOW MDM 20: CPT | Performed by: PHYSICIAN ASSISTANT

## 2020-07-08 PROCEDURE — 1123F ACP DISCUSS/DSCN MKR DOCD: CPT | Performed by: PHYSICIAN ASSISTANT

## 2020-07-09 ENCOUNTER — OFFICE VISIT (OUTPATIENT)
Dept: GERIATRIC MEDICINE | Age: 70
End: 2020-07-09
Payer: MEDICARE

## 2020-07-09 PROBLEM — E08.621 DIABETIC ULCER OF RIGHT HEEL ASSOCIATED WITH DIABETES MELLITUS DUE TO UNDERLYING CONDITION, LIMITED TO BREAKDOWN OF SKIN (HCC): Status: RESOLVED | Noted: 2019-05-07 | Resolved: 2020-07-09

## 2020-07-09 PROBLEM — N39.0 SEPSIS SECONDARY TO UTI (HCC): Status: RESOLVED | Noted: 2019-10-03 | Resolved: 2020-07-09

## 2020-07-09 PROBLEM — A41.9 SEPSIS SECONDARY TO UTI (HCC): Status: RESOLVED | Noted: 2019-10-03 | Resolved: 2020-07-09

## 2020-07-09 PROBLEM — L89.313 DECUBITUS ULCER OF ISCHIAL AREA, RIGHT, STAGE III (HCC): Status: RESOLVED | Noted: 2019-05-29 | Resolved: 2020-07-09

## 2020-07-09 PROBLEM — A48.0 GAS GANGRENE OF FOOT (HCC): Chronic | Status: RESOLVED | Noted: 2018-11-20 | Resolved: 2020-07-09

## 2020-07-09 PROBLEM — R17 ELEVATED BILIRUBIN: Status: RESOLVED | Noted: 2018-11-20 | Resolved: 2020-07-09

## 2020-07-09 PROBLEM — L97.411 DIABETIC ULCER OF RIGHT HEEL ASSOCIATED WITH DIABETES MELLITUS DUE TO UNDERLYING CONDITION, LIMITED TO BREAKDOWN OF SKIN (HCC): Status: RESOLVED | Noted: 2019-05-07 | Resolved: 2020-07-09

## 2020-07-09 PROBLEM — E11.65 HYPERGLYCEMIA DUE TO TYPE 2 DIABETES MELLITUS (HCC): Chronic | Status: RESOLVED | Noted: 2018-11-20 | Resolved: 2020-07-09

## 2020-07-09 PROBLEM — G89.28 CHRONIC PAIN FOLLOWING SURGERY OR PROCEDURE: Status: RESOLVED | Noted: 2019-10-16 | Resolved: 2020-07-09

## 2020-07-09 PROBLEM — R26.9 GAIT ABNORMALITY: Status: RESOLVED | Noted: 2019-05-07 | Resolved: 2020-07-09

## 2020-07-09 PROBLEM — G89.18 PAIN FOLLOWING SURGERY OR PROCEDURE: Status: RESOLVED | Noted: 2019-10-16 | Resolved: 2020-07-09

## 2020-07-09 PROCEDURE — 99308 SBSQ NF CARE LOW MDM 20: CPT | Performed by: INTERNAL MEDICINE

## 2020-07-09 PROCEDURE — 1123F ACP DISCUSS/DSCN MKR DOCD: CPT | Performed by: INTERNAL MEDICINE

## 2020-07-09 PROCEDURE — 3051F HG A1C>EQUAL 7.0%<8.0%: CPT | Performed by: INTERNAL MEDICINE

## 2020-07-09 NOTE — PROGRESS NOTES
PATIENT: Zhanna Stewart : 1950 DOS: 2020     [facility]    HPI: The patient is seen today for history of elevated BUN of 42 today. On last blood draw, BUN was 30. Discussed patient's oral fluid intake. Per nursing staff, patient only drinks coffee predominantly most of the day, will have 3 to 4 cups of coffee and that will be his predominant fluid intake with scant sips of water and/or juice throughout the rest of the day for a low average total of fluid intake. Expressed to the patient that he needs to increase more fluids due to signs of dehydration. Skin turgor is fair/poor. The patient does have moist mucous membranes. However, fairly dry mouth on inspection. Dry, cracked lips as well, mild. Encouraged patient to drink increased water, p.o. and/or decaf coffee/decaf tea if he tolerates that better. The patient stated he would be compliant and was fairly cooperative throughout the discussion. Otherwise, patient offers no new acute complaints. The patient denies any darker urine. States that he has never been dehydrated before. Educated patient on dehydration and laboratory values. MEDICATIONS:  Reviewed. ALLERGIES:  Reviewed. REVIEW OF SYSTEMS:  Constitutional:  No NVD. The patient's fatigue has slightly diminished. No new weakness, fever, chills, nausea, or vomiting. No new headache, confusion, lightheadedness, LOC. Cardiac:  Denies chest pain, palpitations, orthopnea, CANCINO, or significant  upper leg edema. Pulmonary:  No new cough, wheezing, SOB, or POI. GI:  No new complaints. :  No new complaints. Maintains scheduled toileting program. Produces urine adequately. Psychiatric:  The patient's mood is overall stable. No acute changes in this regard. Remaining 14-point ROS unremarkable. PHYSICAL EXAMINATION: Vital signs reviewed, see [facility] for details.   GENERAL: Displays good hygiene, bright affect, no distress; seen in his wheelchair today in

## 2020-07-09 NOTE — PROGRESS NOTES
PATIENT: Maritza Aggarwal : 1950 DOS: 2020     [facility]    HPI: Vital signs reviewed on site today. The patient is being seen today for monthly routine follow up visit for history of chronic respiratory failure with COPD, CKD stage III, and hypertension. The patient is currently doing well. Vital signs are well within normal limits. No new hypertensive crisis noted. Denies any chest pain, shortness of breath at this time. [facility] where he is smoking cigarette. The patient does continue to smoke chronically. The patient does continue to refuse to quit or want to do any mitigation at this time. The patient is breathing fairly well. SpO2 is within normal limits at this time for COPD patient. No new exacerbations noted. The patient is in a good mood overall, seated in his wheelchair outside. Discussed doing follow up labs. The patient has not had BMP drawn for his CKD since early March. The patient obliged. We will order follow up labs on next Tuesday and report back with the patient to discuss as needed. Otherwise, the patient has no new complaints. He does want a multivitamin and a few vitamins to mitigate certain disease processes despite poor health choices. We will add to oblige the patient assure of any micronutrient deficiencies that the patient perceives. MEDICATIONS: Reviewed. ALLERGIES: Reviewed. REVIEW OF SYSTEMS: Constitutional: Denies any fever, chills, NVD. Denies any new fatigue or weakness beyond his baseline. No new acute weight changes reported. Cardiopulmonary: Denies any chest pain, shortness of breath, palpitations, PND, orthopnea. No new stuffiness, sneezing, sore throat. Denies any wheezing, hemoptysis. GI: Denies any new problems. : No new problems. Psychiatric: The patient's mood has overall improved. It seems to be secondary to good weather and ability to go outside as the patient enjoys it.  Remaining 14-point ROS unremarkable. PHYSICAL EXAMINATION: General: The patient has unchanged normal hygiene, bright affect. No acute distress noted. Seated upright in his wheelchair today in courtyard in [place]. HEENT: MMM. No erythema or exudate. PERRLA. Cardiac: Regular rate and rhythm. No JVD. No significant lower leg edema. Pulmonary: LSCTA with some minor expiratory wheezing on further exam in upper lung fields. Abdominal: Normal bowel sounds, soft, nontender abdomen in all 4 quadrants, obese abdomen. Mental Status: The patient is awake, alert, oriented 3/3. Good memory, command following. ASSESSMENT AND PLAN:   1. Chronic respiratory failure/COPD Continue ongoing O2 therapy p.r.n. Encourage BiPAP despite the patient's refusal.   2.  CKD stage III BMP next Tuesday. We will follow up with the patient on lab results. 3.  Hypertension Continue current medications. No new changes.          Electronically Signed By: Krish Leone PA-C on 06/21/2020 23:18:31  ______________________________  Krish Leone PA-C  /[id number]  D: 06/17/2020 10:10:43  T: 06/18/2020 02:04:24    cc: - [phone] [facility]

## 2020-07-10 LAB
BUN BLDV-MCNC: 52 MG/DL
CALCIUM SERPL-MCNC: 8.6 MG/DL
CHLORIDE BLD-SCNC: 102 MMOL/L
CO2: 33 MMOL/L
CREAT SERPL-MCNC: 1.2 MG/DL
GFR CALCULATED: NORMAL
GLUCOSE BLD-MCNC: 179 MG/DL
POTASSIUM SERPL-SCNC: 4.6 MMOL/L
SODIUM BLD-SCNC: 144 MMOL/L

## 2020-07-17 ENCOUNTER — OFFICE VISIT (OUTPATIENT)
Dept: GERIATRIC MEDICINE | Age: 70
End: 2020-07-17
Payer: MEDICARE

## 2020-07-17 PROCEDURE — 99308 SBSQ NF CARE LOW MDM 20: CPT | Performed by: PHYSICIAN ASSISTANT

## 2020-07-17 PROCEDURE — 1123F ACP DISCUSS/DSCN MKR DOCD: CPT | Performed by: PHYSICIAN ASSISTANT

## 2020-07-20 ENCOUNTER — VIRTUAL VISIT (OUTPATIENT)
Dept: GERIATRIC MEDICINE | Age: 70
End: 2020-07-20
Payer: MEDICARE

## 2020-07-20 PROCEDURE — 99309 SBSQ NF CARE MODERATE MDM 30: CPT | Performed by: PHYSICIAN ASSISTANT

## 2020-07-20 PROCEDURE — 1123F ACP DISCUSS/DSCN MKR DOCD: CPT | Performed by: PHYSICIAN ASSISTANT

## 2020-07-22 PROBLEM — J96.21 ACUTE AND CHRONIC RESPIRATORY FAILURE WITH HYPOXIA (HCC): Status: RESOLVED | Noted: 2017-01-16 | Resolved: 2020-07-22

## 2020-07-22 NOTE — PROGRESS NOTES
2020    TELEHEALTH EVALUATION -- Audio/Visual (During YXMUN-38 public health emergency)    Due to [place] 19 outbreak, patient's office visit was converted to a virtual visit. Patient was contacted and agreed to proceed with a virtual visit via FaceTime  The risks and benefits of converting to a virtual visit were discussed in light of the current infectious disease epidemic. Patient also understood that insurance coverage and co-pays are up to their individual insurance plans. HPI:    Kikameggan More (:  1950) has requested an audio/video evaluation for the following concern(s):    Patient being evaluate today for ongoing weight gain and diminished SPO2. Patient has had frequent SPO2 levels as low as 78%. Patient refused to wear oxygen routinely. Patient is frequently seen smoking outside. We will continue to smoke despite education to the contrary. Encourage patient to wear oxygen particular when he is in his room resting and not active and out in hallways. Patient does currently have pro-air inhalers for acute exacerbations  Of COPD and/or low SPO2 however has not used frequently. States does not feel symptomatic on SPO2 as low as 80%. Patient shows lack of concern for pulse ox level. Patient also had chest x-ray showing right lower lung infiltrate possibility. Will start patient on Augmentin prophylactically. Encourage patient today to continue wearing nasal cannula and for us to monitor his SPO2 routinely. Patient agreed to oblige. We will follow-up with patient next time available in facility. Today's visit was via virtual visit. [employer] nurse practitioner to visit as well over the next of days to monitor. Additionally patient has had ongoing weight gain over the past several months.   Was recently started patient on metolazone 2.5 mg p.o. every other day for 4 days as a trial.  We will continue manipulate patient's Lasix and metolazone in order to decrease peripheral and/or pulmonary edema. .    Review of Systems    Prior to Visit Medications    Medication Sig Taking? Authorizing Provider   lanolin-petrolatum (A+D) ointment Apply 1 Tube topically 4 times daily as needed for Dry Skin  London Blake MD   isosorbide mononitrate (IMDUR) 30 MG extended release tablet Take 1 tablet by mouth daily  Vinicio Lorenz, DO   metoprolol tartrate (LOPRESSOR) 25 MG tablet Take 1 tablet by mouth 2 times daily  Vinicio Lorenz, DO   ferrous sulfate 325 (65 Fe) MG tablet Take 1 tablet by mouth 2 times daily (with meals)  Vinicio Lorenz, DO   pantoprazole (PROTONIX) 40 MG tablet Take 1 tablet by mouth every morning (before breakfast)  Vinicio Lorenz, DO   nicotine (NICODERM CQ) 21 MG/24HR Place 1 patch onto the skin daily  Vinicio Lorenz, DO   ipratropium-albuterol (DUONEB) 0.5-2.5 (3) MG/3ML SOLN nebulizer solution Inhale 3 mLs into the lungs three times daily  Vinicio Lorenz, DO   lidocaine (LMX) 4 % cream Apply topically as needed. Amina Duron MD   furosemide (LASIX) 40 MG tablet Take 40 mg by mouth daily Give 30 minutes after metolazone  Historical Provider, MD   Multiple Vitamins-Minerals (THERAPEUTIC MULTIVITAMIN-MINERALS) tablet Take 1 tablet by mouth daily  Historical Provider, MD   vitamin C (ASCORBIC ACID) 500 MG tablet Take 500 mg by mouth daily  Historical Provider, MD   insulin glargine (LANTUS) 100 UNIT/ML injection vial Inject 12 Units into the skin nightly  Too Meneses,    insulin lispro (HUMALOG) 100 UNIT/ML injection vial Inject 0-12 Units into the skin 3 times daily (with meals)  Patient taking differently: Inject 5 Units into the skin 3 times daily (before meals)   Too Meneses,    aspirin 81 MG chewable tablet Take 1 tablet by mouth daily  Alexandro Brice, DO   atorvastatin (LIPITOR) 20 MG tablet Take 1 tablet by mouth nightly  Alexandro Brice, DO   gabapentin (NEURONTIN) 800 MG tablet Take 1 tablet by mouth 3 times daily for 30 days. Grover García MD   albuterol sulfate  (90 Base) MCG/ACT inhaler Inhale 2 puffs into the lungs daily   Historical Provider, MD       Social History     Tobacco Use    Smoking status: Current Every Day Smoker     Packs/day: 0.25     Years: 58.00     Pack years: 14.50     Types: Cigarettes    Smokeless tobacco: Never Used   Substance Use Topics    Alcohol use: No    Drug use: Yes     Types: Marijuana        Allergies   Allergen Reactions    Coconut Oil Nausea And Vomiting     Coconut meat only. ,   Past Medical History:   Diagnosis Date    Asthma     [facility]     Diabetes mellitus due to underlying condition, controlled, with stage 2 chronic kidney disease ([facility]     Morbid obesity ([facility]     Neuropathy     PVD (peripheral vascular disease) ([facility]     1 stent in left leg placed 09/2018 and 2 stents in right leg    Sleep apnea     Type 2 diabetes mellitus without complication ([facility]    ,   Past Surgical History:   Procedure Laterality Date    AMPUTATION ABOVE KNEE Right 10/14/2019    LEG AMPUTATION ABOVE KNEE performed by Tree Almazan MD at . [facility] 126 TISS N-SLCTV DBRDMT W/O ANES 1 SESS Right 5/10/2019    INCISION AND DEBRIDEMENT WOUND RIGHT BUTTOCK, ROOM 270 performed by Nicol Vazquez MD at [address] [facility]  2015    patient had a boil break open inside rectum.  needed surgery for infection removal.   ,   Social History     Tobacco Use    Smoking status: Current Every Day Smoker     Packs/day: 0.25     Years: 58.00     Pack years: 14.50     Types: Cigarettes    Smokeless tobacco: Never Used   Substance Use Topics    Alcohol use: No    Drug use: Yes     Types: Marijuana   , No family history on file.,   Immunization History   Administered Date(s) Administered    Influenza, Intradermal, Quadrivalent, Preservative Free 09/14/2015    Zoster Recombinant (Shingrix) 09/14/2015   ,   Health Maintenance   Topic Date Due    Diabetic retinal exam  08/12/1962    Lipid screen 08/12/1962    Diabetic microalbuminuria test  08/12/1970    DTaP/Tdap/Td vaccine (1 - Tdap) 08/12/1971    Colon cancer screen colonoscopy  08/12/2002    Shingles Vaccine (2 of 2) 11/09/2015    Pneumococcal 65+ years Vaccine (1 of 1 - PPSV23) 08/12/2017    Annual Wellness Visit (AWV)  06/07/2020    Flu vaccine (1) 09/01/2020    Diabetic foot exam  10/04/2020    A1C test (Diabetic or Prediabetic)  01/20/2021    Potassium monitoring  07/10/2021    Creatinine monitoring  07/10/2021    AAA screen  Completed    Hepatitis C screen  Completed    Hepatitis A vaccine  Aged Out    Hib vaccine  Aged Out    Meningococcal (ACWY) vaccine  Aged Out       PHYSICAL EXAMINATION:  [ INSTRUCTIONS:  \"[x]\" Indicates a positive item  \"[]\" Indicates a negative item  -- DELETE ALL ITEMS NOT EXAMINED]  Vital Signs: (As obtained by patient/caregiver or practitioner observation)    Blood pressure- 120/95 Heart rate- 61   Respiratory rate- 20   Temperature- 98.1 Pulse oximetry-  90% on 02 at time of visit    Constitutional: [x] Appears well-developed and well-nourished [] No apparent distress      [x] Abnormal- mildly lethargic; slightly more lethargic than normal    Mental status  [x] Alert and awake  [x] Oriented to person/place/time [x]Able to follow commands      Eyes:  EOM    [x]  Normal  [] Abnormal-  Sclera  [x]  Normal  [] Abnormal -         Discharge [x]  None visible  [] Abnormal -    HENT:   [x] Normocephalic, atraumatic. [] Abnormal   [x] Mouth/Throat: Mucous membranes are moist.     Pulmonary/Chest: [x] Respiratory effort normal.  [x] No visualized signs of difficulty breathing or respiratory distress. Nursing staff reports no new abnormal lung sounds. Patient does have chronic wheeze that is unchanged.     [] Abnormal-        Neurological:        [x] No Facial Asymmetry (Cranial nerve 7 motor function) (limited exam to video visit)          [x] No gaze palsy        [] Abnormal-         Skin:        [x] No significant exanthematous lesions or discoloration noted on facial skin         [] Abnormal-            Psychiatric:       [] Normal Affect [] No Hallucinations        [] Abnormal-     Other pertinent observable physical exam findings-     Due to this being a TeleHealth encounter, evaluation of the following organ systems is limited: Vitals/Constitutional/EENT/Resp/CV/GI//MS/Neuro/Skin/Heme-Lymph-Imm. ASSESSMENT/PLAN:  1. Acute and chronic respiratory failure with hypoxia (HCC)  Continue emphasizing O2 via nasal cannula. Monitor SPO2 routinely. See orders for details. Begin Augmentin prophylactically for possible pneumonia. 2. Abnormal weight gain  Continue Lasix and metolazone as ordered. Follow-up with weights. May continue metolazone further as adjunctive therapy with Lasix. Will monitor renal function periodically to assess. 3. Hx of recurrent pna -see #1    No follow-ups on file. An  electronic signature was used to authenticate this note. --MARIAH Montalvo on 7/22/2020 at 12:40 PM        Pursuant to the emergency declaration under the [facility] waiver authority and the [employer] and [employer] Act, this Virtual  Visit was conducted, with patient's consent, to reduce the patient's risk of exposure to COVID-19 and provide continuity of care for an established patient. Services were provided through a video synchronous discussion virtually to substitute for in-person clinic visit.

## 2020-07-24 ENCOUNTER — OFFICE VISIT (OUTPATIENT)
Dept: GERIATRIC MEDICINE | Age: 70
End: 2020-07-24
Payer: MEDICARE

## 2020-07-24 PROCEDURE — 99309 SBSQ NF CARE MODERATE MDM 30: CPT | Performed by: PHYSICIAN ASSISTANT

## 2020-07-24 PROCEDURE — 1123F ACP DISCUSS/DSCN MKR DOCD: CPT | Performed by: PHYSICIAN ASSISTANT

## 2020-07-26 ENCOUNTER — HOSPITAL ENCOUNTER (EMERGENCY)
Age: 70
Discharge: HOME | End: 2020-07-27
Payer: MEDICARE

## 2020-07-26 ENCOUNTER — APPOINTMENT (OUTPATIENT)
Dept: GENERAL RADIOLOGY | Age: 70
End: 2020-07-26
Payer: MEDICARE

## 2020-07-26 LAB
BASE EXCESS ARTERIAL: 9 (ref -3–3)
BASOPHILS ABSOLUTE: 0 K/UL (ref 0–0.2)
BASOPHILS RELATIVE PERCENT: 0.3 %
CALCIUM IONIZED: 1.12 MMOL/L (ref 1.12–1.32)
EKG ATRIAL RATE: 74 BPM
EKG P AXIS: 65 DEGREES
EKG P-R INTERVAL: 208 MS
EKG Q-T INTERVAL: 434 MS
EKG QRS DURATION: 168 MS
EKG QTC CALCULATION (BAZETT): 481 MS
EKG R AXIS: 67 DEGREES
EKG T AXIS: -73 DEGREES
EKG VENTRICULAR RATE: 74 BPM
EOSINOPHILS ABSOLUTE: 0 K/UL (ref 0–0.7)
EOSINOPHILS RELATIVE PERCENT: 0.1 %
GFR AFRICAN AMERICAN: 30
GFR NON-AFRICAN AMERICAN: 25
GLUCOSE BLD-MCNC: 155 MG/DL (ref 60–115)
HCO3 ARTERIAL: 34.7 MMOL/L (ref 21–29)
HCT VFR BLD CALC: 51.1 % (ref 42–52)
HEMOGLOBIN: 16 G/DL (ref 14–18)
HEMOGLOBIN: 19.9 GM/DL (ref 13.5–17.5)
LACTATE: 1.27 MMOL/L (ref 0.4–2)
LYMPHOCYTES ABSOLUTE: 0.6 K/UL (ref 1–4.8)
LYMPHOCYTES RELATIVE PERCENT: 10.8 %
MCH RBC QN AUTO: 31.6 PG (ref 27–31.3)
MCHC RBC AUTO-ENTMCNC: 31.4 % (ref 33–37)
MCV RBC AUTO: 100.8 FL (ref 80–100)
MONOCYTES ABSOLUTE: 0.1 K/UL (ref 0.2–0.8)
MONOCYTES RELATIVE PERCENT: 2.6 %
NEUTROPHILS ABSOLUTE: 5 K/UL (ref 1.4–6.5)
NEUTROPHILS RELATIVE PERCENT: 86.2 %
O2 SAT, ARTERIAL: 88 % (ref 93–100)
PCO2 ARTERIAL: 64 MM HG (ref 35–45)
PDW BLD-RTO: 16.7 % (ref 11.5–14.5)
PERFORMED ON: ABNORMAL
PH ARTERIAL: 7.34 (ref 7.35–7.45)
PLATELET # BLD: 176 K/UL (ref 130–400)
PO2 ARTERIAL: 60 MM HG (ref 75–108)
POC CHLORIDE: 101 MEQ/L (ref 99–110)
POC CREATININE: 2.6 MG/DL (ref 0.8–1.3)
POC FIO2: 12
POC HEMATOCRIT: 59 % (ref 41–53)
POC POTASSIUM: 4 MEQ/L (ref 3.5–5.1)
POC SAMPLE TYPE: ABNORMAL
POC SODIUM: 140 MEQ/L (ref 136–145)
RBC # BLD: 5.07 M/UL (ref 4.7–6.1)
TCO2 ARTERIAL: 37 (ref 22–29)
WBC # BLD: 5.8 K/UL (ref 4.8–10.8)

## 2020-07-26 PROCEDURE — 82803 BLOOD GASES ANY COMBINATION: CPT

## 2020-07-26 PROCEDURE — 84484 ASSAY OF TROPONIN QUANT: CPT

## 2020-07-26 PROCEDURE — 85014 HEMATOCRIT: CPT

## 2020-07-26 PROCEDURE — 93005 ELECTROCARDIOGRAM TRACING: CPT | Performed by: PERSONAL EMERGENCY RESPONSE ATTENDANT

## 2020-07-26 PROCEDURE — 80053 COMPREHEN METABOLIC PANEL: CPT

## 2020-07-26 PROCEDURE — 36415 COLL VENOUS BLD VENIPUNCTURE: CPT

## 2020-07-26 PROCEDURE — 36600 WITHDRAWAL OF ARTERIAL BLOOD: CPT

## 2020-07-26 PROCEDURE — 82565 ASSAY OF CREATININE: CPT

## 2020-07-26 PROCEDURE — 82330 ASSAY OF CALCIUM: CPT

## 2020-07-26 PROCEDURE — 83880 ASSAY OF NATRIURETIC PEPTIDE: CPT

## 2020-07-26 PROCEDURE — 84295 ASSAY OF SERUM SODIUM: CPT

## 2020-07-26 PROCEDURE — 85025 COMPLETE CBC W/AUTO DIFF WBC: CPT

## 2020-07-26 PROCEDURE — 6360000002 HC RX W HCPCS: Performed by: PERSONAL EMERGENCY RESPONSE ATTENDANT

## 2020-07-26 PROCEDURE — 83605 ASSAY OF LACTIC ACID: CPT

## 2020-07-26 PROCEDURE — 82435 ASSAY OF BLOOD CHLORIDE: CPT

## 2020-07-26 PROCEDURE — 84132 ASSAY OF SERUM POTASSIUM: CPT

## 2020-07-26 PROCEDURE — 96374 THER/PROPH/DIAG INJ IV PUSH: CPT

## 2020-07-26 PROCEDURE — 99284 EMERGENCY DEPT VISIT MOD MDM: CPT

## 2020-07-26 PROCEDURE — 83735 ASSAY OF MAGNESIUM: CPT

## 2020-07-26 PROCEDURE — 71045 X-RAY EXAM CHEST 1 VIEW: CPT

## 2020-07-26 RX ORDER — METHYLPREDNISOLONE SODIUM SUCCINATE 125 MG/2ML
125 INJECTION, POWDER, LYOPHILIZED, FOR SOLUTION INTRAMUSCULAR; INTRAVENOUS ONCE
Status: COMPLETED | OUTPATIENT
Start: 2020-07-26 | End: 2020-07-26

## 2020-07-26 RX ADMIN — METHYLPREDNISOLONE SODIUM SUCCINATE 125 MG: 125 INJECTION, POWDER, FOR SOLUTION INTRAMUSCULAR; INTRAVENOUS at 23:31

## 2020-07-26 ASSESSMENT — ENCOUNTER SYMPTOMS
COLOR CHANGE: 0
SHORTNESS OF BREATH: 0
COUGH: 0
SORE THROAT: 0
DIARRHEA: 0
VOMITING: 0
RHINORRHEA: 0
BLOOD IN STOOL: 0
NAUSEA: 0
ABDOMINAL PAIN: 0

## 2020-07-27 ENCOUNTER — OFFICE VISIT (OUTPATIENT)
Dept: GERIATRIC MEDICINE | Age: 70
End: 2020-07-27
Payer: MEDICARE

## 2020-07-27 VITALS
TEMPERATURE: 98.8 F | WEIGHT: 315 LBS | HEIGHT: 72 IN | SYSTOLIC BLOOD PRESSURE: 102 MMHG | DIASTOLIC BLOOD PRESSURE: 84 MMHG | HEART RATE: 82 BPM | BODY MASS INDEX: 42.66 KG/M2 | OXYGEN SATURATION: 92 % | RESPIRATION RATE: 20 BRPM

## 2020-07-27 LAB
ALBUMIN SERPL-MCNC: 2.9 G/DL (ref 3.5–4.6)
ALP BLD-CCNC: 106 U/L (ref 35–104)
ALT SERPL-CCNC: <5 U/L (ref 0–41)
ANION GAP SERPL CALCULATED.3IONS-SCNC: 19 MEQ/L (ref 9–15)
AST SERPL-CCNC: 36 U/L (ref 0–40)
BILIRUB SERPL-MCNC: 0.6 MG/DL (ref 0.2–0.7)
BUN BLDV-MCNC: 80 MG/DL (ref 8–23)
CALCIUM SERPL-MCNC: 8.7 MG/DL (ref 8.5–9.9)
CHLORIDE BLD-SCNC: 94 MEQ/L (ref 95–107)
CO2: 25 MEQ/L (ref 20–31)
CREAT SERPL-MCNC: 1.89 MG/DL (ref 0.7–1.2)
GFR AFRICAN AMERICAN: 43.1
GFR NON-AFRICAN AMERICAN: 35.6
GLOBULIN: 3.6 G/DL (ref 2.3–3.5)
GLUCOSE BLD-MCNC: 150 MG/DL (ref 70–99)
MAGNESIUM: 2.8 MG/DL (ref 1.7–2.4)
POTASSIUM SERPL-SCNC: 4.6 MEQ/L (ref 3.4–4.9)
PRO-BNP: NORMAL PG/ML
SODIUM BLD-SCNC: 138 MEQ/L (ref 135–144)
TOTAL PROTEIN: 6.5 G/DL (ref 6.3–8)
TROPONIN: 0.25 NG/ML (ref 0–0.01)

## 2020-07-27 PROCEDURE — 1123F ACP DISCUSS/DSCN MKR DOCD: CPT | Performed by: PHYSICIAN ASSISTANT

## 2020-07-27 PROCEDURE — 93010 ELECTROCARDIOGRAM REPORT: CPT | Performed by: INTERNAL MEDICINE

## 2020-07-27 PROCEDURE — 99309 SBSQ NF CARE MODERATE MDM 30: CPT | Performed by: PHYSICIAN ASSISTANT

## 2020-07-27 PROCEDURE — 2580000003 HC RX 258: Performed by: PERSONAL EMERGENCY RESPONSE ATTENDANT

## 2020-07-27 PROCEDURE — 96361 HYDRATE IV INFUSION ADD-ON: CPT

## 2020-07-27 RX ORDER — 0.9 % SODIUM CHLORIDE 0.9 %
500 INTRAVENOUS SOLUTION INTRAVENOUS ONCE
Status: COMPLETED | OUTPATIENT
Start: 2020-07-27 | End: 2020-07-27

## 2020-07-27 RX ADMIN — SODIUM CHLORIDE 500 ML: 9 INJECTION, SOLUTION INTRAVENOUS at 01:08

## 2020-07-27 NOTE — ED NOTES
Bed: 06  Expected date: 7/26/20  Expected time: 9:45 PM  Means of arrival: [facility]  Comments:  65 Samuel Beltran RN  07/26/20 9571

## 2020-07-27 NOTE — ED TRIAGE NOTES
Pt [facility] c/o sob. Pt states he feels more sob today and has been wearing oxygen today and usually doesn't have to wear O2. Pt is A&O x 4. Pt is a DNR. Pt refused Bipap. Bilateral amputee. Pt states he has gained 50 lbs in a month. Pt's breathing and respirations are equal and unlabored. Pt was recently treated for pneumonia. Pt was given lasix, and rocephin earlier at nursing home. Pt is afebrile.

## 2020-07-27 NOTE — ED NOTES
Lab was called to obtain blood on pt (pt has an iv but wouldn't give blood). Iv flushed well.       Tyson Phoenix, RN  07/26/20 4642

## 2020-07-27 NOTE — ED NOTES
Blood work is drawn and sent to lab. Patient is medicated and tolerated well.       Lucero Ariza RN  07/26/20 0511

## 2020-07-27 NOTE — ED NOTES
D/C instructions are given to the paramedics transferring the patient to the nursing home. Patient is transferred to cot with no incident. Left ER with no incident.       Yolis Rincon RN  07/27/20 8548

## 2020-07-27 NOTE — ED PROVIDER NOTES
[facility] ED  eMERGENCY dEPARTMENT eNCOUnter      Pt Name: Horacio Godoy  MRN: [id number]  [place] 1950  Date of evaluation: 7/26/2020  Provider: MARIAH Colon      HISTORY OF PRESENT ILLNESS    Horacio Godoy is a 81 y.o. male with PMHx of asthma, COPD with O2 at night, DM, obesity, neuropathy, PVD, sleep apnea, bilateral above-knee amputations presents to the emergency department with fatigue. Pt is from [facility]. He says for the past couple days intermittently he has been having fatigue and increase sleep. He denies fevers, cough, chest pain, shortness of breath, nausea, vomiting, diarrhea, constipation, urinary symptoms. He hasnt eaten anything all day. HPI    Nursing Notes were reviewed. REVIEW OF SYSTEMS       Review of Systems   Constitutional: Positive for appetite change and fatigue. Negative for chills and fever. HENT: Negative for congestion, rhinorrhea and sore throat. Respiratory: Negative for cough and shortness of breath. Cardiovascular: Negative for chest pain. Gastrointestinal: Negative for abdominal pain, blood in stool, diarrhea, nausea and vomiting. Genitourinary: Negative for difficulty urinating. Musculoskeletal: Negative for neck stiffness. Skin: Negative for color change and rash. Neurological: Negative for dizziness, syncope, weakness, light-headedness, numbness and headaches. All other systems reviewed and are negative.             PAST MEDICAL HISTORY     Past Medical History:   Diagnosis Date    Asthma     COPD (chronic obstructive pulmonary disease) ([address]     Diabetes mellitus due to underlying condition, controlled, with stage 2 chronic kidney disease ([address]     Morbid obesity ([address]     Neuropathy     PVD (peripheral vascular disease) ([address]     1 stent in left leg placed 09/2018 and 2 stents in right leg    Sleep apnea     Type 2 diabetes mellitus without complication ([address]          SURGICAL HISTORY       Past Surgical History:   Procedure Laterality Date    AMPUTATION ABOVE KNEE Right 10/14/2019    LEG AMPUTATION ABOVE KNEE performed by Ayanna Garcia MD at . [facility] 126 TISS N-SLCTV DBRDMT W/O ANES 1 SESS Right 5/10/2019    INCISION AND DEBRIDEMENT WOUND RIGHT BUTTOCK, ROOM 270 performed by Sonal Martino MD at [facility]  2015    patient had a boil break open inside rectum. needed surgery for infection removal.         CURRENT MEDICATIONS       Previous Medications    ALBUTEROL SULFATE  (90 BASE) MCG/ACT INHALER    Inhale 2 puffs into the lungs daily     ASPIRIN 81 MG CHEWABLE TABLET    Take 1 tablet by mouth daily    ATORVASTATIN (LIPITOR) 20 MG TABLET    Take 1 tablet by mouth nightly    FERROUS SULFATE 325 (65 FE) MG TABLET    Take 1 tablet by mouth 2 times daily (with meals)    FUROSEMIDE (LASIX) 40 MG TABLET    Take 40 mg by mouth daily Give 30 minutes after metolazone    GABAPENTIN (NEURONTIN) 800 MG TABLET    Take 1 tablet by mouth 3 times daily for 30 days. .    INSULIN GLARGINE (LANTUS) 100 UNIT/ML INJECTION VIAL    Inject 12 Units into the skin nightly    INSULIN LISPRO (HUMALOG) 100 UNIT/ML INJECTION VIAL    Inject 0-12 Units into the skin 3 times daily (with meals)    IPRATROPIUM-ALBUTEROL (DUONEB) 0.5-2.5 (3) MG/3ML SOLN NEBULIZER SOLUTION    Inhale 3 mLs into the lungs three times daily    ISOSORBIDE MONONITRATE (IMDUR) 30 MG EXTENDED RELEASE TABLET    Take 1 tablet by mouth daily    LANOLIN-PETROLATUM (A+D) OINTMENT    Apply 1 Tube topically 4 times daily as needed for Dry Skin    LIDOCAINE (LMX) 4 % CREAM    Apply topically as needed.     METOPROLOL TARTRATE (LOPRESSOR) 25 MG TABLET    Take 1 tablet by mouth 2 times daily    MULTIPLE VITAMINS-MINERALS (THERAPEUTIC MULTIVITAMIN-MINERALS) TABLET    Take 1 tablet by mouth daily    NICOTINE (NICODERM CQ) 21 MG/24HR    Place 1 patch onto the skin daily    PANTOPRAZOLE (PROTONIX) 40 MG TABLET    Take 1 tablet by mouth every morning symmetric. Psychiatric:         Behavior: Behavior normal.         Thought Content:  Thought content normal.         Judgment: Judgment normal.         DIAGNOSTIC RESULTS     EKG:All EKG's are interpreted by the Emergency Department Physician who either signs or Co-signs this chart in the absence of a cardiologist.    Normal sinus rhythm with right bundle branch block, rate 74, normal intervals, no ST segment changes    RADIOLOGY:   Non-plain film images such as CT, Ultrasound and MRI are read by theradiologist. Plain radiographic images are visualized and preliminarily interpreted by the emergency physician with the below findings:    Interpretation per theRadiologist below, if available at the time of this note:    XR CHEST PORTABLE    (Results Pending)           LABS:  Labs Reviewed   COMPREHENSIVE METABOLIC PANEL - Abnormal; Notable for the following components:       Result Value    Chloride 94 (*)     Anion Gap 19 (*)     Glucose 150 (*)     BUN 80 (*)     CREATININE 1.89 (*)     GFR Non- 35.6 (*)     GFR  43.1 (*)     Alb 2.9 (*)     Alkaline Phosphatase 106 (*)     Globulin 3.6 (*)     All other components within normal limits    Narrative:     CALL  [facility] tel. [phone],  BUN & TROP results called to and read back by Nguyễn Diamond RN, 07/27/2020  00:23, by BRUCE   CBC WITH AUTO DIFFERENTIAL - Abnormal; Notable for the following components:    .8 (*)     MCH 31.6 (*)     MCHC 31.4 (*)     RDW 16.7 (*)     Lymphocytes Absolute 0.6 (*)     Monocytes Absolute 0.1 (*)     All other components within normal limits   MAGNESIUM - Abnormal; Notable for the following components:    Magnesium 2.8 (*)     All other components within normal limits    Narrative:     CALL  [facility] tel. [phone],  BUN & TROP results called to and read back by Nguyễn Diamond RN, 07/27/2020  00:23, by BRUCE   TROPONIN - Abnormal; Notable for the following components:    Troponin 0.254 (*) All other components within normal limits    Narrative:     CALL  [facility] tel. [phone],  BUN & TROP results called to and read back by Janki Collazo RN, 07/27/2020  00:23, by [employer]   POCT ARTERIAL - Abnormal; Notable for the following components:    POC Glucose 155 (*)     POC Creatinine 2.6 (*)     GFR Non- 25 (*)     GFR African American 30 (*)     pH, Arterial 7.345 (*)     pCO2, Arterial 64 (*)     pO2, Arterial 60 (*)     HCO3, Arterial 34.7 (*)     Base Excess, Arterial 9 (*)     O2 Sat, Arterial 88 (*)     TCO2, Arterial 37 (*)     POC Hematocrit 59 (*)     Hemoglobin 19.9 (*)     All other components within normal limits   BRAIN NATRIURETIC PEPTIDE    Narrative:     CALL  [facility]  [facility] tel. [phone],  BUN & TROP results called to and read back by [place], 07/27/2020  00:23, by [address] [place] [address]       All other labs were within normal range or not returned as of this dictation. EMERGENCY DEPARTMENT COURSE and DIFFERENTIAL DIAGNOSIS/MDM:   Vitals:    Vitals:    07/26/20 2315 07/26/20 2316 07/26/20 2318 07/27/20 0027   BP: (!) 148/100  (!) 116/93 102/84   Pulse: 72  71 82   Resp: 20  20 20   Temp:       TempSrc:       SpO2: 96% 91% 92% 92%   Weight:       Height:             MDM    Chest x-ray shows no acute process. Patient on room air is 86% on 4L NC. He states he only wears oxygen while \"laying on his back \". Blood gas revealed pH 7.34, CO2 64, O2 60, HCO3 34.7. BUN 80, creatinine 1.89, anion gap 19, magnesium 2.8, proBNP 92797, troponin 0.254 . Patient does have history of chronically elevated troponins. This seems higher than normal, however kidney function has increased. Patient does clinically appear dry. He is not on diuretics. patient is alert and oriented, is sleeping in the room but easy to arouse. Drinking water at bedside. He will be admitted at this time. On high flow nasal cannula he is 95%.   Hospice has refused to admit patient d/t DNR-CC. I called nursing home and they will accept him with high flow nasal cannula. CRITICAL CARE TIME   Total Critical Caretime was 0 minutes, excluding separately reportable procedures. There was a high probability of clinically significant/life threatening deterioration in the patient's condition which required my urgent intervention. Procedures    FINAL IMPRESSION      1. Acute on chronic respiratory failure with hypoxia and hypercapnia (HCC)    2. KARTHIK (acute kidney injury) ([facility]    3. Dehydration    4. Hypermagnesemia    5. Elevated troponin          DISPOSITION/PLAN   DISPOSITION Decision To Discharge 07/27/2020 01:45:53 AM      PATIENT REFERRED TO:  Follow up with PCP today            DISCHARGE MEDICATIONS:  New Prescriptions    No medications on file          (Please notethat portions of this note were completed with a voice recognition program.  Efforts were made to edit the dictations but occasionally words are mis-transcribed. )    MARIAH Wade (electronically signed)  Emergency Physician Assistant         [place], [place]  07/27/20 4 [place], [place]  07/27/20 7436

## 2020-07-31 ENCOUNTER — OFFICE VISIT (OUTPATIENT)
Dept: GERIATRIC MEDICINE | Age: 70
End: 2020-07-31
Payer: MEDICARE

## 2020-07-31 VITALS
TEMPERATURE: 99.4 F | BODY MASS INDEX: 43.13 KG/M2 | DIASTOLIC BLOOD PRESSURE: 64 MMHG | RESPIRATION RATE: 18 BRPM | WEIGHT: 315 LBS | SYSTOLIC BLOOD PRESSURE: 109 MMHG | OXYGEN SATURATION: 92 %

## 2020-07-31 PROCEDURE — 1123F ACP DISCUSS/DSCN MKR DOCD: CPT | Performed by: PHYSICIAN ASSISTANT

## 2020-07-31 PROCEDURE — 99309 SBSQ NF CARE MODERATE MDM 30: CPT | Performed by: PHYSICIAN ASSISTANT

## 2020-07-31 NOTE — PROGRESS NOTES
[facility]    HPI: Vital signs reviewed, blood pressure 109/64, temperature 99.4 degree F, respirations 18, 92% on approximately 10 L and weight is 318. The patient recently returned from the hospital due to SpO2 of approximately 43%. He did have a chest x-ray done showing a perforated left lower lung, currently on IM Rocephin 1 g times two days. The patient stated up to nurse practitioner that he does not desire any further ED visits and wants to continue [place], does not want extensive measures taken for life-saving. Discussed today with patient whether or not that the concept of him going to the ED for his frequent exacerbations of COPD and smoking related issues. The patient continues to want to smoke, stated so today. States that he does not want hospice, but would like hospice leg measures and comfort care all the same. Expressed to him that as long as he is not hospice, we will continue to do as thorough workups as needed to treat and that hospice is a likely benefit to him given his care goals, which are comfort care, and nonintensive therapy. We will order morphine, Ativan, and Levsin today for respiratory distress. See orders for details. Discussed patient's end-of-life care and terminal illness care as well as his spiritual and emotional wellness extensively today. Time in care for patient approximately 30-35 minutes. We will recommend hospice at a future date as well due to patient's care goals. MEDICATIONS:  Reviewed. ALLERGIES:  Reviewed.     Past Medical History:   Diagnosis Date    Asthma     COPD (chronic obstructive pulmonary disease) ([address]     Diabetes mellitus due to underlying condition, controlled, with stage 2 chronic kidney disease ([address]     Morbid obesity ([address]     Neuropathy     [facility]VD [facility]per[facility]) [facility]     1 stent in left leg placed 09/2018 and 2 stents in right leg    Sleep apnea     Type 2 diabetes mellitus without complication [facility])      Social History     Socioeconomic History    Marital status:      Spouse name: Not on file    Number of children: Not on file    Years of education: Not on file    Highest education level: Not on file   Occupational History    Not on file   Social Needs    Financial resource strain: Not on file    Food insecurity     Worry: Not on file     Inability: Not on file    Transportation needs     Medical: Not on file     Non-medical: Not on file   Tobacco Use    Smoking status: Current Every Day Smoker     Packs/day: 0.25     Years: 58.00     Pack years: 14.50     Types: Cigarettes    Smokeless tobacco: Never Used   Substance and Sexual Activity    Alcohol use: No    Drug use: Yes     Types: Marijuana    Sexual activity: Not on file   Lifestyle    Physical activity     Days per week: Not on file     Minutes per session: Not on file    Stress: Not on file   Relationships    Social connections     Talks on phone: Not on file     Gets together: Not on file     Attends Congregational service: Not on file     Active member of club or organization: Not on file     Attends meetings of clubs or organizations: Not on file     Relationship status: Not on file    Intimate partner violence     Fear of current or ex partner: Not on file     Emotionally abused: Not on file     Physically abused: Not on file     Forced sexual activity: Not on file   Other Topics Concern    Not on file   Social History Narrative         Lives With: Alone     Pt is from home but home was not going well prior to this admission. Pt lives alone. Pt was recently at a SNF [facility] but he does not want to return there. Pt is agreeable to [facility] if [facility] will take him. Referral called to Harmeet Hull at [place].       Type of Home: Apartment One level    Home Access: Stairs to enter without rails - Number of Steps: 1 (working with landlord to get a ramp)    Bathroom Shower/Tub: Tub/Shower unit(w/c does not fit into the bathroom)    Bathroom Equipment: 3-in-1 commode    Home Equipment: [employer]    ADL Assistance: Independent(\"With baby wipes, not very good\")    Homemaking Assistance: Needs assistance    Ambulation Assistance: (w/c)    Transfer Assistance: Independent(was using slideboard at home)    Active : No    Additional Comments: Has been sleeping on the sofa, cannot get into the bedroom or bathroom at home as [facility] does not fit in the doorways. Girlfriend or guys from the apartment do his shopping    Social/Functional History              REVIEW OF SYSTEMS:  Constitutional:  Low-grade temperature elevation of 99.4. No new NVD. The patient is globally fatigued and weak, status post ED visit. Weight is elevated, morbidly obese. HEENT:  Denies any headache, shows some signs of very mild confusion and lightheadedness. No vision changes. Neurologic:  No new syncope, minor slurring of speech. Denies any numbness, tingling, beyond baseline. Cardiac:  Denies chest pain, palpitations, does complain of intermittent orthopnea. He did receive IV Lasix at Hospital, which relieved symptoms somewhat he said. CANCINO reported, some lower leg edema and abdominal edema. Denies any claudication, double BKA. Pulmonary:  Complains of mild to moderate shortness of breath, moderate cough. Denies any POI, hemoptysis, finger clubbing or increased sputum production. Does have ongoing chronic wheeze that has worsened. Psychiatric:  Mild stress anxiety. Fair mood. No memory deficits. Remaining 14-point ROS unremarkable at this time. No urinary or GI complaints at the moment. PHYSICAL EXAMINATION:  General:  Fair hygiene, subdued affect, mild distress today. HEENT:  PERRLA, some minor injection and possible mild icterus noted ? MMM, no erythema or exudate. No lymphadenopathy noted. Trachea midline. Cardiac:  Regular rate and rhythm. No murmurs, rubs, or gallops noted today.   Some lower extremity edema above the knee due to double BKA. Pulmonary:  Left lungs show diffuse wheezing/rhonchi. Right lungs showed diffuse expiratory and inspiratory wheezing. Abdomen:  Normal bowel sounds. Soft and nontender, abdomen, morbidly obese abdomen. Skin:  Turgor within normal limits. Does appear to have some fluid beneath the skin globally/global edema. Mental Status: The patient is awake, alert, oriented, 3/3. Good memory and good command following. ASSESSMENT AND PLAN:  1. Chronic respiratory distress w/hypoxia - continue monitoring O2. Continue on O2 therapy as ordered. Continue Rocephin. We may order further IV or oral antibiotics. 2.   History of pneumonia-we will order laboratory work, see #1. We will continue Rocephin. We will continue antibiotics as needed. 3.   History of CKD-we will monitor BMP. We will monitor fluid retention and weight weekly. We will add IV Lasix as needed. 4.   Generalized weakness/failure to thrive-recommend hospice consult for patient's end-of-life goals and long-term care goals at this time. The patient denied. We will continue to monitoring and recommend as warranted. I spent greater than >35 minutes with the patient, direct patient contact, at bedside face to face and on the patient's unit for multiple complex medical problem for diagnosis of chronic respiratory distress in setting of COPD as well as hospice discussion, review of abnormal lab results, diagnosis possibilities, treatment options, risks and benefits, reviewing hospital charts, labs; discussion with the therapists and nursing regarding rehabilitation  POC and services.         Electronically Signed By: Jin Howe PA-C on 07/28/2020 21:44:22  ______________________________  JEWEL Castro/[id number]  D: 07/27/2020 16:24:13  T: 07/28/2020 12:38:08    cc: - [phone] [facility]

## 2020-08-03 ENCOUNTER — VIRTUAL VISIT (OUTPATIENT)
Dept: GERIATRIC MEDICINE | Age: 70
End: 2020-08-03
Payer: MEDICARE

## 2020-08-03 PROCEDURE — 99309 SBSQ NF CARE MODERATE MDM 30: CPT | Performed by: PHYSICIAN ASSISTANT

## 2020-08-03 PROCEDURE — 1123F ACP DISCUSS/DSCN MKR DOCD: CPT | Performed by: PHYSICIAN ASSISTANT

## 2020-08-04 ENCOUNTER — OFFICE VISIT (OUTPATIENT)
Dept: GERIATRIC MEDICINE | Age: 70
End: 2020-08-04
Payer: MEDICARE

## 2020-08-04 PROCEDURE — 99309 SBSQ NF CARE MODERATE MDM 30: CPT | Performed by: PHYSICIAN ASSISTANT

## 2020-08-04 PROCEDURE — 1123F ACP DISCUSS/DSCN MKR DOCD: CPT | Performed by: PHYSICIAN ASSISTANT

## 2020-08-05 LAB
ALBUMIN SERPL-MCNC: 2.8 G/DL
ALP BLD-CCNC: 90 U/L
ALT SERPL-CCNC: 2 U/L
ANION GAP SERPL CALCULATED.3IONS-SCNC: NORMAL MMOL/L
AST SERPL-CCNC: 18 U/L
BASOPHILS ABSOLUTE: NORMAL
BASOPHILS RELATIVE PERCENT: NORMAL
BILIRUB SERPL-MCNC: 0.4 MG/DL (ref 0.1–1.4)
BUN BLDV-MCNC: 49 MG/DL
CALCIUM SERPL-MCNC: 8.4 MG/DL
CHLORIDE BLD-SCNC: 97 MMOL/L
CO2: 37 MMOL/L
CREAT SERPL-MCNC: 1.1 MG/DL
EOSINOPHILS ABSOLUTE: NORMAL
EOSINOPHILS RELATIVE PERCENT: NORMAL
GFR CALCULATED: NORMAL
GLUCOSE BLD-MCNC: 156 MG/DL
HCT VFR BLD CALC: 49.4 % (ref 41–53)
HEMOGLOBIN: 15 G/DL (ref 13.5–17.5)
LYMPHOCYTES ABSOLUTE: NORMAL
LYMPHOCYTES RELATIVE PERCENT: NORMAL
MCH RBC QN AUTO: 30.8 PG
MCHC RBC AUTO-ENTMCNC: 30.3 G/DL
MCV RBC AUTO: 101.6 FL
MONOCYTES ABSOLUTE: NORMAL
MONOCYTES RELATIVE PERCENT: NORMAL
NEUTROPHILS ABSOLUTE: NORMAL
NEUTROPHILS RELATIVE PERCENT: NORMAL
PLATELET # BLD: 198 K/ΜL
PMV BLD AUTO: 10.1 FL
POTASSIUM SERPL-SCNC: 4.4 MMOL/L
RBC # BLD: 4.86 10^6/ΜL
SODIUM BLD-SCNC: 143 MMOL/L
TOTAL PROTEIN: 5.6
WBC # BLD: 7.3 10^3/ML

## 2020-08-05 NOTE — PROGRESS NOTES
[facility]    This is a review of tele visit via face time due to [place] pandemic. Vital signs reviewed today, see [facility] for details. The patient is being seen today for right upper leg AKA wound and subsequent cellulitis. The patient bumped into his door frame while ambulating with wheelchair causing an approximate 3 to 5 cm dehiscence of old surgical site, currently being treated with gentian violet and simple dressing covering weeping area. It is seen today via tele visit, slightly weeping with some very minor sluff on top aspect of wound, almost proximally. There is mildly warm, inflamed area surrounding the wound. The patient is currently not on any antibiotics at the moment. We will continue the gentian violet. We will see wound care physician on Wednesday for further instruction. We will monitor via wound care nurse. The patient offers no further complaints today. We will continue wound care orders for the time being. We will add a low-dose Bactrim due to the patient's kidney function for prevention of further cellulitis. 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  We will perform labs and followup. MEDICATIONS:  Reviewed. ALLERGIES:  Reviewed.     Past Medical History:   Diagnosis Date    Asthma     COPD (chronic obstructive pulmonary disease) ([address]     Diabetes mellitus due to underlying condition, controlled, with stage 2 chronic kidney disease ([address]     Morbid obesity ([address]     Neuropathy     PVD (peripheral vascular disease) ([address]     1 stent in left leg placed 09/2018 and 2 stents in right leg    Sleep apnea     Type 2 diabetes mellitus without complication ([address]      Social History     Socioeconomic History    Marital status:      Spouse name: Not on file    Number of children: Not on file    Years of education: Not on file    Highest education level: Not on file   Occupational History    Not on file   Social Needs    Financial resource strain: Not on file    Food insecurity     Worry: Not on file     Inability: Not on file    Transportation needs     Medical: Not on file     Non-medical: Not on file   Tobacco Use    Smoking status: Current Every Day Smoker     Packs/day: 0.25     Years: 58.00     Pack years: 14.50     Types: Cigarettes    Smokeless tobacco: Never Used   Substance and Sexual Activity    Alcohol use: No    Drug use: Yes     Types: Marijuana    Sexual activity: Not on file   Lifestyle    Physical activity     Days per week: Not on file     Minutes per session: Not on file    Stress: Not on file   Relationships    Social connections     Talks on phone: Not on file     Gets together: Not on file     Attends Anglican service: Not on file     Active member of club or organization: Not on file     Attends meetings of clubs or organizations: Not on file     Relationship status: Not on file    Intimate partner violence     Fear of current or ex partner: Not on file     Emotionally abused: Not on file     Physically abused: Not on file     Forced sexual activity: Not on file   Other Topics Concern    Not on file   Social History Narrative         Lives With: Alone     Pt is from home but home was not going well prior to this admission. Pt lives alone. Pt was recently at a SNF in [facility] but he does not want to return there. Pt is agreeable to [facility] if they will take him. Referral called to Dee Dee Garcia at [place].       Type of Home: Apartment One level    Home Access: Stairs to enter without rails - Number of Steps: 1 (working with landlord to get a ramp)    Bathroom Shower/Tub: Tub/Shower unit(w/c does not fit into the bathroom)    Bathroom Equipment: 3-in-1 commode    Home Equipment: [employer]    ADL Assistance: Independent(\"With baby wipes, not very good\")    Homemaking Assistance: Needs assistance    Ambulation Assistance: (w/c)    Transfer Assistance: Independent(was using slideboard at home) will give a stat dose of an extra 20 mg of Lasix 1 tab, he already received. We will continue wound care orders. We will follow up with wound care MD on Wednesday. I will do renal panel and CBC with diff to assess. We will follow up with the patient tomorrow in person.          Electronically Signed By: Ting Munroe PA-C on 08/05/2020 12:07:59  ______________________________  Ting Munroe PA-C  /[id number]  D: 08/03/2020 13:46:59  T: 08/04/2020 10:11:00    [phone]

## 2020-08-05 NOTE — PROGRESS NOTES
[facility]    HPI: Vital signs reviewed today, see  for details. The patient is being seen today for elevated BUN history. The patient had on 02/21 BUN of 33, on 03/02 BUN of 30, 06/23 45, on 06/30 53 respectively. The patient does have a history of chronic kidney disease, diabetes mellitus, and is typically having difficulty in ingesting adequate fluids. The patient has been on long-term diuretic therapy as well for lower extremity edema. The patient currently has bilateral DKA at the moment. Due to the patient's elevated BUN and lack of hydration, we will begin normal saline dermoclysis at 40 mL per hour for 1 L and do a BMP on Thursday. The patient also is having decreased SpO2 numbers ranging from 78 to 85%. The patient continually educated to be wearing his O2 while in room and at rest and frequently refuses. The patient is also chronic smoker. We will continue to encourage O2 therapy and document refusal.  The patient offers no further complaints today. MEDICATIONS:  Reviewed. ALLERGIES:  Reviewed. REVIEW OF SYSTEMS:  Constitutional:  No fever, chills. Denies any NVD. Denies any headache, confusion, lightheadedness at this time. Cardiac:  Denies chest pain, shortness of breath. Skin: Skin turgor seems to be within normal limits, slightly decreased. Pulmonary:  Lung sounds are overall bilaterally wheezy moderately. No increased sputum production or evidence of acute respiratory failure or distress. GI: No acute complaints. Appetite is adequate. :  Currently no new issues, remains at baseline. Psychiatric:  The patient's mood is overall stable, expresses no acute anxiety or depression symptoms at this moment in time. Remaining 14-point ROS unremarkable. PHYSICAL EXAMINATION:  General:  Fair hygiene, bright/normal affect, is in no acute distress. HEENT:  Mucous membranes are moist.  No erythema or exudate noted today.   Cardiac:  Regular rate and rhythm. No JVD noted. Pulmonary:  lung sounds are diffusely wheezy on both quadrants. No bibasilar crackles noted today. No new wheezes, rales, or rhonchi outside of baseline otherwise. Abdominal:  Normal bowel sounds, soft, nontender abdomen. Mental Status: The patient is awake, alert, oriented 3/3. ASSESSMENT AND PLAN:  1.   Dehydration/elevated BUN/CKD - will do BMP on Thursday. We will start hydro dermoclysis today with 0.9% normal saline at 40 mL per hour.   2.   Low SpO2/Hypoxia -  will continue to encourage oxygen therapy when at rest.  Document refusal.         Electronically Signed By: Ting Munroe PA-C on 08/02/2020 22:59:26  ______________________________  JEWEL Felipe/[id number]  D: 08/01/2020 19:40:43  T: 08/02/2020 12:07:53    cc: - [phone] [place] [address]

## 2020-08-06 NOTE — PROGRESS NOTES
[facility] COMMU    Seen for routine monthly visit for diabetes, peripheral neuropathy, skin breakdown. SUBJECTIVE:  This patient was evaluated in his room. The patient has not had any new evidence of fevers or chills. The patient denied any new chest pain or palpitations, remains globally weak. Oral intake has been fair. The patient has received ongoing local skin care. The patient remains morbidly obese at his baseline. Has had ongoing discussions regarding the need for CPAP. REVIEW OF SYSTEMS:  The patient denied chest pain, palpitations, nausea, vomiting. Past Medical History:   Diagnosis Date    Asthma     COPD (chronic obstructive pulmonary disease) ([address]     Diabetes mellitus due to underlying condition, controlled, with stage 2 chronic kidney disease ([address]     Morbid obesity ([address]     Neuropathy     PVD [facility]peripheral vascular disease) ([facility])     1 stent in left leg placed 09/2018 and 2 stents in right leg    Sleep apnea     Type 2 diabetes mellitus without complication ([facility]      Past Surgical History:   Procedure Laterality Date    AMPUTATION ABOVE KNEE Right 10/14/2019    LEG AMPUTATION ABOVE KNEE performed by Berny Sinegr MD at . [facility] 126 TISS N-SLCTV DBRDMT W/O ANES 1 SESS Right 5/10/2019    INCISION AND DEBRIDEMENT WOUND RIGHT BUTTOCK, ROOM 270 performed by Jayson Moralez MD at [address] [place] [address]  2015    patient had a boil break open inside rectum. needed surgery for infection removal.         OBJECTIVE:  He appeared chronically ill. Pupils are small. Oral mucosa was moist.  Chest showed no crackles, no wheezing. Cardiovascular exam showed a regular rate. Abdomen was soft and nontender. Extremities showed he has a breakdown of skin, please refer to nursing notes for detailed skin assessment.     Lab Results   Component Value Date    LABA1C 7.7 01/20/2020     No results found for: EAG  No results found for: CHOL  No results found for: TRIG  No results found for: HDL  No results found for: LDLCHOLESTEROL, LDLCALC  No results found for: LABVLDL, VLDL  No results found for: CHOLHDLRATIO      ASSESSMENT AND PLAN:  1. Diabetes. Continue supportive care. 2.   Neuropathy. The patient does not have evidence of end-organ disease or intermittent phantom pain. 3.   Diabetes. Repeat A1c, lipid panel. Monitoring blood sugars. The patient has had erratic blood sugars in the past.  We will follow as needed.         Electronically Signed By: Quintin Giang M.D. on 08/05/2020 00:55:41  ______________________________  Quintin Giang M.D.  /[id number]  D: 08/04/2020 18:16:07  T: 08/04/2020 19:39:20    cc: - [phone] [facility]

## 2020-08-06 NOTE — PROGRESS NOTES
The patient is morbidly obese. Chronic smoker, smells of smoke. Abdomen:  Obese abdomen, difficulty auscultating bowel sounds. Mental Status: The patient is awake, alert, oriented 3 out of 3. Good command following, good memory. ASSESSMENT AND PLAN:  Positive fecal OB/history of GERD:  Patient agreed to return to Protonix medication usage. We will follow up with further fecal OB and/or GI consult in the near future.         Electronically Signed By: Bridgett Keita PA-C on 08/06/2020 08:54:36  ______________________________  Bridgett Keita PA-C  IE/[id number]  D: 08/05/2020 19:04:31  T: 08/05/2020 19:55:43    cc: - [phone]

## 2020-08-07 ENCOUNTER — OFFICE VISIT (OUTPATIENT)
Dept: GERIATRIC MEDICINE | Age: 70
End: 2020-08-07
Payer: MEDICARE

## 2020-08-07 PROCEDURE — 1123F ACP DISCUSS/DSCN MKR DOCD: CPT | Performed by: PHYSICIAN ASSISTANT

## 2020-08-07 PROCEDURE — 99309 SBSQ NF CARE MODERATE MDM 30: CPT | Performed by: PHYSICIAN ASSISTANT

## 2020-08-24 NOTE — PROGRESS NOTES
PATIENT: Mariajose Em : 1950 DOS: 2020     [facility]    HPI: Vital signs reviewed, see [facility]. The patient is being seen today for acute visit on elevated BUN of 84 and creatinine of 1.8. The patient has had increasing azotemia, on establish with pre to postrenal, but otherwise due to the patient's fluid intake is fairly poor, does drink mostly coffee throughout the day with very little water intake. The patient has been having increased weight gain. See [facility] for trends. There is approximate 35 to 40 pound weight gain. We are doing further investigation for presence of ascites. The patient denies any abdominal pain. No nausea, vomiting, or diarrhea. The patient's appetite has been fair. The patient does complain about very poor bowel movements, states that he will have only few scant amounts every other day. Nursing staff does report some fair bowel movements; however, bowel sounds are within normal limits. There is no tenderness to palpation. On deep palpation of the patient's abdomen, there is potential presence of fluid. We will follow up with ultrasound results. We will do further CT scan to eval.  We will continue encouraging adequate fluids. We will do fluid via IV as needed. The patient denies any further complaints. The patient does continue to smoke and have oxygen on routinely throughout the day due to chronically low SpO2 and COPD history. MEDICATIONS:  Reviewed. ALLERGIES:  Reviewed. REVIEW OF SYSTEMS:  See HPI for pertinent ROS. PHYSICAL EXAMINATION:  General:  Displays overall fair hygiene, bright affect, is in no acute distress. Pupils equal, round, reactive to light. No icterus. No injection. Cardiac:  Regular rate and rhythm. No significant lower leg edema. The patient is bilateral AKA. Pulmonary:  Lung sounds show some diffuse expiratory and inspiratory wheezing in mid and upper lung fields. No new rhonchi noted.   Some diminished lung sounds due to body habitus. The patient is morbidly obese. Bowel sounds are fairly normal body habitus, causes some difficulty, no obvious splenomegaly or hepatomegaly noted. Some significant distention due to body flare and possible fluid distention. Nontender abdomen throughout. Mental Status: The patient is awake, alert, oriented 3/3. No change in mentation. Good command following and normal memory. ASSESSMENT AND PLAN:    1. Azotemia w/ hx of CKD stage 3 - will continue monitoring BMP. We will give fluids p.o. and then IV if the patient is unable to establish fluid intake and follow up with nephrology as needed. 2. High risk for fluid overload - pt is gaining signifcant weight of unknown etiology. Cont diuretics. Refer to nephrology for consult.         Electronically Signed By: Gypsy Ga PA-C on 08/21/2020 16:57:13  ______________________________  JEWEL Michaels/[id number]  D: 08/20/2020 18:08:59  T: 08/21/2020 11:11:23    cc: - [phone]

## 2020-08-26 NOTE — PROGRESS NOTES
PATIENT: Javier Germain : 1950 DOS: 2020     [facility] COMM[facility]      HPI: The patient is being seen today for increased abnormal weight gain. The patient states that he has gained approximately 45 pounds over the past 4 to 6 weeks. The patient does show increased evidence of abdominal distention. The patient does have a soft abdomen showing some increased sense of edema. We will continue to do daily weights times four days to continue assessment. The patient has been on long-term diuretic therapy with combination of Lasix and metolazone in the past.  We will re-add metolazone 2.5 mg p.o. twice weekly prior to his 40 mg of Lasix. We will reassess weights after four days to assess efficacy. The patient does have chronic kidney disease stage III, thus diuretic therapy is difficult due to patient's fragile kidney situation. The patient is producing urine while he is drinking adequately. The patient is not a heavy drinker of water. Drinks coffee primarily. We will do further follow up with ultrasound to assess for ascites as needed. The patient denies any abdominal pain or shortness of breath. No adventitial breath sounds noted on in lower lungs; no crackles. The patient is a bilateral above-knee amputee with some pitting edema at the site of amputation. No new ulcerations beyond last skin check. See wound care nurse notes. The patient's current renal function seems to be fair at this time. We will continue assessment with routine BMPs. We will follow up within a week for ongoing assessment and treatment. At this time, the patient does state that he does not want hemodialysis, as he believes it killed his father when Tereso Milner had gone on it in the past.\"    MEDICATIONS:  Reviewed. ALLERGIES:  Reviewed. REVIEW OF SYSTEMS:  See HPI. PHYSICAL EXAMINATION:  Vital signs reviewed in site today within normal limits. See [facility]. General:  The patient displays fair hygiene overall,

## 2020-08-31 ASSESSMENT — ENCOUNTER SYMPTOMS
CHOKING: 0
RHINORRHEA: 0
COUGH: 0
SORE THROAT: 0
APNEA: 1
NAUSEA: 0
CONSTIPATION: 1
VOMITING: 0
WHEEZING: 1
TROUBLE SWALLOWING: 0
DIARRHEA: 0
COLOR CHANGE: 0
STRIDOR: 0
ABDOMINAL DISTENTION: 1
SINUS PRESSURE: 0
SINUS PAIN: 0
CHEST TIGHTNESS: 1
ABDOMINAL PAIN: 0

## 2020-09-01 ASSESSMENT — ENCOUNTER SYMPTOMS
APNEA: 1
COLOR CHANGE: 0
WHEEZING: 1
CONSTIPATION: 1
DIARRHEA: 0
COUGH: 0
ABDOMINAL DISTENTION: 1
CHEST TIGHTNESS: 0
ABDOMINAL PAIN: 0
VOMITING: 0
CHOKING: 0
NAUSEA: 0
SHORTNESS OF BREATH: 1

## 2020-09-01 NOTE — PROGRESS NOTES
Subjective:      Patient ID: Fany Guo is a pleasant 78 y.o. male who presents today for:  No chief complaint on file. Patient seen today for a follow-up visit ongoing discussions regarding his ongoing decline. Patient noted to have fluid overload predominantly in the groin by nursing staff. Patient and I spent approximate 30 to 45 minutes discussing end-of-life issues possibility of transition to hospice care. Patient intermittently short of breath without chest pain. No new nausea vomiting. Please refer to review of systems for detailed assessment. Plan of care discussed with nursing staff. Attempted to reach out to the family regarding patient's end-of-life discussion. Additionally vital signs reviewed with nursing staff. Reviewed through electronic records in terms of blood pressure and trends towards subtherapeutic systolic pressure. Medications reviewed in terms of globally for hospice discussion as well as in terms of his hypertension/hypotension.         Patient Active Problem List   Diagnosis    BMI 45.0-49.9, adult ([address]    History of tobacco use    HARSHA on CPAP    Wheezing    Cellulitis    Paraplegia (HCC)    Leukocytosis    Unable to ambulate    Cellulitis of left foot    Gangrene associated with type 2 diabetes mellitus (HCC)    Venous stasis ulcer limited to breakdown of skin without varicose veins (HCC)    PAD (peripheral artery disease) (HCC)    Neuropathic pain    PVD (peripheral vascular disease) with claudication [facility]    Neuropathy due to peripheral vascular disease (HCC)    Phantom pain after amputation of lower extremity ([address]    Pneumonia    Pseudomonas urinary tract infection    Chronic indwelling Babin catheter     Past Medical History:   Diagnosis Date    Asthma     COPD (chronic obstructive pulmonary disease) ([address]     Diabetes mellitus due to underlying condition, controlled, with stage 2 chronic kidney disease ([address]     Morbid obesity ([address]     Physically abused: Not on file     Forced sexual activity: Not on file   Other Topics Concern    Not on file   Social History Narrative         Lives With: Alone     Pt is from home but home was not going well prior to this admission. Pt lives alone. Pt was recently at a SNF in [facility] but he does not want to return there. Pt is agreeable to [facility] if they will take him. Referral called to [place] at [place]. Type of Home: Apartment One level    Home Access: Stairs to enter without rails - Number of Steps: 1 (working with landlord to get a ramp)    Bathroom Shower/Tub: Tub/Shower unit(w/c does not fit into the bathroom)    Bathroom Equipment: 3-in-1 commode    Home Equipment: [address]    ADL Assistance: Independent(\"With baby wipes, not very good\")    Homemaking Assistance: Needs assistance    Ambulation Assistance: (w/c)    Transfer Assistance: Independent(was using slideboard at home)    Active : No    Additional Comments: Has been sleeping on the sofa, cannot get into the bedroom or bathroom at home as [address] does not fit in the doorways. Girlfriend or guys from the apartment do his shopping    Social/Functional History          No family history on file. Allergies   Allergen Reactions    Coconut Oil Nausea And Vomiting     Coconut meat only. Review of Systems   Constitutional: Positive for fatigue and unexpected weight change (increase ~ 40-45lbs in 1 month). Negative for fever. HENT: Negative. Respiratory: Positive for apnea, shortness of breath (w/exertion) and wheezing (baseline). Negative for cough, choking and chest tightness. Cardiovascular: Negative for chest pain and palpitations. Gastrointestinal: Positive for abdominal distention (morbidly obese; ?ascutes) and constipation (intermittent). Negative for abdominal pain, diarrhea, nausea and vomiting. Genitourinary: Positive for scrotal swelling (moderate; minimal erythema).  Negative for overview of lab results, and general physical exam of patient greater than 30 minutes.     Rosie Srinivasan, [id number]

## 2020-09-01 NOTE — PROGRESS NOTES
Subjective:      Patient ID: Laya Li is a pleasant 78 y.o. male who presents today for:  No chief complaint on file. Patient being seen today for acute visit regarding increased abdominal edema as well as ongoing intermittent episodes of hypoxia. Patient is a long-term COPD patient who is a chronic smoker. He has had increases in edema and overall weight that are significant over the past several days. Patient denies any breathing abnormalities, states that he feels \"just fine\". Does not complain of any increased respiratory distress, accessory muscle usage. Was treated with Rocephin and Lasix earlier this past Monday for pneumonia. Had visit at ED on Sunday, was treated with both prednisone, Lasix, and some IV fluids prior to release back to SNF. Has had recent hypoxic episodes with SPO2 reaching approximately 87% on room air over this past week. Does have PRN O2 therapy in place however inconsistent and does go out to smoke frequently throughout the day. It is difficult to evaluate patient's abdominal edema due to his already obese body habitus. There does not seem to be any acute swelling, mass, or obvious abnormality on palpation. Questionable fluid wave. Patient denies abdominal pain of any kind today. General consensus is that there is fluid buildup within the abdomen. We will attempt gentle diuresis at first and get more aggressive as needed due to patient's ongoing chronic kidney disease. Patient to get 40 mg p.o. Lasix daily. Does not elect to do aggressive IV diuresis at this time. Prefers comfort care measures. Patient will maintain on continuous oxygen as well whenever in the room. Encouraged not to smoke. Patient recently elected to begin comfort care measures although not discretely signing up for hospice. Patient will be seen by palliative care in the future for comfort care measures.   Additionally patient does have a right AKA stump wound that is being treated by wound needed surgery for infection removal.     Social History     Socioeconomic History    Marital status:      Spouse name: Not on file    Number of children: Not on file    Years of education: Not on file    Highest education level: Not on file   Occupational History    Not on file   Social Needs    Financial resource strain: Not on file    Food insecurity     Worry: Not on file     Inability: Not on file    Transportation needs     Medical: Not on file     Non-medical: Not on file   Tobacco Use    Smoking status: Current Every Day Smoker     Packs/day: 0.25     Years: 58.00     Pack years: 14.50     Types: Cigarettes    Smokeless tobacco: Never Used   Substance and Sexual Activity    Alcohol use: No    Drug use: Yes     Types: Marijuana    Sexual activity: Not on file   Lifestyle    Physical activity     Days per week: Not on file     Minutes per session: Not on file    Stress: Not on file   Relationships    Social connections     Talks on phone: Not on file     Gets together: Not on file     Attends Evangelical service: Not on file     Active member of club or organization: Not on file     Attends meetings of clubs or organizations: Not on file     Relationship status: Not on file    Intimate partner violence     Fear of current or ex partner: Not on file     Emotionally abused: Not on file     Physically abused: Not on file     Forced sexual activity: Not on file   Other Topics Concern    Not on file   Social History Narrative         Lives With: Alone     Pt is from home but home was not going well prior to this admission. Pt lives alone. Pt was recently [facility] but he does not want to return there. Pt is agreeable to [facility] if they will take him. Referral called to Kristy Lyn at [place].       Type of Home: Apartment One level    Home Access: Stairs to enter without rails - Number of Steps: 1 (working with landlord to get a ramp)    Bathroom Shower/Tub: Tub/Shower unit(w/c does not fit into the bathroom)    Bathroom Equipment: 3-in-1 commode    Home Equipment: Wheelchair-manual    ADL Assistance: Independent(\"With baby wipes, not very good\")    Homemaking Assistance: Needs assistance    Ambulation Assistance: (w/c)    Transfer Assistance: Independent(was using slideboard at home)    Active : No    Additional Comments: Has been sleeping on the sofa, cannot get into the bedroom or bathroom at home as [facility] does not fit in the doorways. Girlfriend or guys from the apartment do his shopping    Social/Functional History          No family history on file. Allergies   Allergen Reactions    Coconut Oil Nausea And Vomiting     Coconut meat only. Review of Systems   Constitutional: Positive for fatigue (baseline, generalized) and unexpected weight change (~45 lbs in past month). Negative for activity change, appetite change, chills, diaphoresis and fever. HENT: Negative for congestion, postnasal drip, rhinorrhea, sinus pressure, sinus pain, sneezing, sore throat and trouble swallowing. Respiratory: Positive for apnea (utilizes biPap; freq refusals), chest tightness and wheezing. Negative for cough, choking and stridor. Shortness of breath: with exertion; no mention while on 02. Gastrointestinal: Positive for abdominal distention and constipation (moderate, intermittent). Negative for abdominal pain, diarrhea, nausea and vomiting. Genitourinary: Negative for decreased urine volume. Musculoskeletal: Positive for arthralgias. Skin: Negative for color change and pallor. Psychiatric/Behavioral: Negative for agitation, behavioral problems and confusion. The patient is not nervous/anxious. Objective: There were no vitals taken for this visit. Physical Exam  Constitutional:       General: He is not in acute distress. Appearance: He is obese. He is not ill-appearing. HENT:      Head: Normocephalic and atraumatic.       Nose: No congestion. Mouth/Throat:      Mouth: Mucous membranes are moist.      Pharynx: Oropharynx is clear. No oropharyngeal exudate or posterior oropharyngeal erythema. Eyes:      General: No scleral icterus. Extraocular Movements: Extraocular movements intact. Pupils: Pupils are equal, round, and reactive to light. Comments: injeciton bilateral     Neck:      Musculoskeletal: Normal range of motion. No neck rigidity or muscular tenderness. Cardiovascular:      Rate and Rhythm: Normal rate and regular rhythm. Pulses: Normal pulses. Heart sounds: Normal heart sounds. Pulmonary:      Effort: Pulmonary effort is normal. No respiratory distress. Breath sounds: Wheezing (bilateral exp wheezing) present. Comments: Difficult ausculation 2/2 body habitus    Neurological:      Mental Status: He is alert. Assessment:       Diagnosis Orders   1. Abdominal edema on examination     2. COPD with hypoxia ([address]     3. Hx of AKA (above knee amputation), right ([address]           Plan:      No orders of the defined types were placed in this encounter. No orders of the defined types were placed in this encounter. 1.  We will continue Lasix. We will add additional Lasix if kidneys can tolerate. Will monitor BMPs. Currently patient does not like to do further imaging. 2.  Continue routine O2. Encourage smoking cessation. 3.  Continue wound care orders. Monitor for further worsening. Currently stable and healing albeit slowly. No follow-ups on file. Side effects, adverse effects of the medication prescribed today, as well as treatment plan and result expectations have beendiscussed with the patient who expresses understanding and desires to proceed.     Doris Burton [place]

## 2020-09-04 NOTE — PROGRESS NOTES
The patient sleeps at angle in his bed with O2 present. Denies any abdominal pain, but does state constipation is present. We will assess questionable review [facility] for BM history. No new diarrhea. Denies any rectal bleeding, indigestion, or dysphagia. Denies any urinary urgency, polyuria, hematuria, or discharge. Does complain of scrotal swelling/edema. The patient's mood is overall above the same. He is slightly more dysthymic. The patient is depicting less desire to have medical workups and treatment. Remainder of 14-pound ROS unremarkable. PHYSICAL EXAMINATION:  General:  The patient displays fair hygiene overall, subdued affect, no acute distress noted. He is friendly overall; however, moderately uncooperative when it comes to testing and treatment protocols discussion. HEENT:  Pupils equal, round, reactive to light. Mild icterus. Mild injection. MMM without erythema or exudate on oropharyngeal exam.  No lymphadenopathy palpated. Trachea is midline. She has regular rate and rhythm. Heart sounds normal compared to baseline, some stump edema bilaterally in lower legs. History of BLE AKA. Lung sounds are difficult to auscultate due to body habitus. Show diffuse wheezing and ?rhonchi bilaterally/crackles. Abdominal:  Normal bowel sounds, difficult auscultating. Her abdomen is slightly tense and distended. Questionable ascites/abdominal edema. Mental Status: The patient is awake, alert, oriented 3/3. Good memory. Good command following. No change in mentation. ASSESSMENT AND PLAN:  1. Scrotal swelling - will add IV Lasix, see orders. Urology consult possibility if the patient tolerates. 2.   Abdominal edema/ascites - will add IV Lasix to be given for one time order. May titrate up on subsequent orders if renal function tolerates. Abdominal ultrasound, spot urine, protein/creatinine, serum GTT, amylase/lipase and UA if reflux.   3.   ?cellulitis of pannus  - will evaluate CBC.  We will treat with antibiotics as needed to be followed by wound care to monitor for wounds.            ______________________________  JEWEL Coon/[id number]  D: 09/03/2020 14:48:36  T: 09/04/2020 07:37:17    cc: - [id number] [facility]
